# Patient Record
Sex: FEMALE | Race: WHITE | NOT HISPANIC OR LATINO | Employment: PART TIME | URBAN - METROPOLITAN AREA
[De-identification: names, ages, dates, MRNs, and addresses within clinical notes are randomized per-mention and may not be internally consistent; named-entity substitution may affect disease eponyms.]

---

## 2017-01-17 DIAGNOSIS — M54.41 ACUTE RIGHT-SIDED LOW BACK PAIN WITH RIGHT-SIDED SCIATICA: Primary | ICD-10-CM

## 2017-01-17 RX ORDER — HYDROCODONE BITARTRATE AND ACETAMINOPHEN 5; 325 MG/1; MG/1
1 TABLET ORAL EVERY 6 HOURS PRN
Qty: 45 TABLET | Refills: 0 | Status: SHIPPED | OUTPATIENT
Start: 2017-01-17 | End: 2017-02-01

## 2017-02-01 ENCOUNTER — OFFICE VISIT (OUTPATIENT)
Dept: FAMILY MEDICINE | Facility: CLINIC | Age: 47
End: 2017-02-01
Payer: COMMERCIAL

## 2017-02-01 VITALS
DIASTOLIC BLOOD PRESSURE: 80 MMHG | HEIGHT: 66 IN | SYSTOLIC BLOOD PRESSURE: 130 MMHG | WEIGHT: 142 LBS | BODY MASS INDEX: 22.82 KG/M2 | TEMPERATURE: 98.2 F | HEART RATE: 80 BPM

## 2017-02-01 DIAGNOSIS — M25.511 ACUTE PAIN OF RIGHT SHOULDER: Primary | ICD-10-CM

## 2017-02-01 DIAGNOSIS — F43.23 ADJUSTMENT DISORDER WITH MIXED ANXIETY AND DEPRESSED MOOD: ICD-10-CM

## 2017-02-01 PROCEDURE — 99214 OFFICE O/P EST MOD 30 MIN: CPT | Performed by: NURSE PRACTITIONER

## 2017-02-01 RX ORDER — TRAMADOL HYDROCHLORIDE 50 MG/1
50-100 TABLET ORAL EVERY 8 HOURS PRN
Qty: 60 TABLET | Refills: 0 | Status: SHIPPED | OUTPATIENT
Start: 2017-02-01 | End: 2017-04-12

## 2017-02-01 RX ORDER — NAPROXEN 500 MG/1
500 TABLET ORAL 2 TIMES DAILY PRN
Qty: 60 TABLET | Refills: 1 | Status: SHIPPED | OUTPATIENT
Start: 2017-02-01 | End: 2017-04-12

## 2017-02-01 RX ORDER — ALPRAZOLAM 0.5 MG
0.5 TABLET ORAL 3 TIMES DAILY PRN
Qty: 90 TABLET | Refills: 0 | Status: SHIPPED | OUTPATIENT
Start: 2017-02-01 | End: 2017-03-28

## 2017-02-01 RX ORDER — FLUOXETINE 40 MG/1
40 CAPSULE ORAL DAILY
Qty: 30 CAPSULE | Refills: 1 | Status: SHIPPED | OUTPATIENT
Start: 2017-02-01 | End: 2018-06-27

## 2017-02-01 NOTE — MR AVS SNAPSHOT
After Visit Summary   2/1/2017    Liliana Kat    MRN: [id number]           Patient Information     Date Of Birth          1970        Visit Information        Provider Department      2/1/2017 8:30 AM Paula Villa APRN [facility]        Today's Diagnoses     Adjustment disorder with mixed anxiety and depressed mood    -  1     Acute pain of right shoulder           Care Instructions      Preventive Health Recommendations  Female Ages 40 to 49    Yearly exam:     See your health care provider every year in order to  1. Review health changes.   2. Discuss preventive care.    3. Review your medicines if your doctor prescribed any.      Get a Pap test every three years (unless you have an abnormal result and your provider advises testing more often).      If you get Pap tests with HPV test, you only need to test every 5 years, unless you have an abnormal result. You do not need a Pap test if your uterus was removed (hysterectomy) and you have not had cancer.      You should be tested each year for STDs (sexually transmitted diseases), if you're at risk.       Ask your doctor if you should have a mammogram.      Have a colonoscopy (test for colon cancer) if someone in your family has had colon cancer or polyps before age 50.       Have a cholesterol test every 5 years.       Have a diabetes test (fasting glucose) after age 45. If you are at risk for diabetes, you should have this test every 3 years.    Shots: Get a flu shot each year. Get a tetanus shot every 10 years.     Nutrition:     Eat at least 5 servings of fruits and vegetables each day.    Eat whole-grain bread, whole-wheat pasta and brown rice instead of white grains and rice.    Talk to your provider about Calcium and Vitamin D.     Lifestyle    Exercise at least 150 minutes a week (an average of 30 minutes a day, 5 days a week). This will help you control your weight and prevent disease.    Limit alcohol  to one drink per day.    No smoking.     Wear sunscreen to prevent skin cancer.    See your dentist every six months for an exam and cleaning.        Follow-ups after your visit        Additional Services     MENTAL HEALTH REFERRAL       Your provider has referred you to: [place]: [facility] - Counseling (Individual/Couples/Family) - [facility] [phone]   [email or web address]   *Please call to schedule an appointment.    All scheduling is subject to the client's specific insurance plan & benefits, provider/location availability, and provider clinical specialities.  Please arrive 15 minutes early for your first appointment and bring your completed paperwork.    Please be aware that coverage of these services is subject to the terms and limitations of your health insurance plan.  Call member services at your health plan with any benefit or coverage questions.                  Who to contact     If you have questions or need follow up information about today's clinic visit or your schedule please contact [facility] directly at [phone].  Normal or non-critical lab and imaging results will be communicated to you by MyChart, letter or phone within 4 business days after the clinic has received the results. If you do not hear from us within 7 days, please contact the clinic through [employer] or phone. If you have a critical or abnormal lab result, we will notify you by phone as soon as possible.  Submit refill requests through [employer] or call your pharmacy and they will forward the refill request to us. Please allow 3 business days for your refill to be completed.          Additional Information About Your Visit        [employer] Information     [employer] lets you send messages to your doctor, view your test results, renew your prescriptions, schedule appointments and more. To sign up, go to [email or web address] . Click  "on \"Log in\" on the left side of the screen, which will take you to the Welcome page. Then click on \"Sign up Now\" on the right side of the page.     You will be asked to enter the access code listed below, as well as some personal information. Please follow the directions to create your username and password.     Your access code is: [email or web address]  Expires: 2017 10:36 AM     Your access code will  in 90 days. If you need help or a new code, please call your [facility] clinic or [phone].        Care EveryWhere ID     This is your Care EveryWhere ID. This could be used by other organizations to access your [facility] medical records  [id number]        Your Vitals Were     Pulse Temperature Height BMI (Body Mass Index) Last Period       80 98.2  F (36.8  C) (Tympanic) 5' 5.8\" (1.671 m) 23.07 kg/m2 2006        Blood Pressure from Last 3 Encounters:   17 130/80   16 102/60   16 116/62    Weight from Last 3 Encounters:   17 142 lb (64.411 kg)   16 143 lb (64.864 kg)   16 143 lb (64.864 kg)              We Performed the Following     MENTAL HEALTH REFERRAL          Today's Medication Changes          These changes are accurate as of: 17 10:36 AM.  If you have any questions, ask your nurse or doctor.               Start taking these medicines.        Dose/Directions    ALPRAZolam 0.5 MG tablet   Commonly known as:  XANAX   Used for:  Adjustment disorder with mixed anxiety and depressed mood   Started by:  Paula Villa APRN CNP        Dose:  0.5 mg   Take 1 tablet (0.5 mg) by mouth 3 times daily as needed for anxiety   Quantity:  90 tablet   Refills:  0       FLUoxetine 40 MG capsule   Commonly known as:  PROzac   Used for:  Adjustment disorder with mixed anxiety and depressed mood   Started by:  Paula Villa APRN CNP        Dose:  40 mg   Take 1 capsule (40 mg) by mouth daily   Quantity:  30 capsule   Refills:  1       naproxen 500 MG tablet   Commonly " known as:  NAPROSYN   Used for:  Acute pain of right shoulder   Started by:  Paula Villa APRN CNP        Dose:  500 mg   Take 1 tablet (500 mg) by mouth 2 times daily as needed for moderate pain   Quantity:  60 tablet   Refills:  1       traMADol 50 MG tablet   Commonly known as:  ULTRAM   Used for:  Acute pain of right shoulder   Started by:  Paula Villa APRN CNP        Dose:   mg   Take 1-2 tablets ( mg) by mouth every 8 hours as needed for pain Maximum 3 tablet(s) per day   Quantity:  60 tablet   Refills:  0         Stop taking these medicines if you haven't already. Please contact your care team if you have questions.     citalopram 20 MG tablet   Commonly known as:  celeXA   Stopped by:  Paula Villa APRN CNP           hydrOXYzine 50 MG capsule   Commonly known as:  VISTARIL   Stopped by:  Paula Villa APRN CNP                Where to get your medicines      These medications were sent to [facility]  [address] [facility] [address] [facility] [address]     Phone:  [phone]    - FLUoxetine 40 MG capsule  - naproxen 500 MG tablet      Some of these will need a paper prescription and others can be bought over the counter.  Ask your nurse if you have questions.     Bring a paper prescription for each of these medications    - ALPRAZolam 0.5 MG tablet  - traMADol 50 MG tablet             Primary Care Provider Office Phone # Fax #    LOLIS Osorio [phone]        [address] [address]   [facility] [address]        Thank you!     Thank you for choosing [facility]  for your care. Our goal is always to provide you with excellent care. Hearing back from our patients is one way we can continue to improve our services. Please take a few minutes to complete the written survey that you may receive in the mail after your visit with us. Thank you!             Your Updated Medication  List - Protect others around you: Learn how to safely use, store and throw away your medicines at www.disposemymeds.org.          This list is accurate as of: 2/1/17 10:36 AM.  Always use your most recent med list.                   Brand Name Dispense Instructions for use    * albuterol (2.5 MG/3ML) 0.083% neb solution     1 BOX    ONE NEBULIZATION 4 TIMES DAILY AS NEEDED       * albuterol 108 (90 BASE) MCG/ACT Inhaler    PROAIR HFA/PROVENTIL HFA/VENTOLIN HFA    1 Inhaler    Inhale 2 puffs into the lungs every 6 hours as needed for shortness of breath / dyspnea or wheezing       ALPRAZolam 0.5 MG tablet    XANAX    90 tablet    Take 1 tablet (0.5 mg) by mouth 3 times daily as needed for anxiety       aspirin 81 MG tablet      Take 1 tablet by mouth daily.       cyclobenzaprine 10 MG tablet    FLEXERIL    30 tablet    Take 1 tablet (10 mg) by mouth 3 times daily as needed for muscle spasms       estradiol 1 MG tablet    ESTRACE    30 tablet    TAKE ONE TABLET BY MOUTH ONCE DAILY       FLUoxetine 40 MG capsule    PROzac    30 capsule    Take 1 capsule (40 mg) by mouth daily       fluticasone 50 MCG/ACT spray    FLONASE    1 Package    Spray 2 sprays into both nostrils daily       medroxyPROGESTERone 5 MG tablet    PROVERA    90 tablet    TAKE ONE TABLET BY MOUTH ONCE DAILY       MELATONIN          naproxen 500 MG tablet    NAPROSYN    60 tablet    Take 1 tablet (500 mg) by mouth 2 times daily as needed for moderate pain       omeprazole 40 MG capsule    priLOSEC    90 capsule    Take 1 capsule (40 mg) by mouth daily as needed Take 30-60 minutes before a meal.       traMADol 50 MG tablet    ULTRAM    60 tablet    Take 1-2 tablets ( mg) by mouth every 8 hours as needed for pain Maximum 3 tablet(s) per day       * Notice:  This list has 2 medication(s) that are the same as other medications prescribed for you. Read the directions carefully, and ask your doctor or other care provider to review them with you.
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
## 2017-02-01 NOTE — NURSING NOTE
"Chief Complaint   Patient presents with     Shoulder Pain     right shoulder pain       Initial /80 mmHg  Pulse 80  Temp(Src) 98.2  F (36.8  C) (Tympanic)  Ht 5' 5.8\" (1.671 m)  Wt 142 lb (64.411 kg)  BMI 23.07 kg/m2  LMP 03/06/2006 Estimated body mass index is 23.07 kg/(m^2) as calculated from the following:    Height as of this encounter: 5' 5.8\" (1.671 m).    Weight as of this encounter: 142 lb (64.411 kg).  BP completed using cuff size: large  "

## 2017-02-02 ENCOUNTER — HOSPITAL ENCOUNTER (OUTPATIENT)
Dept: MRI IMAGING | Facility: CLINIC | Age: 47
Discharge: HOME | End: 2017-02-02
Attending: NURSE PRACTITIONER | Admitting: NURSE PRACTITIONER
Payer: COMMERCIAL

## 2017-02-02 DIAGNOSIS — M25.511 ACUTE PAIN OF RIGHT SHOULDER: ICD-10-CM

## 2017-02-02 PROCEDURE — 73221 MRI JOINT UPR EXTREM W/O DYE: CPT | Mod: RT

## 2017-02-02 ASSESSMENT — ANXIETY QUESTIONNAIRES
1. FEELING NERVOUS, ANXIOUS, OR ON EDGE: SEVERAL DAYS
7. FEELING AFRAID AS IF SOMETHING AWFUL MIGHT HAPPEN: SEVERAL DAYS
GAD7 TOTAL SCORE: 8
IF YOU CHECKED OFF ANY PROBLEMS ON THIS QUESTIONNAIRE, HOW DIFFICULT HAVE THESE PROBLEMS MADE IT FOR YOU TO DO YOUR WORK, TAKE CARE OF THINGS AT HOME, OR GET ALONG WITH OTHER PEOPLE: VERY DIFFICULT
6. BECOMING EASILY ANNOYED OR IRRITABLE: MORE THAN HALF THE DAYS
5. BEING SO RESTLESS THAT IT IS HARD TO SIT STILL: SEVERAL DAYS
2. NOT BEING ABLE TO STOP OR CONTROL WORRYING: SEVERAL DAYS
3. WORRYING TOO MUCH ABOUT DIFFERENT THINGS: SEVERAL DAYS

## 2017-02-02 ASSESSMENT — PATIENT HEALTH QUESTIONNAIRE - PHQ9
5. POOR APPETITE OR OVEREATING: SEVERAL DAYS
SUM OF ALL RESPONSES TO PHQ QUESTIONS 1-9: 20

## 2017-02-03 ASSESSMENT — ANXIETY QUESTIONNAIRES: GAD7 TOTAL SCORE: 8

## 2017-02-06 ENCOUNTER — OFFICE VISIT (OUTPATIENT)
Dept: ORTHOPEDICS | Facility: CLINIC | Age: 47
End: 2017-02-06
Payer: COMMERCIAL

## 2017-02-06 ENCOUNTER — RADIANT APPOINTMENT (OUTPATIENT)
Dept: GENERAL RADIOLOGY | Facility: CLINIC | Age: 47
End: 2017-02-06
Attending: ORTHOPAEDIC SURGERY
Payer: COMMERCIAL

## 2017-02-06 VITALS — BODY MASS INDEX: 22.82 KG/M2 | HEIGHT: 66 IN | TEMPERATURE: 97.4 F | WEIGHT: 142 LBS

## 2017-02-06 DIAGNOSIS — M25.511 ACUTE PAIN OF RIGHT SHOULDER: ICD-10-CM

## 2017-02-06 DIAGNOSIS — S43.431A SUPERIOR GLENOID LABRUM LESION OF RIGHT SHOULDER, INITIAL ENCOUNTER: Primary | ICD-10-CM

## 2017-02-06 PROCEDURE — 20610 DRAIN/INJ JOINT/BURSA W/O US: CPT | Mod: RT | Performed by: PHYSICIAN ASSISTANT

## 2017-02-06 PROCEDURE — 73030 X-RAY EXAM OF SHOULDER: CPT | Mod: TC

## 2017-02-06 PROCEDURE — 99244 OFF/OP CNSLTJ NEW/EST MOD 40: CPT | Mod: 25 | Performed by: PHYSICIAN ASSISTANT

## 2017-02-06 RX ORDER — MELOXICAM 15 MG/1
15 TABLET ORAL DAILY
Qty: 90 TABLET | Refills: 1 | Status: SHIPPED | OUTPATIENT
Start: 2017-02-06 | End: 2017-04-12

## 2017-02-06 ASSESSMENT — PAIN SCALES - GENERAL: PAINLEVEL: SEVERE PAIN (6)

## 2017-02-06 NOTE — Clinical Note
[address] [address]  Phone: [phone]  Fax: [phone]    February 6, 2017        Liliana Kat  [address] [place] [id number]          To whom it may concern:    RE: Liliana Kat    Patient was seen and treated today at our clinic.  Patient may return to work with the following: No lifting greater than 5 pounds with right arm for the next 6 weeks.  She will be reassessed at that time.   Please contact me for questions or concerns.      Sincerely,        Bruce Valentine PA-C

## 2017-02-06 NOTE — MR AVS SNAPSHOT
After Visit Summary   2/6/2017    Liliana Kat    MRN: [id number]           Patient Information     Date Of Birth          1970        Visit Information        Provider Department      2/6/2017 8:00 AM Letitia Stratton MD [facility]        Today's Diagnoses     Superior glenoid labrum lesion of right shoulder, initial encounter    -  1     Acute pain of right shoulder           Care Instructions    Encounter Diagnoses   Name Primary?     Acute pain of right shoulder      Superior glenoid labrum lesion of right shoulder, initial encounter Yes     Rest, ice and elevate above heart level as needed for pain control  1. Today we discussed your MRI findings which show a superior anterior to posterior labral tear.  2. This is the socket around your shoulder joint.  3. On exam I do believe you could have some rotator cuff irritation also as well as this.  4. We can treat this conservatively without surgery to begin with.  5. We decided to do a cortisone injection in your right shoulder to help cool down the shoulder and bring down inflammation  6. We also do a prescription of Mobic. Mobic 15mg once a day times 2 weeks, then take as needed   7. We also did a work note that says no lifting greater than 5 pounds for the next 6 weeks. We will see you back in 6 weeks.    1. You received an injection of cortisone into your R shoulder today.  2. The joint may be more painful for the first 1-2 days.  3. We ask you to continue to rest the joint for a few more days before resuming regular activities.  4. Pain Medications you can take:  Tylenol  Take 1000 mg by mouth every 6 hours as needed; maximum dose 4000 mg a day  Ibuprofen  600 mg every 6 hours as needed; maximum 2400 mg a day  (OK to take tylenol and ibuprofen at the same time)  5. Rest, ice and elevate as needed for pain control  6. Watch for these signs of infection: redness, swelling, drainage, warmth to touch, increased pain,  or fever. Call the clinic or make an appointment to be seen if you think you have an infection.  7. If you are diabetic, make sure you keep a close eye on your blood sugars, they can get elevated with cortisone injections.   8. Sometimes it can take 1-2 weeks for it to reach its full effect.  9. You can followup with Letitia Stratton MD in 6 weeks.    HubHub and [email or web address] may offer reliable information regarding your diagnosis and treatment plan.    THANK YOU for coming in today. If you receive a survey via impok or mail please let us know if there was anything you especially appreciated today or if there is any way we can improve our clinic. We appreciate your input.    GENERAL INFORMATION:  Our hours are:  Monday :     Clinic 7:30 AM-430 PM ([facility])  Tuesday:      Operating Room All Day ([facility])  Wednesday: Clinic 7:30 AM - 11:15 AM ([facility])             Clinic 1:00 PM - 4:00PM ([facility])  Thursday:     Administrative Day  Friday:          Clinic 7:30 AM - 11:15 AM ([facility])            Clinic 1:00 PM - 4:00 PM ([facility])    We are not in the office Thursdays. Therefore non- urgent calls and medical messages received on Thursday will be addressed when we are back in the office on Wednesday. Urgent matters will be reviewed and addressed by one of our partners in the office as needed.    If lab work was done today as part of your evaluation you will generally be contacted via impok, mail, or phone with the results within 1-5 days. If there is an alarming result we will contact you by phone. Lab results come back at varying times, I generally wait until all labs are resulted before making comments on results. Please note labs are automatically released to impok (if you have signed up for it) once available-at times you may see these prior to my having a chance  to review them as well.    If you need refills please contact your pharmacist. They will send a refill request to me to review. Please allow 3 business days for us to process all refill requests. All narcotic refills should be handled in the clinic at the time of your visit.    Cortisone Injections  Cortisone is a type of steroid. It can greatly reduce swelling, redness, and irritation (inflammation) and pain. Being injected with cortisone is simple and doesn t take long. Your doctor may ask you questions about your health. Certain health conditions, such as diabetes, can be affected by cortisone.     Your pain may be relieved by a cortisone injection.   Why have a cortisone injection?  Injecting cortisone can relieve pain for anything from a sports injury to arthritis. Your doctor may suggest an injection if rest, splints, or oral medicine doesn t relieve your pain. Injecting cortisone is simpler than having surgery. And cortisone may provide the lasting pain relief that can help you get out and enjoy life again.  Getting the injection  Your doctor will start by cleaning and occasionally numbing your skin at the injection site. Next you ll be injected with local anesthetics (for short-term pain relief) and cortisone. The injection may last a few moments. A small bandage will be put over the injection site. You ll then be ready to go home.  After your injection  After being injected, make sure you don t injure the treated region. But stay active. Enjoy a walk or some other mild activity. Just be careful not to strain the region that gave you trouble.  The next day  Some people feel more pain after being injected. This is normal, and it will go away soon. Applying ice for 20 minutes at a time to your injury may reduce the increased pain. Rest for the first day or two. You don t need to stay in bed. But avoid tasks that may strain the injured region.  If you have diabetes  Cortisone injections can cause blood sugar to  be increased for several days after the injection. If you have diabetes, you should follow your blood  sugar closely during this time. Follow your regular plan for what to do when your blood sugar is elevated.     5029-7601 [employer]. [address] [place] [address], [place] [address]. All rights reserved. This information is not intended as a substitute for professional medical care. Always follow your healthcare professional's instructions.                    Follow-ups after your visit        Additional Services     PHYSICAL THERAPY REFERRAL       *This therapy referral will be filtered to a centralized scheduling office at [facility] and the patient will receive a call to schedule an appointment at a [facility] location most convenient for them. *     [facility] provides Physical Therapy evaluation and treatment and many specialty services across the [facility] system.  If requesting a specialty program, please choose from the list below.    If you have not heard from the scheduling office within 2 business days, please call [phone] for all locations, with the exception of [place], please call [phone].  Treatment: Evaluation & Treatment  Special Instructions/Modalities: slap tear left shoulder  Special Programs:     Please be aware that coverage of these services is subject to the terms and limitations of your health insurance plan.  Call member services at your health plan with any benefit or coverage questions.      **Note to Provider:  If you are referring outside of [facility] for the therapy appointment, please list the name of the location in the  special instructions  above, print the referral and give to the patient to schedule the appointment.                  Your next 10 appointments already scheduled     Feb 07, 2017  9:00 AM   New Visit with ELVA Doe   [facility] ([facility])    [address] [facility] [id number] [phone]            2017 10:30 AM   Return Visit with ELVA Doe   [facility] ([facility])    [address] [id number] [phone]              Who to contact     If you have questions or need follow up information about today's clinic visit or your schedule please contact [facility] directly at [phone].  Normal or non-critical lab and imaging results will be communicated to you by Mediumhart, letter or phone within 4 business days after the clinic has received the results. If you do not hear from us within 7 days, please contact the clinic through Mediumhart or phone. If you have a critical or abnormal lab result, we will notify you by phone as soon as possible.  Submit refill requests through Flexiroam or call your pharmacy and they will forward the refill request to us. Please allow 3 business days for your refill to be completed.          Additional Information About Your Visit        [employer] Information     Flexiroam lets you send messages to your doctor, view your test results, renew your prescriptions, schedule appointments and more. To sign up, go to [email or web address] . Click on \"Log in\" on the left side of the screen, which will take you to the Welcome page. Then click on \"Sign up Now\" on the right side of the page.     You will be asked to enter the access code listed below, as well as some personal information. Please follow the directions to create your username and password.     Your access code is: [email or web address]  Expires: 2017 10:36 AM     Your access code will  in 90 days. If you need help or a new code, please call your [place] clinic or [phone].        Care EveryWhere ID     This is your Care EveryWhere ID. This could be used by other organizations to access your [place] medical records  [id number]        Your Vitals Were     " "Temperature Height BMI (Body Mass Index) Last Period          97.4  F (36.3  C) 1.671 m (5' 5.8\") 23.07 kg/m2 03/06/2006         Blood Pressure from Last 3 Encounters:   02/01/17 130/80   11/17/16 102/60   09/09/16 116/62    Weight from Last 3 Encounters:   02/06/17 64.411 kg (142 lb)   02/01/17 64.411 kg (142 lb)   11/17/16 64.864 kg (143 lb)              We Performed the Following     PHYSICAL THERAPY REFERRAL          Today's Medication Changes          These changes are accurate as of: 2/6/17  8:45 AM.  If you have any questions, ask your nurse or doctor.               Start taking these medicines.        Dose/Directions    meloxicam 15 MG tablet   Commonly known as:  MOBIC   Used for:  Acute pain of right shoulder, Superior glenoid labrum lesion of right shoulder, initial encounter   Started by:  Letitia Stratton MD        Dose:  15 mg   Take 1 tablet (15 mg) by mouth daily   Quantity:  90 tablet   Refills:  1            Where to get your medicines      These medications were sent to [address]  [address], [facility] [address]     Phone:  [phone]    - meloxicam 15 MG tablet             Primary Care Provider Office Phone # Fax #    Paula LOLIS Renee [facility] [phone]       [facility] [address]   [facility] [address]        Thank you!     Thank you for choosing [facility]  for your care. Our goal is always to provide you with excellent care. Hearing back from our patients is one way we can continue to improve our services. Please take a few minutes to complete the written survey that you may receive in the mail after your visit with us. Thank you!             Your Updated Medication List - Protect others around you: Learn how to safely use, store and throw away your medicines at www.disposemymeds.org.          This list is accurate as of: 2/6/17  8:45 AM.  Always use your most recent med list.                   " Brand Name Dispense Instructions for use    * albuterol (2.5 MG/3ML) 0.083% neb solution     1 BOX    ONE NEBULIZATION 4 TIMES DAILY AS NEEDED       * albuterol 108 (90 BASE) MCG/ACT Inhaler    PROAIR HFA/PROVENTIL HFA/VENTOLIN HFA    1 Inhaler    Inhale 2 puffs into the lungs every 6 hours as needed for shortness of breath / dyspnea or wheezing       ALPRAZolam 0.5 MG tablet    XANAX    90 tablet    Take 1 tablet (0.5 mg) by mouth 3 times daily as needed for anxiety       aspirin 81 MG tablet      Take 1 tablet by mouth daily.       cyclobenzaprine 10 MG tablet    FLEXERIL    30 tablet    Take 1 tablet (10 mg) by mouth 3 times daily as needed for muscle spasms       estradiol 1 MG tablet    ESTRACE    30 tablet    TAKE ONE TABLET BY MOUTH ONCE DAILY       FLUoxetine 40 MG capsule    PROzac    30 capsule    Take 1 capsule (40 mg) by mouth daily       fluticasone 50 MCG/ACT spray    FLONASE    1 Package    Spray 2 sprays into both nostrils daily       medroxyPROGESTERone 5 MG tablet    PROVERA    90 tablet    TAKE ONE TABLET BY MOUTH ONCE DAILY       MELATONIN          meloxicam 15 MG tablet    MOBIC    90 tablet    Take 1 tablet (15 mg) by mouth daily       naproxen 500 MG tablet    NAPROSYN    60 tablet    Take 1 tablet (500 mg) by mouth 2 times daily as needed for moderate pain       omeprazole 40 MG capsule    priLOSEC    90 capsule    Take 1 capsule (40 mg) by mouth daily as needed Take 30-60 minutes before a meal.       traMADol 50 MG tablet    ULTRAM    60 tablet    Take 1-2 tablets ( mg) by mouth every 8 hours as needed for pain Maximum 3 tablet(s) per day       * Notice:  This list has 2 medication(s) that are the same as other medications prescribed for you. Read the directions carefully, and ask your doctor or other care provider to review them with you.
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
## 2017-02-06 NOTE — PROGRESS NOTES
Liliana Kat is a 46 year old female who is seen in consultation at the request of Paula Villa.    History of Present illness:    Liliana presents for evaluation of:    1.) rt shoulder  2.)     Onset:  early and January      Symptoms brought on by stretching, lifting.     Location:  shoulder and arm, rt.      Character:  sharp.      Progression of symptoms:  worse.      Previous similar pain: YES.  10 yrs ago    Pain Level:  6/10.     Previous treatments:  immobilization.    Currently on Blood thinners? no    Diagnosis of Diabetes? no

## 2017-02-06 NOTE — PATIENT INSTRUCTIONS
Encounter Diagnoses   Name Primary?     Acute pain of right shoulder      Superior glenoid labrum lesion of right shoulder, initial encounter Yes     Rest, ice and elevate above heart level as needed for pain control  1. Today we discussed your MRI findings which show a superior anterior to posterior labral tear.  2. This is the socket around your shoulder joint.  3. On exam I do believe you could have some rotator cuff irritation also as well as this.  4. We can treat this conservatively without surgery to begin with.  5. We decided to do a cortisone injection in your right shoulder to help cool down the shoulder and bring down inflammation  6. We also do a prescription of Mobic. Mobic 15mg once a day times 2 weeks, then take as needed   7. We also did a work note that says no lifting greater than 5 pounds for the next 6 weeks. We will see you back in 6 weeks.    1. You received an injection of cortisone into your R shoulder today.  2. The joint may be more painful for the first 1-2 days.  3. We ask you to continue to rest the joint for a few more days before resuming regular activities.  4. Pain Medications you can take:  Tylenol  Take 1000 mg by mouth every 6 hours as needed; maximum dose 4000 mg a day  Ibuprofen  600 mg every 6 hours as needed; maximum 2400 mg a day  (OK to take tylenol and ibuprofen at the same time)  5. Rest, ice and elevate as needed for pain control  6. Watch for these signs of infection: redness, swelling, drainage, warmth to touch, increased pain, or fever. Call the clinic or make an appointment to be seen if you think you have an infection.  7. If you are diabetic, make sure you keep a close eye on your blood sugars, they can get elevated with cortisone injections.   8. Sometimes it can take 1-2 weeks for it to reach its full effect.  9. You can followup with Letitia Stratton MD in 6 weeks.    [employer] and [employer] may offer reliable information regarding your diagnosis and  treatment plan.    THANK YOU for coming in today. If you receive a survey via [employer] [place] or mail please let us know if there was anything you especially appreciated today or if there is any way we can improve our clinic. We appreciate your input.    GENERAL INFORMATION:  Our hours are:  Monday :     Clinic 7:30 AM-430 PM ([facility])  Tuesday:      Operating Room All Day ([facility])  Wednesday: Clinic 7:30 AM - 11:15 AM ([facility])             Clinic 1:00 PM - 4:00PM ([facility])  Thursday:     Administrative Day  Friday:          Clinic 7:30 AM - 11:15 AM ([facility])            Clinic 1:00 PM - 4:00 PM ([facility])    We are not in the office Thursdays. Therefore non- urgent calls and medical messages received on Thursday will be addressed when we are back in the office on Wednesday. Urgent matters will be reviewed and addressed by one of our partners in the office as needed.    If lab work was done today as part of your evaluation you will generally be contacted via [employer] [place], mail, or phone with the results within 1-5 days. If there is an alarming result we will contact you by phone. Lab results come back at varying times, I generally wait until all labs are resulted before making comments on results. Please note labs are automatically released to [employer] [place] (if you have signed up for it) once available-at times you may see these prior to my having a chance to review them as well.    If you need refills please contact your pharmacist. They will send a refill request to me to review. Please allow 3 business days for us to process all refill requests. All narcotic refills should be handled in the clinic at the time of your visit.    Cortisone Injections  Cortisone is a type of steroid. It can greatly reduce swelling, redness, and irritation (inflammation) and pain. Being injected with  cortisone is simple and doesn t take long. Your doctor may ask you questions about your health. Certain health conditions, such as diabetes, can be affected by cortisone.     Your pain may be relieved by a cortisone injection.   Why have a cortisone injection?  Injecting cortisone can relieve pain for anything from a sports injury to arthritis. Your doctor may suggest an injection if rest, splints, or oral medicine doesn t relieve your pain. Injecting cortisone is simpler than having surgery. And cortisone may provide the lasting pain relief that can help you get out and enjoy life again.  Getting the injection  Your doctor will start by cleaning and occasionally numbing your skin at the injection site. Next you ll be injected with local anesthetics (for short-term pain relief) and cortisone. The injection may last a few moments. A small bandage will be put over the injection site. You ll then be ready to go home.  After your injection  After being injected, make sure you don t injure the treated region. But stay active. Enjoy a walk or some other mild activity. Just be careful not to strain the region that gave you trouble.  The next day  Some people feel more pain after being injected. This is normal, and it will go away soon. Applying ice for 20 minutes at a time to your injury may reduce the increased pain. Rest for the first day or two. You don t need to stay in bed. But avoid tasks that may strain the injured region.  If you have diabetes  Cortisone injections can cause blood sugar to be increased for several days after the injection. If you have diabetes, you should follow your blood  sugar closely during this time. Follow your regular plan for what to do when your blood sugar is elevated.     5215-1124 [employer]. [address] [place] [address]. All rights reserved. This information is not intended as a substitute for professional medical care. Always follow your healthcare  professional's instructions.

## 2017-02-06 NOTE — NURSING NOTE
"Chief Complaint   Patient presents with     Consult     rt shoulder pain per Paula Villa       Initial Temp(Src) 97.4  F (36.3  C)  Ht 1.671 m (5' 5.8\")  Wt 64.411 kg (142 lb)  BMI 23.07 kg/m2  LMP 03/06/2006 Estimated body mass index is 23.07 kg/(m^2) as calculated from the following:    Height as of this encounter: 1.671 m (5' 5.8\").    Weight as of this encounter: 64.411 kg (142 lb).  BP completed using cuff size: NA (Not Taken)    Erma Beebe MA      "

## 2017-02-17 DIAGNOSIS — M54.41 ACUTE RIGHT-SIDED LOW BACK PAIN WITH RIGHT-SIDED SCIATICA: ICD-10-CM

## 2017-02-17 RX ORDER — HYDROCODONE BITARTRATE AND ACETAMINOPHEN 5; 325 MG/1; MG/1
1 TABLET ORAL EVERY 6 HOURS PRN
Qty: 45 TABLET | Refills: 0 | Status: SHIPPED | OUTPATIENT
Start: 2017-02-17 | End: 2017-03-23

## 2017-02-17 NOTE — TELEPHONE ENCOUNTER
Hydrocodone-acetaminophen 5-325      Last Written Prescription Date: 09/09/16  Last Fill Quantity: 30,  # refills: 0   Last Office Visit with [facility], [facility] or [facility] prescribing provider: 02/01/17                                         Next 5 appointments (look out 90 days)     Mar 20, 2017  9:00 AM CDT   [facility] Visit with Letitia Stratton MD   [facility]    [address] [place] [id number] [phone]

## 2017-03-02 RX ORDER — CITALOPRAM HYDROBROMIDE 40 MG/1
TABLET ORAL
Qty: 30 TABLET | Refills: 4 | OUTPATIENT
Start: 2017-03-02

## 2017-03-22 ENCOUNTER — OFFICE VISIT (OUTPATIENT)
Dept: ORTHOPEDICS | Facility: CLINIC | Age: 47
End: 2017-03-22
Payer: COMMERCIAL

## 2017-03-22 VITALS — HEIGHT: 65 IN | WEIGHT: 143 LBS | TEMPERATURE: 97 F | BODY MASS INDEX: 23.82 KG/M2

## 2017-03-22 DIAGNOSIS — M75.41 IMPINGEMENT SYNDROME OF RIGHT SHOULDER: Primary | ICD-10-CM

## 2017-03-22 PROCEDURE — 99213 OFFICE O/P EST LOW 20 MIN: CPT | Mod: 25 | Performed by: ORTHOPAEDIC SURGERY

## 2017-03-22 PROCEDURE — 20610 DRAIN/INJ JOINT/BURSA W/O US: CPT | Mod: RT | Performed by: ORTHOPAEDIC SURGERY

## 2017-03-22 NOTE — MR AVS SNAPSHOT
"              After Visit Summary   3/22/2017    Liliana Kat    MRN: [id number]           Patient Information     Date Of Birth          1970        Visit Information        Provider Department      3/22/2017 1:30 PM Letitia Stratton MD [facility]        Today's Diagnoses     Impingement syndrome of right shoulder    -  1       Follow-ups after your visit        Your next 10 appointments already scheduled     May 03, 2017  1:00 PM CDT   Return Visit with Letitia Stratton MD   [facility]    [address] [place] [id number] [phone]              Who to contact     If you have questions or need follow up information about today's clinic visit or your schedule please contact [facility] directly at [phone].  Normal or non-critical lab and imaging results will be communicated to you by [employer], letter or phone within 4 business days after the clinic has received the results. If you do not hear from us within 7 days, please contact the clinic through MyChart or phone. If you have a critical or abnormal lab result, we will notify you by phone as soon as possible.  Submit refill requests through [employer] or call your pharmacy and they will forward the refill request to us. Please allow 3 business days for your refill to be completed.          Additional Information About Your Visit        MyChart Information     [employer] lets you send messages to your doctor, view your test results, renew your prescriptions, schedule appointments and more. To sign up, go to [email or web address] . Click on \"Log in\" on the left side of the screen, which will take you to the Welcome page. Then click on \"Sign up Now\" on the right side of the page.     You will be asked to enter the access code listed below, as well as some personal information. Please follow the directions to create your username and password.     Your " "access code is: [email or web address]  Expires: 2017 11:36 AM     Your access code will  in 90 days. If you need help or a new code, please call your [facility] clinic or [phone].        Care EveryWhere ID     This is your Care EveryWhere ID. This could be used by other organizations to access your [facility] medical records  [id number]        Your Vitals Were     Temperature Height Last Period BMI (Body Mass Index)          97  F (36.1  C) 1.651 m (5' 5\") 2006 23.8 kg/m2         Blood Pressure from Last 3 Encounters:   17 130/80   16 102/60   16 116/62    Weight from Last 3 Encounters:   17 64.9 kg (143 lb)   17 64.4 kg (142 lb)   17 64.4 kg (142 lb)              Today, you had the following     No orders found for display       Primary Care Provider Office Phone # Fax #    LOLIS Osorio [facility] [phone]       [facility]  [address] [place] [address]        Thank you!     Thank you for choosing [facility]  for your care. Our goal is always to provide you with excellent care. Hearing back from our patients is one way we can continue to improve our services. Please take a few minutes to complete the written survey that you may receive in the mail after your visit with us. Thank you!             Your Updated Medication List - Protect others around you: Learn how to safely use, store and throw away your medicines at www.disposemymeds.org.          This list is accurate as of: 3/22/17  4:41 PM.  Always use your most recent med list.                   Brand Name Dispense Instructions for use    * albuterol (2.5 MG/3ML) 0.083% neb solution     1 BOX    ONE NEBULIZATION 4 TIMES DAILY AS NEEDED       * albuterol 108 (90 BASE) MCG/ACT Inhaler    PROAIR HFA/PROVENTIL HFA/VENTOLIN HFA    1 Inhaler    Inhale 2 puffs into the lungs every 6 hours as needed for shortness of breath / dyspnea or wheezing       ALPRAZolam 0.5 MG " tablet    XANAX    90 tablet    Take 1 tablet (0.5 mg) by mouth 3 times daily as needed for anxiety       aspirin 81 MG tablet      Take 1 tablet by mouth daily.       cyclobenzaprine 10 MG tablet    FLEXERIL    30 tablet    Take 1 tablet (10 mg) by mouth 3 times daily as needed for muscle spasms       estradiol 1 MG tablet    ESTRACE    30 tablet    TAKE ONE TABLET BY MOUTH ONCE DAILY       FLUoxetine 40 MG capsule    PROzac    30 capsule    Take 1 capsule (40 mg) by mouth daily       fluticasone 50 MCG/ACT spray    FLONASE    1 Package    Spray 2 sprays into both nostrils daily       HYDROcodone-acetaminophen 5-325 MG per tablet    NORCO    45 tablet    Take 1 tablet by mouth every 6 hours as needed for moderate to severe pain       medroxyPROGESTERone 5 MG tablet    PROVERA    90 tablet    TAKE ONE TABLET BY MOUTH ONCE DAILY       MELATONIN          meloxicam 15 MG tablet    MOBIC    90 tablet    Take 1 tablet (15 mg) by mouth daily       naproxen 500 MG tablet    NAPROSYN    60 tablet    Take 1 tablet (500 mg) by mouth 2 times daily as needed for moderate pain       omeprazole 40 MG capsule    priLOSEC    90 capsule    Take 1 capsule (40 mg) by mouth daily as needed Take 30-60 minutes before a meal.       traMADol 50 MG tablet    ULTRAM    60 tablet    Take 1-2 tablets ( mg) by mouth every 8 hours as needed for pain Maximum 3 tablet(s) per day       * Notice:  This list has 2 medication(s) that are the same as other medications prescribed for you. Read the directions carefully, and ask your doctor or other care provider to review them with you.

## 2017-03-22 NOTE — LETTER
"  3/22/2017       RE: Liliana Kat  [address] [address]           Dear Colleague,    Thank you for referring your patient, Liliana Kat, to the [facility]. Please see a copy of my visit note below.    Office Visit-Follow up    Chief Complaint: Liliana Kat is a 46 year old female who is being seen for   Chief Complaint   Patient presents with     RECHECK     6 wk rt shoulder f/u       History of Present Illness:   The injection given last visit worked for 2 days, mobic didn't help at all.  Still working full duty doing heavy lifting.  Severe pain at night.      REVIEW OF SYSTEMS  General: negative for, night sweats, dizziness, fatigue  Resp: No shortness of breath and no cough  CV: negative for chest pain, syncope or near-syncope  GI: negative for nausea, vomiting and diarrhea  : negative for dysuria and hematuria  Musculoskeletal: as above  Neurologic: negative for syncope   Hematologic: negative for bleeding disorder    Physical Exam:  Vitals: Temp 97  F (36.1  C)  Ht 1.651 m (5' 5\")  Wt 64.9 kg (143 lb)  LMP 03/06/2006  BMI 23.8 kg/m2  BMI= Body mass index is 23.8 kg/(m^2).  Constitutional: healthy, alert and no acute distress   Psychiatric: mentation appears normal and affect normal/bright  NEURO: no focal deficits  RESP: Normal with easy respirations and no use of accessory muscles to breathe, no audible wheezing or retractions  CV: No peripheral edema  SKIN: No erythema, rashes, excoriation, or breakdown. No evidence of infection.   JOINT/EXTREMITIES:right shoulder - global tenderness, full passive ROM with coaxing, positive impingement, ok strength in rotator cuff, ?labral signs  GAIT: non-antalgic      Diagnostic Modalities:  None today.      Impression: right rotator cuff tendonitis and SLAP tear    Plan:  All of the above pertinent physical exam and imaging modalities findings was reviewed with Liliana.                                         "  CONSERVATIVE CARE:  I recommend conservative care for the patient to include Tramadol, focused self directed physical therapy, steroid injections, activity modifications. Today I provided or dispensed Tramadol prescription.                                        INJECTION PROCEDURE:  The patient was counseled about an  injection, including discussion of risks (including infection), contents of the injection, rationale for performing the injection, and expected benefits of the injection. The skin was prepped with alcohol and betadine and then utilizing sterile technique an injection of the right shoulder subacromial space from the anterolateral approach  was performed. The injection consisted 1ml of Kenalog (40mg per 1ml) with 8ml 1% lidocaine plain. The patient tolerated the injection well, and there were no complications. The injection site was covered with a Band-Aid. The injection was performed by Letitia Stratton M.D.                                                FUTURE PLAN:  On their return if they still have symptoms we will consider physical therapy, NSAID's, injection of steroids, OOW.      Return to clinic 6, week(s), or sooner as needed for changes.  Re-x-ray on return: No    Letitia Stratton M.D.          Again, thank you for allowing me to participate in the care of your patient.        Sincerely,    Letitia Stratton MD

## 2017-03-22 NOTE — PROGRESS NOTES
"Office Visit-Follow up    Chief Complaint: Liliana Kat is a 46 year old female who is being seen for   Chief Complaint   Patient presents with     RECHECK     6 wk rt shoulder f/u       History of Present Illness:   The injection given last visit worked for 2 days, mobic didn't help at all.  Still working full duty doing heavy lifting.  Severe pain at night.      REVIEW OF SYSTEMS  General: negative for, night sweats, dizziness, fatigue  Resp: No shortness of breath and no cough  CV: negative for chest pain, syncope or near-syncope  GI: negative for nausea, vomiting and diarrhea  : negative for dysuria and hematuria  Musculoskeletal: as above  Neurologic: negative for syncope   Hematologic: negative for bleeding disorder    Physical Exam:  Vitals: Temp 97  F (36.1  C)  Ht 1.651 m (5' 5\")  Wt 64.9 kg (143 lb)  LMP 03/06/2006  BMI 23.8 kg/m2  BMI= Body mass index is 23.8 kg/(m^2).  Constitutional: healthy, alert and no acute distress   Psychiatric: mentation appears normal and affect normal/bright  NEURO: no focal deficits  RESP: Normal with easy respirations and no use of accessory muscles to breathe, no audible wheezing or retractions  CV: No peripheral edema  SKIN: No erythema, rashes, excoriation, or breakdown. No evidence of infection.   JOINT/EXTREMITIES:right shoulder - global tenderness, full passive ROM with coaxing, positive impingement, ok strength in rotator cuff, ?labral signs  GAIT: non-antalgic      Diagnostic Modalities:  None today.      Impression: right rotator cuff tendonitis and SLAP tear    Plan:  All of the above pertinent physical exam and imaging modalities findings was reviewed with Liliana.                                          CONSERVATIVE CARE:  I recommend conservative care for the patient to include Tramadol, focused self directed physical therapy, steroid injections, activity modifications. Today I provided or dispensed Tramadol prescription.                           "              INJECTION PROCEDURE:  The patient was counseled about an  injection, including discussion of risks (including infection), contents of the injection, rationale for performing the injection, and expected benefits of the injection. The skin was prepped with alcohol and betadine and then utilizing sterile technique an injection of the right shoulder subacromial space from the anterolateral approach  was performed. The injection consisted 1ml of Kenalog (40mg per 1ml) with 8ml 1% lidocaine plain. The patient tolerated the injection well, and there were no complications. The injection site was covered with a Band-Aid. The injection was performed by Letitia Stratton M.D.                                                FUTURE PLAN:  On their return if they still have symptoms we will consider physical therapy, NSAID's, injection of steroids, OOW.      Return to clinic 6, week(s), or sooner as needed for changes.  Re-x-ray on return: No    Letitia Stratton M.D.

## 2017-03-22 NOTE — NURSING NOTE
"Chief Complaint   Patient presents with     RECHECK     6 wk rt shoulder f/u       Initial Temp 97  F (36.1  C)  Ht 1.651 m (5' 5\")  Wt 64.9 kg (143 lb)  LMP 03/06/2006  BMI 23.8 kg/m2 Estimated body mass index is 23.8 kg/(m^2) as calculated from the following:    Height as of this encounter: 1.651 m (5' 5\").    Weight as of this encounter: 64.9 kg (143 lb).  Medication Reconciliation: complete    BP completed using cuff size: NA (Not Taken)    Erma Beebe MA      "

## 2017-03-23 DIAGNOSIS — M54.41 ACUTE RIGHT-SIDED LOW BACK PAIN WITH RIGHT-SIDED SCIATICA: ICD-10-CM

## 2017-03-23 RX ORDER — HYDROCODONE BITARTRATE AND ACETAMINOPHEN 5; 325 MG/1; MG/1
1 TABLET ORAL EVERY 6 HOURS PRN
Qty: 45 TABLET | Refills: 0 | Status: SHIPPED | OUTPATIENT
Start: 2017-03-23 | End: 2017-04-04

## 2017-03-23 NOTE — TELEPHONE ENCOUNTER
Renitaco       Last Written Prescription Date: 2/17/2017  Last Fill Quantity: 45,  # refills: 0   Last Office Visit with [facility], [facility] or [facility] prescribing provider: 2/1/2017                                         Next 5 appointments (look out 90 days)     May 03, 2017  1:00 PM CDT   [facility] with [facility]   [facility]    [address] [place] [id number] [phone]

## 2017-03-28 DIAGNOSIS — F43.23 ADJUSTMENT DISORDER WITH MIXED ANXIETY AND DEPRESSED MOOD: ICD-10-CM

## 2017-03-28 RX ORDER — ALPRAZOLAM 0.5 MG
0.5 TABLET ORAL 3 TIMES DAILY PRN
Qty: 90 TABLET | Refills: 0 | Status: SHIPPED | OUTPATIENT
Start: 2017-03-28 | End: 2017-05-13

## 2017-03-28 NOTE — TELEPHONE ENCOUNTER
Alprazolam      Last Written Prescription Date: 2/1/17  Last Fill Quantity: 90,  # refills: 0   Last Office Visit with [facility], [facility] or [facility] prescribing provider: 2/1/17                                         Next 5 appointments (look out 90 days)     May 03, 2017  1:00 PM CDT   Return Visit with Letitia Stratton MD   [facility]    [address] [place] [id number] [phone]              [facility]

## 2017-03-31 ENCOUNTER — APPOINTMENT (OUTPATIENT)
Dept: ULTRASOUND IMAGING | Facility: CLINIC | Age: 47
End: 2017-03-31
Attending: FAMILY MEDICINE
Payer: COMMERCIAL

## 2017-03-31 ENCOUNTER — HOSPITAL ENCOUNTER (EMERGENCY)
Facility: CLINIC | Age: 47
Discharge: HOME | End: 2017-03-31
Attending: FAMILY MEDICINE | Admitting: FAMILY MEDICINE
Payer: COMMERCIAL

## 2017-03-31 VITALS
SYSTOLIC BLOOD PRESSURE: 102 MMHG | BODY MASS INDEX: 23.66 KG/M2 | TEMPERATURE: 99.2 F | OXYGEN SATURATION: 100 % | RESPIRATION RATE: 20 BRPM | HEIGHT: 65 IN | DIASTOLIC BLOOD PRESSURE: 78 MMHG | HEART RATE: 68 BPM | WEIGHT: 142 LBS

## 2017-03-31 DIAGNOSIS — R74.8 ELEVATED LIPASE: ICD-10-CM

## 2017-03-31 DIAGNOSIS — R11.2 NAUSEA AND VOMITING, INTRACTABILITY OF VOMITING NOT SPECIFIED, UNSPECIFIED VOMITING TYPE: ICD-10-CM

## 2017-03-31 DIAGNOSIS — R10.11 ABDOMINAL PAIN, RIGHT UPPER QUADRANT: ICD-10-CM

## 2017-03-31 DIAGNOSIS — N75.1 ABSCESS OF BARTHOLIN'S GLAND: ICD-10-CM

## 2017-03-31 DIAGNOSIS — K85.80 OTHER ACUTE PANCREATITIS, UNSPECIFIED COMPLICATION STATUS: ICD-10-CM

## 2017-03-31 DIAGNOSIS — D72.823 LEUKEMOID REACTION: ICD-10-CM

## 2017-03-31 LAB
ALBUMIN SERPL-MCNC: 4 G/DL (ref 3.4–5)
ALBUMIN UR-MCNC: NEGATIVE MG/DL
ALP SERPL-CCNC: 90 U/L (ref 40–150)
ALT SERPL W P-5'-P-CCNC: 21 U/L (ref 0–50)
ANION GAP SERPL CALCULATED.3IONS-SCNC: 7 MMOL/L (ref 3–14)
APPEARANCE UR: CLEAR
AST SERPL W P-5'-P-CCNC: 10 U/L (ref 0–45)
BASOPHILS # BLD AUTO: 0 10E9/L (ref 0–0.2)
BASOPHILS NFR BLD AUTO: 0.3 %
BILIRUB SERPL-MCNC: 0.4 MG/DL (ref 0.2–1.3)
BILIRUB UR QL STRIP: NEGATIVE
BUN SERPL-MCNC: 10 MG/DL (ref 7–30)
CALCIUM SERPL-MCNC: 8.9 MG/DL (ref 8.5–10.1)
CHLORIDE SERPL-SCNC: 108 MMOL/L (ref 94–109)
CO2 SERPL-SCNC: 28 MMOL/L (ref 20–32)
COLOR UR AUTO: YELLOW
CREAT SERPL-MCNC: 0.84 MG/DL (ref 0.52–1.04)
CRP SERPL-MCNC: <2.9 MG/L (ref 0–8)
DIFFERENTIAL METHOD BLD: ABNORMAL
EOSINOPHIL # BLD AUTO: 0.1 10E9/L (ref 0–0.7)
EOSINOPHIL NFR BLD AUTO: 0.5 %
ERYTHROCYTE [DISTWIDTH] IN BLOOD BY AUTOMATED COUNT: 12.8 % (ref 10–15)
GFR SERPL CREATININE-BSD FRML MDRD: 74 ML/MIN/1.7M2
GLUCOSE SERPL-MCNC: 80 MG/DL (ref 70–99)
GLUCOSE UR STRIP-MCNC: NEGATIVE MG/DL
HCG UR QL: NEGATIVE
HCT VFR BLD AUTO: 43.1 % (ref 35–47)
HGB BLD-MCNC: 14.5 G/DL (ref 11.7–15.7)
HGB UR QL STRIP: ABNORMAL
IMM GRANULOCYTES # BLD: 0 10E9/L (ref 0–0.4)
IMM GRANULOCYTES NFR BLD: 0.2 %
KETONES UR STRIP-MCNC: NEGATIVE MG/DL
LEUKOCYTE ESTERASE UR QL STRIP: NEGATIVE
LIPASE SERPL-CCNC: 518 U/L (ref 73–393)
LYMPHOCYTES # BLD AUTO: 2.5 10E9/L (ref 0.8–5.3)
LYMPHOCYTES NFR BLD AUTO: 18.5 %
MCH RBC QN AUTO: 32.2 PG (ref 26.5–33)
MCHC RBC AUTO-ENTMCNC: 33.6 G/DL (ref 31.5–36.5)
MCV RBC AUTO: 96 FL (ref 78–100)
MONOCYTES # BLD AUTO: 0.7 10E9/L (ref 0–1.3)
MONOCYTES NFR BLD AUTO: 5.3 %
MUCOUS THREADS #/AREA URNS LPF: PRESENT /LPF
NEUTROPHILS # BLD AUTO: 10 10E9/L (ref 1.6–8.3)
NEUTROPHILS NFR BLD AUTO: 75.2 %
NITRATE UR QL: NEGATIVE
PH UR STRIP: 5 PH (ref 5–7)
PLATELET # BLD AUTO: 318 10E9/L (ref 150–450)
POTASSIUM SERPL-SCNC: 3.5 MMOL/L (ref 3.4–5.3)
PROT SERPL-MCNC: 7.3 G/DL (ref 6.8–8.8)
RBC # BLD AUTO: 4.5 10E12/L (ref 3.8–5.2)
RBC #/AREA URNS AUTO: 5 /HPF (ref 0–2)
SODIUM SERPL-SCNC: 143 MMOL/L (ref 133–144)
SP GR UR STRIP: 1.02 (ref 1–1.03)
SQUAMOUS #/AREA URNS AUTO: 3 /HPF (ref 0–1)
URN SPEC COLLECT METH UR: ABNORMAL
UROBILINOGEN UR STRIP-MCNC: 0 MG/DL (ref 0–2)
WBC # BLD AUTO: 13.3 10E9/L (ref 4–11)
WBC #/AREA URNS AUTO: 1 /HPF (ref 0–2)

## 2017-03-31 PROCEDURE — 80053 COMPREHEN METABOLIC PANEL: CPT | Performed by: FAMILY MEDICINE

## 2017-03-31 PROCEDURE — 81001 URINALYSIS AUTO W/SCOPE: CPT | Performed by: FAMILY MEDICINE

## 2017-03-31 PROCEDURE — 25000132 ZZH RX MED GY IP 250 OP 250 PS 637: Performed by: FAMILY MEDICINE

## 2017-03-31 PROCEDURE — 99284 EMERGENCY DEPT VISIT MOD MDM: CPT | Mod: 25 | Performed by: FAMILY MEDICINE

## 2017-03-31 PROCEDURE — 81025 URINE PREGNANCY TEST: CPT | Performed by: FAMILY MEDICINE

## 2017-03-31 PROCEDURE — 86140 C-REACTIVE PROTEIN: CPT | Performed by: FAMILY MEDICINE

## 2017-03-31 PROCEDURE — 85025 COMPLETE CBC W/AUTO DIFF WBC: CPT | Performed by: FAMILY MEDICINE

## 2017-03-31 PROCEDURE — 99284 EMERGENCY DEPT VISIT MOD MDM: CPT | Mod: 25

## 2017-03-31 PROCEDURE — 96360 HYDRATION IV INFUSION INIT: CPT

## 2017-03-31 PROCEDURE — 96361 HYDRATE IV INFUSION ADD-ON: CPT

## 2017-03-31 PROCEDURE — 25000128 H RX IP 250 OP 636: Performed by: FAMILY MEDICINE

## 2017-03-31 PROCEDURE — 56420 I&D BARTHOLINS GLAND ABSCESS: CPT

## 2017-03-31 PROCEDURE — 76705 ECHO EXAM OF ABDOMEN: CPT

## 2017-03-31 PROCEDURE — 25000125 ZZHC RX 250: Performed by: FAMILY MEDICINE

## 2017-03-31 PROCEDURE — 56420 I&D BARTHOLINS GLAND ABSCESS: CPT | Performed by: FAMILY MEDICINE

## 2017-03-31 PROCEDURE — 83690 ASSAY OF LIPASE: CPT | Performed by: FAMILY MEDICINE

## 2017-03-31 RX ORDER — ACETAMINOPHEN 325 MG/1
975 TABLET ORAL ONCE
Status: COMPLETED | OUTPATIENT
Start: 2017-03-31 | End: 2017-03-31

## 2017-03-31 RX ORDER — ONDANSETRON 4 MG/1
4 TABLET, ORALLY DISINTEGRATING ORAL ONCE
Status: COMPLETED | OUTPATIENT
Start: 2017-03-31 | End: 2017-03-31

## 2017-03-31 RX ORDER — LIDOCAINE 40 MG/G
CREAM TOPICAL
Status: DISCONTINUED | OUTPATIENT
Start: 2017-03-31 | End: 2017-04-01 | Stop reason: HOSPADM

## 2017-03-31 RX ORDER — BUPIVACAINE HYDROCHLORIDE AND EPINEPHRINE 5; 5 MG/ML; UG/ML
3 INJECTION, SOLUTION EPIDURAL; INTRACAUDAL; PERINEURAL ONCE
Status: COMPLETED | OUTPATIENT
Start: 2017-03-31 | End: 2017-03-31

## 2017-03-31 RX ORDER — CEPHALEXIN 500 MG/1
500 CAPSULE ORAL 4 TIMES DAILY
Qty: 28 CAPSULE | Refills: 0 | Status: SHIPPED | OUTPATIENT
Start: 2017-03-31 | End: 2017-04-07

## 2017-03-31 RX ORDER — SODIUM CHLORIDE 9 MG/ML
1000 INJECTION, SOLUTION INTRAVENOUS CONTINUOUS
Status: DISCONTINUED | OUTPATIENT
Start: 2017-03-31 | End: 2017-04-01 | Stop reason: HOSPADM

## 2017-03-31 RX ORDER — ONDANSETRON 4 MG/1
2-4 TABLET, ORALLY DISINTEGRATING ORAL EVERY 8 HOURS PRN
Qty: 10 TABLET | Refills: 0 | Status: SHIPPED | OUTPATIENT
Start: 2017-03-31 | End: 2017-04-12 | Stop reason: ALTCHOICE

## 2017-03-31 RX ORDER — IBUPROFEN 600 MG/1
600 TABLET, FILM COATED ORAL ONCE
Status: COMPLETED | OUTPATIENT
Start: 2017-03-31 | End: 2017-03-31

## 2017-03-31 RX ADMIN — IBUPROFEN 600 MG: 600 TABLET ORAL at 19:43

## 2017-03-31 RX ADMIN — BUPIVACAINE HYDROCHLORIDE AND EPINEPHRINE BITARTRATE 3 ML: 5; .005 INJECTION, SOLUTION EPIDURAL; INTRACAUDAL; PERINEURAL at 22:09

## 2017-03-31 RX ADMIN — SODIUM CHLORIDE 1000 ML: 9 INJECTION, SOLUTION INTRAVENOUS at 19:43

## 2017-03-31 RX ADMIN — SODIUM CHLORIDE 1000 ML: 9 INJECTION, SOLUTION INTRAVENOUS at 20:48

## 2017-03-31 RX ADMIN — ONDANSETRON 4 MG: 4 TABLET, ORALLY DISINTEGRATING ORAL at 19:30

## 2017-03-31 RX ADMIN — ACETAMINOPHEN 975 MG: 325 TABLET ORAL at 19:43

## 2017-03-31 ASSESSMENT — ENCOUNTER SYMPTOMS
EYES NEGATIVE: 1
NERVOUS/ANXIOUS: 1
CONSTIPATION: 0
BLOOD IN STOOL: 0
RESPIRATORY NEGATIVE: 1
CHILLS: 1
ACTIVITY CHANGE: 1
CARDIOVASCULAR NEGATIVE: 1
DYSURIA: 1
FLANK PAIN: 1
FATIGUE: 1
ABDOMINAL PAIN: 1
NAUSEA: 1
POLYPHAGIA: 0
DIARRHEA: 0
HEMATOLOGIC/LYMPHATIC NEGATIVE: 1
ANAL BLEEDING: 0
ABDOMINAL DISTENTION: 1
VOMITING: 1
NEUROLOGICAL NEGATIVE: 1
APPETITE CHANGE: 1
RECTAL PAIN: 0

## 2017-03-31 NOTE — LETTER
[facility]  Emergency Room  [address] [place]   [address]  Tel: [phone]   Fax: [phone]  2017    Liliana Kat  [address] [place] [id number] [phone] (home)     : 1970          To Whom it May Concern:    Liliana Kat was seen in our ER today 2017. I expect her condition to improve over the next 2-3 days.  She may return to work/school, without restriction, when improved. If not improved during the above expected time period,  then I have encouraged her to be rechecked in her clinic for further evaluation.      Please contact me for questions or concerns.    Sincerely,      Win Montemayor  Physician  [facility] Emergency Room

## 2017-03-31 NOTE — ED NOTES
"Abd pain started two days ago. Nausea and dry heaving today. Also noted a lump on labia today. States \" I could hardly wipe it hurt so bad\"  "

## 2017-03-31 NOTE — ED AVS SNAPSHOT
[facility]    [address] [place] [address]    Phone:  [phone]    Fax:  [phone]                                       Liliana Kat   MRN: [id number]    Department:  [facility]   Date of Visit:  3/31/2017           After Visit Summary Signature Page     I have received my discharge instructions, and my questions have been answered. I have discussed any challenges I see with this plan with the nurse or doctor.    ..........................................................................................................................................  Patient/Patient Representative Signature      ..........................................................................................................................................  Patient Representative Print Name and Relationship to Patient    ..................................................               ................................................  Date                                            Time    ..........................................................................................................................................  Reviewed by Signature/Title    ...................................................              ..............................................  Date                                                            Time

## 2017-03-31 NOTE — ED AVS SNAPSHOT
[facility] Emergency Department    [address] [place] [address]    Phone:  [phone]    Fax:  [phone]                                       Liliana Kat   MRN: [id number]    Department:  [facility] Emergency Department   Date of Visit:  3/31/2017           Patient Information     Date Of Birth          1970        Your diagnoses for this visit were:     Abdominal pain, right upper quadrant     Other acute pancreatitis, unspecified complication status     Nausea and vomiting, intractability of vomiting not specified, unspecified vomiting type     Leukemoid reaction     Elevated lipase     Abscess of Bartholin's gland left       You were seen by Win Montemayor DO.      Follow-up Information     Follow up with [facility] Emergency Department.    Specialty:  EMERGENCY MEDICINE    Why:  If symptoms worsen    Contact information:    [address] [place] [address] [phone]    Additional information:    From [address]: Exit at [employer] [employer] on south side of [place]. Turn right on [address]. Turn left at stoplight on [address]. [facility] will be in view two blocks ahead        Follow up with Paula Villa, LOLIS CNP.    Specialty:  NURSE PRACTITIONER - OBSTETRICS & GYNECOLOGY    Why:  if not improved in 3-4days    Contact information:    [facility]  [address] [place] [address] [phone]          Discharge Instructions       Please read and follow the handout(s) instructions. Return, if needed, for increased or worsening symptoms and as directed by the handout(s).    See the note for work.    If not improved in the next 2-3 days, then please return or get rechecked in your clinic.    I hope you feel better soon!    Electronically signed, Win Montemayor DO      Discharge References/Attachments     BARTHOLIN'S CYST: COMMON TREATMENTS (ENGLISH)    PANCREATITIS, ACUTE, DISCHARGE  INSTRUCTIONS FOR (ENGLISH)    DIET, CLEAR LIQUID (ENGLISH)      Future Appointments        Provider Department Dept Phone Center    5/3/2017 1:00 PM Letitia Stratton MD [facility] [phone] [facility]      24 Hour Appointment Hotline       To make an appointment at any [facility], call [phone] ([phone]). If you don't have a family doctor or clinic, we will help you find one. [facility] are conveniently located to serve the needs of you and your family.             Review of your medicines      START taking        Dose / Directions Last dose taken    cephALEXin 500 MG capsule   Commonly known as:  KEFLEX   Dose:  500 mg   Quantity:  28 capsule        Take 1 capsule (500 mg) by mouth 4 times daily for 7 days   Refills:  0        ondansetron 4 MG ODT tab   Commonly known as:  ZOFRAN-ODT   Dose:  2-4 mg   Quantity:  10 tablet        Take 0.5-1 tablets (2-4 mg) by mouth every 8 hours as needed for nausea or vomiting   Refills:  0          Our records show that you are taking the medicines listed below. If these are incorrect, please call your family doctor or clinic.        Dose / Directions Last dose taken    * albuterol (2.5 MG/3ML) 0.083% neb solution   Quantity:  1 BOX        ONE NEBULIZATION 4 TIMES DAILY AS NEEDED   Refills:  0        * albuterol 108 (90 BASE) MCG/ACT Inhaler   Commonly known as:  PROAIR HFA/PROVENTIL HFA/VENTOLIN HFA   Dose:  2 puff   Quantity:  1 Inhaler        Inhale 2 puffs into the lungs every 6 hours as needed for shortness of breath / dyspnea or wheezing   Refills:  0        ALPRAZolam 0.5 MG tablet   Commonly known as:  XANAX   Dose:  0.5 mg   Quantity:  90 tablet        Take 1 tablet (0.5 mg) by mouth 3 times daily as needed for anxiety   Refills:  0        aspirin 81 MG tablet   Dose:  81 mg        Take 1 tablet by mouth daily.   Refills:  OTC        cyclobenzaprine 10 MG tablet   Commonly known as:  FLEXERIL   Dose:  10 mg   Quantity:  30  tablet        Take 1 tablet (10 mg) by mouth 3 times daily as needed for muscle spasms   Refills:  1        estradiol 1 MG tablet   Commonly known as:  ESTRACE   Quantity:  30 tablet        TAKE ONE TABLET BY MOUTH ONCE DAILY   Refills:  1        FLUoxetine 40 MG capsule   Commonly known as:  PROzac   Dose:  40 mg   Quantity:  30 capsule        Take 1 capsule (40 mg) by mouth daily   Refills:  1        fluticasone 50 MCG/ACT spray   Commonly known as:  FLONASE   Dose:  2 spray   Quantity:  1 Package        Spray 2 sprays into both nostrils daily   Refills:  12        HYDROcodone-acetaminophen 5-325 MG per tablet   Commonly known as:  NORCO   Dose:  1 tablet   Quantity:  45 tablet        Take 1 tablet by mouth every 6 hours as needed for moderate to severe pain   Refills:  0        medroxyPROGESTERone 5 MG tablet   Commonly known as:  PROVERA   Quantity:  90 tablet        TAKE ONE TABLET BY MOUTH ONCE DAILY   Refills:  0        MELATONIN   Dose:  10 mg        10 mg daily   Refills:  0        meloxicam 15 MG tablet   Commonly known as:  MOBIC   Dose:  15 mg   Quantity:  90 tablet        Take 1 tablet (15 mg) by mouth daily   Refills:  1        naproxen 500 MG tablet   Commonly known as:  NAPROSYN   Dose:  500 mg   Quantity:  60 tablet        Take 1 tablet (500 mg) by mouth 2 times daily as needed for moderate pain   Refills:  1        omeprazole 40 MG capsule   Commonly known as:  priLOSEC   Dose:  40 mg   Quantity:  90 capsule        Take 1 capsule (40 mg) by mouth daily as needed Take 30-60 minutes before a meal.   Refills:  1        traMADol 50 MG tablet   Commonly known as:  ULTRAM   Dose:   mg   Quantity:  60 tablet        Take 1-2 tablets ( mg) by mouth every 8 hours as needed for pain Maximum 3 tablet(s) per day   Refills:  0        * Notice:  This list has 2 medication(s) that are the same as other medications prescribed for you. Read the directions carefully, and ask your doctor or other care  "provider to review them with you.            Prescriptions were sent or printed at these locations (2 Prescriptions)                   [facility]   [address] [address]    Telephone:  [phone]   Fax:  [phone]   Hours:                  These medications are not ready yet because we are checking if your insurance will help you pay for them. Call your pharmacy to confirm that your medication is ready for pickup. It may take up to 24 hours for them to receive the prescription. If the prescription is not ready within 3 business days, please contact your clinic or your provider (2 of 2)         ondansetron (ZOFRAN-ODT) 4 MG ODT tab               cephALEXin (KEFLEX) 500 MG capsule                Procedures and tests performed during your visit     CBC with platelets differential    CRP inflammation    Comprehensive metabolic panel    HCG qualitative urine    Lipase    Peripheral IV catheter    Prep for procedure - Laceration Kit    UA with Microscopic    US Abdomen Limited      Orders Needing Specimen Collection     None      Pending Results     No orders found from 3/29/2017 to 4/1/2017.            Pending Culture Results     No orders found from 3/29/2017 to 4/1/2017.            Thank you for choosing [facility]       Thank you for choosing [facility] for your care. Our goal is always to provide you with excellent care. Hearing back from our patients is one way we can continue to improve our services. Please take a few minutes to complete the written survey that you may receive in the mail after you visit with us. Thank you!        [facility] Information     [employer] lets you send messages to your doctor, view your test results, renew your prescriptions, schedule appointments and more. To sign up, go to [email or web address] . Click on \"Log in\" on the left side of the screen, which will take you to the Welcome page. Then click on \"Sign up Now\" on the right side of the " page.     You will be asked to enter the access code listed below, as well as some personal information. Please follow the directions to create your username and password.     Your access code is: [email or web address]  Expires: 2017 11:36 AM     Your access code will  in 90 days. If you need help or a new code, please call your [place] clinic or [phone].        Care EveryWhere ID     This is your Care EveryWhere ID. This could be used by other organizations to access your [place] medical records  [id number]        After Visit Summary       This is your record. Keep this with you and show to your community pharmacist(s) and doctor(s) at your next visit.

## 2017-04-01 NOTE — ED PROVIDER NOTES
History     Chief Complaint   Patient presents with     Abdominal Pain     HPI  Liliana Kat is a 46 year old female who presents to the ER with concerns about abdominal pain that started 2 days ago. She states the pain started on the right abdominal area and was sharp. She states the pain is now in her flank areas bilaterally. She has noted a change in the odor of the urine and states she noted pain and swelling to the left lip of the vaginal june today. She denies changes in the stool. Denies blood in the stool. States had one episode of vomiting yesterday but only dry heaves and nausea today. She derek to chills but is not sure about a fever. She denies fall or injury. She states she does have polyps in her urethra and has her next urology appointment on Monday in [place], [place].  The patient denies any alcohol use for many years. She does admit that she smokes daily. She states that all members of her family have been sick with a stomach flu with nausea vomiting and diarrhea over the last week.    I have reviewed the Medications, Allergies, Past Medical and Surgical History, and Social History in the Epic system.  Patient Active Problem List   Diagnosis     Need for postmenopausal hormone replacement     Decreased libido     CARDIOVASCULAR SCREENING; LDL GOAL LESS THAN 160     Bladder polyps     Superficial varicosities     Sacroiliitis (H)     Adjustment disorder with mixed anxiety and depressed mood     Acute pain of right shoulder       Past Medical History:   Diagnosis Date     Allergic rhinitis, cause unspecified     Allergic rhinitis - weeds and pollan     Anxiety      Bladder polyps      Depressive disorder      Obstructive chronic bronchitis with exacerbation (H)      Other and unspecified ovarian cyst     Ovarian cyst - rupured cyst + laser x3     Other and unspecified ovarian cyst 9/14/2005    Left hemorrhagic ovarian cyst.     Tobacco abuse      Urinary tract infection, site not specified          Past Surgical History:   Procedure Laterality Date     C LIGATE FALLOPIAN TUBE,POSTPARTUM      Tubal Ligation     ESOPHAGOSCOPY, GASTROSCOPY, DUODENOSCOPY (EGD), COMBINED  2012    Procedure: COMBINED ESOPHAGOSCOPY, GASTROSCOPY, DUODENOSCOPY (EGD), BIOPSY SINGLE OR MULTIPLE;  ESOPHAGOSCOPY, GASTROSCOPY, DUODENOSCOPY (EGD) with biopsy;  Surgeon: Herson Cabrera MD;  Location: [facility]     EXCIS VAGINAL CYST/TUMOR       HC REMOVAL OF OVARY/TUBE(S)  3/6/2006    Laparoscopic bilateral salpingo-oophorectomy.     HC TYMPANOPLASTY W/O MASTOID, INIT/REV W/O OSS CHAIN RECONST         Family History   Problem Relation Age of Onset     Alcohol/Drug Father       at age 53 of alcohol     Alcohol/Drug Paternal Grandfather      Alcohol/Drug Paternal Grandmother      Alcohol/Drug Paternal Aunt      Allergies Daughter      animals and grass     Allergies Son      dust mite     Arthritis Mother      Arthritis Father      CANCER Paternal Grandmother      lung     CANCER Paternal Grandfather      lung     CANCER Father      lung     Depression Brother      Hypertension Mother      Lipids Mother      Depression Mother      HEART DISEASE Mother      Blood Disease Mother      DVTs       Social History     Social History     Marital status:      Spouse name: N/A     Number of children: N/A     Years of education: N/A     Occupational History      [employer]     [employer]     Social History Main Topics     Smoking status: Current Some Day Smoker     Packs/day: 0.25     Years: 10.00     Smokeless tobacco: Never Used      Comment: 2 cigs a week     Alcohol use No      Comment: recovering  - 2005     Drug use: No     Sexual activity: Not Currently     Partners: Male     Birth control/ protection: Surgical      Comment: tubal 1997     Other Topics Concern     Not on file     Social History Narrative    Lives with spouse and children. No domestic violence issues.       Current Outpatient Rx   Medication Sig Dispense  Refill     ALPRAZolam (XANAX) 0.5 MG tablet Take 1 tablet (0.5 mg) by mouth 3 times daily as needed for anxiety 90 tablet 0     medroxyPROGESTERone (PROVERA) 5 MG tablet TAKE ONE TABLET BY MOUTH ONCE DAILY 90 tablet 0     meloxicam (MOBIC) 15 MG tablet Take 1 tablet (15 mg) by mouth daily 90 tablet 1     FLUoxetine (PROZAC) 40 MG capsule Take 1 capsule (40 mg) by mouth daily 30 capsule 1     naproxen (NAPROSYN) 500 MG tablet Take 1 tablet (500 mg) by mouth 2 times daily as needed for moderate pain 60 tablet 1     estradiol (ESTRACE) 1 MG tablet TAKE ONE TABLET BY MOUTH ONCE DAILY 30 tablet 1     cyclobenzaprine (FLEXERIL) 10 MG tablet Take 1 tablet (10 mg) by mouth 3 times daily as needed for muscle spasms 30 tablet 1     aspirin 81 MG tablet Take 1 tablet by mouth daily.  OTC     MELATONIN 10 mg daily        HYDROcodone-acetaminophen (NORCO) 5-325 MG per tablet Take 1 tablet by mouth every 6 hours as needed for moderate to severe pain 45 tablet 0     traMADol (ULTRAM) 50 MG tablet Take 1-2 tablets ( mg) by mouth every 8 hours as needed for pain Maximum 3 tablet(s) per day 60 tablet 0     omeprazole (PRILOSEC) 40 MG capsule Take 1 capsule (40 mg) by mouth daily as needed Take 30-60 minutes before a meal. 90 capsule 1     albuterol (PROAIR HFA, PROVENTIL HFA, VENTOLIN HFA) 108 (90 BASE) MCG/ACT inhaler Inhale 2 puffs into the lungs every 6 hours as needed for shortness of breath / dyspnea or wheezing 1 Inhaler 0     fluticasone (FLONASE) 50 MCG/ACT nasal spray Spray 2 sprays into both nostrils daily 1 Package 12     ALBUTEROL SULFATE 0.083 % IN NEBU ONE NEBULIZATION 4 TIMES DAILY AS NEEDED 1 BOX 0       Allergies   Allergen Reactions     Augmentin Swelling and Difficulty breathing     Avelox Nausea and Vomiting     Effexor Xr [Venlafaxine Hydrochloride]      Shaky and heart racing     Sulfa Drugs        Immunization History   Administered Date(s) Administered     Influenza (IIV3) 11/11/2008, 11/20/2012     TD  "(ADULT, 7+) 04/15/2003, 03/26/2004     TDAP Vaccine (Adacel) 03/20/2012       Review of Systems   Constitutional: Positive for activity change, appetite change, chills and fatigue.   HENT: Negative.    Eyes: Negative.    Respiratory: Negative.    Cardiovascular: Negative.    Gastrointestinal: Positive for abdominal distention, abdominal pain, nausea and vomiting. Negative for anal bleeding, blood in stool, constipation, diarrhea and rectal pain.   Endocrine: Negative for polyphagia and polyuria.   Genitourinary: Positive for dysuria, flank pain (Bilateral) and vaginal pain (left labial pain/swelling x 1 day.).   Neurological: Negative.    Hematological: Negative.    Psychiatric/Behavioral: The patient is nervous/anxious.    All other systems reviewed and are negative.      Physical Exam   BP: (!) 142/101  Pulse: 97  Temp: 99.2  F (37.3  C)  Resp: 20  Height: 165.1 cm (5' 5\")  Weight: 64.4 kg (142 lb)  SpO2: 100 %  Physical Exam   Constitutional: She is oriented to person, place, and time. She appears well-developed and well-nourished. She appears distressed.   HENT:   Head: Normocephalic and atraumatic.   Eyes: Conjunctivae and EOM are normal. Pupils are equal, round, and reactive to light.   Neck: Normal range of motion. Neck supple.   Cardiovascular: Normal rate, regular rhythm and normal pulses.    Pulmonary/Chest: Effort normal and breath sounds normal. No respiratory distress.   Abdominal: Soft. Normal appearance. She exhibits distension ( mild distention noted.). She exhibits no mass. There is tenderness in the right upper quadrant and epigastric area. There is guarding. There is no rigidity and no rebound. No hernia.   Genitourinary: Vagina normal.       There is tenderness and lesion on the left labia. There is no injury on the left labia.   Musculoskeletal: Normal range of motion.   Neurological: She is alert and oriented to person, place, and time.   Skin: Skin is warm. She is not diaphoretic. " "  Psychiatric: She has a normal mood and affect. Her behavior is normal. Judgment and thought content normal.   Nursing note and vitals reviewed.      ED Course     ED Course     Incision + drainage  Date/Time: 4/1/2017 3:22 PM  Performed by: ARMOND MART  Authorized by: ARMOND MART   Consent: Verbal consent obtained.  Risks and benefits: risks, benefits and alternatives were discussed  Consent given by: patient  Patient understanding: patient states understanding of the procedure being performed  Site marked: the operative site was marked  Required items: required blood products, implants, devices, and special equipment available  Patient identity confirmed: verbally with patient  Time out: Immediately prior to procedure a \"time out\" was called to verify the correct patient, procedure, equipment, support staff and site/side marked as required.  Type: abscess  Body area: anogenital  Location details: Bartholin's gland  Anesthesia: local infiltration    Anesthesia:  Anesthesia: local infiltration  Local Anesthetic: bupivacaine 0.5% with epinephrine   Scalpel size: 11  Incision type: single straight  Complexity: simple  Drainage amount: scant  Wound treatment: wound left open  Packing material: Vaseline gauze  Patient tolerance: Patient tolerated the procedure well with no immediate complications                   Critical Care time:  none               Results for orders placed or performed during the hospital encounter of 03/31/17 (from the past 24 hour(s))   UA with Microscopic   Result Value Ref Range    Color Urine Yellow     Appearance Urine Clear     Glucose Urine Negative NEG mg/dL    Bilirubin Urine Negative NEG    Ketones Urine Negative NEG mg/dL    Specific Gravity Urine 1.018 1.003 - 1.035    Blood Urine Small (A) NEG    pH Urine 5.0 5.0 - 7.0 pH    Protein Albumin Urine Negative NEG mg/dL    Urobilinogen mg/dL 0.0 0.0 - 2.0 mg/dL    Nitrite Urine Negative NEG    Leukocyte Esterase " Urine Negative NEG    Source Midstream Urine     WBC Urine 1 0 - 2 /HPF    RBC Urine 5 (H) 0 - 2 /HPF    Squamous Epithelial /HPF Urine 3 (H) 0 - 1 /HPF    Mucous Urine Present (A) NEG /LPF   HCG qualitative urine   Result Value Ref Range    HCG Qual Urine Negative NEG   CBC with platelets differential   Result Value Ref Range    WBC 13.3 (H) 4.0 - 11.0 10e9/L    RBC Count 4.50 3.8 - 5.2 10e12/L    Hemoglobin 14.5 11.7 - 15.7 g/dL    Hematocrit 43.1 35.0 - 47.0 %    MCV 96 78 - 100 fl    MCH 32.2 26.5 - 33.0 pg    MCHC 33.6 31.5 - 36.5 g/dL    RDW 12.8 10.0 - 15.0 %    Platelet Count 318 150 - 450 10e9/L    Diff Method Automated Method     % Neutrophils 75.2 %    % Lymphocytes 18.5 %    % Monocytes 5.3 %    % Eosinophils 0.5 %    % Basophils 0.3 %    % Immature Granulocytes 0.2 %    Absolute Neutrophil 10.0 (H) 1.6 - 8.3 10e9/L    Absolute Lymphocytes 2.5 0.8 - 5.3 10e9/L    Absolute Monocytes 0.7 0.0 - 1.3 10e9/L    Absolute Eosinophils 0.1 0.0 - 0.7 10e9/L    Absolute Basophils 0.0 0.0 - 0.2 10e9/L    Abs Immature Granulocytes 0.0 0 - 0.4 10e9/L   Comprehensive metabolic panel   Result Value Ref Range    Sodium 143 133 - 144 mmol/L    Potassium 3.5 3.4 - 5.3 mmol/L    Chloride 108 94 - 109 mmol/L    Carbon Dioxide 28 20 - 32 mmol/L    Anion Gap 7 3 - 14 mmol/L    Glucose 80 70 - 99 mg/dL    Urea Nitrogen 10 7 - 30 mg/dL    Creatinine 0.84 0.52 - 1.04 mg/dL    GFR Estimate 74 >60 mL/min/1.7m2    GFR Estimate If Black 89 >60 mL/min/1.7m2    Calcium 8.9 8.5 - 10.1 mg/dL    Bilirubin Total 0.4 0.2 - 1.3 mg/dL    Albumin 4.0 3.4 - 5.0 g/dL    Protein Total 7.3 6.8 - 8.8 g/dL    Alkaline Phosphatase 90 40 - 150 U/L    ALT 21 0 - 50 U/L    AST 10 0 - 45 U/L   Lipase   Result Value Ref Range    Lipase 518 (H) 73 - 393 U/L   CRP inflammation   Result Value Ref Range    CRP Inflammation <2.9 0.0 - 8.0 mg/L   US Abdomen Limited    Narrative    US ABDOMEN LIMITED 3/31/2017 9:05 PM    HISTORY: Right upper quadrant  pain.    TECHNIQUE: Limited abdominal ultrasound to the right upper quadrant is  performed.    COMPARISON: None.    FINDINGS: The liver is normal. Visualized portions of the pancreas are  unremarkable.    There is echogenic sludge within the gallbladder. Normal gallbladder  wall thickness. No sonographic Gabriel sign.    Normal right kidney.        Impression    IMPRESSION:  Gallbladder sludge. No sonographic evidence of  cholecystitis.      MELISSA CHASE MD         Assessments & Plan (with Medical Decision Making)     46-year-old female with concerns about right upper and epigastric abdominal pain. She also had concerns about tenderness and swelling to the left labial area vaginally. Patient with exam findings consistent with an acute pancreatitis with nausea and vomiting and mild dehydration. Also noted to have evidence for a Bartholin cyst/abscess. I suspect the pancreatitis may be viral in etiology as her family all head nausea and vomiting and diarrhea symptoms this last week. She denies any alcohol use. Her ultrasound was reassuring. She felt markedly improved after the IV fluids and anti-emetics and desire to return home. We discussed the potential risk of pancreatitis up to including even the potential risk of death. She is in agreement that she will return if she is having increasing symptoms despite the plan of care. The Bartholin's cyst was incised but very little drainage was noted from the site. I did not have a Word catheter available in the hospital and the patient was made aware of this In the potential risk of not using a Word catheter in the I&D of a Bartholin's cyst/abscess. We discussed the pros and cons of the I&D procedure. Procedure as noted above. She was initiated on a course of antibiotic as listed below. She was given instructions on pancreatitis and on the care of the Bartholin's cyst. A clear liquid diet once also prescribed and she was given instructions.         I have reviewed the  nursing notes.    I have reviewed the findings, diagnosis, plan and need for follow up with the patient.    Discharge Medication List as of 3/31/2017 10:59 PM      START taking these medications    Details   ondansetron (ZOFRAN-ODT) 4 MG ODT tab Take 0.5-1 tablets (2-4 mg) by mouth every 8 hours as needed for nausea or vomiting, Disp-10 tablet, R-0, E-Prescribe      cephALEXin (KEFLEX) 500 MG capsule Take 1 capsule (500 mg) by mouth 4 times daily for 7 days, Disp-28 capsule, R-0, E-Prescribe                I verbally discussed the findings of the evaluation today in the ER. I have verbally discussed with Liliana the suggested treatment(s) as described in the discharge instructions and handouts. I have prescribed the above listed medications and instructed her on appropriate use of these medications.      I have verbally suggested she follow-up in her clinic or return to the ER for increased symptoms. See the follow-up recommendations documented  in the after visit summary in this visit's EPIC chart.      Final diagnoses:   Abdominal pain, right upper quadrant   Other acute pancreatitis, unspecified complication status   Nausea and vomiting, intractability of vomiting not specified, unspecified vomiting type   Leukemoid reaction   Elevated lipase   Abscess of Bartholin's gland - left       3/31/2017   [facility] EMERGENCY DEPARTMENT     Win Montemayor,   04/01/17 1532

## 2017-04-01 NOTE — DISCHARGE INSTRUCTIONS
Please read and follow the handout(s) instructions. Return, if needed, for increased or worsening symptoms and as directed by the handout(s).    See the note for work.    If not improved in the next 2-3 days, then please return or get rechecked in your clinic.    I hope you feel better soon!    Electronically signed, Win Montemayor DO

## 2017-04-03 ENCOUNTER — TELEPHONE (OUTPATIENT)
Dept: FAMILY MEDICINE | Facility: CLINIC | Age: 47
End: 2017-04-03

## 2017-04-03 ENCOUNTER — OFFICE VISIT (OUTPATIENT)
Dept: FAMILY MEDICINE | Facility: CLINIC | Age: 47
End: 2017-04-03
Payer: COMMERCIAL

## 2017-04-03 VITALS
OXYGEN SATURATION: 99 % | BODY MASS INDEX: 24.16 KG/M2 | WEIGHT: 145 LBS | DIASTOLIC BLOOD PRESSURE: 70 MMHG | HEIGHT: 65 IN | SYSTOLIC BLOOD PRESSURE: 112 MMHG | TEMPERATURE: 98.5 F | RESPIRATION RATE: 24 BRPM | HEART RATE: 78 BPM

## 2017-04-03 DIAGNOSIS — L02.92 BOIL: Primary | ICD-10-CM

## 2017-04-03 DIAGNOSIS — K85.90 ACUTE PANCREATITIS, UNSPECIFIED COMPLICATION STATUS, UNSPECIFIED PANCREATITIS TYPE: ICD-10-CM

## 2017-04-03 DIAGNOSIS — R10.11 ABDOMINAL PAIN, RIGHT UPPER QUADRANT: ICD-10-CM

## 2017-04-03 DIAGNOSIS — K81.9 CHOLECYSTITIS: ICD-10-CM

## 2017-04-03 LAB
ALBUMIN SERPL-MCNC: 3.9 G/DL (ref 3.4–5)
ALP SERPL-CCNC: 82 U/L (ref 40–150)
ALT SERPL W P-5'-P-CCNC: 20 U/L (ref 0–50)
AMYLASE SERPL-CCNC: 39 U/L (ref 30–110)
ANION GAP SERPL CALCULATED.3IONS-SCNC: 6 MMOL/L (ref 3–14)
AST SERPL W P-5'-P-CCNC: 12 U/L (ref 0–45)
BASOPHILS # BLD AUTO: 0 10E9/L (ref 0–0.2)
BASOPHILS NFR BLD AUTO: 0.2 %
BILIRUB SERPL-MCNC: 0.5 MG/DL (ref 0.2–1.3)
BUN SERPL-MCNC: 14 MG/DL (ref 7–30)
CALCIUM SERPL-MCNC: 9.3 MG/DL (ref 8.5–10.1)
CHLORIDE SERPL-SCNC: 107 MMOL/L (ref 94–109)
CO2 SERPL-SCNC: 31 MMOL/L (ref 20–32)
CREAT SERPL-MCNC: 0.85 MG/DL (ref 0.52–1.04)
CRP SERPL-MCNC: <2.9 MG/L (ref 0–8)
DIFFERENTIAL METHOD BLD: ABNORMAL
EOSINOPHIL # BLD AUTO: 0.1 10E9/L (ref 0–0.7)
EOSINOPHIL NFR BLD AUTO: 0.4 %
ERYTHROCYTE [DISTWIDTH] IN BLOOD BY AUTOMATED COUNT: 12.9 % (ref 10–15)
GFR SERPL CREATININE-BSD FRML MDRD: 73 ML/MIN/1.7M2
GLUCOSE SERPL-MCNC: 96 MG/DL (ref 70–99)
HCT VFR BLD AUTO: 45.4 % (ref 35–47)
HGB BLD-MCNC: 14.8 G/DL (ref 11.7–15.7)
IMM GRANULOCYTES # BLD: 0 10E9/L (ref 0–0.4)
IMM GRANULOCYTES NFR BLD: 0.2 %
LIPASE SERPL-CCNC: 128 U/L (ref 73–393)
LYMPHOCYTES # BLD AUTO: 1.7 10E9/L (ref 0.8–5.3)
LYMPHOCYTES NFR BLD AUTO: 13.5 %
MCH RBC QN AUTO: 31.6 PG (ref 26.5–33)
MCHC RBC AUTO-ENTMCNC: 32.6 G/DL (ref 31.5–36.5)
MCV RBC AUTO: 97 FL (ref 78–100)
MONOCYTES # BLD AUTO: 0.7 10E9/L (ref 0–1.3)
MONOCYTES NFR BLD AUTO: 5.8 %
NEUTROPHILS # BLD AUTO: 9.8 10E9/L (ref 1.6–8.3)
NEUTROPHILS NFR BLD AUTO: 79.9 %
PLATELET # BLD AUTO: 323 10E9/L (ref 150–450)
POTASSIUM SERPL-SCNC: 4.2 MMOL/L (ref 3.4–5.3)
PROT SERPL-MCNC: 7.2 G/DL (ref 6.8–8.8)
RBC # BLD AUTO: 4.68 10E12/L (ref 3.8–5.2)
SODIUM SERPL-SCNC: 144 MMOL/L (ref 133–144)
WBC # BLD AUTO: 12.3 10E9/L (ref 4–11)

## 2017-04-03 PROCEDURE — 82150 ASSAY OF AMYLASE: CPT | Performed by: OBSTETRICS & GYNECOLOGY

## 2017-04-03 PROCEDURE — 99214 OFFICE O/P EST MOD 30 MIN: CPT | Performed by: OBSTETRICS & GYNECOLOGY

## 2017-04-03 PROCEDURE — 86140 C-REACTIVE PROTEIN: CPT | Performed by: OBSTETRICS & GYNECOLOGY

## 2017-04-03 PROCEDURE — 85025 COMPLETE CBC W/AUTO DIFF WBC: CPT | Performed by: OBSTETRICS & GYNECOLOGY

## 2017-04-03 PROCEDURE — 80053 COMPREHEN METABOLIC PANEL: CPT | Performed by: OBSTETRICS & GYNECOLOGY

## 2017-04-03 PROCEDURE — 83690 ASSAY OF LIPASE: CPT | Performed by: OBSTETRICS & GYNECOLOGY

## 2017-04-03 PROCEDURE — 36415 COLL VENOUS BLD VENIPUNCTURE: CPT | Performed by: OBSTETRICS & GYNECOLOGY

## 2017-04-03 ASSESSMENT — PAIN SCALES - GENERAL: PAINLEVEL: EXTREME PAIN (8)

## 2017-04-03 NOTE — TELEPHONE ENCOUNTER
Kassandra is calling back, relayed messages below, pt is confused. Couldn't reach RN. Please call back and clarify what is going on with patient today. She stated she was trying to get in to [place] today, not ER. She stated it's so painful, she can't drive to ER.

## 2017-04-03 NOTE — PROGRESS NOTES
Subjective:  She was seen in ER a few days ago with a left labial abscess- they tried to drain it but after incising it, very little stuff came out. She was started on cephalexin. She has multiple antibiotic allergies. She doesn't think it is improving. She had fevers a few days ago. Also was dx with pancreatitis. probably viral.      Also she continues to have RUQ pain as she did when she went to ER. The US then showed GB sludge. The pancreas looked normal. The GB wall thickness was normal then.    Also continues to feel weak and has epigastric pain as she did when she was in ER. consistent with pancreatitis. Has eaten very little. Taking fluids well at this point.    No vomiting.  Tolerating the cephalexin but she states that the boil still hurts quite a bit.      The past medical history, social history, past surgical history and family history as shown below have been reviewed by me today.  Past Medical History:   Diagnosis Date     Allergic rhinitis, cause unspecified     Allergic rhinitis - weeds and pollan     Anxiety      Bladder polyps      Depressive disorder      Obstructive chronic bronchitis with exacerbation (H)      Other and unspecified ovarian cyst     Ovarian cyst - rupured cyst + laser x3     Other and unspecified ovarian cyst 9/14/2005    Left hemorrhagic ovarian cyst.     Tobacco abuse      Urinary tract infection, site not specified         Allergies   Allergen Reactions     Augmentin Swelling and Difficulty breathing     Avelox Nausea and Vomiting     Effexor Xr [Venlafaxine Hydrochloride]      Shaky and heart racing     Sulfa Drugs      Current Outpatient Prescriptions   Medication Sig Dispense Refill     ondansetron (ZOFRAN-ODT) 4 MG ODT tab Take 0.5-1 tablets (2-4 mg) by mouth every 8 hours as needed for nausea or vomiting 10 tablet 0     cephALEXin (KEFLEX) 500 MG capsule Take 1 capsule (500 mg) by mouth 4 times daily for 7 days 28 capsule 0     ALPRAZolam (XANAX) 0.5 MG tablet Take 1  tablet (0.5 mg) by mouth 3 times daily as needed for anxiety 90 tablet 0     HYDROcodone-acetaminophen (NORCO) 5-325 MG per tablet Take 1 tablet by mouth every 6 hours as needed for moderate to severe pain 45 tablet 0     medroxyPROGESTERone (PROVERA) 5 MG tablet TAKE ONE TABLET BY MOUTH ONCE DAILY 90 tablet 0     meloxicam (MOBIC) 15 MG tablet Take 1 tablet (15 mg) by mouth daily 90 tablet 1     FLUoxetine (PROZAC) 40 MG capsule Take 1 capsule (40 mg) by mouth daily 30 capsule 1     traMADol (ULTRAM) 50 MG tablet Take 1-2 tablets ( mg) by mouth every 8 hours as needed for pain Maximum 3 tablet(s) per day 60 tablet 0     naproxen (NAPROSYN) 500 MG tablet Take 1 tablet (500 mg) by mouth 2 times daily as needed for moderate pain 60 tablet 1     estradiol (ESTRACE) 1 MG tablet TAKE ONE TABLET BY MOUTH ONCE DAILY 30 tablet 1     cyclobenzaprine (FLEXERIL) 10 MG tablet Take 1 tablet (10 mg) by mouth 3 times daily as needed for muscle spasms 30 tablet 1     omeprazole (PRILOSEC) 40 MG capsule Take 1 capsule (40 mg) by mouth daily as needed Take 30-60 minutes before a meal. 90 capsule 1     albuterol (PROAIR HFA, PROVENTIL HFA, VENTOLIN HFA) 108 (90 BASE) MCG/ACT inhaler Inhale 2 puffs into the lungs every 6 hours as needed for shortness of breath / dyspnea or wheezing 1 Inhaler 0     fluticasone (FLONASE) 50 MCG/ACT nasal spray Spray 2 sprays into both nostrils daily 1 Package 12     aspirin 81 MG tablet Take 1 tablet by mouth daily.  OTC     MELATONIN 10 mg daily        ALBUTEROL SULFATE 0.083 % IN NEBU ONE NEBULIZATION 4 TIMES DAILY AS NEEDED 1 BOX 0     Past Surgical History:   Procedure Laterality Date     C LIGATE FALLOPIAN TUBE,POSTPARTUM      Tubal Ligation     ESOPHAGOSCOPY, GASTROSCOPY, DUODENOSCOPY (EGD), COMBINED  11/28/2012    Procedure: COMBINED ESOPHAGOSCOPY, GASTROSCOPY, DUODENOSCOPY (EGD), BIOPSY SINGLE OR MULTIPLE;  ESOPHAGOSCOPY, GASTROSCOPY, DUODENOSCOPY (EGD) with biopsy;  Surgeon: Herson Cabrera  "MD Nima;  Location: [facility]     EXCIS VAGINAL CYST/TUMOR       HC REMOVAL OF OVARY/TUBE(S)  3/6/2006    Laparoscopic bilateral salpingo-oophorectomy.     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 No domestic violence issues.     Family History   Problem Relation Age of Onset     Alcohol/Drug Father       at age 53 of alcohol     Alcohol/Drug Paternal Grandfather      Alcohol/Drug Paternal Grandmother      Alcohol/Drug Paternal Aunt      Allergies Daughter      animals and grass     Allergies Son      dust mite     Arthritis Mother      Arthritis Father      CANCER Paternal Grandmother      lung     CANCER Paternal Grandfather      lung     CANCER Father      lung     Depression Brother      Hypertension Mother      Lipids Mother      Depression Mother      HEART DISEASE Mother      Blood Disease Mother      DVTs       ROS: A 12 point review of systems was done. Except for what is listed above in the HPI, the systems review is negative .      Objective: Vital signs: Blood pressure 112/70, pulse 78, temperature 98.5  F (36.9  C), temperature source Tympanic, resp. rate 24, height 5' 5\" (1.651 m), weight 145 lb (65.8 kg), last menstrual period 2006, SpO2 99 %, not currently breastfeeding.    HEENT normal- mucous membranes " moist.    Neck is supple, mobile, no adenoapthy or masses palpable. Normal range of motion noted.    Chest is clear to auscultation. No wheezes, rales or rhonchi heard.  cardiac exam is normal with s1, s2, no murmurs or adventitious sounds.Normal rate and rhythm is heard.    Abdomen is soft,  nondistended, No masses felt.No HSM. No guarding or rigidity or rebound noted. Palpation reveals   tenderness  Across the upper quadrants including the epigastrium and more RUQ than left side although tender diffusely. consistent with pancreatitis. Not distended.  Normal bowel sounds heard.     .Pelvic exam:My nurse Tarah   was present to chaperone the exam.    The external genitalia appeared normal except for a boil on the left labium majus which is 2 cm in diameter and red and swollen but doesn't appear to be coming to a head- nothing obvious that could be incised and drained.    The vaginal vault was without bleeding  or   discharge or odor. There is no obvious bartholin's cyst- the boil is on the external labium and well away from the introitus.     The cervix was smooth and palpable- no pap or speculum exam done today because she is so tender to movement of the labia.    No vaginal support defects were noted,      Bimanual exam revealed a  6 week  sized uterus. It does not   descend   well in the vaginal vault.      No adnexal masses were felt.      There was no  cervical motion tenderness.      Exam was  limited by the patient's labial tenderness.      Cbc  Shows lower wbc-now down to 12.3   Still high ANC.    amylase  39  Lipase 128- back to normal-    Comprehensive  panel normal-      Assessment/Plan:    1. Boil over the left labium majus- now on day #3 of cephalexin- due to multiple antibiotic allergies I advised her to stay on the same med. Also to soak in warm water and apply moist heat several times a dya. rechekc in 1 week, sooner if this gets bigger.    2. Vague diffuse upper abdominal pain- recent dx with  pancreatitis- viral-  At this point she doesn't have an acute abdomen and hger wbc is lower and I thinbk she is getting over the pancreatitis- the enzymes back to normal-    3. GB sludge on US- and she has RUQ pain- this is difficult to sort out because of her pancreatitis- but I am keeping this open as a possible explanation for her pain- cholecystitis is possible- I will arrange for her to see general surgery within the next day-    4, note written excusing her from work until next week.    DEVEN Washington MD

## 2017-04-03 NOTE — TELEPHONE ENCOUNTER
"I spoke with patient.   She would like to see Melissa today for her Pancreatitis follow up.   In regards to the cyst, they were unable to drain it at the ED with a \"catheter\" as they wanted.   She noticed today that it is larger and more painful than what it was on Friday.   Recommend to be seen today by OB/GYN.   Will see if Dr. Mancini can work patient into schedule - appointment with Melissa Chew is at 4 pm in [place].     Melissa Merritt, RN, BSN    "

## 2017-04-03 NOTE — TELEPHONE ENCOUNTER
"Patient is on  schedule this afternoon. Patient wanting to be seen by any provider as cyst is \"worse\".  does not treat Bartholin cysts.  will see for f/u pancreatitis, but need to see OB/GYN for cyst f/u. Need to notify patient of this; need more info regarding cyst-was it packed, is it draining etc. To determine when/where should be seen.     Christina Stringer RN, BSN    "

## 2017-04-03 NOTE — TELEPHONE ENCOUNTER
LM that she can be worked into our schedule today at 2:30,  I have put her in this spot.    Please also inform her that she is scheduled with GÉNESIS Villa on Thurs. 4/6 at 7:30 a.m. For her pancreatitis

## 2017-04-03 NOTE — TELEPHONE ENCOUNTER
"RN has attempted to contact this patient by phone to return their call, but there is no response.  Left message to \"call clinic at earliest convenience\".  Will try again later.     Leilani Mcconnell RN      Patient is scheduled on Dr. Washington's schedule at 1:00 today, 4/3/17, for cyst only.  She is seeing PCP on Thursday at 7:30 am for pancreatitis.  "

## 2017-04-03 NOTE — TELEPHONE ENCOUNTER
Patient called to schedule appointment for a hospital follow-up    Patient was admitted to : [facility]  Discharged date: 3/31/17  Reason for hospital admission:  Pancreatitis and large groin cyst   Does patient have future appointment scheduled with provider? Yes - Melissa Chew in [place]  Date of future appointment:  Today 4/3/17      Groin cyst has gotten much worse. It is now closed and gotten much bigger. She needed to be seen today.     This information will be used to help the care team plan for the patients upcoming visit.  The triage RN may determine that a follow up call is necessary and reach out to the patient via the phone number listed in the chart.     Please route this message high priority to the Triage RN pool.   Thank you,  Princess Glass   for [facility]

## 2017-04-03 NOTE — TELEPHONE ENCOUNTER
ED / Discharge Outreach Protocol    Patient Contact    Attempt # 1    Was call answered?  No.  Left message on voicemail with information to call me back.  Leilani Mcconnell RN

## 2017-04-03 NOTE — MR AVS SNAPSHOT
After Visit Summary   4/3/2017    Liliana Kat    MRN: [id number]           Patient Information     Date Of Birth          1970        Visit Information        Provider Department      4/3/2017 1:00 PM Edis Washington MD [facility]        Today's Diagnoses     Boil    -  1    Acute pancreatitis, unspecified complication status, unspecified pancreatitis type        Abdominal pain, right upper quadrant        Cholecystitis           Follow-ups after your visit        Additional Services     GENERAL SURG ADULT REFERRAL       Your provider has referred you to: G: [facility] [phone]   [email or web address]    Please be aware that coverage of these services is subject to the terms and limitations of your health insurance plan.  Call member services at your health plan with any benefit or coverage questions.      Please bring the following with you to your appointment:    (1) Any X-Rays, CTs or MRIs which have been performed.  Contact the facility where they were done to arrange for  prior to your scheduled appointment.   (2) List of current medications   (3) This referral request   (4) Any documents/labs given to you for this referral                  Your next 10 appointments already scheduled     Apr 04, 2017  2:00 PM CDT   New Visit with Shivam Luevano MD   [facility] ([address] [id number] [phone]            Apr 06, 2017  7:30 AM CDT   (Arrive by 7:00 AM)   Office Visit with LOLIS Osorio CNP   [address] [address]   [phone]           Bring a current list of meds and any records pertaining to this visit.  For Physicals, please bring immunization records and any forms needing to be filled out.  Please arrive 10 minutes early to complete  "paperwork.            May 03, 2017  1:00 PM CDT   Return Visit with Letitia Stratton MD   [facility] ([facility]    [address] [place] [id number] [phone]              Who to contact     If you have questions or need follow up information about today's clinic visit or your schedule please contact [facility] directly at [phone].  Normal or non-critical lab and imaging results will be communicated to you by iMusicTweethart, letter or phone within 4 business days after the clinic has received the results. If you do not hear from us within 7 days, please contact the clinic through D4Pt or phone. If you have a critical or abnormal lab result, we will notify you by phone as soon as possible.  Submit refill requests through [employer] or call your pharmacy and they will forward the refill request to us. Please allow 3 business days for your refill to be completed.          Additional Information About Your Visit        [employer] Information     [employer] lets you send messages to your doctor, view your test results, renew your prescriptions, schedule appointments and more. To sign up, go to [email or web address] . Click on \"Log in\" on the left side of the screen, which will take you to the Welcome page. Then click on \"Sign up Now\" on the right side of the page.     You will be asked to enter the access code listed below, as well as some personal information. Please follow the directions to create your username and password.     Your access code is: [email or web address]  Expires: 2017 11:36 AM     Your access code will  in 90 days. If you need help or a new code, please call your [facility] or [phone].        Care EveryWhere ID     This is your Care EveryWhere ID. This could be used by other organizations to access your [place] medical records  [id number]        Your Vitals Were     Pulse Temperature Respirations Height Last Period Pulse " "Oximetry    78 98.5  F (36.9  C) (Tympanic) 24 5' 5\" (1.651 m) 03/06/2006 99%    Breastfeeding? BMI (Body Mass Index)                No 24.13 kg/m2           Blood Pressure from Last 3 Encounters:   04/03/17 112/70   03/31/17 102/78   02/01/17 130/80    Weight from Last 3 Encounters:   04/03/17 145 lb (65.8 kg)   03/31/17 142 lb (64.4 kg)   03/22/17 143 lb (64.9 kg)              We Performed the Following     Amylase     CBC with platelets and differential     Comprehensive metabolic panel     CRP, inflammation     GENERAL SURG ADULT REFERRAL     Lipase        Primary Care Provider Office Phone # Fax #    LOLIS Osorio [phone]       [facility] [address] [place] [address]        Thank you!     Thank you for choosing [facility]  for your care. Our goal is always to provide you with excellent care. Hearing back from our patients is one way we can continue to improve our services. Please take a few minutes to complete the written survey that you may receive in the mail after your visit with us. Thank you!             Your Updated Medication List - Protect others around you: Learn how to safely use, store and throw away your medicines at www.disposemymeds.org.          This list is accurate as of: 4/3/17  2:10 PM.  Always use your most recent med list.                   Brand Name Dispense Instructions for use    * albuterol (2.5 MG/3ML) 0.083% neb solution     1 BOX    ONE NEBULIZATION 4 TIMES DAILY AS NEEDED       * albuterol 108 (90 BASE) MCG/ACT Inhaler    PROAIR HFA/PROVENTIL HFA/VENTOLIN HFA    1 Inhaler    Inhale 2 puffs into the lungs every 6 hours as needed for shortness of breath / dyspnea or wheezing       ALPRAZolam 0.5 MG tablet    XANAX    90 tablet    Take 1 tablet (0.5 mg) by mouth 3 times daily as needed for anxiety       aspirin 81 MG tablet      Take 1 tablet by mouth daily.       cephALEXin 500 MG capsule    KEFLEX    28 capsule "    Take 1 capsule (500 mg) by mouth 4 times daily for 7 days       cyclobenzaprine 10 MG tablet    FLEXERIL    30 tablet    Take 1 tablet (10 mg) by mouth 3 times daily as needed for muscle spasms       estradiol 1 MG tablet    ESTRACE    30 tablet    TAKE ONE TABLET BY MOUTH ONCE DAILY       FLUoxetine 40 MG capsule    PROzac    30 capsule    Take 1 capsule (40 mg) by mouth daily       fluticasone 50 MCG/ACT spray    FLONASE    1 Package    Spray 2 sprays into both nostrils daily       HYDROcodone-acetaminophen 5-325 MG per tablet    NORCO    45 tablet    Take 1 tablet by mouth every 6 hours as needed for moderate to severe pain       medroxyPROGESTERone 5 MG tablet    PROVERA    90 tablet    TAKE ONE TABLET BY MOUTH ONCE DAILY       MELATONIN      10 mg daily       meloxicam 15 MG tablet    MOBIC    90 tablet    Take 1 tablet (15 mg) by mouth daily       naproxen 500 MG tablet    NAPROSYN    60 tablet    Take 1 tablet (500 mg) by mouth 2 times daily as needed for moderate pain       omeprazole 40 MG capsule    priLOSEC    90 capsule    Take 1 capsule (40 mg) by mouth daily as needed Take 30-60 minutes before a meal.       ondansetron 4 MG ODT tab    ZOFRAN-ODT    10 tablet    Take 0.5-1 tablets (2-4 mg) by mouth every 8 hours as needed for nausea or vomiting       traMADol 50 MG tablet    ULTRAM    60 tablet    Take 1-2 tablets ( mg) by mouth every 8 hours as needed for pain Maximum 3 tablet(s) per day       * Notice:  This list has 2 medication(s) that are the same as other medications prescribed for you. Read the directions carefully, and ask your doctor or other care provider to review them with you.

## 2017-04-03 NOTE — TELEPHONE ENCOUNTER
RN can see Addis will see her at 2:30.  Please inform her that the [place] RN will not be calling her back about the cyst as this is taken care of.  Patient would like to PCP for her pancreatitis on a different day, so will call to schedule her with GÉNESIS Villa later in the week for pancreatitis.  She will be taken off the schedule in [facility] at 4:00.  Leilani Mcconnell RN    Please inform her she is scheduled with GÉNESIS Villa (PCP) on Thursday, 4/6/17 at 7:30 am for her pancreatitis.

## 2017-04-03 NOTE — TELEPHONE ENCOUNTER
Patient is called and informed of this message.  Patient understands and agrees to this plan.  Closing this encounter.    Leilani Mcconnell RN

## 2017-04-03 NOTE — TELEPHONE ENCOUNTER
"Hospital/TCU/ED for chronic condition Discharge Protocol    \"Hi, my name is Leilani Mcconnell, a registered nurse, and I am calling from [facility].  I am calling to follow up and see how things are going for you after your recent emergency visit/hospital/TCU stay.\"    Tell me how you are doing now that you are home?\" I am not doing well, the ED was supposed to go in and drain this cyst, but were unable to get it drained.  It then closed up when I went home and now it is very large. The provider she is scheduled with in [place] today at 4:00 does not see patients for this cyst.  She will be scheduled with Addis at 2:30 today.  She would like to see PCP a different day for pancreatitis follow up.  This is changed to Thursday 4/6/17      Discharge Instructions    \"Let's review your discharge instructions.  What is/are the follow-up recommendations?  Pt. Response: see above    \"Has an appointment with your primary care provider been scheduled?\"   Yes. (confirm)    \"When you see the provider, I would recommend that you bring your medications with you.\"    Medications    \"Tell me what changed about your medicines when you discharged?\"    Changes to chronic meds?    0-1    \"What questions do you have about your medications?\"    None     New diagnoses of heart failure, COPD, diabetes, or MI?    No              Medication reconciliation completed? Yes  Was MTM referral placed (*Make sure to put transitions as reason for referral)?   No    Call Summary    \"What questions or concerns do you have about your recent visit and your follow-up care?\"     none    \"If you have questions or things don't continue to improve, we encourage you contact us through the main clinic number (give number).  Even if the clinic is not open, triage nurses are available 24/7 to help you.     We would like you to know that our clinic has extended hours (provide information).  We also have urgent care (provide details on closest location and " "hours/contact info)\"      \"Thank you for your time and take care!\"   Leilani Mcconnell RN             "

## 2017-04-04 ENCOUNTER — TELEPHONE (OUTPATIENT)
Dept: SURGERY | Facility: CLINIC | Age: 47
End: 2017-04-04

## 2017-04-04 ENCOUNTER — OFFICE VISIT (OUTPATIENT)
Dept: SURGERY | Facility: CLINIC | Age: 47
End: 2017-04-04
Payer: COMMERCIAL

## 2017-04-04 VITALS — DIASTOLIC BLOOD PRESSURE: 62 MMHG | HEART RATE: 84 BPM | SYSTOLIC BLOOD PRESSURE: 114 MMHG | TEMPERATURE: 98.1 F

## 2017-04-04 DIAGNOSIS — K85.10 ACUTE BILIARY PANCREATITIS, UNSPECIFIED COMPLICATION STATUS: Primary | ICD-10-CM

## 2017-04-04 PROCEDURE — 99203 OFFICE O/P NEW LOW 30 MIN: CPT | Performed by: SURGERY

## 2017-04-04 RX ORDER — ACETAMINOPHEN 325 MG/1
975 TABLET ORAL ONCE
Status: CANCELLED | OUTPATIENT
Start: 2017-04-04 | End: 2017-04-04

## 2017-04-04 RX ORDER — OFLOXACIN 3 MG/ML
1 SOLUTION/ DROPS OPHTHALMIC
COMMUNITY
Start: 2015-12-05 | End: 2017-04-12

## 2017-04-04 RX ORDER — CEFAZOLIN SODIUM 2 G/100ML
2 INJECTION, SOLUTION INTRAVENOUS
Status: CANCELLED | OUTPATIENT
Start: 2017-04-04

## 2017-04-04 RX ORDER — CEFAZOLIN SODIUM 1 G/3ML
1 INJECTION, POWDER, FOR SOLUTION INTRAMUSCULAR; INTRAVENOUS SEE ADMIN INSTRUCTIONS
Status: CANCELLED | OUTPATIENT
Start: 2017-04-04

## 2017-04-04 NOTE — Clinical Note
Request for Elective Surgery to be Scheduled Dr. Martha Luevano Please schedule for surgery, pre-op H&P, and post-op follow up. Patient Name:   Liliana Kat ([id number]). Diagnosis:  Acute biliary pancreatitis, unspecified complication status [K85.10]. Procedures:   Laparoscopic cholecystectomy .  Patient type:  Outpatient. Time estimate:  60 min. Assistant:   Surgical Tech Anesthesia:  general anesthesia.     Patient position:  supine.                     Pathology Service Needed Routine. Block type: None. Given by: NA. Location where block to be administered:  NA. C-arm:  No. Special Equipment:  None Vendor to be called by O.R.:   No. Patient on anti-coagulation?:  Yes.             Blood or Blood Products available   No.                  Post op appointment:  3-4 weeks. Expected work restrictions:  10-20 lbs for 2-3 weeks.  PLEASE SCHEDULE THIS WEEK

## 2017-04-04 NOTE — PROGRESS NOTES
Chief complaint:  Abdominal pain, right upper quadrant    HPI:  This patient is a 46 year old  female who presents with right upper quadrant abdominal pain for the past 2 years.  The pain is constant.  It occurs with meals and especially after spicy foods. She denies radiation to the back. She has a history of pancreatitis times 2 one this past month and the other nine years ago. She denies excessive alcohol intake and smokes 2 cigs a day. She is trying to quit. She was diagnosed in the ED with viral pancreatitis as several family members were ill. She has continued to have this pain which is now leading to anorexia, nausea and vomiting.     Past Medical History:   has a past medical history of Allergic rhinitis, cause unspecified; Anxiety; Bladder polyps; Depressive disorder; Obstructive chronic bronchitis with exacerbation (H); Other and unspecified ovarian cyst; Other and unspecified ovarian cyst (9/14/2005); Tobacco abuse; and Urinary tract infection, site not specified.    Past Surgical History:  Past Surgical History:   Procedure Laterality Date     C LIGATE FALLOPIAN TUBE,POSTPARTUM      Tubal Ligation     ESOPHAGOSCOPY, GASTROSCOPY, DUODENOSCOPY (EGD), COMBINED  11/28/2012    Procedure: COMBINED ESOPHAGOSCOPY, GASTROSCOPY, DUODENOSCOPY (EGD), BIOPSY SINGLE OR MULTIPLE;  ESOPHAGOSCOPY, GASTROSCOPY, DUODENOSCOPY (EGD) with biopsy;  Surgeon: Herson Cabrera MD;  Location: [facility]     EXCIS VAGINAL CYST/TUMOR       HC REMOVAL OF OVARY/TUBE(S)  3/6/2006    Laparoscopic bilateral salpingo-oophorectomy.     HC TYMPANOPLASTY W/O MASTOID, INIT/REV W/O OSS CHAIN RECONST  04/02        Social History:  Social History     Social History     Marital status:      Spouse name: N/A     Number of children: N/A     Years of education: N/A     Occupational History      [employer]     assembly     Social History Main Topics     Smoking status: Current Some Day Smoker     Packs/day: 0.25     Years: 10.00      Smokeless tobacco: Never Used      Comment: 2 cigs a week     Alcohol use No      Comment: recovering  -      Drug use: No     Sexual activity: Not Currently     Partners: Male     Birth control/ protection: Surgical      Comment: tubal 1997     Other Topics Concern     Not on file     Social History Narrative    Lives with spouse and children. No domestic violence issues.        Family History:  Family History   Problem Relation Age of Onset     Alcohol/Drug Father       at age 53 of alcohol     Arthritis Father      CANCER Father      lung     Alcohol/Drug Paternal Grandfather      CANCER Paternal Grandfather      lung     Alcohol/Drug Paternal Grandmother      CANCER Paternal Grandmother      lung     Alcohol/Drug Paternal Aunt      Allergies Daughter      animals and grass     Allergies Son      dust mite     Arthritis Mother      Hypertension Mother      Lipids Mother      Depression Mother      HEART DISEASE Mother      Blood Disease Mother      DVTs     Depression Brother        Review of Systems:  The 10 point Review of Systems is negative other than noted in the HPI and above.    Physical Exam:  General - This is a well developed, well nourished female in no apparent distress.  HEENT - Normocephalic, atraumatic.  No scleral icterus.  Neck - supple without masses  Lungs - respirations regular and non-labored.    Abdomen:   soft, non-distended with tenderness noted in the right upper quadrant . no masses palpated. normal bowel sounds.  Extremities - warm without edema  Neurologic - nonfocal    Relevant labs:  Liver function panel- normal  WBC- normal  Lipase- has normalized was 578 in hospital     Imaging:  Ultrasound RUQ: positive cholelithiasis, negative gallbladder wall thickening, negative ductal dilatation, negative pericholecystic fluid, negative sonographic Gabriel's sign.    Assessment and Plan:  It is my impression that Ms Kat has biliary pancreatitis. I have also advised a low fat diet  and to avoid spicy foods and to take omeprazole daily. There is a small chance that this pain is not related to gallbladder. I will like her to complete her antibiotic course before surgery.    I have offered her a Laparoscopic cholecystectomy.  We have discussed the indication, risks and expected recovery.  Risks discussed included duct/ organ injury, conversion to open surgery with increased recovery, post cholecystectomy syndromes and their treatment.   We also discussed alternatives including dissolution, lithotripsy and low fat diet. Literature was given to review.  We will work on scheduling surgery this week.    Martha Luevano MD

## 2017-04-04 NOTE — MR AVS SNAPSHOT
After Visit Summary   4/4/2017    Liliana Kat    MRN: [id number]           Patient Information     Date Of Birth          1970        Visit Information        Provider Department      4/4/2017 2:00 PM Shivam Luevano MD [facility]        Today's Diagnoses     Acute biliary pancreatitis, unspecified complication status    -  1       Follow-ups after your visit        Your next 10 appointments already scheduled     Apr 06, 2017  7:30 AM CDT   (Arrive by 7:00 AM)   Office Visit with LOLIS Osorio CNP   [facility]    [address] [place] [id number] [phone]           Bring a current list of meds and any records pertaining to this visit.  For Physicals, please bring immunization records and any forms needing to be filled out.  Please arrive 10 minutes early to complete paperwork.            May 03, 2017  1:00 PM CDT   Return Visit with Letitia Stratton MD   [facility] ([address] [address] [phone]              Who to contact     If you have questions or need follow up information about today's clinic visit or your schedule please contact [facility] directly at [phone].  Normal or non-critical lab and imaging results will be communicated to you by [employer], letter or phone within 4 business days after the clinic has received the results. If you do not hear from us within 7 days, please contact the clinic through [employer] or phone. If you have a critical or abnormal lab result, we will notify you by phone as soon as possible.  Submit refill requests through Bundle or call your pharmacy and they will forward the refill request to us. Please allow 3 business days for your refill to be completed.          Additional Information About Your Visit        Bundle Information     Bundle lets you send messages to your  "doctor, view your test results, renew your prescriptions, schedule appointments and more. To sign up, go to [email or web address] . Click on \"Log in\" on the left side of the screen, which will take you to the Welcome page. Then click on \"Sign up Now\" on the right side of the page.     You will be asked to enter the access code listed below, as well as some personal information. Please follow the directions to create your username and password.     Your access code is: [email or web address]  Expires: 2017 11:36 AM     Your access code will  in 90 days. If you need help or a new code, please call your [place] clinic or [phone].        Care EveryWhere ID     This is your Care EveryWhere ID. This could be used by other organizations to access your [place] medical records  [id number]        Your Vitals Were     Pulse Temperature Last Period             84 98.1  F (36.7  C) (Temporal) 2006          Blood Pressure from Last 3 Encounters:   17 114/62   17 112/70   17 102/78    Weight from Last 3 Encounters:   17 145 lb (65.8 kg)   17 142 lb (64.4 kg)   17 143 lb (64.9 kg)              Today, you had the following     No orders found for display       Primary Care Provider Office Phone # Fax #    Paula LOLIS Renee [facility] [phone]       [facility]  [address] [place] [address]        Thank you!     Thank you for choosing [facility]  for your care. Our goal is always to provide you with excellent care. Hearing back from our patients is one way we can continue to improve our services. Please take a few minutes to complete the written survey that you may receive in the mail after your visit with us. Thank you!             Your Updated Medication List - Protect others around you: Learn how to safely use, store and throw away your medicines at www.disposemymeds.org.          This list is accurate as of: 17  3:25 PM.  " Always use your most recent med list.                   Brand Name Dispense Instructions for use    * albuterol (2.5 MG/3ML) 0.083% neb solution     1 BOX    ONE NEBULIZATION 4 TIMES DAILY AS NEEDED       * albuterol 108 (90 BASE) MCG/ACT Inhaler    PROAIR HFA/PROVENTIL HFA/VENTOLIN HFA    1 Inhaler    Inhale 2 puffs into the lungs every 6 hours as needed for shortness of breath / dyspnea or wheezing       ALPRAZolam 0.5 MG tablet    XANAX    90 tablet    Take 1 tablet (0.5 mg) by mouth 3 times daily as needed for anxiety       aspirin 81 MG tablet      Take 1 tablet by mouth daily.       cephALEXin 500 MG capsule    KEFLEX    28 capsule    Take 1 capsule (500 mg) by mouth 4 times daily for 7 days       cyclobenzaprine 10 MG tablet    FLEXERIL    30 tablet    Take 1 tablet (10 mg) by mouth 3 times daily as needed for muscle spasms       estradiol 1 MG tablet    ESTRACE    30 tablet    TAKE ONE TABLET BY MOUTH ONCE DAILY       FLUoxetine 40 MG capsule    PROzac    30 capsule    Take 1 capsule (40 mg) by mouth daily       fluticasone 50 MCG/ACT spray    FLONASE    1 Package    Spray 2 sprays into both nostrils daily       medroxyPROGESTERone 5 MG tablet    PROVERA    90 tablet    TAKE ONE TABLET BY MOUTH ONCE DAILY       MELATONIN      10 mg daily       meloxicam 15 MG tablet    MOBIC    90 tablet    Take 1 tablet (15 mg) by mouth daily       naproxen 500 MG tablet    NAPROSYN    60 tablet    Take 1 tablet (500 mg) by mouth 2 times daily as needed for moderate pain       ofloxacin 0.3 % ophthalmic solution    OCUFLOX     1 drop       omeprazole 40 MG capsule    priLOSEC    90 capsule    Take 1 capsule (40 mg) by mouth daily as needed Take 30-60 minutes before a meal.       ondansetron 4 MG ODT tab    ZOFRAN-ODT    10 tablet    Take 0.5-1 tablets (2-4 mg) by mouth every 8 hours as needed for nausea or vomiting       traMADol 50 MG tablet    ULTRAM    60 tablet    Take 1-2 tablets ( mg) by mouth every 8 hours as  needed for pain Maximum 3 tablet(s) per day       * Notice:  This list has 2 medication(s) that are the same as other medications prescribed for you. Read the directions carefully, and ask your doctor or other care provider to review them with you.

## 2017-04-04 NOTE — NURSING NOTE
"Chief Complaint   Patient presents with     Consult     gallbladder       Initial /62 (BP Location: Right arm, Patient Position: Chair, Cuff Size: Adult Regular)  Pulse 84  Temp 98.1  F (36.7  C) (Temporal)  LMP 03/06/2006 Estimated body mass index is 24.13 kg/(m^2) as calculated from the following:    Height as of 4/3/17: 5' 5\" (1.651 m).    Weight as of 4/3/17: 145 lb (65.8 kg).  Medication Reconciliation: complete   Judah REYNOSO CMA      "

## 2017-04-04 NOTE — TELEPHONE ENCOUNTER
Surgery Scheduled    Date of Surgery 4/6/17 Time of Surgery   Procedure: Laparoscopic cholecystectomy  Hospital/Surgical Facility: [facility]  Surgeon: Dr. Luevano  Type of Anesthesia : Genera;  Pre-op 4/5/17 with Paula Villa  Post op:4/21/17 with Dr. Luevano        Surgery packet mailed to patient's home address. Patients instructed to arrive 1 hours prior to surgery. Patient understood and agrees to plan.    Arin Gomez  Surgery Scheduler

## 2017-04-05 ENCOUNTER — OFFICE VISIT (OUTPATIENT)
Dept: FAMILY MEDICINE | Facility: CLINIC | Age: 47
End: 2017-04-05
Payer: COMMERCIAL

## 2017-04-05 VITALS
DIASTOLIC BLOOD PRESSURE: 68 MMHG | HEART RATE: 60 BPM | WEIGHT: 145 LBS | SYSTOLIC BLOOD PRESSURE: 120 MMHG | TEMPERATURE: 98.6 F | BODY MASS INDEX: 24.13 KG/M2

## 2017-04-05 DIAGNOSIS — Z01.818 PREOP GENERAL PHYSICAL EXAM: Primary | ICD-10-CM

## 2017-04-05 DIAGNOSIS — K85.10 ACUTE BILIARY PANCREATITIS, UNSPECIFIED COMPLICATION STATUS: ICD-10-CM

## 2017-04-05 PROCEDURE — 99214 OFFICE O/P EST MOD 30 MIN: CPT | Performed by: NURSE PRACTITIONER

## 2017-04-05 NOTE — MR AVS SNAPSHOT
After Visit Summary   4/5/2017    Liliana Kat    MRN: [id number]           Patient Information     Date Of Birth          1970        Visit Information        Provider Department      4/5/2017 11:30 AM Paula Villa APRN CNP [facility]        Today's Diagnoses     Preop general physical exam    -  1    Acute biliary pancreatitis, unspecified complication status          Care Instructions      Before Your Surgery      Call your surgeon if there is any change in your health. This includes signs of a cold or flu (such as a sore throat, runny nose, cough, rash or fever).    Do not smoke, drink alcohol or take over the counter medicine (unless your surgeon or primary care doctor tells you to) for the 24 hours before and after surgery.    If you take prescribed drugs: Follow your doctor s orders about which medicines to take and which to stop until after surgery.    Eating and drinking prior to surgery: follow the instructions from your surgeon    Take a shower or bath the night before surgery. Use the soap your surgeon gave you to gently clean your skin. If you do not have soap from your surgeon, use your regular soap. Do not shave or scrub the surgery site.  Wear clean pajamas and have clean sheets on your bed.         Follow-ups after your visit        Your next 10 appointments already scheduled     Apr 06, 2017  7:30 AM CDT   (Arrive by 7:00 AM)   Office Visit with LOLIS Osorio CNP   [facility] ([facility])    [address] [place] [id number] [phone]           Bring a current list of meds and any records pertaining to this visit.  For Physicals, please bring immunization records and any forms needing to be filled out.  Please arrive 10 minutes early to complete paperwork.            Apr 06, 2017   Procedure with Shivam Luevano MD   [facility] ([facility])    [facility]  "[address]  [place] [address]   [phone]           From [address]: Exit at [address] on south side of [place]. Turn right on [address]. Turn left at stoplight on [address]. [facility] will be in view two blocks ahead            Apr 21, 2017  1:30 PM CDT   Return Visit with Shivam Luevano MD   [facility] ([facility])    [address] [id number] [phone]            May 03, 2017  1:00 PM CDT   Return Visit with Letitia Stratton MD   [facility] ([facility])    [address] [id number] [phone]              Who to contact     If you have questions or need follow up information about today's clinic visit or your schedule please contact [facility] directly at [phone].  Normal or non-critical lab and imaging results will be communicated to you by [employer], letter or phone within 4 business days after the clinic has received the results. If you do not hear from us within 7 days, please contact the clinic through [employer] or phone. If you have a critical or abnormal lab result, we will notify you by phone as soon as possible.  Submit refill requests through [employer] or call your pharmacy and they will forward the refill request to us. Please allow 3 business days for your refill to be completed.          Additional Information About Your Visit        [employer] Information     [employer] lets you send messages to your doctor, view your test results, renew your prescriptions, schedule appointments and more. To sign up, go to [email or web address] . Click on \"Log in\" on the left side of the screen, which will take you to the Welcome page. Then click on \"Sign up Now\" on the right side of the page.     You will be asked to enter the access code listed below, as well as some personal information. Please follow the directions to create your username and password.   "   Your access code is: [email or web address]  Expires: 2017 11:36 AM     Your access code will  in 90 days. If you need help or a new code, please call your [place] clinic or [phone].        Care EveryWhere ID     This is your Care EveryWhere ID. This could be used by other organizations to access your [place] medical records  [id number]        Your Vitals Were     Pulse Temperature Last Period BMI (Body Mass Index)          60 98.6  F (37  C) (Tympanic) 2006 24.13 kg/m2         Blood Pressure from Last 3 Encounters:   17 120/68   17 114/62   17 112/70    Weight from Last 3 Encounters:   17 145 lb (65.8 kg)   17 145 lb (65.8 kg)   17 142 lb (64.4 kg)              Today, you had the following     No orders found for display       Primary Care Provider Office Phone # Fax #    LOLIS Osorio [facility] [phone]       [facility]  [address] [place] [address]        Thank you!     Thank you for choosing [facility]  for your care. Our goal is always to provide you with excellent care. Hearing back from our patients is one way we can continue to improve our services. Please take a few minutes to complete the written survey that you may receive in the mail after your visit with us. Thank you!             Your Updated Medication List - Protect others around you: Learn how to safely use, store and throw away your medicines at www.disposemymeds.org.          This list is accurate as of: 17  1:41 PM.  Always use your most recent med list.                   Brand Name Dispense Instructions for use    * albuterol (2.5 MG/3ML) 0.083% neb solution     1 BOX    ONE NEBULIZATION 4 TIMES DAILY AS NEEDED       * albuterol 108 (90 BASE) MCG/ACT Inhaler    PROAIR HFA/PROVENTIL HFA/VENTOLIN HFA    1 Inhaler    Inhale 2 puffs into the lungs every 6 hours as needed for shortness of breath / dyspnea or wheezing       ALPRAZolam 0.5  MG tablet    XANAX    90 tablet    Take 1 tablet (0.5 mg) by mouth 3 times daily as needed for anxiety       aspirin 81 MG tablet      Take 1 tablet by mouth daily.       cephALEXin 500 MG capsule    KEFLEX    28 capsule    Take 1 capsule (500 mg) by mouth 4 times daily for 7 days       cyclobenzaprine 10 MG tablet    FLEXERIL    30 tablet    Take 1 tablet (10 mg) by mouth 3 times daily as needed for muscle spasms       estradiol 1 MG tablet    ESTRACE    30 tablet    TAKE ONE TABLET BY MOUTH ONCE DAILY       FLUoxetine 40 MG capsule    PROzac    30 capsule    Take 1 capsule (40 mg) by mouth daily       fluticasone 50 MCG/ACT spray    FLONASE    1 Package    Spray 2 sprays into both nostrils daily       medroxyPROGESTERone 5 MG tablet    PROVERA    90 tablet    TAKE ONE TABLET BY MOUTH ONCE DAILY       MELATONIN      10 mg daily       meloxicam 15 MG tablet    MOBIC    90 tablet    Take 1 tablet (15 mg) by mouth daily       naproxen 500 MG tablet    NAPROSYN    60 tablet    Take 1 tablet (500 mg) by mouth 2 times daily as needed for moderate pain       ofloxacin 0.3 % ophthalmic solution    OCUFLOX     1 drop       omeprazole 40 MG capsule    priLOSEC    90 capsule    Take 1 capsule (40 mg) by mouth daily as needed Take 30-60 minutes before a meal.       ondansetron 4 MG ODT tab    ZOFRAN-ODT    10 tablet    Take 0.5-1 tablets (2-4 mg) by mouth every 8 hours as needed for nausea or vomiting       traMADol 50 MG tablet    ULTRAM    60 tablet    Take 1-2 tablets ( mg) by mouth every 8 hours as needed for pain Maximum 3 tablet(s) per day       * Notice:  This list has 2 medication(s) that are the same as other medications prescribed for you. Read the directions carefully, and ask your doctor or other care provider to review them with you.

## 2017-04-05 NOTE — PROGRESS NOTES
[address] [address]  [phone]  Dept: [phone]    PRE-OP EVALUATION:  Today's date: 2017    Liliana Kat (: 1970) presents for pre-operative evaluation assessment as requested by Dr. Shivam Luevano.  She requires evaluation and anesthesia risk assessment prior to undergoing surgery/procedure for treatment of RUQ pain .  Proposed procedure: LAPAROSCOPIC CHOLECYSTECTOMY    Date of Surgery/ Procedure: 17  Time of Surgery/ Procedure: 930  Hospital/Surgical Facility: [facility]  Fax number for surgical facility:   Primary Physician: Paula Villa  Type of Anesthesia Anticipated: General    Patient has a Health Care Directive or Living Will:  NO    1. NO - Do you have a history of heart attack, stroke, stent, bypass or surgery on an artery in the head, neck, heart or legs?  2. NO - Do you ever have any pain or discomfort in your chest?  3. NO - Do you have a history of  Heart Failure?  4. NO - Are you troubled by shortness of breath when: walking on the level, up a slight hill or at night?  5. NO - Do you currently have a cold, bronchitis or other respiratory infection?  6. NO - Do you have a cough, shortness of breath or wheezing?  7. NO - Do you sometimes get pains in the calves of your legs when you walk?  8. NO - Do you or anyone in your family have previous history of blood clots?  9. NO - Do you or does anyone in your family have a serious bleeding problem such as prolonged bleeding following surgeries or cuts?  10. NO - Have you ever had problems with anemia or been told to take iron pills?  11. NO - Have you had any abnormal blood loss such as black, tarry or bloody stools, or abnormal vaginal bleeding?  12. NO - Have you ever had a blood transfusion?  13. yes - Have you or any of your relatives ever had problems with anesthesia? Patient states she gets sick  14. NO - Do you have sleep apnea, excessive snoring or daytime  drowsiness?  15. NO - Do you have any prosthetic heart valves?  16. NO - Do you have prosthetic joints?  17. NO - Is there any chance that you may be pregnant?      HPI:                                                      Brief HPI related to upcoming procedure: The patient has been experiencing right upper quadrant abdominal pain and nausea. When it finally became more severe, she presented to the ED, where she was noted to have pancreatitis. She is a nondrinker. Upon further investigation, ultrasound was shown to show a lot of sludge in the gallbladder.      See problem list for active medical problems.  Problems all longstanding and stable, except as noted/documented.  See ROS for pertinent symptoms related to these conditions.                                                                                                  .    MEDICAL HISTORY:                                                      Patient Active Problem List    Diagnosis Date Noted     Acute biliary pancreatitis, unspecified complication status 04/05/2017     Priority: Medium     Adjustment disorder with mixed anxiety and depressed mood 02/01/2017     Priority: Medium     Acute pain of right shoulder 02/01/2017     Priority: Medium     Superficial varicosities 05/16/2013     Priority: Medium     Sacroiliitis (H) 05/16/2013     Priority: Medium     Bladder polyps      Priority: Medium     CARDIOVASCULAR SCREENING; LDL GOAL LESS THAN 160 10/31/2010     Priority: Medium     Need for postmenopausal hormone replacement 11/11/2008     Priority: Medium     Decreased libido 11/11/2008     Priority: Medium      Past Medical History:   Diagnosis Date     Allergic rhinitis, cause unspecified     Allergic rhinitis - weeds and pollan     Anxiety      Bladder polyps      Depressive disorder      Obstructive chronic bronchitis with exacerbation (H)      Other and unspecified ovarian cyst     Ovarian cyst - rupured cyst + laser x3     Other and unspecified  ovarian cyst 9/14/2005    Left hemorrhagic ovarian cyst.     Tobacco abuse      Urinary tract infection, site not specified      Past Surgical History:   Procedure Laterality Date     C LIGATE FALLOPIAN TUBE,POSTPARTUM      Tubal Ligation     ESOPHAGOSCOPY, GASTROSCOPY, DUODENOSCOPY (EGD), COMBINED  11/28/2012    Procedure: COMBINED ESOPHAGOSCOPY, GASTROSCOPY, DUODENOSCOPY (EGD), BIOPSY SINGLE OR MULTIPLE;  ESOPHAGOSCOPY, GASTROSCOPY, DUODENOSCOPY (EGD) with biopsy;  Surgeon: Herson Cabrera MD;  Location: [facility]     EXCIS VAGINAL CYST/TUMOR       HC REMOVAL OF OVARY/TUBE(S)  3/6/2006    Laparoscopic bilateral salpingo-oophorectomy.     HC TYMPANOPLASTY W/O MASTOID, INIT/REV W/O OSS CHAIN RECONST  04/02     Current Outpatient Prescriptions   Medication Sig Dispense Refill     ofloxacin (OCUFLOX) 0.3 % ophthalmic solution 1 drop       ondansetron (ZOFRAN-ODT) 4 MG ODT tab Take 0.5-1 tablets (2-4 mg) by mouth every 8 hours as needed for nausea or vomiting 10 tablet 0     cephALEXin (KEFLEX) 500 MG capsule Take 1 capsule (500 mg) by mouth 4 times daily for 7 days 28 capsule 0     ALPRAZolam (XANAX) 0.5 MG tablet Take 1 tablet (0.5 mg) by mouth 3 times daily as needed for anxiety 90 tablet 0     medroxyPROGESTERone (PROVERA) 5 MG tablet TAKE ONE TABLET BY MOUTH ONCE DAILY 90 tablet 0     meloxicam (MOBIC) 15 MG tablet Take 1 tablet (15 mg) by mouth daily 90 tablet 1     FLUoxetine (PROZAC) 40 MG capsule Take 1 capsule (40 mg) by mouth daily 30 capsule 1     traMADol (ULTRAM) 50 MG tablet Take 1-2 tablets ( mg) by mouth every 8 hours as needed for pain Maximum 3 tablet(s) per day 60 tablet 0     naproxen (NAPROSYN) 500 MG tablet Take 1 tablet (500 mg) by mouth 2 times daily as needed for moderate pain 60 tablet 1     estradiol (ESTRACE) 1 MG tablet TAKE ONE TABLET BY MOUTH ONCE DAILY 30 tablet 1     cyclobenzaprine (FLEXERIL) 10 MG tablet Take 1 tablet (10 mg) by mouth 3 times daily as needed for muscle spasms  30 tablet 1     omeprazole (PRILOSEC) 40 MG capsule Take 1 capsule (40 mg) by mouth daily as needed Take 30-60 minutes before a meal. 90 capsule 1     albuterol (PROAIR HFA, PROVENTIL HFA, VENTOLIN HFA) 108 (90 BASE) MCG/ACT inhaler Inhale 2 puffs into the lungs every 6 hours as needed for shortness of breath / dyspnea or wheezing 1 Inhaler 0     fluticasone (FLONASE) 50 MCG/ACT nasal spray Spray 2 sprays into both nostrils daily 1 Package 12     aspirin 81 MG tablet Take 1 tablet by mouth daily.  OTC     MELATONIN 10 mg daily        ALBUTEROL SULFATE 0.083 % IN NEBU ONE NEBULIZATION 4 TIMES DAILY AS NEEDED 1 BOX 0     OTC products: None, except as noted above    Allergies   Allergen Reactions     Augmentin Swelling and Difficulty breathing     Avelox Nausea and Vomiting     Effexor Xr [Venlafaxine Hydrochloride]      Shaky and heart racing     Sulfa Drugs       Latex Allergy: NO    Social History   Substance Use Topics     Smoking status: Current Some Day Smoker     Packs/day: 0.25     Years: 10.00     Smokeless tobacco: Never Used      Comment: 2 cigs a week     Alcohol use No      Comment: recovering  - 2005     History   Drug Use No       REVIEW OF SYSTEMS:                                                    C: NEGATIVE for fever, chills, change in weight  I: NEGATIVE for worrisome rashes, moles or lesions  E: NEGATIVE for vision changes or irritation  E/M: NEGATIVE for ear, mouth and throat problems  R: NEGATIVE for significant cough or SOB  CV: NEGATIVE for chest pain, palpitations or peripheral edema  GI: POSITIVE for acute biliary pancreatitis with nausea and abdominal pain  : NEGATIVE for frequency, dysuria, or hematuria  M: NEGATIVE for significant arthralgias or myalgia  N: NEGATIVE for weakness, dizziness or paresthesias  E: NEGATIVE for temperature intolerance, skin/hair changes  H: NEGATIVE for bleeding problems  PSYCHIATRIC: POSITIVE for depression and anxiety, primarily related to family  issues    EXAM:                                                    /68  Pulse 60  Temp 98.6  F (37  C) (Tympanic)  Wt 145 lb (65.8 kg)  LMP 03/06/2006  BMI 24.13 kg/m2    GENERAL APPEARANCE: healthy, alert and no distress     EYES: EOMI, PERRL     HENT: ear canals and TM's normal and nose and mouth without ulcers or lesions     NECK: no adenopathy, no asymmetry, masses, or scars and thyroid normal to palpation     RESP: lungs clear to auscultation - no rales, rhonchi or wheezes     CV: regular rates and rhythm, normal S1 S2, no S3 or S4 and no murmur, click or rub     ABDOMEN: Soft, nondistended. Bowel sounds present throughout. Tenderness in the right upper quadrant     MS: extremities normal- no gross deformities noted, no evidence of inflammation in joints, FROM in all extremities.     SKIN: no suspicious lesions or rashes     NEURO: Normal strength and tone, sensory exam grossly normal, mentation intact and speech normal     PSYCH: mentation appears normal. and affect normal/bright     LYMPHATICS: No axillary, cervical, or supraclavicular nodes    DIAGNOSTICS:                                                    EKG: Not indicated due to non-vascular surgery and low risk of event (age <65 and without cardiac risk factors)    Results for orders placed or performed in visit on 04/03/17 (from the past 48 hour(s))   Comprehensive metabolic panel   Result Value Ref Range    Sodium 144 133 - 144 mmol/L    Potassium 4.2 3.4 - 5.3 mmol/L    Chloride 107 94 - 109 mmol/L    Carbon Dioxide 31 20 - 32 mmol/L    Anion Gap 6 3 - 14 mmol/L    Glucose 96 70 - 99 mg/dL    Urea Nitrogen 14 7 - 30 mg/dL    Creatinine 0.85 0.52 - 1.04 mg/dL    GFR Estimate 73 >60 mL/min/1.7m2    GFR Estimate If Black 88 >60 mL/min/1.7m2    Calcium 9.3 8.5 - 10.1 mg/dL    Bilirubin Total 0.5 0.2 - 1.3 mg/dL    Albumin 3.9 3.4 - 5.0 g/dL    Protein Total 7.2 6.8 - 8.8 g/dL    Alkaline Phosphatase 82 40 - 150 U/L    ALT 20 0 - 50 U/L    AST  12 0 - 45 U/L   CBC with platelets and differential   Result Value Ref Range    WBC 12.3 (H) 4.0 - 11.0 10e9/L    RBC Count 4.68 3.8 - 5.2 10e12/L    Hemoglobin 14.8 11.7 - 15.7 g/dL    Hematocrit 45.4 35.0 - 47.0 %    MCV 97 78 - 100 fl    MCH 31.6 26.5 - 33.0 pg    MCHC 32.6 31.5 - 36.5 g/dL    RDW 12.9 10.0 - 15.0 %    Platelet Count 323 150 - 450 10e9/L    Diff Method Automated Method     % Neutrophils 79.9 %    % Lymphocytes 13.5 %    % Monocytes 5.8 %    % Eosinophils 0.4 %    % Basophils 0.2 %    % Immature Granulocytes 0.2 %    Absolute Neutrophil 9.8 (H) 1.6 - 8.3 10e9/L    Absolute Lymphocytes 1.7 0.8 - 5.3 10e9/L    Absolute Monocytes 0.7 0.0 - 1.3 10e9/L    Absolute Eosinophils 0.1 0.0 - 0.7 10e9/L    Absolute Basophils 0.0 0.0 - 0.2 10e9/L    Abs Immature Granulocytes 0.0 0 - 0.4 10e9/L   CRP, inflammation   Result Value Ref Range    CRP Inflammation <2.9 0.0 - 8.0 mg/L   Amylase   Result Value Ref Range    Amylase 39 30 - 110 U/L   Lipase   Result Value Ref Range    Lipase 128 73 - 393 U/L       IMPRESSION:                                                    Reason for surgery/procedure: Laparoscopic cholecystectomy  Diagnosis/reason for consult: No medical contraindication noted to proceeding as planned     The proposed surgical procedure is considered INTERMEDIATE risk.    REVISED CARDIAC RISK INDEX  The patient has the following serious cardiovascular risks for perioperative complications such as (MI, PE, VFib and 3  AV Block):  No serious cardiac risks  INTERPRETATION: 0 risks: Class I (very low risk - 0.4% complication rate)    The patient has the following additional risks for perioperative complications:  No identified additional risks      ICD-10-CM    1. Preop general physical exam Z01.818    2. Acute biliary pancreatitis, unspecified complication status K85.10        RECOMMENDATIONS:                                                       Advised to take no further meloxicam or naproxen prior to  surgery.May take usual evening medications, n.p.o. in the morning    APPROVAL GIVEN to proceed with proposed procedure, without further diagnostic evaluation       Signed Electronically by: LOLIS Osorio CNP    Copy of this evaluation report is provided to requesting physician.    [place] Preop Guidelines

## 2017-04-06 ENCOUNTER — HOSPITAL ENCOUNTER (OUTPATIENT)
Facility: CLINIC | Age: 47
Discharge: HOME | End: 2017-04-06
Attending: SURGERY | Admitting: SURGERY
Payer: COMMERCIAL

## 2017-04-06 ENCOUNTER — ANESTHESIA EVENT (OUTPATIENT)
Dept: SURGERY | Facility: CLINIC | Age: 47
End: 2017-04-06
Payer: COMMERCIAL

## 2017-04-06 ENCOUNTER — ANESTHESIA (OUTPATIENT)
Dept: SURGERY | Facility: CLINIC | Age: 47
End: 2017-04-06
Payer: COMMERCIAL

## 2017-04-06 VITALS
DIASTOLIC BLOOD PRESSURE: 81 MMHG | TEMPERATURE: 97.6 F | RESPIRATION RATE: 16 BRPM | SYSTOLIC BLOOD PRESSURE: 121 MMHG | HEART RATE: 76 BPM | OXYGEN SATURATION: 95 %

## 2017-04-06 DIAGNOSIS — Z90.49 S/P LAPAROSCOPIC CHOLECYSTECTOMY: Primary | ICD-10-CM

## 2017-04-06 PROCEDURE — C9399 UNCLASSIFIED DRUGS OR BIOLOG: HCPCS | Performed by: NURSE ANESTHETIST, CERTIFIED REGISTERED

## 2017-04-06 PROCEDURE — 40000306 ZZH STATISTIC PRE PROC ASSESS II: Performed by: SURGERY

## 2017-04-06 PROCEDURE — 25000125 ZZHC RX 250: Performed by: SURGERY

## 2017-04-06 PROCEDURE — 47562 LAPAROSCOPIC CHOLECYSTECTOMY: CPT | Performed by: SURGERY

## 2017-04-06 PROCEDURE — 27110028 ZZH OR GENERAL SUPPLY NON-STERILE: Performed by: SURGERY

## 2017-04-06 PROCEDURE — 25800025 ZZH RX 258: Performed by: NURSE ANESTHETIST, CERTIFIED REGISTERED

## 2017-04-06 PROCEDURE — 37000009 ZZH ANESTHESIA TECHNICAL FEE, EACH ADDTL 15 MIN: Performed by: SURGERY

## 2017-04-06 PROCEDURE — 25000128 H RX IP 250 OP 636: Performed by: NURSE ANESTHETIST, CERTIFIED REGISTERED

## 2017-04-06 PROCEDURE — 27210794 ZZH OR GENERAL SUPPLY STERILE: Performed by: SURGERY

## 2017-04-06 PROCEDURE — 37000008 ZZH ANESTHESIA TECHNICAL FEE, 1ST 30 MIN: Performed by: SURGERY

## 2017-04-06 PROCEDURE — 25000132 ZZH RX MED GY IP 250 OP 250 PS 637: Performed by: SURGERY

## 2017-04-06 PROCEDURE — 88304 TISSUE EXAM BY PATHOLOGIST: CPT | Performed by: SURGERY

## 2017-04-06 PROCEDURE — 25000125 ZZHC RX 250: Performed by: NURSE ANESTHETIST, CERTIFIED REGISTERED

## 2017-04-06 PROCEDURE — 36000056 ZZH SURGERY LEVEL 3 1ST 30 MIN: Performed by: SURGERY

## 2017-04-06 PROCEDURE — 25000564 ZZH DESFLURANE, EA 15 MIN: Performed by: SURGERY

## 2017-04-06 PROCEDURE — 88304 TISSUE EXAM BY PATHOLOGIST: CPT | Mod: 26 | Performed by: SURGERY

## 2017-04-06 PROCEDURE — 71000014 ZZH RECOVERY PHASE 1 LEVEL 2 FIRST HR: Performed by: SURGERY

## 2017-04-06 PROCEDURE — 25000128 H RX IP 250 OP 636: Performed by: SURGERY

## 2017-04-06 PROCEDURE — 25000132 ZZH RX MED GY IP 250 OP 250 PS 637: Performed by: NURSE ANESTHETIST, CERTIFIED REGISTERED

## 2017-04-06 PROCEDURE — 71000027 ZZH RECOVERY PHASE 2 EACH 15 MINS: Performed by: SURGERY

## 2017-04-06 PROCEDURE — 36000058 ZZH SURGERY LEVEL 3 EA 15 ADDTL MIN: Performed by: SURGERY

## 2017-04-06 RX ORDER — MEPERIDINE HYDROCHLORIDE 25 MG/ML
12.5 INJECTION INTRAMUSCULAR; INTRAVENOUS; SUBCUTANEOUS
Status: COMPLETED | OUTPATIENT
Start: 2017-04-06 | End: 2017-04-06

## 2017-04-06 RX ORDER — SODIUM CHLORIDE, SODIUM LACTATE, POTASSIUM CHLORIDE, CALCIUM CHLORIDE 600; 310; 30; 20 MG/100ML; MG/100ML; MG/100ML; MG/100ML
INJECTION, SOLUTION INTRAVENOUS CONTINUOUS
Status: DISCONTINUED | OUTPATIENT
Start: 2017-04-06 | End: 2017-04-06 | Stop reason: HOSPADM

## 2017-04-06 RX ORDER — ACETAMINOPHEN 325 MG/1
975 TABLET ORAL ONCE
Status: DISCONTINUED | OUTPATIENT
Start: 2017-04-06 | End: 2017-04-06 | Stop reason: HOSPADM

## 2017-04-06 RX ORDER — OXYCODONE HYDROCHLORIDE 5 MG/1
5-10 TABLET ORAL
Qty: 15 TABLET | Refills: 0 | Status: SHIPPED | OUTPATIENT
Start: 2017-04-06 | End: 2017-04-12 | Stop reason: SINTOL

## 2017-04-06 RX ORDER — LIDOCAINE 40 MG/G
CREAM TOPICAL
Status: DISCONTINUED | OUTPATIENT
Start: 2017-04-06 | End: 2017-04-06 | Stop reason: HOSPADM

## 2017-04-06 RX ORDER — ONDANSETRON 4 MG/1
4 TABLET, ORALLY DISINTEGRATING ORAL EVERY 30 MIN PRN
Status: DISCONTINUED | OUTPATIENT
Start: 2017-04-06 | End: 2017-04-06 | Stop reason: HOSPADM

## 2017-04-06 RX ORDER — DIMENHYDRINATE 50 MG/ML
25 INJECTION, SOLUTION INTRAMUSCULAR; INTRAVENOUS
Status: DISCONTINUED | OUTPATIENT
Start: 2017-04-06 | End: 2017-04-06 | Stop reason: HOSPADM

## 2017-04-06 RX ORDER — CEFAZOLIN SODIUM 2 G/100ML
2 INJECTION, SOLUTION INTRAVENOUS
Status: COMPLETED | OUTPATIENT
Start: 2017-04-06 | End: 2017-04-06

## 2017-04-06 RX ORDER — ONDANSETRON 2 MG/ML
4 INJECTION INTRAMUSCULAR; INTRAVENOUS EVERY 30 MIN PRN
Status: DISCONTINUED | OUTPATIENT
Start: 2017-04-06 | End: 2017-04-06 | Stop reason: HOSPADM

## 2017-04-06 RX ORDER — ALBUTEROL SULFATE 0.83 MG/ML
2.5 SOLUTION RESPIRATORY (INHALATION) EVERY 4 HOURS PRN
Status: DISCONTINUED | OUTPATIENT
Start: 2017-04-06 | End: 2017-04-06 | Stop reason: HOSPADM

## 2017-04-06 RX ORDER — FENTANYL CITRATE 50 UG/ML
INJECTION, SOLUTION INTRAMUSCULAR; INTRAVENOUS PRN
Status: DISCONTINUED | OUTPATIENT
Start: 2017-04-06 | End: 2017-04-06

## 2017-04-06 RX ORDER — FENTANYL CITRATE 50 UG/ML
25-50 INJECTION, SOLUTION INTRAMUSCULAR; INTRAVENOUS
Status: DISCONTINUED | OUTPATIENT
Start: 2017-04-06 | End: 2017-04-06 | Stop reason: HOSPADM

## 2017-04-06 RX ORDER — DEXAMETHASONE SODIUM PHOSPHATE 10 MG/ML
INJECTION, SOLUTION INTRAMUSCULAR; INTRAVENOUS PRN
Status: DISCONTINUED | OUTPATIENT
Start: 2017-04-06 | End: 2017-04-06

## 2017-04-06 RX ORDER — BUPIVACAINE HYDROCHLORIDE AND EPINEPHRINE 2.5; 5 MG/ML; UG/ML
INJECTION, SOLUTION INFILTRATION; PERINEURAL PRN
Status: DISCONTINUED | OUTPATIENT
Start: 2017-04-06 | End: 2017-04-06 | Stop reason: HOSPADM

## 2017-04-06 RX ORDER — HYDRALAZINE HYDROCHLORIDE 20 MG/ML
2.5-5 INJECTION INTRAMUSCULAR; INTRAVENOUS EVERY 10 MIN PRN
Status: DISCONTINUED | OUTPATIENT
Start: 2017-04-06 | End: 2017-04-06 | Stop reason: HOSPADM

## 2017-04-06 RX ORDER — HYDROXYZINE HYDROCHLORIDE 25 MG/1
25 TABLET, FILM COATED ORAL 3 TIMES DAILY PRN
Status: DISCONTINUED | OUTPATIENT
Start: 2017-04-06 | End: 2017-04-06 | Stop reason: HOSPADM

## 2017-04-06 RX ORDER — HYDROMORPHONE HYDROCHLORIDE 1 MG/ML
.3-.5 INJECTION, SOLUTION INTRAMUSCULAR; INTRAVENOUS; SUBCUTANEOUS EVERY 10 MIN PRN
Status: DISCONTINUED | OUTPATIENT
Start: 2017-04-06 | End: 2017-04-06 | Stop reason: HOSPADM

## 2017-04-06 RX ORDER — LIDOCAINE HYDROCHLORIDE 20 MG/ML
INJECTION, SOLUTION INFILTRATION; PERINEURAL PRN
Status: DISCONTINUED | OUTPATIENT
Start: 2017-04-06 | End: 2017-04-06

## 2017-04-06 RX ORDER — HYDROXYZINE HYDROCHLORIDE 25 MG/1
50 TABLET, FILM COATED ORAL EVERY 6 HOURS PRN
Status: DISCONTINUED | OUTPATIENT
Start: 2017-04-06 | End: 2017-04-06 | Stop reason: HOSPADM

## 2017-04-06 RX ORDER — NALOXONE HYDROCHLORIDE 0.4 MG/ML
.1-.4 INJECTION, SOLUTION INTRAMUSCULAR; INTRAVENOUS; SUBCUTANEOUS
Status: DISCONTINUED | OUTPATIENT
Start: 2017-04-06 | End: 2017-04-06 | Stop reason: HOSPADM

## 2017-04-06 RX ORDER — KETOROLAC TROMETHAMINE 30 MG/ML
INJECTION, SOLUTION INTRAMUSCULAR; INTRAVENOUS PRN
Status: DISCONTINUED | OUTPATIENT
Start: 2017-04-06 | End: 2017-04-06

## 2017-04-06 RX ORDER — ONDANSETRON 2 MG/ML
INJECTION INTRAMUSCULAR; INTRAVENOUS PRN
Status: DISCONTINUED | OUTPATIENT
Start: 2017-04-06 | End: 2017-04-06

## 2017-04-06 RX ORDER — PROPOFOL 10 MG/ML
INJECTION, EMULSION INTRAVENOUS CONTINUOUS PRN
Status: DISCONTINUED | OUTPATIENT
Start: 2017-04-06 | End: 2017-04-06

## 2017-04-06 RX ORDER — OXYCODONE AND ACETAMINOPHEN 5; 325 MG/1; MG/1
1-2 TABLET ORAL
Status: COMPLETED | OUTPATIENT
Start: 2017-04-06 | End: 2017-04-06

## 2017-04-06 RX ORDER — HYDROXYZINE HYDROCHLORIDE 25 MG/1
25 TABLET, FILM COATED ORAL EVERY 6 HOURS PRN
Status: DISCONTINUED | OUTPATIENT
Start: 2017-04-06 | End: 2017-04-06 | Stop reason: HOSPADM

## 2017-04-06 RX ORDER — PROPOFOL 10 MG/ML
INJECTION, EMULSION INTRAVENOUS PRN
Status: DISCONTINUED | OUTPATIENT
Start: 2017-04-06 | End: 2017-04-06

## 2017-04-06 RX ORDER — CEFAZOLIN SODIUM 1 G/3ML
1 INJECTION, POWDER, FOR SOLUTION INTRAMUSCULAR; INTRAVENOUS SEE ADMIN INSTRUCTIONS
Status: DISCONTINUED | OUTPATIENT
Start: 2017-04-06 | End: 2017-04-06 | Stop reason: HOSPADM

## 2017-04-06 RX ORDER — GLYCOPYRROLATE 0.2 MG/ML
INJECTION, SOLUTION INTRAMUSCULAR; INTRAVENOUS PRN
Status: DISCONTINUED | OUTPATIENT
Start: 2017-04-06 | End: 2017-04-06

## 2017-04-06 RX ADMIN — FENTANYL CITRATE 50 MCG: 50 INJECTION, SOLUTION INTRAMUSCULAR; INTRAVENOUS at 10:33

## 2017-04-06 RX ADMIN — SODIUM CITRATE AND CITRIC ACID MONOHYDRATE 30 ML: 500; 334 SOLUTION ORAL at 12:12

## 2017-04-06 RX ADMIN — MEPERIDINE HYDROCHLORIDE 12.5 MG: 25 INJECTION, SOLUTION INTRAMUSCULAR; INTRAVENOUS; SUBCUTANEOUS at 11:53

## 2017-04-06 RX ADMIN — DEXAMETHASONE SODIUM PHOSPHATE 10 MG: 10 INJECTION, SOLUTION INTRAMUSCULAR; INTRAVENOUS at 10:30

## 2017-04-06 RX ADMIN — PROPOFOL 100 MCG/KG/MIN: 10 INJECTION, EMULSION INTRAVENOUS at 10:20

## 2017-04-06 RX ADMIN — GLYCOPYRROLATE 0.1 MG: 0.2 INJECTION, SOLUTION INTRAMUSCULAR; INTRAVENOUS at 10:23

## 2017-04-06 RX ADMIN — MIDAZOLAM HYDROCHLORIDE 2 MG: 1 INJECTION, SOLUTION INTRAMUSCULAR; INTRAVENOUS at 10:15

## 2017-04-06 RX ADMIN — ONDANSETRON 4 MG: 2 INJECTION INTRAMUSCULAR; INTRAVENOUS at 10:31

## 2017-04-06 RX ADMIN — LIDOCAINE HYDROCHLORIDE 60 MG: 20 INJECTION, SOLUTION INFILTRATION; PERINEURAL at 10:23

## 2017-04-06 RX ADMIN — HYDROMORPHONE HYDROCHLORIDE 0.5 MG: 10 INJECTION, SOLUTION INTRAMUSCULAR; INTRAVENOUS; SUBCUTANEOUS at 12:02

## 2017-04-06 RX ADMIN — SUGAMMADEX 130 MG: 100 INJECTION, SOLUTION INTRAVENOUS at 11:18

## 2017-04-06 RX ADMIN — FENTANYL CITRATE 50 MCG: 50 INJECTION, SOLUTION INTRAMUSCULAR; INTRAVENOUS at 12:25

## 2017-04-06 RX ADMIN — LIDOCAINE HYDROCHLORIDE 1 ML: 10 INJECTION, SOLUTION EPIDURAL; INFILTRATION; INTRACAUDAL; PERINEURAL at 09:29

## 2017-04-06 RX ADMIN — FENTANYL CITRATE 50 MCG: 50 INJECTION, SOLUTION INTRAMUSCULAR; INTRAVENOUS at 12:32

## 2017-04-06 RX ADMIN — PROPOFOL 140 MG: 10 INJECTION, EMULSION INTRAVENOUS at 10:23

## 2017-04-06 RX ADMIN — SODIUM CHLORIDE, POTASSIUM CHLORIDE, SODIUM LACTATE AND CALCIUM CHLORIDE: 600; 310; 30; 20 INJECTION, SOLUTION INTRAVENOUS at 10:45

## 2017-04-06 RX ADMIN — HYDROMORPHONE HYDROCHLORIDE 0.5 MG: 1 INJECTION, SOLUTION INTRAMUSCULAR; INTRAVENOUS; SUBCUTANEOUS at 10:52

## 2017-04-06 RX ADMIN — MEPERIDINE HYDROCHLORIDE 12.5 MG: 25 INJECTION, SOLUTION INTRAMUSCULAR; INTRAVENOUS; SUBCUTANEOUS at 12:11

## 2017-04-06 RX ADMIN — ROCURONIUM BROMIDE 40 MG: 10 INJECTION INTRAVENOUS at 10:23

## 2017-04-06 RX ADMIN — KETOROLAC TROMETHAMINE 30 MG: 30 INJECTION, SOLUTION INTRAMUSCULAR at 11:12

## 2017-04-06 RX ADMIN — HYDROMORPHONE HYDROCHLORIDE 0.5 MG: 1 INJECTION, SOLUTION INTRAMUSCULAR; INTRAVENOUS; SUBCUTANEOUS at 10:49

## 2017-04-06 RX ADMIN — FENTANYL CITRATE 50 MCG: 50 INJECTION, SOLUTION INTRAMUSCULAR; INTRAVENOUS at 10:20

## 2017-04-06 RX ADMIN — CEFAZOLIN SODIUM 2 G: 2 INJECTION, SOLUTION INTRAVENOUS at 10:27

## 2017-04-06 RX ADMIN — SODIUM CHLORIDE, POTASSIUM CHLORIDE, SODIUM LACTATE AND CALCIUM CHLORIDE: 600; 310; 30; 20 INJECTION, SOLUTION INTRAVENOUS at 09:29

## 2017-04-06 RX ADMIN — OXYCODONE HYDROCHLORIDE AND ACETAMINOPHEN 2 TABLET: 5; 325 TABLET ORAL at 13:47

## 2017-04-06 ASSESSMENT — LIFESTYLE VARIABLES: TOBACCO_USE: 1

## 2017-04-06 NOTE — ANESTHESIA POSTPROCEDURE EVALUATION
Patient: Liliana Cisnerosaert    Procedure(s):  LAPAROSCOPIC CHOLECYSTECTOMY - Wound Class: II-Clean Contaminated    Diagnosis:Acute biliary pancreatitis, unspecified complication status  Diagnosis Additional Information: No value filed.    Anesthesia Type:  General, ETT    Note:  Anesthesia Post Evaluation    Patient location during evaluation: PACU  Patient participation: Able to fully participate in evaluation  Level of consciousness: awake and alert  Pain management: adequate (stiffness in legs, restless)  Airway patency: patent  Cardiovascular status: acceptable  Respiratory status: acceptable, spontaneous ventilation and nasal cannula  Hydration status: acceptable  PONV: none             Last vitals:  Vitals:    04/06/17 1205 04/06/17 1210 04/06/17 1215   BP: 146/85 137/87    Pulse:      Resp: 16 29 14   Temp:      SpO2: 100% 100% 100%         Electronically Signed By: LOLIS Rose CRNA  April 6, 2017  12:25 PM

## 2017-04-06 NOTE — IP AVS SNAPSHOT
[facility] Phase II    [address]     [place] [address]    Phone:  [phone]                                       After Visit Summary   4/6/2017    Liliana Kat    MRN: [id number]           After Visit Summary Signature Page     I have received my discharge instructions, and my questions have been answered. I have discussed any challenges I see with this plan with the nurse or doctor.    ..........................................................................................................................................  Patient/Patient Representative Signature      ..........................................................................................................................................  Patient Representative Print Name and Relationship to Patient    ..................................................               ................................................  Date                                            Time    ..........................................................................................................................................  Reviewed by Signature/Title    ...................................................              ..............................................  Date                                                            Time

## 2017-04-06 NOTE — OR NURSING
Pt. Has had some right shoulder strap pain & feels some fullness in her upper esophagus. Feels like she has to belch,  went & got her some Mountain Dew & feels much better after belching quite a bit.  Received oral pain meds at 1347 & at 1415 assisted up to chair & perked up & decided she was ready to go home.  Instructions reviewed with pt. &  & pt. Stated readiness for discharge.  Wang BARILLAS CAPA

## 2017-04-06 NOTE — ANESTHESIA CARE TRANSFER NOTE
Patient: Liliana VELASCO Standaert    Procedure(s):  LAPAROSCOPIC CHOLECYSTECTOMY - Wound Class: II-Clean Contaminated    Diagnosis: Acute biliary pancreatitis, unspecified complication status  Diagnosis Additional Information: No value filed.    Anesthesia Type:   General, ETT     Note:  Airway :Nasal Cannula  Patient transferred to:PACU  Comments: Pt awake, vital signs stable.      Vitals: (Last set prior to Anesthesia Care Transfer)    CRNA VITALS  4/6/2017 1106 - 4/6/2017 1141      4/6/2017             SpO2: 99 %                Electronically Signed By: LOLIS Rose CRNA  April 6, 2017  11:41 AM

## 2017-04-06 NOTE — ANESTHESIA PREPROCEDURE EVALUATION
Anesthesia Evaluation     . Pt has had prior anesthetic. Type: General           ROS/MED HX    ENT/Pulmonary:     (+)tobacco use, Current use asthma Treatment: Inhaler prn,  , . .    Neurologic:  - neg neurologic ROS     Cardiovascular:  - neg cardiovascular ROS   (+) ----. Taking blood thinners : Instructions Given to patient: been taking ASA, discussed with surgeon.  . . . :. . Previous cardiac testing date:results:date: results:ECG reviewed date:6/22/11 results:NSR date: results:          METS/Exercise Tolerance:     Hematologic:  - neg hematologic  ROS       Musculoskeletal:  - neg musculoskeletal ROS       GI/Hepatic:  - neg GI/hepatic ROS       Renal/Genitourinary:  - ROS Renal section negative       Endo:  - neg endo ROS       Psychiatric:     (+) psychiatric history anxiety and depression      Infectious Disease:  - neg infectious disease ROS       Malignancy:      - no malignancy   Other:    (+) No chance of pregnancy C-spine cleared: N/A,   - neg other ROS                 Physical Exam  Normal systems: cardiovascular, pulmonary and dental    Airway   Mallampati: II  TM distance: >3 FB  Neck ROM: full    Dental     Cardiovascular   Rhythm and rate: regular and normal      Pulmonary    breath sounds clear to auscultation                    Anesthesia Plan      History & Physical Review  History and physical reviewed and following examination; no interval change.H + P from 4/5/17 reviewed; cleared for procedure.      ASA Status:  2 .    NPO Status:  > 8 hours    Plan for General and ETT with Intravenous and Propofol induction. Maintenance will be Balanced.    PONV prophylaxis:  Ondansetron (or other 5HT-3) and Dexamethasone or Solumedrol  Pt verbalizes understanding of GETA anesthesia.  Denies further questions.        Postoperative Care  Postoperative pain management:  IV analgesics and Oral pain medications.      Consents  Anesthetic plan, risks, benefits and alternatives discussed with:  Patient.  Use of  blood products discussed: No .   .                          .

## 2017-04-06 NOTE — OP NOTE
DATE OF PROCEDURE:4/6/2017      PREOPERATIVE DIAGNOSIS: Biliary colic, cholelithiasis.       POSTOPERATIVE DIAGNOSIS: Biliary colic, cholelithiasis.       OPERATIVE PROCEDURES: Laparoscopic cholecystectomy.     ANESTHESIA: General      1ST ASSISTANT:  ST Natali      OPERATIVE FINDINGS: Omental attachments to intrahepatic gallbladder. No other abnormalities noted intra-abdominally.       DESCRIPTION OF PROCEDURE: Liliana Kat was brought back to the OR where she was laid supine. She was intubated successfully.  After a surgical pause identifying the patient and procedure, the Veress needle was placed infraumbilically via a vertical incision. After successful pneumoperitoneum, we inserted a 12 mm port and a camera and explored the abdomen where we noted extensive omental adhesions to the gallbladder. We placed under direct visualization, an epigastric port 12 mm and two 5 mm right upper quadrant ports. We began by taking down with cautery, the omental attachments to the gallbladder and once this was complete, mobilized the gallbladder anterior and posterior by scoring the peritoneum. The gallbladder was inflamed. With retraction then was fracturing to a crevice in the liver near the falciform so instead we did a top down resection. Removing the gallbladder from the gallbladder fossa. We were then able to then score the triangle of Calot isolated the cystic duct and  and cystic artery. The artery and  cystic duct was divided between 2 clips proximally, 1 distally. Once we completed this, we were able to remove the gallbladder from the gallbladder fossa. Hemostasis was achieved. We then irrigated and aspirated dry abdomen and under direct visualization, removed all ports. The periumbilical port was closed with an 0 PDS and skin with 4-0 Monocryl. Total of 30 mL of lidocaine was used for local analgesia and the patient was extubated and sent to PACU in stable condition.     ESTIMATED BLOOD LOSS: 5 mL.            TED HINES MD

## 2017-04-06 NOTE — DISCHARGE INSTRUCTIONS
[facility]   Adult Discharge Orders & Instructions     For 24 hours after surgery    1. Get plenty of rest.  A responsible adult must stay with you for at least 24 hours after you leave the hospital.   2. Do not drive or use heavy equipment.  If you have weakness or tingling, don't drive or use heavy equipment until this feeling goes away.  3. Do not drink alcohol.  4. Avoid strenuous or risky activities.  Ask for help when climbing stairs.   5. You may feel lightheaded.  IF so, sit for a few minutes before standing.  Have someone help you get up.   6. If you have nausea (feel sick to your stomach): Drink only clear liquids such as apple juice, ginger ale, broth or 7-Up.  Rest may also help.  Be sure to drink enough fluids.  Move to a regular diet as you feel able.  7. You may have a slight fever. Call the doctor if your fever is over 100 F (37.7 C) (taken under the tongue) or lasts longer than 24 hours.  8. You may have a dry mouth, a sore throat, muscle aches or trouble sleeping.  These should go away after 24 hours.  9. Do not make important or legal decisions.   Call your doctor for any of the followin.  Signs of infection (fever, growing tenderness at the surgery site, a large amount of drainage or bleeding, severe pain, foul-smelling drainage, redness, swelling).    2. It has been over 8 to 10 hours since surgery and you are still not able to urinate (pass water).    3.  Headache for over 24 hours.    To contact a doctor, call __[phone]______________________________________    No Ibuprofen until after 5:15 this evening.

## 2017-04-06 NOTE — IP AVS SNAPSHOT
MRN:[id number]                      After Visit Summary   4/6/2017    Liliana Kat    MRN: [id number]           Thank you!     Thank you for choosing [facility] for your care. Our goal is always to provide you with excellent care. Hearing back from our patients is one way we can continue to improve our services. Please take a few minutes to complete the written survey that you may receive in the mail after you visit with us. Thank you!        Patient Information     Date Of Birth          1970        About your hospital stay     You were admitted on:  April 6, 2017 You last received care in the:  [facility]    You were discharged on:  April 6, 2017       Who to Call     For medical emergencies, please call 911.  For non-urgent questions about your medical care, please call your primary care provider or clinic, [phone]  For questions related to your surgery, please call your surgery clinic        Attending Provider     Provider Martha Hassan MD Surgery       Primary Care Provider Office Phone # Fax #    Paula LOLIS Renee [facility] [phone]       [facility] [address]   [facility] [address]        After Care Instructions     Diet Instructions       Resume pre-procedure diet            Discharge Instructions       Follow-up Care:    [facility]    Home Care Following Gallbladder Surgery    Martha Luevano MD        Care of the Incision:  Remove gauze dressing (if present) after 48 hours.  Leave small strips in place (if present).  They will gradually come off.  If surgical glue was used on your incision, keep it dry for 24 hours.  Then you may shower but don t submerge under water for at least 2 weeks.  Gently pat your incision dry with a freshly laundered towel.  Do not touch your incision with bare hands or pick at scabs.  Leave your incision open to air.  Cover it only if draining, clothing rubs or irritates  it.    Activity:  Gradually increase your activity.  Walk short distances several times each day and increase the distance as your strength allows.  To promote circulation, do not cross your legs while sitting.  No strenuous lifting or straining for 2-3 weeks.  Do not lift anything over 10-20 pounds until your doctor approves an increase.  Return to work will be determined by the type of work you do and should be discussed with your physician.  Do not drive or operate equipment while taking prescription pain medicines.  You may drive 1 week after surgery if you have stopped taking prescription pain medicines and can react quickly enough to make an emergency stop if necessary.    Diet:  Continue a low fat diet for 1 week then resume usual diet as tolerated.  Drink plenty of water.  Avoid foods that cause constipation.    REMEMBER most prescription pain pills cause constipation.  Walking, extra fluids, and increased fiber (fresh fruits and vegetables, etc.) are natural remedies for constipation.  You can also take mineral oil, 1-2 Tablespoons per day.  If still constipated you may try a stool softener such as Colace or Miralax.    Call Your Physician if You Have:  Redness, increased swelling or cloudy drainage from your incision.  A temperature of more than 101 degrees F.  Worsening pain in your incision not relieved by your prescription pain pills and/or a short rest.  Jaundice (yellowing of skin or eyes)  Abdominal distention (stomach getting very large)  Swelling in your legs  Productive cough  Burning with urination  Any questions or concerns about your recovery, please call     Business hours [phone]    After hours [phone] Nurse Advice Line (24 hours a day)    Follow-up Care:  Make an appointment 3-4 weeks after your surgery.  Call [phone].            Dressing       Keep dressing clean and dry.  Dressing / incisional care as instructed by RN and or Surgeon            No driving or operating  machinery        until the day after procedure            No lifting        No lifting over 10 lbs and no strenuous physical activity for 2 weeks            Shower       No shower for 24 hours post procedure. May shower Postoperative Day (POD)  2                  Your next 10 appointments already scheduled     2017  1:30 PM CDT   [facility]am [facility]   [facility]    [address] [place] [id number]   [phone]            May 03, 2017  1:00 PM CDT   Return Visit with Letitia Stratton MD   [facility]    [address] [place] [id number]   [phone]              Further instructions from your care team       [facility]   Adult Discharge Orders & Instructions     For 24 hours after surgery    1. Get plenty of rest.  A responsible adult must stay with you for at least 24 hours after you leave the hospital.   2. Do not drive or use heavy equipment.  If you have weakness or tingling, don't drive or use heavy equipment until this feeling goes away.  3. Do not drink alcohol.  4. Avoid strenuous or risky activities.  Ask for help when climbing stairs.   5. You may feel lightheaded.  IF so, sit for a few minutes before standing.  Have someone help you get up.   6. If you have nausea (feel sick to your stomach): Drink only clear liquids such as apple juice, ginger ale, broth or 7-Up.  Rest may also help.  Be sure to drink enough fluids.  Move to a regular diet as you feel able.  7. You may have a slight fever. Call the doctor if your fever is over 100 F (37.7 C) (taken under the tongue) or lasts longer than 24 hours.  8. You may have a dry mouth, a sore throat, muscle aches or trouble sleeping.  These should go away after 24 hours.  9. Do not make important or legal decisions.   Call your doctor for any of the followin.  Signs of infection (fever, growing tenderness  "at the surgery site, a large amount of drainage or bleeding, severe pain, foul-smelling drainage, redness, swelling).    2. It has been over 8 to 10 hours since surgery and you are still not able to urinate (pass water).    3.  Headache for over 24 hours.    To contact a doctor, call __[phone]______________________________________    No Ibuprofen until after 5:15 this evening.      Additional Information     If you use hormonal birth control (such as the pill, patch, ring or implants): You'll need a second form of birth control for 7 days (condoms, a diaphragm or contraceptive foam). While in the hospital, you received a medicine called Bridion. Your normal birth control will not work as well for a week after taking this medicine.          Pending Results     No orders found from 2017 to 2017.            Admission Information     Date & Time Provider Department Dept. Phone    2017 Shivam Luevano MD [facility] Phase [phone]      Your Vitals Were     Blood Pressure Pulse Temperature Respirations Last Period Pulse Oximetry    115/86 76 97.6  F (36.4  C) (Axillary) 14 2006 95%      MyChart Information     [employer] lets you send messages to your doctor, view your test results, renew your prescriptions, schedule appointments and more. To sign up, go to [email or web address] . Click on \"Log in\" on the left side of the screen, which will take you to the Welcome page. Then click on \"Sign up Now\" on the right side of the page.     You will be asked to enter the access code listed below, as well as some personal information. Please follow the directions to create your username and password.     Your access code is: [email or web address]  Expires: 2017 11:36 AM     Your access code will  in 90 days. If you need help or a new code, please call your [facility] or [phone].        Care EveryWhere ID     This is your Care EveryWhere ID. This could be used by other organizations to " access your [facility] medical records  [id number]           Review of your medicines      UNREVIEWED medicines. Ask your doctor about these medicines        Dose / Directions    * albuterol (2.5 MG/3ML) 0.083% neb solution   Used for:  Pneumonia, organism unspecified        ONE NEBULIZATION 4 TIMES DAILY AS NEEDED   Quantity:  1 BOX   Refills:  0       * albuterol 108 (90 BASE) MCG/ACT Inhaler   Commonly known as:  PROAIR HFA/PROVENTIL HFA/VENTOLIN HFA   Used for:  Tobacco abuse        Dose:  2 puff   Inhale 2 puffs into the lungs every 6 hours as needed for shortness of breath / dyspnea or wheezing   Quantity:  1 Inhaler   Refills:  0       ALPRAZolam 0.5 MG tablet   Commonly known as:  XANAX   Used for:  Adjustment disorder with mixed anxiety and depressed mood        Dose:  0.5 mg   Take 1 tablet (0.5 mg) by mouth 3 times daily as needed for anxiety   Quantity:  90 tablet   Refills:  0       aspirin 81 MG tablet        Dose:  81 mg   Take 1 tablet by mouth daily.   Refills:  OTC       cephALEXin 500 MG capsule   Commonly known as:  KEFLEX   Used for:  Abscess of Bartholin's gland        Dose:  500 mg   Take 1 capsule (500 mg) by mouth 4 times daily for 7 days   Quantity:  28 capsule   Refills:  0       cyclobenzaprine 10 MG tablet   Commonly known as:  FLEXERIL   Used for:  Acute right-sided low back pain with right-sided sciatica        Dose:  10 mg   Take 1 tablet (10 mg) by mouth 3 times daily as needed for muscle spasms   Quantity:  30 tablet   Refills:  1       estradiol 1 MG tablet   Commonly known as:  ESTRACE   Used for:  Need for postmenopausal hormone replacement        TAKE ONE TABLET BY MOUTH ONCE DAILY   Quantity:  30 tablet   Refills:  1       FLUoxetine 40 MG capsule   Commonly known as:  PROzac   Used for:  Adjustment disorder with mixed anxiety and depressed mood        Dose:  40 mg   Take 1 capsule (40 mg) by mouth daily   Quantity:  30 capsule   Refills:  1       fluticasone 50 MCG/ACT spray    Commonly known as:  FLONASE   Used for:  Headache(784.0), Acute sinusitis, unspecified        Dose:  2 spray   Spray 2 sprays into both nostrils daily   Quantity:  1 Package   Refills:  12       medroxyPROGESTERone 5 MG tablet   Commonly known as:  PROVERA   Used for:  Need for postmenopausal hormone replacement        TAKE ONE TABLET BY MOUTH ONCE DAILY   Quantity:  90 tablet   Refills:  0       MELATONIN        Dose:  10 mg   10 mg daily   Refills:  0       meloxicam 15 MG tablet   Commonly known as:  MOBIC   Used for:  Acute pain of right shoulder, Superior glenoid labrum lesion of right shoulder, initial encounter        Dose:  15 mg   Take 1 tablet (15 mg) by mouth daily   Quantity:  90 tablet   Refills:  1       naproxen 500 MG tablet   Commonly known as:  NAPROSYN   Used for:  Acute pain of right shoulder        Dose:  500 mg   Take 1 tablet (500 mg) by mouth 2 times daily as needed for moderate pain   Quantity:  60 tablet   Refills:  1       ofloxacin 0.3 % ophthalmic solution   Commonly known as:  OCUFLOX        Dose:  1 drop   1 drop   Refills:  0       omeprazole 40 MG capsule   Commonly known as:  priLOSEC   Used for:  Gastritis        Dose:  40 mg   Take 1 capsule (40 mg) by mouth daily as needed Take 30-60 minutes before a meal.   Quantity:  90 capsule   Refills:  1       ondansetron 4 MG ODT tab   Commonly known as:  ZOFRAN-ODT   Used for:  Nausea and vomiting, intractability of vomiting not specified, unspecified vomiting type        Dose:  2-4 mg   Take 0.5-1 tablets (2-4 mg) by mouth every 8 hours as needed for nausea or vomiting   Quantity:  10 tablet   Refills:  0       traMADol 50 MG tablet   Commonly known as:  ULTRAM   Used for:  Acute pain of right shoulder        Dose:   mg   Take 1-2 tablets ( mg) by mouth every 8 hours as needed for pain Maximum 3 tablet(s) per day   Quantity:  60 tablet   Refills:  0       * Notice:  This list has 2 medication(s) that are the same as other  medications prescribed for you. Read the directions carefully, and ask your doctor or other care provider to review them with you.      START taking        Dose / Directions    oxyCODONE 5 MG IR tablet   Commonly known as:  ROXICODONE   Used for:  S/P laparoscopic cholecystectomy        Dose:  5-10 mg   Take 1-2 tablets (5-10 mg) by mouth every 3 hours as needed for pain or other (Moderate to Severe)   Quantity:  15 tablet   Refills:  0            Where to get your medicines      Some of these will need a paper prescription and others can be bought over the counter. Ask your nurse if you have questions.     Bring a paper prescription for each of these medications     oxyCODONE 5 MG IR tablet                Protect others around you: Learn how to safely use, store and throw away your medicines at www.[employer].org.             Medication List: This is a list of all your medications and when to take them. Check marks below indicate your daily home schedule. Keep this list as a reference.      Medications           Morning Afternoon Evening Bedtime As Needed    * albuterol (2.5 MG/3ML) 0.083% neb solution   ONE NEBULIZATION 4 TIMES DAILY AS NEEDED                                * albuterol 108 (90 BASE) MCG/ACT Inhaler   Commonly known as:  PROAIR HFA/PROVENTIL HFA/VENTOLIN HFA   Inhale 2 puffs into the lungs every 6 hours as needed for shortness of breath / dyspnea or wheezing                                ALPRAZolam 0.5 MG tablet   Commonly known as:  XANAX   Take 1 tablet (0.5 mg) by mouth 3 times daily as needed for anxiety                                aspirin 81 MG tablet   Take 1 tablet by mouth daily.                                cephALEXin 500 MG capsule   Commonly known as:  KEFLEX   Take 1 capsule (500 mg) by mouth 4 times daily for 7 days                                cyclobenzaprine 10 MG tablet   Commonly known as:  FLEXERIL   Take 1 tablet (10 mg) by mouth 3 times daily as needed for muscle  spasms                                estradiol 1 MG tablet   Commonly known as:  ESTRACE   TAKE ONE TABLET BY MOUTH ONCE DAILY                                FLUoxetine 40 MG capsule   Commonly known as:  PROzac   Take 1 capsule (40 mg) by mouth daily                                fluticasone 50 MCG/ACT spray   Commonly known as:  FLONASE   Spray 2 sprays into both nostrils daily                                medroxyPROGESTERone 5 MG tablet   Commonly known as:  PROVERA   TAKE ONE TABLET BY MOUTH ONCE DAILY                                MELATONIN   10 mg daily                                meloxicam 15 MG tablet   Commonly known as:  MOBIC   Take 1 tablet (15 mg) by mouth daily                                naproxen 500 MG tablet   Commonly known as:  NAPROSYN   Take 1 tablet (500 mg) by mouth 2 times daily as needed for moderate pain                                ofloxacin 0.3 % ophthalmic solution   Commonly known as:  OCUFLOX   1 drop                                omeprazole 40 MG capsule   Commonly known as:  priLOSEC   Take 1 capsule (40 mg) by mouth daily as needed Take 30-60 minutes before a meal.                                ondansetron 4 MG ODT tab   Commonly known as:  ZOFRAN-ODT   Take 0.5-1 tablets (2-4 mg) by mouth every 8 hours as needed for nausea or vomiting                                oxyCODONE 5 MG IR tablet   Commonly known as:  ROXICODONE   Take 1-2 tablets (5-10 mg) by mouth every 3 hours as needed for pain or other (Moderate to Severe)                                traMADol 50 MG tablet   Commonly known as:  ULTRAM   Take 1-2 tablets ( mg) by mouth every 8 hours as needed for pain Maximum 3 tablet(s) per day                                * Notice:  This list has 2 medication(s) that are the same as other medications prescribed for you. Read the directions carefully, and ask your doctor or other care provider to review them with you.

## 2017-04-07 ENCOUNTER — TELEPHONE (OUTPATIENT)
Dept: SURGERY | Facility: CLINIC | Age: 47
End: 2017-04-07

## 2017-04-07 NOTE — TELEPHONE ENCOUNTER
D:  raissa called reporting his wife in 10/10 pain. Has taken 3 tabs TOTAL of oxycodone since surgery yesterday and has been up all night. Reports the medication made her jittery. Has taken tylenol #3 and Vicodin in the past. Took oxycodone with food. Incisions covered, only dressing with drainage is the umbilical -instructed patient to place a gauze over that dressing to reinforce.  A: instructed raissa to give the patient 2 ES tylenol and increase to 2 oxycodone (with food). Continue ice to incisions 15 min on every hour.  R: raissa to call writer back in 1.5 hours to provide update.    Leilani Forman RN  [facility]  [phone]  4/7/2017 8:17 AM

## 2017-04-07 NOTE — TELEPHONE ENCOUNTER
FABIANA for Follow up phone call. Writer didn't receive return call today. Instructed patient to call back if she has questions or concerns.    Leilani Forman RN  [facility]  [phone]  4/7/2017 3:26 PM

## 2017-04-08 ENCOUNTER — TELEPHONE (OUTPATIENT)
Dept: NURSING | Facility: CLINIC | Age: 47
End: 2017-04-08

## 2017-04-08 NOTE — TELEPHONE ENCOUNTER
"Call Type: Triage Call    Presenting Problem: \"I had my gallbladder removed on Thursday, tried  calling the clinic yesterday they never returned my call. They gave  my Oxycodone for pain, I can't take it. It makes me feel funny and I  am in pain, I need something.\" Denies triage, advised ER. Patient  says she is not going to ER will wait until Monday when the clinic  is open. Advised to call back if needed.  Triage Note:  Guideline Title: Medication Questions - Adult  Recommended Disposition: Provide Health Information  Original Inclination: Would have called clinic  Override Disposition:  Intended Action: Follow advice given  Physician Contacted: No  Caller has medication question(s) that was answered with available resources ?  YES  Pregnant and has medication questions regarding prescribed and/or nonprescribed  medication(s) not covered by available resources ? NO  Breastfeeding and has medication questions regarding prescribed and/or  nonprescribed medication(s) not covered by available resources ? NO  Requested information on how to safely dispose of  or unused medications ?  NO  Sign(s) or symptom(s) associated with a diagnosed condition or with a new illness  ? NO  Prescription ordered today and not available at pharmacy putting patient at  clinical risk ? NO  Recurrence of a symptom(s) or illness post prescribed medication treatment AND  provider instructed patient to call if symptom(s) returned. ? NO  Unable to obtain prescribed medication related to available resources AND  situation poses immediate clinical risk ? NO  Pharmacy calling to clarify prescription order. ? NO  Requests refill of prescribed medication that does NOT have a valid refill; lack  of medication may cause clinical risk to patient if not available. ? NO  Has questions about prescribed and/or nonprescribed medications not covered by  available resources ? NO  Pharmacy calling with prescription question; answered per department " policy. ? NO  Requests refill of prescribed medication without valid refills OR requests refill  of prescribed medication with valid refills but does not have prescription number  (no RX container); lack of medication does not put patient at clinical risk ? NO  Physician Instructions:  Care Advice:

## 2017-04-10 LAB — COPATH REPORT: NORMAL

## 2017-04-12 ENCOUNTER — TELEPHONE (OUTPATIENT)
Dept: NURSING | Facility: CLINIC | Age: 47
End: 2017-04-12

## 2017-04-12 ENCOUNTER — APPOINTMENT (OUTPATIENT)
Dept: CT IMAGING | Facility: CLINIC | Age: 47
End: 2017-04-12
Attending: PHYSICIAN ASSISTANT
Payer: COMMERCIAL

## 2017-04-12 ENCOUNTER — HOSPITAL ENCOUNTER (EMERGENCY)
Facility: CLINIC | Age: 47
Discharge: HOME | End: 2017-04-12
Attending: PHYSICIAN ASSISTANT | Admitting: PHYSICIAN ASSISTANT
Payer: COMMERCIAL

## 2017-04-12 VITALS
BODY MASS INDEX: 24.16 KG/M2 | SYSTOLIC BLOOD PRESSURE: 114 MMHG | OXYGEN SATURATION: 97 % | RESPIRATION RATE: 20 BRPM | HEIGHT: 65 IN | WEIGHT: 145 LBS | DIASTOLIC BLOOD PRESSURE: 76 MMHG | TEMPERATURE: 98.6 F

## 2017-04-12 DIAGNOSIS — G89.18 POSTOPERATIVE PAIN: ICD-10-CM

## 2017-04-12 DIAGNOSIS — Z90.49 S/P LAPAROSCOPIC CHOLECYSTECTOMY: ICD-10-CM

## 2017-04-12 DIAGNOSIS — R11.2 NAUSEA AND VOMITING, INTRACTABILITY OF VOMITING NOT SPECIFIED, UNSPECIFIED VOMITING TYPE: ICD-10-CM

## 2017-04-12 DIAGNOSIS — R10.84 ABDOMINAL PAIN, GENERALIZED: ICD-10-CM

## 2017-04-12 LAB
ALBUMIN SERPL-MCNC: 3.6 G/DL (ref 3.4–5)
ALBUMIN UR-MCNC: NEGATIVE MG/DL
ALP SERPL-CCNC: 88 U/L (ref 40–150)
ALT SERPL W P-5'-P-CCNC: 34 U/L (ref 0–50)
ANION GAP SERPL CALCULATED.3IONS-SCNC: 6 MMOL/L (ref 3–14)
APPEARANCE UR: CLEAR
AST SERPL W P-5'-P-CCNC: 11 U/L (ref 0–45)
BASOPHILS # BLD AUTO: 0 10E9/L (ref 0–0.2)
BASOPHILS NFR BLD AUTO: 0.4 %
BILIRUB SERPL-MCNC: 0.3 MG/DL (ref 0.2–1.3)
BILIRUB UR QL STRIP: NEGATIVE
BUN SERPL-MCNC: 11 MG/DL (ref 7–30)
CALCIUM SERPL-MCNC: 9 MG/DL (ref 8.5–10.1)
CHLORIDE SERPL-SCNC: 107 MMOL/L (ref 94–109)
CO2 SERPL-SCNC: 29 MMOL/L (ref 20–32)
COLOR UR AUTO: ABNORMAL
CREAT SERPL-MCNC: 0.73 MG/DL (ref 0.52–1.04)
CRP SERPL-MCNC: 15.3 MG/L (ref 0–8)
DIFFERENTIAL METHOD BLD: ABNORMAL
EOSINOPHIL # BLD AUTO: 0.3 10E9/L (ref 0–0.7)
EOSINOPHIL NFR BLD AUTO: 2.7 %
ERYTHROCYTE [DISTWIDTH] IN BLOOD BY AUTOMATED COUNT: 12.4 % (ref 10–15)
GFR SERPL CREATININE-BSD FRML MDRD: 86 ML/MIN/1.7M2
GLUCOSE SERPL-MCNC: 94 MG/DL (ref 70–99)
GLUCOSE UR STRIP-MCNC: NEGATIVE MG/DL
HCG UR QL: NEGATIVE
HCT VFR BLD AUTO: 42.4 % (ref 35–47)
HGB BLD-MCNC: 14.1 G/DL (ref 11.7–15.7)
HGB UR QL STRIP: ABNORMAL
IMM GRANULOCYTES # BLD: 0 10E9/L (ref 0–0.4)
IMM GRANULOCYTES NFR BLD: 0.2 %
KETONES UR STRIP-MCNC: NEGATIVE MG/DL
LEUKOCYTE ESTERASE UR QL STRIP: NEGATIVE
LIPASE SERPL-CCNC: 143 U/L (ref 73–393)
LYMPHOCYTES # BLD AUTO: 2.4 10E9/L (ref 0.8–5.3)
LYMPHOCYTES NFR BLD AUTO: 21.5 %
MCH RBC QN AUTO: 31.6 PG (ref 26.5–33)
MCHC RBC AUTO-ENTMCNC: 33.3 G/DL (ref 31.5–36.5)
MCV RBC AUTO: 95 FL (ref 78–100)
MONOCYTES # BLD AUTO: 0.9 10E9/L (ref 0–1.3)
MONOCYTES NFR BLD AUTO: 8.1 %
NEUTROPHILS # BLD AUTO: 7.6 10E9/L (ref 1.6–8.3)
NEUTROPHILS NFR BLD AUTO: 67.1 %
NITRATE UR QL: NEGATIVE
PH UR STRIP: 7 PH (ref 5–7)
PLATELET # BLD AUTO: 349 10E9/L (ref 150–450)
POTASSIUM SERPL-SCNC: 3.8 MMOL/L (ref 3.4–5.3)
PROT SERPL-MCNC: 7.6 G/DL (ref 6.8–8.8)
RBC # BLD AUTO: 4.46 10E12/L (ref 3.8–5.2)
RBC #/AREA URNS AUTO: <1 /HPF (ref 0–2)
SODIUM SERPL-SCNC: 142 MMOL/L (ref 133–144)
SP GR UR STRIP: 1 (ref 1–1.03)
SQUAMOUS #/AREA URNS AUTO: <1 /HPF (ref 0–1)
URN SPEC COLLECT METH UR: ABNORMAL
UROBILINOGEN UR STRIP-MCNC: 0 MG/DL (ref 0–2)
WBC # BLD AUTO: 11.4 10E9/L (ref 4–11)
WBC #/AREA URNS AUTO: 1 /HPF (ref 0–2)

## 2017-04-12 PROCEDURE — 83690 ASSAY OF LIPASE: CPT | Performed by: PHYSICIAN ASSISTANT

## 2017-04-12 PROCEDURE — 85025 COMPLETE CBC W/AUTO DIFF WBC: CPT | Performed by: PHYSICIAN ASSISTANT

## 2017-04-12 PROCEDURE — 80053 COMPREHEN METABOLIC PANEL: CPT | Performed by: PHYSICIAN ASSISTANT

## 2017-04-12 PROCEDURE — 96376 TX/PRO/DX INJ SAME DRUG ADON: CPT | Performed by: PHYSICIAN ASSISTANT

## 2017-04-12 PROCEDURE — 81025 URINE PREGNANCY TEST: CPT | Performed by: PHYSICIAN ASSISTANT

## 2017-04-12 PROCEDURE — 96375 TX/PRO/DX INJ NEW DRUG ADDON: CPT | Performed by: PHYSICIAN ASSISTANT

## 2017-04-12 PROCEDURE — 86140 C-REACTIVE PROTEIN: CPT | Performed by: PHYSICIAN ASSISTANT

## 2017-04-12 PROCEDURE — 25000128 H RX IP 250 OP 636: Performed by: PHYSICIAN ASSISTANT

## 2017-04-12 PROCEDURE — 25500064 ZZH RX 255 OP 636: Performed by: PHYSICIAN ASSISTANT

## 2017-04-12 PROCEDURE — 99285 EMERGENCY DEPT VISIT HI MDM: CPT | Mod: 25 | Performed by: PHYSICIAN ASSISTANT

## 2017-04-12 PROCEDURE — 81001 URINALYSIS AUTO W/SCOPE: CPT | Performed by: PHYSICIAN ASSISTANT

## 2017-04-12 PROCEDURE — 74177 CT ABD & PELVIS W/CONTRAST: CPT

## 2017-04-12 PROCEDURE — 25000125 ZZHC RX 250: Performed by: PHYSICIAN ASSISTANT

## 2017-04-12 PROCEDURE — 96361 HYDRATE IV INFUSION ADD-ON: CPT | Performed by: PHYSICIAN ASSISTANT

## 2017-04-12 PROCEDURE — 96374 THER/PROPH/DIAG INJ IV PUSH: CPT | Performed by: PHYSICIAN ASSISTANT

## 2017-04-12 PROCEDURE — 99284 EMERGENCY DEPT VISIT MOD MDM: CPT | Mod: Z6 | Performed by: PHYSICIAN ASSISTANT

## 2017-04-12 RX ORDER — HYDROMORPHONE HYDROCHLORIDE 1 MG/ML
0.5 INJECTION, SOLUTION INTRAMUSCULAR; INTRAVENOUS; SUBCUTANEOUS
Status: DISCONTINUED | OUTPATIENT
Start: 2017-04-12 | End: 2017-04-13 | Stop reason: HOSPADM

## 2017-04-12 RX ORDER — ONDANSETRON 2 MG/ML
4 INJECTION INTRAMUSCULAR; INTRAVENOUS EVERY 30 MIN PRN
Status: DISCONTINUED | OUTPATIENT
Start: 2017-04-12 | End: 2017-04-13 | Stop reason: HOSPADM

## 2017-04-12 RX ORDER — SODIUM CHLORIDE 9 MG/ML
1000 INJECTION, SOLUTION INTRAVENOUS CONTINUOUS
Status: DISCONTINUED | OUTPATIENT
Start: 2017-04-12 | End: 2017-04-13 | Stop reason: HOSPADM

## 2017-04-12 RX ORDER — IOPAMIDOL 755 MG/ML
100 INJECTION, SOLUTION INTRAVASCULAR ONCE
Status: COMPLETED | OUTPATIENT
Start: 2017-04-12 | End: 2017-04-12

## 2017-04-12 RX ORDER — HYDROCODONE BITARTRATE AND ACETAMINOPHEN 5; 325 MG/1; MG/1
1-2 TABLET ORAL EVERY 4 HOURS PRN
Qty: 20 TABLET | Refills: 0 | Status: SHIPPED | OUTPATIENT
Start: 2017-04-12 | End: 2017-05-03

## 2017-04-12 RX ORDER — ONDANSETRON 4 MG/1
4 TABLET, ORALLY DISINTEGRATING ORAL EVERY 8 HOURS PRN
Qty: 12 TABLET | Refills: 0 | Status: SHIPPED | OUTPATIENT
Start: 2017-04-12 | End: 2018-06-27

## 2017-04-12 RX ORDER — LIDOCAINE 40 MG/G
CREAM TOPICAL
Status: DISCONTINUED | OUTPATIENT
Start: 2017-04-12 | End: 2017-04-13 | Stop reason: HOSPADM

## 2017-04-12 RX ADMIN — SODIUM CHLORIDE 1000 ML: 9 INJECTION, SOLUTION INTRAVENOUS at 19:55

## 2017-04-12 RX ADMIN — HYDROMORPHONE HYDROCHLORIDE 0.5 MG: 1 INJECTION, SOLUTION INTRAMUSCULAR; INTRAVENOUS; SUBCUTANEOUS at 19:55

## 2017-04-12 RX ADMIN — ONDANSETRON 4 MG: 2 INJECTION INTRAMUSCULAR; INTRAVENOUS at 19:55

## 2017-04-12 RX ADMIN — IOPAMIDOL 70 ML: 755 INJECTION, SOLUTION INTRAVENOUS at 20:23

## 2017-04-12 RX ADMIN — HYDROMORPHONE HYDROCHLORIDE 0.5 MG: 1 INJECTION, SOLUTION INTRAMUSCULAR; INTRAVENOUS; SUBCUTANEOUS at 21:08

## 2017-04-12 RX ADMIN — SODIUM CHLORIDE 60 ML: 9 INJECTION, SOLUTION INTRAVENOUS at 20:23

## 2017-04-12 ASSESSMENT — ENCOUNTER SYMPTOMS
FREQUENCY: 1
ABDOMINAL PAIN: 1
DIZZINESS: 1
VOMITING: 1
FLANK PAIN: 0
FEVER: 1
RESPIRATORY NEGATIVE: 1
DIAPHORESIS: 1
DYSURIA: 0
RHINORRHEA: 0

## 2017-04-12 NOTE — TELEPHONE ENCOUNTER
"Pt called at 6:27 P.M. She has not heard from the on call MD and just wants to go to ER.  She reports \"something is just not right\"Nellie Leon MA, RN, [facility]  "

## 2017-04-12 NOTE — TELEPHONE ENCOUNTER
"Call Type: Triage Call    Presenting Problem: Pt called at 6:27 P.M. She has not heard from the  on call MD and just wants to go to ER.  She reports \"something is  just not right\"Nellie Leon MA, RN, [facility]  Pt  just triaged one hr ago did not want to be triaged again.  Triage Note:  Guideline Title: No Guideline - Advice Per Reference (Adult)  Recommended Disposition: See ED Immediately  Original Inclination: Would have called ED  Override Disposition:  Intended Action: Go to Hospital / ED  Physician Contacted: No  SEE ED IMMEDIATELY ?  YES  ACTIVATE  ? NO  Physician Instructions:  Care Advice:  "

## 2017-04-12 NOTE — ED AVS SNAPSHOT
[facility] Emergency Department    [address] [place] [address]    Phone:  [phone]    Fax:  [phone]                                       Liliana Kat   MRN: [id number]    Department:  [facility] Emergency Department   Date of Visit:  4/12/2017           Patient Information     Date Of Birth          1970        Your diagnoses for this visit were:     Abdominal pain, generalized     S/P laparoscopic cholecystectomy     Nausea and vomiting, intractability of vomiting not specified, unspecified vomiting type        You were seen by Rhett Hernandez PA-C.      Follow-up Information     Follow up with Paula Villa, LOLIS CNP. Schedule an appointment as soon as possible for a visit in 2 days.    Specialty:  NURSE PRACTITIONER - OBSTETRICS & GYNECOLOGY    Why:  For abdominal pain recheck    Contact information:    [facility]  [address] [place] [address] [phone]          Discharge Instructions       It was a pleasure meeting you and working with you today!  I hope your condition improves rapidly!    1.  Please take the Zofran every 8 hours for nausea.  2.  It is okay to take the Hydrocodone as needed for pain.  3.  Please see your primary care provider for a recheck visit on Friday to ensure that you are doing well going into the weekend.  4.  Do not lift anything until new week when you are feeling better.        Future Appointments        Provider Department Dept Phone Center    4/21/2017 1:30 PM Martha Luevano MD [facility] [phone] [facility]    5/3/2017 1:00 PM Letitia Stratton MD [facility] [phone] [facility]      24 Hour Appointment Hotline       To make an appointment at any [facility], call [phone] ([phone]). If you don't have a family doctor or clinic, we will help you find one. [facility] are conveniently located to serve the needs of you and your  family.             Review of your medicines      START taking        Dose / Directions Last dose taken    HYDROcodone-acetaminophen 5-325 MG per tablet   Commonly known as:  NORCO   Dose:  1-2 tablet   Quantity:  20 tablet        Take 1-2 tablets by mouth every 4 hours as needed for moderate to severe pain   Refills:  0          CONTINUE these medicines which may have CHANGED, or have new prescriptions. If we are uncertain of the size of tablets/capsules you have at home, strength may be listed as something that might have changed.        Dose / Directions Last dose taken    ondansetron 4 MG ODT tab   Commonly known as:  ZOFRAN ODT   Dose:  4 mg   What changed:    - how much to take  - reasons to take this   Quantity:  12 tablet        Take 1 tablet (4 mg) by mouth every 8 hours as needed for nausea   Refills:  0          Our records show that you are taking the medicines listed below. If these are incorrect, please call your family doctor or clinic.        Dose / Directions Last dose taken    * albuterol (2.5 MG/3ML) 0.083% neb solution   Quantity:  1 BOX        ONE NEBULIZATION 4 TIMES DAILY AS NEEDED   Refills:  0        * albuterol 108 (90 BASE) MCG/ACT Inhaler   Commonly known as:  PROAIR HFA/PROVENTIL HFA/VENTOLIN HFA   Dose:  2 puff   Quantity:  1 Inhaler        Inhale 2 puffs into the lungs every 6 hours as needed for shortness of breath / dyspnea or wheezing   Refills:  0        ALPRAZolam 0.5 MG tablet   Commonly known as:  XANAX   Dose:  0.5 mg   Quantity:  90 tablet        Take 1 tablet (0.5 mg) by mouth 3 times daily as needed for anxiety   Refills:  0        aspirin 81 MG tablet   Dose:  81 mg        Take 1 tablet by mouth daily.   Refills:  OTC        cyclobenzaprine 10 MG tablet   Commonly known as:  FLEXERIL   Dose:  10 mg   Quantity:  30 tablet        Take 1 tablet (10 mg) by mouth 3 times daily as needed for muscle spasms   Refills:  1        estradiol 1 MG tablet   Commonly known as:  ESTRACE    Quantity:  30 tablet        TAKE ONE TABLET BY MOUTH ONCE DAILY   Refills:  1        FLUoxetine 40 MG capsule   Commonly known as:  PROzac   Dose:  40 mg   Quantity:  30 capsule        Take 1 capsule (40 mg) by mouth daily   Refills:  1        fluticasone 50 MCG/ACT spray   Commonly known as:  FLONASE   Dose:  2 spray   Quantity:  1 Package        Spray 2 sprays into both nostrils daily   Refills:  12        medroxyPROGESTERone 5 MG tablet   Commonly known as:  PROVERA   Quantity:  90 tablet        TAKE ONE TABLET BY MOUTH ONCE DAILY   Refills:  0        MELATONIN   Dose:  10 mg        10 mg daily   Refills:  0        TYLENOL PO   Dose:  650 mg        Take 650 mg by mouth every 4 hours as needed for mild pain or fever   Refills:  0        * Notice:  This list has 2 medication(s) that are the same as other medications prescribed for you. Read the directions carefully, and ask your doctor or other care provider to review them with you.      STOP taking        Dose Reason for stopping Comments    oxyCODONE 5 MG IR tablet   Commonly known as:  ROXICODONE                      Prescriptions were sent or printed at these locations (2 Prescriptions)                   [facility]   [address] [address]    Telephone:  [phone]   Fax:  [phone]   Hours:                  Printed at Department/Unit printer (1 of 2)         HYDROcodone-acetaminophen (NORCO) 5-325 MG per tablet                 These medications are not ready yet because we are checking if your insurance will help you pay for them. Call your pharmacy to confirm that your medication is ready for pickup. It may take up to 24 hours for them to receive the prescription. If the prescription is not ready within 3 business days, please contact your clinic or your provider (1 of 2)         ondansetron (ZOFRAN ODT) 4 MG ODT tab                Procedures and tests performed during your visit     CBC with platelets  "differential    CRP inflammation    CT Abdomen Pelvis w Contrast    Comprehensive metabolic panel    HCG qualitative urine    Lipase    Peripheral IV catheter    UA reflex to Microscopic and Culture      Orders Needing Specimen Collection     None      Pending Results     No orders found from 4/10/2017 to 2017.            Pending Culture Results     No orders found from 4/10/2017 to 2017.            Thank you for choosing [facility]       Thank you for choosing [facility] for your care. Our goal is always to provide you with excellent care. Hearing back from our patients is one way we can continue to improve our services. Please take a few minutes to complete the written survey that you may receive in the mail after you visit with us. Thank you!        [employer] Information     [employer] lets you send messages to your doctor, view your test results, renew your prescriptions, schedule appointments and more. To sign up, go to [email or web address] . Click on \"Log in\" on the left side of the screen, which will take you to the Welcome page. Then click on \"Sign up Now\" on the right side of the page.     You will be asked to enter the access code listed below, as well as some personal information. Please follow the directions to create your username and password.     Your access code is: [email or web address]  Expires: 2017 11:36 AM     Your access code will  in 90 days. If you need help or a new code, please call your [facility] clinic or [phone].        Care EveryWhere ID     This is your Care EveryWhere ID. This could be used by other organizations to access your [facility] medical records  [id number]        After Visit Summary       This is your record. Keep this with you and show to your community pharmacist(s) and doctor(s) at your next visit.                  "

## 2017-04-12 NOTE — ED AVS SNAPSHOT
[facility] Emergency Department    [address] [place] [address]    Phone:  [phone]    Fax:  [phone]                                       Liliana Kat   MRN: [id number]    Department:  [facility] Department   Date of Visit:  4/12/2017           After Visit Summary Signature Page     I have received my discharge instructions, and my questions have been answered. I have discussed any challenges I see with this plan with the nurse or doctor.    ..........................................................................................................................................  Patient/Patient Representative Signature      ..........................................................................................................................................  Patient Representative Print Name and Relationship to Patient    ..................................................               ................................................  Date                                            Time    ..........................................................................................................................................  Reviewed by Signature/Title    ...................................................              ..............................................  Date                                                            Time

## 2017-04-12 NOTE — TELEPHONE ENCOUNTER
"Call Type: Triage Call    Presenting Problem: s/p lap olu 4/6  Dr. Luevano @ [facility]. Today pt reports upper incision (between breasts) is quite  red and there is a small bump underneath skin at incision. Also more  sore/tender. No drainage. T 100.0. \"waking up w/ cold sweats\".  Advised pt we will have surgeon on-call contact her. Paged on-call  Dr. Seaman via page oper @5:50pm to call pt at [phone].  Advised pt if no call in 20-30 min call us back at [facility].  Triage Note:  Guideline Title: Postoperative Wound Care ; Postoperative Wound Care  Recommended Disposition: See Provider within 4 hours  Original Inclination: Wanted to speak with a nurse  Override Disposition: Call Provider Immediately  Intended Action: Follow advice given  Physician Contacted: No  New or increased redness, swelling, pain or purulent drainage (may be foul  smelling) around surgical wound or drain site ?  YES  New onset OR worsening bleeding from incision requiring pressure to control ? NO  Wound separation AND internal organs protrude through wound or surgical incision  (evisceration) ? NO  New or worsening signs and symptoms that may indicate shock ? NO  Continuous or heavy bleeding from operative site and NOT controlled with 10  minutes of steady pressure ? NO  Separation of wound edges without protrusion of tissue (dehiscence) ? NO  Medical device (pump, infusion catheter) damaged or not functioning as expected  (leaking, not infusing, swelling at insertion site) ? NO  More bleeding from site of instrumentation or procedure OR bleeding lasting longer  than defined in provider specified discharge information ? NO  Physician Instructions:  Care Advice: Another adult should drive.  SYMPTOM / CONDITION MANAGEMENT  "

## 2017-04-13 NOTE — DISCHARGE INSTRUCTIONS
It was a pleasure meeting you and working with you today!  I hope your condition improves rapidly!    1.  Please take the Zofran every 8 hours for nausea.  2.  It is okay to take the Hydrocodone as needed for pain.  3.  Please see your primary care provider for a recheck visit on Friday to ensure that you are doing well going into the weekend.  4.  Do not lift anything until new week when you are feeling better.

## 2017-04-13 NOTE — ED PROVIDER NOTES
"  History     Chief Complaint   Patient presents with     Abdominal Pain     The history is provided by the patient.     Liliana Kat is a 46 year old female who presents to the ED with abdominal pain. Patient had a cholecystectomy on 4/6, 6 days ago performed by Dr. Luevano. Since surgery she has had increasing abdominal pain to incision's and lower abdomen. She states her incisions are red. The incision to upper mid abdomen she has felt a lump. Patient has not taken her pain medication as it made her \"not feel right.\" She took tylenol  today but unsure if that helped. Today she has had off and fevers. She has cold sweats. She has been dry-heaving. She will become dizzy when she stands up from a sitting position. She is drinking a lot of fluids. She has had frequency with urination but also mentions a history of bladder issues. She denies dysuria. Patient denies flank pain, cough, congestion or runny nose.     Patient Active Problem List   Diagnosis     Need for postmenopausal hormone replacement     Decreased libido     CARDIOVASCULAR SCREENING; LDL GOAL LESS THAN 160     Bladder polyps     Superficial varicosities     Sacroiliitis (H)     Adjustment disorder with mixed anxiety and depressed mood     Acute pain of right shoulder     Acute biliary pancreatitis, unspecified complication status     Past Medical History:   Diagnosis Date     Allergic rhinitis, cause unspecified     Allergic rhinitis - weeds and pollan     Anxiety      Bladder polyps      Depressive disorder      Obstructive chronic bronchitis with exacerbation (H)      Other and unspecified ovarian cyst     Ovarian cyst - rupured cyst + laser x3     Other and unspecified ovarian cyst 9/14/2005    Left hemorrhagic ovarian cyst.     Tobacco abuse      Urinary tract infection, site not specified        Past Surgical History:   Procedure Laterality Date     C LIGATE FALLOPIAN TUBE,POSTPARTUM      Tubal Ligation     ESOPHAGOSCOPY, GASTROSCOPY, " DUODENOSCOPY (EGD), COMBINED  2012    Procedure: COMBINED ESOPHAGOSCOPY, GASTROSCOPY, DUODENOSCOPY (EGD), BIOPSY SINGLE OR MULTIPLE;  ESOPHAGOSCOPY, GASTROSCOPY, DUODENOSCOPY (EGD) with biopsy;  Surgeon: Herson Cabrera MD;  Location: [facility]     EXCIS VAGINAL CYST/TUMOR       HC REMOVAL OF OVARY/TUBE(S)  3/6/2006    Laparoscopic bilateral salpingo-oophorectomy.     HC TYMPANOPLASTY W/O MASTOID, INIT/REV W/O OSS CHAIN RECONST       LAPAROSCOPIC CHOLECYSTECTOMY N/A 2017    Procedure: LAPAROSCOPIC CHOLECYSTECTOMY;  Surgeon: Shivam Luevano MD;  Location: [facility]       Family History   Problem Relation Age of Onset     Alcohol/Drug Father       at age 53 of alcohol     Arthritis Father      CANCER Father      lung     Alcohol/Drug Paternal Grandfather      CANCER Paternal Grandfather      lung     Alcohol/Drug Paternal Grandmother      CANCER Paternal Grandmother      lung     Alcohol/Drug Paternal Aunt      Allergies Daughter      animals and grass     Allergies Son      dust mite     Arthritis Mother      Hypertension Mother      Lipids Mother      Depression Mother      HEART DISEASE Mother      Blood Disease Mother      DVTs     Depression Brother        Social History   Substance Use Topics     Smoking status: Current Some Day Smoker     Packs/day: 0.25     Years: 10.00     Smokeless tobacco: Never Used      Comment: 2 cigs a week     Alcohol use No      Comment: recovering  -         Immunization History   Administered Date(s) Administered     Influenza (IIV3) 2008, 2012     TD (ADULT, 7+) 04/15/2003, 2004     TDAP Vaccine (Adacel) 2012          Allergies   Allergen Reactions     Augmentin Swelling and Difficulty breathing     Avelox Nausea and Vomiting     Effexor Xr [Venlafaxine Hydrochloride]      Shaky and heart racing     Sulfa Drugs        Current Outpatient Prescriptions   Medication Sig Dispense Refill     Acetaminophen (TYLENOL PO) Take 650 mg by mouth every  4 hours as needed for mild pain or fever       HYDROcodone-acetaminophen (NORCO) 5-325 MG per tablet Take 1-2 tablets by mouth every 4 hours as needed for moderate to severe pain 20 tablet 0     ondansetron (ZOFRAN ODT) 4 MG ODT tab Take 1 tablet (4 mg) by mouth every 8 hours as needed for nausea 12 tablet 0     ALPRAZolam (XANAX) 0.5 MG tablet Take 1 tablet (0.5 mg) by mouth 3 times daily as needed for anxiety 90 tablet 0     medroxyPROGESTERone (PROVERA) 5 MG tablet TAKE ONE TABLET BY MOUTH ONCE DAILY 90 tablet 0     FLUoxetine (PROZAC) 40 MG capsule Take 1 capsule (40 mg) by mouth daily 30 capsule 1     estradiol (ESTRACE) 1 MG tablet TAKE ONE TABLET BY MOUTH ONCE DAILY 30 tablet 1     cyclobenzaprine (FLEXERIL) 10 MG tablet Take 1 tablet (10 mg) by mouth 3 times daily as needed for muscle spasms 30 tablet 1     albuterol (PROAIR HFA, PROVENTIL HFA, VENTOLIN HFA) 108 (90 BASE) MCG/ACT inhaler Inhale 2 puffs into the lungs every 6 hours as needed for shortness of breath / dyspnea or wheezing 1 Inhaler 0     fluticasone (FLONASE) 50 MCG/ACT nasal spray Spray 2 sprays into both nostrils daily 1 Package 12     aspirin 81 MG tablet Take 1 tablet by mouth daily.  OTC     MELATONIN 10 mg daily        ALBUTEROL SULFATE 0.083 % IN NEBU ONE NEBULIZATION 4 TIMES DAILY AS NEEDED 1 BOX 0     I have reviewed the Medications, Allergies, Past Medical and Surgical History, and Social History in the Epic system.    Review of Systems   Constitutional: Positive for diaphoresis and fever.   HENT: Negative for congestion and rhinorrhea.    Respiratory: Negative.    Gastrointestinal: Positive for abdominal pain and vomiting (dry-heaving).   Genitourinary: Positive for frequency. Negative for dysuria and flank pain.   Skin:        Positive for redness to incisions. Positive for a lump to incision at upper mid abdominal.    Neurological: Positive for dizziness.   All other systems reviewed and are negative.      Physical Exam   BP:  "134/81  Heart Rate: 88  Temp: 98.6  F (37  C)  Resp: 20  Height: 165.1 cm (5' 5\")  Weight: 65.8 kg (145 lb)  SpO2: 98 %  Physical Exam  ill appearing   Appears to be in a lot of pain.  Skin:  No rashes or lesions are noted on inspection of the torso, face, and upper extremities.   For abdominal wounds appear normal given the time elapsed from her surgery. No induration, fluctuance, or significant erythema. She is tender to palpation around each of the incisions.  Head: Normocephalic, atraumatic, nontender to palpation  Eyes: PERRLA, conjunctiva and sclera clear  Ears: Bilateral TM's and canals are clear.  TM's translucent without erythema or effusion.  Nose: Nares normal and patent bilaterally.  Mucous membranes are non-erythematous and non-edematous.  No sinus tenderness.  Throat: Mucous membranes are clear.  No tonsilar hypertrophy, exudate, or erythema.  Neck: Supple.  FROM without pain.  No adenopathy.  No thyromegaly.   Heart:  RRR with normal S1 and S2.  No S3 or S4.  No murmur, rub, gallop, or click.  PMI is nondisplaced.   Lungs:  CTA bilaterally without wheezes, rales, or rhonchi.  Good breath sounds heard throughout all lung fields.  Tympanitic to percussion with no areas of dullness.   ABDOMEN: negative findings: umbilicus normal, symmetric, no masses palpable, no organomegaly, bowel sounds normal, no bruits heard, liver span normal to percussion, positive findings: tenderness marked, voluntary guarding and rebound present generalized       ED Course     ED Course       9:41 PM - I spoke with Dr. Seaman, general surgeon, regarding the patient. He agreed that there is nothing acutely concerning based on her workup at this time. He felt that her white blood cell count elevation of 11,400 is typical for her post cholecystectomy status. Especially since she does not have a fever on exam. He recommended antinausea medicine, clear fluid treatment, and pain management. Follow up for symptoms " worsening.      Procedures             Critical Care time:  none         Results for orders placed or performed during the hospital encounter of 04/12/17   CT Abdomen Pelvis w Contrast    Narrative    CT ABDOMEN PELVIS WITH CONTRAST   4/12/2017 8:42 PM     HISTORY: Generalized abdominal pain, fever, status post lap olu 4/6.    TECHNIQUE:   70 mL - Isovue 370. Radiation dose for this scan was  reduced using automated exposure control, adjustment of the mA and/or  kV according to patient size, or iterative reconstruction technique.    COMPARISON: 11/15/2012.    FINDINGS:  No evidence of diverticulitis or small bowel obstruction.  Unremarkable appendix. There are changes of recent cholecystectomy.  Within the gallbladder fossa, there is a small nonspecific fluid  collection measuring 2.5 x 1.1 x 2.3 cm. There is very mild additional  perihepatic fluid. There is a subcentimeter right liver lesion on  image 15, too small to characterize. Nothing else acute is seen in the  upper abdominal organs.       Impression    IMPRESSION:  2.5 cm fluid collection in the gallbladder fossa, as well  as very mild additional perihepatic fluid.      DONTE GUPTA MD   CBC with platelets differential   Result Value Ref Range    WBC 11.4 (H) 4.0 - 11.0 10e9/L    RBC Count 4.46 3.8 - 5.2 10e12/L    Hemoglobin 14.1 11.7 - 15.7 g/dL    Hematocrit 42.4 35.0 - 47.0 %    MCV 95 78 - 100 fl    MCH 31.6 26.5 - 33.0 pg    MCHC 33.3 31.5 - 36.5 g/dL    RDW 12.4 10.0 - 15.0 %    Platelet Count 349 150 - 450 10e9/L    Diff Method Automated Method     % Neutrophils 67.1 %    % Lymphocytes 21.5 %    % Monocytes 8.1 %    % Eosinophils 2.7 %    % Basophils 0.4 %    % Immature Granulocytes 0.2 %    Absolute Neutrophil 7.6 1.6 - 8.3 10e9/L    Absolute Lymphocytes 2.4 0.8 - 5.3 10e9/L    Absolute Monocytes 0.9 0.0 - 1.3 10e9/L    Absolute Eosinophils 0.3 0.0 - 0.7 10e9/L    Absolute Basophils 0.0 0.0 - 0.2 10e9/L    Abs Immature Granulocytes 0.0 0 - 0.4  10e9/L   Comprehensive metabolic panel   Result Value Ref Range    Sodium 142 133 - 144 mmol/L    Potassium 3.8 3.4 - 5.3 mmol/L    Chloride 107 94 - 109 mmol/L    Carbon Dioxide 29 20 - 32 mmol/L    Anion Gap 6 3 - 14 mmol/L    Glucose 94 70 - 99 mg/dL    Urea Nitrogen 11 7 - 30 mg/dL    Creatinine 0.73 0.52 - 1.04 mg/dL    GFR Estimate 86 >60 mL/min/1.7m2    GFR Estimate If Black >90   GFR Calc   >60 mL/min/1.7m2    Calcium 9.0 8.5 - 10.1 mg/dL    Bilirubin Total 0.3 0.2 - 1.3 mg/dL    Albumin 3.6 3.4 - 5.0 g/dL    Protein Total 7.6 6.8 - 8.8 g/dL    Alkaline Phosphatase 88 40 - 150 U/L    ALT 34 0 - 50 U/L    AST 11 0 - 45 U/L   Lipase   Result Value Ref Range    Lipase 143 73 - 393 U/L   CRP inflammation   Result Value Ref Range    CRP Inflammation 15.3 (H) 0.0 - 8.0 mg/L   UA reflex to Microscopic and Culture   Result Value Ref Range    Color Urine Straw     Appearance Urine Clear     Glucose Urine Negative NEG mg/dL    Bilirubin Urine Negative NEG    Ketones Urine Negative NEG mg/dL    Specific Gravity Urine 1.002 (L) 1.003 - 1.035    Blood Urine Small (A) NEG    pH Urine 7.0 5.0 - 7.0 pH    Protein Albumin Urine Negative NEG mg/dL    Urobilinogen mg/dL 0.0 0.0 - 2.0 mg/dL    Nitrite Urine Negative NEG    Leukocyte Esterase Urine Negative NEG    Source Midstream Urine     RBC Urine <1 0 - 2 /HPF    WBC Urine 1 0 - 2 /HPF    Squamous Epithelial /HPF Urine <1 0 - 1 /HPF   HCG qualitative urine   Result Value Ref Range    HCG Qual Urine Negative NEG          Results for orders placed or performed during the hospital encounter of 04/12/17 (from the past 24 hour(s))   UA reflex to Microscopic and Culture   Result Value Ref Range    Color Urine Straw     Appearance Urine Clear     Glucose Urine Negative NEG mg/dL    Bilirubin Urine Negative NEG    Ketones Urine Negative NEG mg/dL    Specific Gravity Urine 1.002 (L) 1.003 - 1.035    Blood Urine Small (A) NEG    pH Urine 7.0 5.0 - 7.0 pH    Protein  Albumin Urine Negative NEG mg/dL    Urobilinogen mg/dL 0.0 0.0 - 2.0 mg/dL    Nitrite Urine Negative NEG    Leukocyte Esterase Urine Negative NEG    Source Midstream Urine     RBC Urine <1 0 - 2 /HPF    WBC Urine 1 0 - 2 /HPF    Squamous Epithelial /HPF Urine <1 0 - 1 /HPF   HCG qualitative urine   Result Value Ref Range    HCG Qual Urine Negative NEG   CBC with platelets differential   Result Value Ref Range    WBC 11.4 (H) 4.0 - 11.0 10e9/L    RBC Count 4.46 3.8 - 5.2 10e12/L    Hemoglobin 14.1 11.7 - 15.7 g/dL    Hematocrit 42.4 35.0 - 47.0 %    MCV 95 78 - 100 fl    MCH 31.6 26.5 - 33.0 pg    MCHC 33.3 31.5 - 36.5 g/dL    RDW 12.4 10.0 - 15.0 %    Platelet Count 349 150 - 450 10e9/L    Diff Method Automated Method     % Neutrophils 67.1 %    % Lymphocytes 21.5 %    % Monocytes 8.1 %    % Eosinophils 2.7 %    % Basophils 0.4 %    % Immature Granulocytes 0.2 %    Absolute Neutrophil 7.6 1.6 - 8.3 10e9/L    Absolute Lymphocytes 2.4 0.8 - 5.3 10e9/L    Absolute Monocytes 0.9 0.0 - 1.3 10e9/L    Absolute Eosinophils 0.3 0.0 - 0.7 10e9/L    Absolute Basophils 0.0 0.0 - 0.2 10e9/L    Abs Immature Granulocytes 0.0 0 - 0.4 10e9/L   Comprehensive metabolic panel   Result Value Ref Range    Sodium 142 133 - 144 mmol/L    Potassium 3.8 3.4 - 5.3 mmol/L    Chloride 107 94 - 109 mmol/L    Carbon Dioxide 29 20 - 32 mmol/L    Anion Gap 6 3 - 14 mmol/L    Glucose 94 70 - 99 mg/dL    Urea Nitrogen 11 7 - 30 mg/dL    Creatinine 0.73 0.52 - 1.04 mg/dL    GFR Estimate 86 >60 mL/min/1.7m2    GFR Estimate If Black >90   GFR Calc   >60 mL/min/1.7m2    Calcium 9.0 8.5 - 10.1 mg/dL    Bilirubin Total 0.3 0.2 - 1.3 mg/dL    Albumin 3.6 3.4 - 5.0 g/dL    Protein Total 7.6 6.8 - 8.8 g/dL    Alkaline Phosphatase 88 40 - 150 U/L    ALT 34 0 - 50 U/L    AST 11 0 - 45 U/L   Lipase   Result Value Ref Range    Lipase 143 73 - 393 U/L   CRP inflammation   Result Value Ref Range    CRP Inflammation 15.3 (H) 0.0 - 8.0 mg/L   CT  Abdomen Pelvis w Contrast    Narrative    CT ABDOMEN PELVIS WITH CONTRAST   4/12/2017 8:42 PM     HISTORY: Generalized abdominal pain, fever, status post lap olu 4/6.    TECHNIQUE:   70 mL - Isovue 370. Radiation dose for this scan was  reduced using automated exposure control, adjustment of the mA and/or  kV according to patient size, or iterative reconstruction technique.    COMPARISON: 11/15/2012.    FINDINGS:  No evidence of diverticulitis or small bowel obstruction.  Unremarkable appendix. There are changes of recent cholecystectomy.  Within the gallbladder fossa, there is a small nonspecific fluid  collection measuring 2.5 x 1.1 x 2.3 cm. There is very mild additional  perihepatic fluid. There is a subcentimeter right liver lesion on  image 15, too small to characterize. Nothing else acute is seen in the  upper abdominal organs.       Impression    IMPRESSION:  2.5 cm fluid collection in the gallbladder fossa, as well  as very mild additional perihepatic fluid.      DONTE GUPTA MD     Medications   0.9% sodium chloride BOLUS (0 mLs Intravenous Stopped 4/12/17 2132)   sodium chloride (PF) 0.9% PF flush 3 mL (3 mLs Intracatheter Given 4/12/17 2023)   sodium chloride 0.9 % for CT scan flush dose 500 mL (60 mLs Intravenous Given 4/12/17 2023)   iopamidol (ISOVUE-370) solution 100 mL (70 mLs Intravenous Given 4/12/17 2023)         Assessments & Plan (with Medical Decision Making)     Abdominal pain, generalized  S/P laparoscopic cholecystectomy  Nausea and vomiting, intractability of vomiting not specified, unspecified vomiting type     Liliana ROD Kat is a 46 year old female presenting with increasing abdominal pain since cholecystectomy surgery 6 days ago.  She had been doing well, but developed nausea and dry heaves 2 days prior and therefore has been unable to tolerate oral food or fluids. Due to side effects, she did not tolerate her oral postoperative pain medication, and therefore has not been able to  take anything for pain either. She reports feeling warm at home, but has not documented a fever. She also notes chills and significant increase in her abdominal pain. She is still passing gas and has had bowel movements. Her daughter has similar GI symptoms over the past 2 days, and they do live together. Her vitals were stable upon arrival to the ED and she was afebrile. On exam she has evidence for dehydration with dry oral mucous membranes and significant generalized abdominal pain with rebound and guarding. This is concerning for an acute intra-abdominal process. Due to this, we performed laboratory tests and CT of the abdomen. On the CT she has a 2.5 cm fluid collection in the gallbladder fossa which appears to be a normal postoperative finding. There were no other acute abnormalities on the CT.  Labs with a mild elevation in her white blood cell count at 11,400 with a normal differential. BMP was normal. Lipase and urinalysis normal. Very mild elevation in her CRP. I contacted the general surgeon on-call, Dr. Seaman, and he agreed that her findings are most suggestive of normal postoperative findings. It appears that she has had an exacerbation of her pain given the nausea, vomiting, and dry heaving that she started 2 days prior. This likely could be a viral gastroenteritis. Her symptoms improved significantly with IV fluid rehydration, IV Zofran, and IV Dilaudid. She was very happy with the results of her treatment. We'll send her home with oral Zofran to use on a scheduled basis for 2 days and then as needed after that. Clear fluid diet initially discussed with her and advance as as tolerated tomorrow. She was sent home with hydrocodone as needed for pain. She has used this in the past without issues. Possible side effects of the medication discussed with her. Follow up with PCP in 2 days for recheck visit to ensure that her condition has stabilized. The patient and her  were in agreement with this  plan. Her  was present to drive her home. She was suitable for discharge.       I have reviewed the nursing notes.    I have reviewed the findings, diagnosis, plan and need for follow up with the patient.    Discharge Medication List as of 4/12/2017 10:38 PM      START taking these medications    Details   HYDROcodone-acetaminophen (NORCO) 5-325 MG per tablet Take 1-2 tablets by mouth every 4 hours as needed for moderate to severe pain, Disp-20 tablet, R-0, Local Print             Final diagnoses:   Abdominal pain, generalized   S/P laparoscopic cholecystectomy   Nausea and vomiting, intractability of vomiting not specified, unspecified vomiting type   This document serves as a record of services personally performed by Rhett Hernandez PA*. It was created on their behalf by Belkys Rutledge, a trained medical scribe. The creation of this record is based on the provider's personal observations and the statements of the patient. This document has been checked and approved by the attending provider.   Note: Chart documentation done in part with Dragon Voice Recognition software. Although reviewed after completion, some word and grammatical errors may remain.        4/12/2017   Rhett Hernandez PA-C   [facility] EMERGENCY DEPARTMENT     Rhett Hernandez PA-C  04/13/17 0145       Rhett Hernandez PA-C  04/13/17 0146

## 2017-04-21 ENCOUNTER — OFFICE VISIT (OUTPATIENT)
Dept: SURGERY | Facility: CLINIC | Age: 47
End: 2017-04-21
Payer: COMMERCIAL

## 2017-04-21 VITALS — WEIGHT: 139 LBS | HEIGHT: 65 IN | BODY MASS INDEX: 23.16 KG/M2 | TEMPERATURE: 99.9 F

## 2017-04-21 DIAGNOSIS — Z90.49 S/P LAPAROSCOPIC CHOLECYSTECTOMY: Primary | ICD-10-CM

## 2017-04-21 PROCEDURE — 99024 POSTOP FOLLOW-UP VISIT: CPT | Performed by: SURGERY

## 2017-04-21 NOTE — NURSING NOTE
"Chief Complaint   Patient presents with     Surgical Followup     lap olu done on 04/06/17       Initial Temp 99.9  F (37.7  C) (Temporal)  Ht 5' 5\" (1.651 m)  Wt 139 lb (63 kg)  LMP 03/06/2006  BMI 23.13 kg/m2 Estimated body mass index is 23.13 kg/(m^2) as calculated from the following:    Height as of this encounter: 5' 5\" (1.651 m).    Weight as of this encounter: 139 lb (63 kg).  Medication Reconciliation: unable or not appropriate to perform   Judah REYNOSO CMA      "

## 2017-04-21 NOTE — PROGRESS NOTES
"[facility] NOTE  GENERAL SURGERY    PCP: Paula Villa         Subjective:     Liliana Kat is a 47 year old female who presents with Surgical Followup (lap olu done on 04/06/17)  Pain she was having preoperatively is gone.  Pain has been well controlled. Currently is not using pain medications. Eating a regular diet and having regular bladder/bowel function. Overall doing well.    Patient went to ER narcotics had caused nausea vomiting and had her pain meds changed.         Objective:     Temp 99.9  F (37.7  C) (Temporal)  Ht 5' 5\" (1.651 m)  Wt 139 lb (63 kg)  LMP 03/06/2006  BMI 23.13 kg/m2   Constitutional: Awake, alert, in no acute distress.  Respiratory: Non-labored.   Cardiovascular: Regular rate and rhythm.   Abdomen: Incision is clean and dry without erythema. Healing very well  No abdominal pain at all on palpation         Assessment and Plan:       ICD-10-CM    1. S/P laparoscopic cholecystectomy Z90.49          Liliana Kat is a 47 year old female who presented post operatively and is doing very well.   Running fever but family has been ill with a rare flu. Push fluids and see PCP.    She may increase her activity.   Follow up with general surgery as needed. No surgical issues today.      Martha Luevano MD  [facility]            "

## 2017-04-21 NOTE — MR AVS SNAPSHOT
"              After Visit Summary   4/21/2017    Liliana Kat    MRN: [id number]           Patient Information     Date Of Birth          1970        Visit Information        Provider Department      4/21/2017 1:30 PM Shivam Luevano MD [facility]        Today's Diagnoses     S/P laparoscopic cholecystectomy    -  1       Follow-ups after your visit        Your next 10 appointments already scheduled     May 03, 2017  1:00 PM CDT   Return Visit with Letitia Stratton MD   [facility] ([facility])    [address] [place] [id number] [phone]              Who to contact     If you have questions or need follow up information about today's clinic visit or your schedule please contact [facility] directly at [phone].  Normal or non-critical lab and imaging results will be communicated to you by MyChart, letter or phone within 4 business days after the clinic has received the results. If you do not hear from us within 7 days, please contact the clinic through MyChart or phone. If you have a critical or abnormal lab result, we will notify you by phone as soon as possible.  Submit refill requests through [employer] or call your pharmacy and they will forward the refill request to us. Please allow 3 business days for your refill to be completed.          Additional Information About Your Visit        MyChart Information     MedNews lets you send messages to your doctor, view your test results, renew your prescriptions, schedule appointments and more. To sign up, go to [email or web address] . Click on \"Log in\" on the left side of the screen, which will take you to the Welcome page. Then click on \"Sign up Now\" on the right side of the page.     You will be asked to enter the access code listed below, as well as some personal information. Please follow the directions to create your username and password.     Your access code is: " "[email or web address]  Expires: 2017 11:36 AM     Your access code will  in 90 days. If you need help or a new code, please call your [facility] clinic or [phone].        Care EveryWhere ID     This is your Care EveryWhere ID. This could be used by other organizations to access your [facility] medical records  [id number]        Your Vitals Were     Temperature Height Last Period BMI (Body Mass Index)          99.9  F (37.7  C) (Temporal) 5' 5\" (1.651 m) 2006 23.13 kg/m2         Blood Pressure from Last 3 Encounters:   17 114/76   17 121/81   17 120/68    Weight from Last 3 Encounters:   17 139 lb (63 kg)   17 145 lb (65.8 kg)   17 145 lb (65.8 kg)              Today, you had the following     No orders found for display       Primary Care Provider Office Phone # Fax #    LOLIS Osorio [facility] [phone]       [facility]  [address] [place] [address]        Thank you!     Thank you for choosing [facility]  for your care. Our goal is always to provide you with excellent care. Hearing back from our patients is one way we can continue to improve our services. Please take a few minutes to complete the written survey that you may receive in the mail after your visit with us. Thank you!             Your Updated Medication List - Protect others around you: Learn how to safely use, store and throw away your medicines at www.disposemymeds.org.          This list is accurate as of: 17  3:01 PM.  Always use your most recent med list.                   Brand Name Dispense Instructions for use    * albuterol (2.5 MG/3ML) 0.083% neb solution     1 BOX    ONE NEBULIZATION 4 TIMES DAILY AS NEEDED       * albuterol 108 (90 BASE) MCG/ACT Inhaler    PROAIR HFA/PROVENTIL HFA/VENTOLIN HFA    1 Inhaler    Inhale 2 puffs into the lungs every 6 hours as needed for shortness of breath / dyspnea or wheezing       ALPRAZolam 0.5 MG " tablet    XANAX    90 tablet    Take 1 tablet (0.5 mg) by mouth 3 times daily as needed for anxiety       aspirin 81 MG tablet      Take 1 tablet by mouth daily.       cyclobenzaprine 10 MG tablet    FLEXERIL    30 tablet    Take 1 tablet (10 mg) by mouth 3 times daily as needed for muscle spasms       estradiol 1 MG tablet    ESTRACE    30 tablet    TAKE ONE TABLET BY MOUTH ONCE DAILY       FLUoxetine 40 MG capsule    PROzac    30 capsule    Take 1 capsule (40 mg) by mouth daily       fluticasone 50 MCG/ACT spray    FLONASE    1 Package    Spray 2 sprays into both nostrils daily       HYDROcodone-acetaminophen 5-325 MG per tablet    NORCO    20 tablet    Take 1-2 tablets by mouth every 4 hours as needed for moderate to severe pain       medroxyPROGESTERone 5 MG tablet    PROVERA    90 tablet    TAKE ONE TABLET BY MOUTH ONCE DAILY       MELATONIN      10 mg daily       ondansetron 4 MG ODT tab    ZOFRAN ODT    12 tablet    Take 1 tablet (4 mg) by mouth every 8 hours as needed for nausea       TYLENOL PO      Take 650 mg by mouth every 4 hours as needed for mild pain or fever       * Notice:  This list has 2 medication(s) that are the same as other medications prescribed for you. Read the directions carefully, and ask your doctor or other care provider to review them with you.

## 2017-05-03 DIAGNOSIS — S43.431D LABRAL TEAR OF SHOULDER, RIGHT, SUBSEQUENT ENCOUNTER: Primary | ICD-10-CM

## 2017-05-03 RX ORDER — HYDROCODONE BITARTRATE AND ACETAMINOPHEN 5; 325 MG/1; MG/1
1-2 TABLET ORAL EVERY 4 HOURS PRN
Qty: 20 TABLET | Refills: 0 | Status: SHIPPED | OUTPATIENT
Start: 2017-05-03 | End: 2017-06-22

## 2017-05-03 NOTE — TELEPHONE ENCOUNTER
HYDROcodone-acetaminophen (NORCO) 5-325 MG per tablet  Last Written Prescription Date:  04/12/2017  Last Fill Quantity: 20,   # refills: 0  Last Office Visit with [facility] prescribing provider: 04/05/2517  Future Office visit:    Next 5 appointments (look out 90 days)     May 03, 2017  1:00 PM CDT   [facility]   [facility]    [address] [place] [id number] [phone]                   Routing refill request to provider for review/approval because:  Drug not on the [facility] [employer] refill protocol or controlled substance

## 2017-05-13 DIAGNOSIS — F43.23 ADJUSTMENT DISORDER WITH MIXED ANXIETY AND DEPRESSED MOOD: ICD-10-CM

## 2017-05-13 NOTE — TELEPHONE ENCOUNTER
Xanax 0.5mg      Last Written Prescription Date: 03/28/2017  Last Fill Quantity: 90,  # refills: 0   Last Office Visit with [facility] or [facility] prescribing provider: 04/05/2017    Lenka Mejia CPhT  [facility] [place]  [phone]

## 2017-05-16 RX ORDER — ALPRAZOLAM 0.5 MG
0.5 TABLET ORAL 3 TIMES DAILY PRN
Qty: 90 TABLET | Refills: 0 | Status: SHIPPED | OUTPATIENT
Start: 2017-05-16 | End: 2017-07-23

## 2017-06-22 DIAGNOSIS — S43.431D LABRAL TEAR OF SHOULDER, RIGHT, SUBSEQUENT ENCOUNTER: ICD-10-CM

## 2017-06-22 RX ORDER — HYDROCODONE BITARTRATE AND ACETAMINOPHEN 5; 325 MG/1; MG/1
1-2 TABLET ORAL EVERY 4 HOURS PRN
Qty: 20 TABLET | Refills: 0 | Status: SHIPPED | OUTPATIENT
Start: 2017-06-22 | End: 2017-07-24

## 2017-06-22 NOTE — TELEPHONE ENCOUNTER
Rx put in the front for coborns to piclk up.     Sanam Lyles CMA ([facility])  6/22/2017

## 2017-07-23 DIAGNOSIS — F43.23 ADJUSTMENT DISORDER WITH MIXED ANXIETY AND DEPRESSED MOOD: ICD-10-CM

## 2017-07-23 NOTE — TELEPHONE ENCOUNTER
Xanax 0.5mg      Last Written Prescription Date: 05/16/2017  Last Fill Quantity: 90,  # refills: 0   Last Office Visit with [facility] or [facility] prescribing provider: 04/05/2017    Mine Hilton, Pharmacy Technician  [facility]  [phone]

## 2017-07-24 DIAGNOSIS — S43.431D LABRAL TEAR OF SHOULDER, RIGHT, SUBSEQUENT ENCOUNTER: ICD-10-CM

## 2017-07-24 NOTE — TELEPHONE ENCOUNTER
Hydrocodone-acetaminophen      Last Written Prescription Date:  6/22/17  Last Fill Quantity: 20,   # refills: 0  Last Office Visit with [facility] prescribing provider: 4/5/17  Future Office visit:       Routing refill request to provider for review/approval because:  Drug not on the [facility] refill protocol or controlled substance

## 2017-07-25 RX ORDER — ALPRAZOLAM 0.5 MG
0.5 TABLET ORAL 3 TIMES DAILY PRN
Qty: 90 TABLET | Refills: 0 | Status: SHIPPED | OUTPATIENT
Start: 2017-07-25 | End: 2017-09-28

## 2017-07-25 RX ORDER — HYDROCODONE BITARTRATE AND ACETAMINOPHEN 5; 325 MG/1; MG/1
1-2 TABLET ORAL EVERY 4 HOURS PRN
Qty: 15 TABLET | Refills: 0 | Status: SHIPPED | OUTPATIENT
Start: 2017-07-25 | End: 2017-09-08

## 2017-08-23 DIAGNOSIS — Z79.890 NEED FOR POSTMENOPAUSAL HORMONE REPLACEMENT: ICD-10-CM

## 2017-08-23 NOTE — TELEPHONE ENCOUNTER
medroxyprogesterone      Last Written Prescription Date: 3/02/2017  Last Fill Quantity: 90, # refills: 0  Last Office Visit with [facility], [facility] or [facility] prescribing provider: 4/05/2017       BP Readings from Last 3 Encounters:   04/12/17 114/76   04/06/17 121/81   04/05/17 120/68     Date of last Breast Exam: 1/06/2016

## 2017-08-28 NOTE — TELEPHONE ENCOUNTER
Patient had a mammogram 1/2016.  Routing refill request to provider for review/approval because:  A break in medication  Leilani Mcconnell RN

## 2017-08-29 NOTE — TELEPHONE ENCOUNTER
Please have patient make appointment for physical and to review medications. Does not appear she has been getting regular refills of the hormone replacement

## 2017-08-30 NOTE — TELEPHONE ENCOUNTER
Left message for patient to call back and speak with any .    Thank you,  Princess Glass   for [facility]

## 2017-09-05 RX ORDER — MEDROXYPROGESTERONE ACETATE 5 MG
TABLET ORAL
Qty: 90 TABLET | Refills: 0 | Status: SHIPPED | OUTPATIENT
Start: 2017-09-05 | End: 2017-09-28

## 2017-09-08 ENCOUNTER — HOSPITAL ENCOUNTER (EMERGENCY)
Facility: CLINIC | Age: 47
Discharge: HOME | End: 2017-09-08
Attending: NURSE PRACTITIONER | Admitting: NURSE PRACTITIONER
Payer: COMMERCIAL

## 2017-09-08 ENCOUNTER — TELEPHONE (OUTPATIENT)
Dept: FAMILY MEDICINE | Facility: CLINIC | Age: 47
End: 2017-09-08

## 2017-09-08 ENCOUNTER — APPOINTMENT (OUTPATIENT)
Dept: CT IMAGING | Facility: CLINIC | Age: 47
End: 2017-09-08
Attending: NURSE PRACTITIONER
Payer: COMMERCIAL

## 2017-09-08 VITALS
RESPIRATION RATE: 18 BRPM | WEIGHT: 143 LBS | BODY MASS INDEX: 23.8 KG/M2 | SYSTOLIC BLOOD PRESSURE: 112 MMHG | DIASTOLIC BLOOD PRESSURE: 79 MMHG | TEMPERATURE: 98.1 F | HEART RATE: 80 BPM | OXYGEN SATURATION: 100 %

## 2017-09-08 DIAGNOSIS — H81.12 BPPV (BENIGN PAROXYSMAL POSITIONAL VERTIGO), LEFT: ICD-10-CM

## 2017-09-08 LAB
ALBUMIN SERPL-MCNC: 4 G/DL (ref 3.4–5)
ALBUMIN UR-MCNC: NEGATIVE MG/DL
ALP SERPL-CCNC: 105 U/L (ref 40–150)
ALT SERPL W P-5'-P-CCNC: 27 U/L (ref 0–50)
ANION GAP SERPL CALCULATED.3IONS-SCNC: 3 MMOL/L (ref 3–14)
APPEARANCE UR: CLEAR
AST SERPL W P-5'-P-CCNC: 16 U/L (ref 0–45)
BASOPHILS # BLD AUTO: 0 10E9/L (ref 0–0.2)
BASOPHILS NFR BLD AUTO: 0.4 %
BILIRUB SERPL-MCNC: 0.4 MG/DL (ref 0.2–1.3)
BILIRUB UR QL STRIP: NEGATIVE
BUN SERPL-MCNC: 9 MG/DL (ref 7–30)
CALCIUM SERPL-MCNC: 9 MG/DL (ref 8.5–10.1)
CHLORIDE SERPL-SCNC: 108 MMOL/L (ref 94–109)
CO2 SERPL-SCNC: 29 MMOL/L (ref 20–32)
COLOR UR AUTO: ABNORMAL
CREAT SERPL-MCNC: 0.87 MG/DL (ref 0.52–1.04)
CRP SERPL-MCNC: <2.9 MG/L (ref 0–8)
DIFFERENTIAL METHOD BLD: NORMAL
EOSINOPHIL # BLD AUTO: 0.2 10E9/L (ref 0–0.7)
EOSINOPHIL NFR BLD AUTO: 2.1 %
ERYTHROCYTE [DISTWIDTH] IN BLOOD BY AUTOMATED COUNT: 12.2 % (ref 10–15)
ERYTHROCYTE [SEDIMENTATION RATE] IN BLOOD BY WESTERGREN METHOD: 10 MM/H (ref 0–20)
GFR SERPL CREATININE-BSD FRML MDRD: 71 ML/MIN/1.7M2
GLUCOSE SERPL-MCNC: 83 MG/DL (ref 70–99)
GLUCOSE UR STRIP-MCNC: NEGATIVE MG/DL
HCT VFR BLD AUTO: 43.5 % (ref 35–47)
HGB BLD-MCNC: 14.3 G/DL (ref 11.7–15.7)
HGB UR QL STRIP: ABNORMAL
IMM GRANULOCYTES # BLD: 0 10E9/L (ref 0–0.4)
IMM GRANULOCYTES NFR BLD: 0.2 %
KETONES UR STRIP-MCNC: NEGATIVE MG/DL
LEUKOCYTE ESTERASE UR QL STRIP: NEGATIVE
LYMPHOCYTES # BLD AUTO: 2.3 10E9/L (ref 0.8–5.3)
LYMPHOCYTES NFR BLD AUTO: 23 %
MCH RBC QN AUTO: 31.6 PG (ref 26.5–33)
MCHC RBC AUTO-ENTMCNC: 32.9 G/DL (ref 31.5–36.5)
MCV RBC AUTO: 96 FL (ref 78–100)
MONOCYTES # BLD AUTO: 0.7 10E9/L (ref 0–1.3)
MONOCYTES NFR BLD AUTO: 6.5 %
NEUTROPHILS # BLD AUTO: 6.8 10E9/L (ref 1.6–8.3)
NEUTROPHILS NFR BLD AUTO: 67.8 %
NITRATE UR QL: NEGATIVE
PH UR STRIP: 7 PH (ref 5–7)
PLATELET # BLD AUTO: 326 10E9/L (ref 150–450)
POTASSIUM SERPL-SCNC: 3.8 MMOL/L (ref 3.4–5.3)
PROT SERPL-MCNC: 7.6 G/DL (ref 6.8–8.8)
RBC # BLD AUTO: 4.52 10E12/L (ref 3.8–5.2)
RBC #/AREA URNS AUTO: <1 /HPF (ref 0–2)
SODIUM SERPL-SCNC: 140 MMOL/L (ref 133–144)
SOURCE: ABNORMAL
SP GR UR STRIP: 1 (ref 1–1.03)
SQUAMOUS #/AREA URNS AUTO: 1 /HPF (ref 0–1)
UROBILINOGEN UR STRIP-MCNC: 0 MG/DL (ref 0–2)
WBC # BLD AUTO: 10 10E9/L (ref 4–11)
WBC #/AREA URNS AUTO: 0 /HPF (ref 0–2)

## 2017-09-08 PROCEDURE — 85652 RBC SED RATE AUTOMATED: CPT | Performed by: NURSE PRACTITIONER

## 2017-09-08 PROCEDURE — 86140 C-REACTIVE PROTEIN: CPT | Performed by: NURSE PRACTITIONER

## 2017-09-08 PROCEDURE — 96375 TX/PRO/DX INJ NEW DRUG ADDON: CPT | Performed by: NURSE PRACTITIONER

## 2017-09-08 PROCEDURE — 85025 COMPLETE CBC W/AUTO DIFF WBC: CPT | Performed by: NURSE PRACTITIONER

## 2017-09-08 PROCEDURE — 99284 EMERGENCY DEPT VISIT MOD MDM: CPT | Mod: 25 | Performed by: NURSE PRACTITIONER

## 2017-09-08 PROCEDURE — 70450 CT HEAD/BRAIN W/O DYE: CPT

## 2017-09-08 PROCEDURE — 96361 HYDRATE IV INFUSION ADD-ON: CPT | Performed by: NURSE PRACTITIONER

## 2017-09-08 PROCEDURE — 99284 EMERGENCY DEPT VISIT MOD MDM: CPT | Mod: Z6 | Performed by: NURSE PRACTITIONER

## 2017-09-08 PROCEDURE — 81001 URINALYSIS AUTO W/SCOPE: CPT | Performed by: NURSE PRACTITIONER

## 2017-09-08 PROCEDURE — 96365 THER/PROPH/DIAG IV INF INIT: CPT | Performed by: NURSE PRACTITIONER

## 2017-09-08 PROCEDURE — 25000128 H RX IP 250 OP 636: Performed by: NURSE PRACTITIONER

## 2017-09-08 PROCEDURE — 80053 COMPREHEN METABOLIC PANEL: CPT | Performed by: NURSE PRACTITIONER

## 2017-09-08 RX ORDER — DEXAMETHASONE SODIUM PHOSPHATE 10 MG/ML
10 INJECTION, SOLUTION INTRAMUSCULAR; INTRAVENOUS ONCE
Status: COMPLETED | OUTPATIENT
Start: 2017-09-08 | End: 2017-09-08

## 2017-09-08 RX ORDER — KETOROLAC TROMETHAMINE 30 MG/ML
30 INJECTION, SOLUTION INTRAMUSCULAR; INTRAVENOUS ONCE
Status: COMPLETED | OUTPATIENT
Start: 2017-09-08 | End: 2017-09-08

## 2017-09-08 RX ORDER — DIPHENHYDRAMINE HYDROCHLORIDE 50 MG/ML
25 INJECTION INTRAMUSCULAR; INTRAVENOUS ONCE
Status: COMPLETED | OUTPATIENT
Start: 2017-09-08 | End: 2017-09-08

## 2017-09-08 RX ORDER — MECLIZINE HYDROCHLORIDE 25 MG/1
25 TABLET ORAL EVERY 6 HOURS PRN
Qty: 30 TABLET | Refills: 0 | Status: ON HOLD | OUTPATIENT
Start: 2017-09-08 | End: 2018-08-30

## 2017-09-08 RX ORDER — LORATADINE 10 MG/1
10 TABLET ORAL DAILY
Qty: 30 TABLET | Refills: 0 | Status: SHIPPED | OUTPATIENT
Start: 2017-09-08 | End: 2017-09-28

## 2017-09-08 RX ORDER — LIDOCAINE 40 MG/G
CREAM TOPICAL
Status: DISCONTINUED | OUTPATIENT
Start: 2017-09-08 | End: 2017-09-08 | Stop reason: HOSPADM

## 2017-09-08 RX ORDER — MAGNESIUM SULFATE 1 G/100ML
1 INJECTION INTRAVENOUS ONCE
Status: COMPLETED | OUTPATIENT
Start: 2017-09-08 | End: 2017-09-08

## 2017-09-08 RX ADMIN — KETOROLAC TROMETHAMINE 30 MG: 30 INJECTION, SOLUTION INTRAMUSCULAR at 12:34

## 2017-09-08 RX ADMIN — SODIUM CHLORIDE 1000 ML: 9 INJECTION, SOLUTION INTRAVENOUS at 12:33

## 2017-09-08 RX ADMIN — PROCHLORPERAZINE EDISYLATE 5 MG: 5 INJECTION INTRAMUSCULAR; INTRAVENOUS at 12:38

## 2017-09-08 RX ADMIN — DEXAMETHASONE SODIUM PHOSPHATE 10 MG: 10 INJECTION, SOLUTION INTRAMUSCULAR; INTRAVENOUS at 12:35

## 2017-09-08 RX ADMIN — MAGNESIUM SULFATE IN DEXTROSE 1 G: 10 INJECTION, SOLUTION INTRAVENOUS at 12:33

## 2017-09-08 RX ADMIN — DIPHENHYDRAMINE HYDROCHLORIDE 25 MG: 50 INJECTION, SOLUTION INTRAMUSCULAR; INTRAVENOUS at 12:33

## 2017-09-08 ASSESSMENT — ENCOUNTER SYMPTOMS
RESPIRATORY NEGATIVE: 1
NAUSEA: 1
ACTIVITY CHANGE: 1
FEVER: 1
DIZZINESS: 1
HEADACHES: 1
SINUS PRESSURE: 1

## 2017-09-08 NOTE — DISCHARGE INSTRUCTIONS
Benign Paroxysmal Positional Vertigo     Your health care provider may move your head in certain ways to treat your BPPV.     Benign paroxysmal positional vertigo (BPPV) is a problem with the inner ear. The inner ear contains the vestibular system. This system is what helps you keep your balance. BPPV causes a feeling of spinning. It is a common problem of the vestibular system.  Understanding the vestibular system  The vestibular system of the ear is made up of very tiny parts. They include the utricle, saccule, and semicircular canals. The utricle is a tiny organ that contains calcium crystals. In some people, the crystals can move into the semicircular canals. When this happens, the system no longer works as it should. This causes BPPV. Benign means it is not life-threatening. Paroxysmal means it happens suddenly. Positional means that it happens when you move your head. Vertigo is a feeling of spinning.  What causes BPPV?  Causes include injury to your head or neck. Other problems with the vestibular system may cause BPPV. In many people, the cause of BPPV is not known.  Symptoms of BPPV  You many have repeated feelings of spinning (vertigo). The vertigo usually lasts less than 1 minute. Some movements, suchas rolling over in bed, can bring on vertigo.  Diagnosing BPPV  Your primary health care provider may diagnose and treat your BPPV. Or you may see an ear, nose, and throat doctor (otolaryngologist). In some cases, you may see a nervous system doctor (neurologist).  The health care provider will ask about your symptoms and your medical history. He or she will examine you. You may have hearing and balance tests. As part of the exam, your health care provider may have you move your head and body in certain ways. If you have BPPV, the movements can bring on vertigo. Your provider will also look for abnormal movements of your eyes. You may have other tests to check your vestibular or nervous systems.  Treatment  for BPPV  Your health care provider may try to move the calcium crystals. This is done by having you move your head and neck in certain ways. This treatment is safe and often works well. You may also be told to do these movements at home. You may still have vertigo for a few weeks. Your health care provider will recheck your symptoms, usually in about a month. Special physical therapy may also be part of treatment. In rare cases surgery may be needed for BPPV that does not go away.     When to call the health care provider  Call your health care provider right away if you have any of these:    Symptoms that do not go away with treatment    Symptoms that get worse    New symptoms   Date Last Reviewed: 3/19/2015    [id number] [employer]. [address] [place] [address], [place] [address]. All rights reserved. This information is not intended as a substitute for professional medical care. Always follow your healthcare professional's instructions.      You can look up the Epley and half somersault maneuvers on-line for vertigo treatment, these can be done at home, specifically for the left side.  These sometimes are helpful for people.

## 2017-09-08 NOTE — ED NOTES
Pt c/o Duncan,  Sinus pain pressure, nausea, body aches for a few days.  Dizziness  Started today.

## 2017-09-08 NOTE — ED PROVIDER NOTES
History     Chief Complaint   Patient presents with     Headache     Dizziness     HPI  Liliana Kat is a 47 year old female who presents to the emergency department today with a several day history of a frontal headache with maxillary and frontal sinus pressure, nausea and body aches.  Patient denies any cough or postnasal drip.  Patient denies any rhinorrhea.  Patient reports she did have a fever yesterday.  Patient denies any vomiting or diarrhea.  Patient does report that she developed dizziness this morning, specifically a room spinning sensation that is worse when patient looks to the left.  She denies any known history of vertigo in the past.  Patient also reports mid pelvic pain which is ongoing for her, she reports that she has polyps along both sides of her urethra.  Patient denies any neck stiffness.    I have reviewed the Medications, Allergies, Past Medical and Surgical History, and Social History in the Epic system.    Allergies:   Allergies   Allergen Reactions     Augmentin Swelling and Difficulty breathing     Avelox Nausea and Vomiting     Effexor Xr [Venlafaxine Hydrochloride]      Shaky and heart racing     Sulfa Drugs          No current facility-administered medications on file prior to encounter.   Current Outpatient Prescriptions on File Prior to Encounter:  medroxyPROGESTERone (PROVERA) 5 MG tablet TAKE ONE TABLET BY MOUTH ONCE DAILY   ALPRAZolam (XANAX) 0.5 MG tablet Take 1 tablet (0.5 mg) by mouth 3 times daily as needed for anxiety   Acetaminophen (TYLENOL PO) Take 650 mg by mouth every 4 hours as needed for mild pain or fever   FLUoxetine (PROZAC) 40 MG capsule Take 1 capsule (40 mg) by mouth daily   estradiol (ESTRACE) 1 MG tablet TAKE ONE TABLET BY MOUTH ONCE DAILY   aspirin 81 MG tablet Take 1 tablet by mouth daily.   MELATONIN 10 mg daily    ondansetron (ZOFRAN ODT) 4 MG ODT tab Take 1 tablet (4 mg) by mouth every 8 hours as needed for nausea   albuterol (PROAIR HFA,  PROVENTIL HFA, VENTOLIN HFA) 108 (90 BASE) MCG/ACT inhaler Inhale 2 puffs into the lungs every 6 hours as needed for shortness of breath / dyspnea or wheezing   fluticasone (FLONASE) 50 MCG/ACT nasal spray Spray 2 sprays into both nostrils daily   ALBUTEROL SULFATE 0.083 % IN NEBU ONE NEBULIZATION 4 TIMES DAILY AS NEEDED       Patient Active Problem List   Diagnosis     Need for postmenopausal hormone replacement     Decreased libido     CARDIOVASCULAR SCREENING; LDL GOAL LESS THAN 160     Bladder polyps     Superficial varicosities     Sacroiliitis (H)     Adjustment disorder with mixed anxiety and depressed mood     Acute pain of right shoulder     Acute biliary pancreatitis, unspecified complication status     Labral tear of shoulder, right, subsequent encounter       Past Surgical History:   Procedure Laterality Date     C LIGATE FALLOPIAN TUBE,POSTPARTUM      Tubal Ligation     ESOPHAGOSCOPY, GASTROSCOPY, DUODENOSCOPY (EGD), COMBINED  11/28/2012    Procedure: COMBINED ESOPHAGOSCOPY, GASTROSCOPY, DUODENOSCOPY (EGD), BIOPSY SINGLE OR MULTIPLE;  ESOPHAGOSCOPY, GASTROSCOPY, DUODENOSCOPY (EGD) with biopsy;  Surgeon: Herson Cabrera MD;  Location: [facility]     EXCIS VAGINAL CYST/TUMOR       HC REMOVAL OF OVARY/TUBE(S)  3/6/2006    Laparoscopic bilateral salpingo-oophorectomy.     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    Weight as of this encounter: 64.9 kg (143 lb).      Review of Systems   Constitutional: Positive for activity change and fever.   HENT: Positive for congestion and sinus pressure.    Respiratory: Negative.    Gastrointestinal: Positive for nausea.   Genitourinary: Positive for pelvic pain.   Neurological: Positive for dizziness and headaches.   All other systems reviewed and are negative.      Physical Exam   BP: (!) 128/93  Pulse: 80  Temp: 98.1  F (36.7  C)  Resp: 18  Weight: 64.9 kg (143 lb)  SpO2: 100 %  Physical Exam   Constitutional: She is oriented to person, place, and time. She appears well-developed and well-nourished. She appears distressed (appears uncomfortable).   HENT:   Head: Normocephalic.   Right Ear: Tympanic membrane and external ear normal.   Left Ear: Tympanic membrane and external ear normal.   Nose: Right sinus exhibits maxillary sinus tenderness and frontal sinus tenderness. Left sinus exhibits maxillary sinus tenderness and frontal sinus tenderness.   Mouth/Throat: Oropharynx is clear and moist. No oropharyngeal exudate.   Eyes: Conjunctivae and EOM are normal. Pupils are equal, round, and reactive to light. Right eye exhibits no discharge. Left eye exhibits no discharge.   Neck: Normal range of motion. Neck supple. No spinous process tenderness present. No rigidity.   Cardiovascular: Normal rate, regular rhythm, normal heart sounds and intact distal pulses.    No murmur heard.  Pulmonary/Chest: Effort normal and breath sounds normal.   Abdominal: Soft. Bowel sounds are normal. There is tenderness (mid pelvic region).   Musculoskeletal: Normal range of motion.   Lymphadenopathy:     She has no cervical adenopathy.   Neurological: She is alert and oriented to person, place, and time.   Skin: Skin is warm and dry. She is not diaphoretic.       ED Course     ED Course     Procedures     Results for " orders placed or performed during the hospital encounter of 09/08/17   CT Head w/o Contrast    Narrative    CT HEAD WITHOUT CONTRAST  9/8/2017 1:10 PM     HISTORY: Headache, dizziness, sinus pressure. No injury or history of  cancer.    COMPARISON: CT head dated 2/2/2010.    TECHNIQUE: Axial images through the brain obtained without intravenous  contrast, reviewed in bone, brain, and subdural windows. Radiation  dose for this scan was reduced using automated exposure control,  adjustment of the mA and/or kV according to patient size, or iterative  reconstruction technique.    FINDINGS: Attenuation of the brain parenchyma is grossly within normal  limits without mass or acute hemorrhage. There is no mass-effect or  midline shift. Gray/white matter differentiation is well maintained.   No abnormal intra- or extra-axial fluid collections. The ventricles do  not appear enlarged out of proportion to the cerebral sulci.     The visualized portions of the paranasal sinuses, mastoid air cells,  calvarium, and orbits are unremarkable.       Impression    IMPRESSION: No acute intracranial pathology. No change since the prior  study.    KI TRAN MD   CBC with platelets differential   Result Value Ref Range    WBC 10.0 4.0 - 11.0 10e9/L    RBC Count 4.52 3.8 - 5.2 10e12/L    Hemoglobin 14.3 11.7 - 15.7 g/dL    Hematocrit 43.5 35.0 - 47.0 %    MCV 96 78 - 100 fl    MCH 31.6 26.5 - 33.0 pg    MCHC 32.9 31.5 - 36.5 g/dL    RDW 12.2 10.0 - 15.0 %    Platelet Count 326 150 - 450 10e9/L    Diff Method Automated Method     % Neutrophils 67.8 %    % Lymphocytes 23.0 %    % Monocytes 6.5 %    % Eosinophils 2.1 %    % Basophils 0.4 %    % Immature Granulocytes 0.2 %    Absolute Neutrophil 6.8 1.6 - 8.3 10e9/L    Absolute Lymphocytes 2.3 0.8 - 5.3 10e9/L    Absolute Monocytes 0.7 0.0 - 1.3 10e9/L    Absolute Eosinophils 0.2 0.0 - 0.7 10e9/L    Absolute Basophils 0.0 0.0 - 0.2 10e9/L    Abs Immature Granulocytes 0.0 0 - 0.4 10e9/L    Comprehensive metabolic panel   Result Value Ref Range    Sodium 140 133 - 144 mmol/L    Potassium 3.8 3.4 - 5.3 mmol/L    Chloride 108 94 - 109 mmol/L    Carbon Dioxide 29 20 - 32 mmol/L    Anion Gap 3 3 - 14 mmol/L    Glucose 83 70 - 99 mg/dL    Urea Nitrogen 9 7 - 30 mg/dL    Creatinine 0.87 0.52 - 1.04 mg/dL    GFR Estimate 71 >60 mL/min/1.7m2    GFR Estimate If Black 85 >60 mL/min/1.7m2    Calcium 9.0 8.5 - 10.1 mg/dL    Bilirubin Total 0.4 0.2 - 1.3 mg/dL    Albumin 4.0 3.4 - 5.0 g/dL    Protein Total 7.6 6.8 - 8.8 g/dL    Alkaline Phosphatase 105 40 - 150 U/L    ALT 27 0 - 50 U/L    AST 16 0 - 45 U/L   CRP inflammation   Result Value Ref Range    CRP Inflammation <2.9 0.0 - 8.0 mg/L   Erythrocyte sedimentation rate auto   Result Value Ref Range    Sed Rate 10 0 - 20 mm/h   UA with Microscopic   Result Value Ref Range    Color Urine Straw     Appearance Urine Clear     Glucose Urine Negative NEG^Negative mg/dL    Bilirubin Urine Negative NEG^Negative    Ketones Urine Negative NEG^Negative mg/dL    Specific Gravity Urine 1.003 1.003 - 1.035    Blood Urine Small (A) NEG^Negative    pH Urine 7.0 5.0 - 7.0 pH    Protein Albumin Urine Negative NEG^Negative mg/dL    Urobilinogen mg/dL 0.0 0.0 - 2.0 mg/dL    Nitrite Urine Negative NEG^Negative    Leukocyte Esterase Urine Negative NEG^Negative    Source Midstream Urine     WBC Urine 0 0 - 2 /HPF    RBC Urine <1 0 - 2 /HPF    Squamous Epithelial /HPF Urine 1 0 - 1 /HPF        Labs Ordered and Resulted from Time of ED Arrival Up to the Time of Departure from the ED - No data to display    Assessments & Plan (with Medical Decision Making)  Liliana is a 47-year-old female with a history of bladder polyps, sacroiliitis and sinus surgeries presents to the emergency department today with multiple complaints including room spinning sensation, congestion, sinus pressure/pain, nausea, headache, and pelvic pain.  Please refer to HPI and focused exam.    Patient on exam is  well-hydrated, she is nontoxic appearing, she appears relatively uncomfortable secondary to her dizziness, this is worse when patient looks to the left, likely consistent with an acute benign positional vertigo.    CT scan was obtained to rule out any acute intracranial findings as well as an acute sinusitis and is negative.  Patient was given a liter of IV fluids here along with medications for headache including IV Decadron, Toradol, Benadryl, Compazine with improvement in her symptoms as well as her nausea.  Urinalysis was obtained secondary to patient's lower pelvic pain, sutures at this moment of blood and is otherwise negative for infection.  CBC, CMP, CRP, ESR are all unremarkable.  I feel patient is stable to be discharged home, I discussed with her vertigo and will prescribe meclizine and Claritin for use at home.  I also recommended patient trying the Epley or half somersault maneuver at home.  I would like patient seen by her primary care provider in the next week to reevaluate her symptoms, reasons to return to the emergency department were discussed.    Patient is agreeable to plan of care, she was discharged home in stable condition.       I have reviewed the nursing notes.    I have reviewed the findings, diagnosis, plan and need for follow up with the patient.      Discharge Medication List as of 9/8/2017  1:45 PM      START taking these medications    Details   meclizine (ANTIVERT) 25 MG tablet Take 1 tablet (25 mg) by mouth every 6 hours as needed for dizziness, Disp-30 tablet, R-0, E-Prescribe      loratadine (CLARITIN) 10 MG tablet Take 1 tablet (10 mg) by mouth daily, Disp-30 tablet, R-0, E-Prescribe             Final diagnoses:   BPPV (benign paroxysmal positional vertigo), left       9/8/2017   [facility] EMERGENCY DEPARTMENT     Fanny Santo, LOLIS CNP  09/08/17 [id number]

## 2017-09-08 NOTE — ED AVS SNAPSHOT
[facility] Emergency Department    [address] [place] [address]    Phone:  [phone]    Fax:  [phone]                                       Liliana Kat   MRN: [id number]    Department:  [facility] Emergency Department   Date of Visit:  9/8/2017           After Visit Summary Signature Page     I have received my discharge instructions, and my questions have been answered. I have discussed any challenges I see with this plan with the nurse or doctor.    ..........................................................................................................................................  Patient/Patient Representative Signature      ..........................................................................................................................................  Patient Representative Print Name and Relationship to Patient    ..................................................               ................................................  Date                                            Time    ..........................................................................................................................................  Reviewed by Signature/Title    ...................................................              ..............................................  Date                                                            Time

## 2017-09-08 NOTE — TELEPHONE ENCOUNTER
"Liliana Kat is a 47 year old female-     NURSING ASSESSMENT:  Description:  Patient has been feeling \"off\" for the past few days. Reports terrible dizziness, changes in vision, sick to her stomach, nauseated, and headaches not relieved with aspirin. She felt like it was maybe sinus related as she has had two sinus surgeries in the past, but something doesn't feel right. Rates the pain a 6/10. She did have a fever yesterday. Unable to tell if she has one today. Denies difficulty breathing or chest pain. She feels very \"stuffed up\".   Onset/duration:  3 days   Precip. factors:  Unknown   Associated symptoms:  Unknown   Improves/worsens symptoms:  Worsening headaches, dizziness and visual changes   Pain scale (0-10)   6/10  Last exam/Treatment:  4/5/2017  Allergies:   Allergies   Allergen Reactions     Augmentin Swelling and Difficulty breathing     Avelox Nausea and Vomiting     Effexor Xr [Venlafaxine Hydrochloride]      Shaky and heart racing     Sulfa Drugs      NURSING PLAN: Nursing advice to patient seek emergency care     RECOMMENDED DISPOSITION:  To ED, another person to drive - patient is in agreement with this plan.   Will comply with recommendation: Yes  If further questions/concerns or if symptoms do not improve, worsen or new symptoms develop, call your PCP or [facility] as soon as possible.      Guideline used: Dizziness/Vision Changes  Telephone Triage Protocols for Nurses, Fifth Edition, Kelsey Vargas RN    "

## 2017-09-28 ENCOUNTER — OFFICE VISIT (OUTPATIENT)
Dept: FAMILY MEDICINE | Facility: CLINIC | Age: 47
End: 2017-09-28
Payer: COMMERCIAL

## 2017-09-28 VITALS
HEART RATE: 119 BPM | BODY MASS INDEX: 24.09 KG/M2 | WEIGHT: 144.6 LBS | DIASTOLIC BLOOD PRESSURE: 72 MMHG | OXYGEN SATURATION: 99 % | TEMPERATURE: 97.1 F | HEIGHT: 65 IN | SYSTOLIC BLOOD PRESSURE: 96 MMHG

## 2017-09-28 DIAGNOSIS — Z79.890 NEED FOR POSTMENOPAUSAL HORMONE REPLACEMENT: ICD-10-CM

## 2017-09-28 DIAGNOSIS — J01.90 ACUTE SINUSITIS, UNSPECIFIED: ICD-10-CM

## 2017-09-28 DIAGNOSIS — Z72.0 TOBACCO ABUSE: ICD-10-CM

## 2017-09-28 DIAGNOSIS — I83.90 SUPERFICIAL VARICOSITIES, UNSPECIFIED LATERALITY: ICD-10-CM

## 2017-09-28 DIAGNOSIS — R42 DIZZINESS: ICD-10-CM

## 2017-09-28 DIAGNOSIS — F43.23 ADJUSTMENT DISORDER WITH MIXED ANXIETY AND DEPRESSED MOOD: Primary | ICD-10-CM

## 2017-09-28 PROCEDURE — 99214 OFFICE O/P EST MOD 30 MIN: CPT | Performed by: FAMILY MEDICINE

## 2017-09-28 RX ORDER — MEDROXYPROGESTERONE ACETATE 5 MG
5 TABLET ORAL DAILY
Qty: 90 TABLET | Refills: 0 | Status: SHIPPED | OUTPATIENT
Start: 2017-09-28 | End: 2018-01-17

## 2017-09-28 RX ORDER — CITALOPRAM HYDROBROMIDE 20 MG/1
20 TABLET ORAL DAILY
Qty: 30 TABLET | Refills: 1 | Status: SHIPPED | OUTPATIENT
Start: 2017-09-28 | End: 2018-01-17

## 2017-09-28 RX ORDER — ALBUTEROL SULFATE 90 UG/1
2 AEROSOL, METERED RESPIRATORY (INHALATION) EVERY 6 HOURS PRN
Qty: 1 INHALER | Refills: 0 | Status: SHIPPED | OUTPATIENT
Start: 2017-09-28 | End: 2020-03-18

## 2017-09-28 RX ORDER — MECLIZINE HYDROCHLORIDE 25 MG/1
25 TABLET ORAL EVERY 6 HOURS PRN
Qty: 30 TABLET | Refills: 0 | Status: CANCELLED | OUTPATIENT
Start: 2017-09-28

## 2017-09-28 RX ORDER — ALBUTEROL SULFATE 0.83 MG/ML
SOLUTION RESPIRATORY (INHALATION)
Qty: 1 BOX | Refills: 0 | Status: SHIPPED | OUTPATIENT
Start: 2017-09-28 | End: 2023-03-13

## 2017-09-28 RX ORDER — FLUTICASONE PROPIONATE 50 MCG
2 SPRAY, SUSPENSION (ML) NASAL DAILY
Qty: 1 BOTTLE | Refills: 11 | Status: SHIPPED | OUTPATIENT
Start: 2017-09-28 | End: 2023-03-13

## 2017-09-28 RX ORDER — ESTRADIOL 1 MG/1
1 TABLET ORAL DAILY
Qty: 30 TABLET | Refills: 3 | Status: SHIPPED | OUTPATIENT
Start: 2017-09-28 | End: 2018-02-20

## 2017-09-28 RX ORDER — ALPRAZOLAM 0.5 MG
0.5 TABLET ORAL 3 TIMES DAILY PRN
Qty: 90 TABLET | Refills: 0 | Status: SHIPPED | OUTPATIENT
Start: 2017-09-28 | End: 2017-11-18

## 2017-09-28 RX ORDER — LORATADINE 10 MG/1
10 TABLET ORAL DAILY
Qty: 30 TABLET | Refills: 0 | Status: SHIPPED | OUTPATIENT
Start: 2017-09-28 | End: 2018-03-30

## 2017-09-28 RX ORDER — FLUOXETINE 40 MG/1
40 CAPSULE ORAL DAILY
Qty: 30 CAPSULE | Refills: 1 | Status: CANCELLED | OUTPATIENT
Start: 2017-09-28

## 2017-09-28 ASSESSMENT — PATIENT HEALTH QUESTIONNAIRE - PHQ9: SUM OF ALL RESPONSES TO PHQ QUESTIONS 1-9: 9

## 2017-09-28 NOTE — MR AVS SNAPSHOT
"              After Visit Summary   9/28/2017    Liliana Kat    MRN: [id number]           Patient Information     Date Of Birth          1970        Visit Information        Provider Department      9/28/2017 10:40 AM Brayan So MD [facility]        Today's Diagnoses     Adjustment disorder with mixed anxiety and depressed mood    -  1    Need for postmenopausal hormone replacement        Tobacco abuse        Acute sinusitis, unspecified        Superficial varicosities, unspecified laterality        Dizziness           Follow-ups after your visit        Who to contact     If you have questions or need follow up information about today's clinic visit or your schedule please contact [facility] directly at [phone].  Normal or non-critical lab and imaging results will be communicated to you by MyChart, letter or phone within 4 business days after the clinic has received the results. If you do not hear from us within 7 days, please contact the clinic through MyChart or phone. If you have a critical or abnormal lab result, we will notify you by phone as soon as possible.  Submit refill requests through [employer] game platform or call your pharmacy and they will forward the refill request to us. Please allow 3 business days for your refill to be completed.          Additional Information About Your Visit        MyChart Information     [employer] game platform lets you send messages to your doctor, view your test results, renew your prescriptions, schedule appointments and more. To sign up, go to [email or web address] . Click on \"Log in\" on the left side of the screen, which will take you to the Welcome page. Then click on \"Sign up Now\" on the right side of the page.     You will be asked to enter the access code listed below, as well as some personal information. Please follow the directions to create your username and password.     Your access code is: [email or web address]  Expires: 12/7/2017  1:45 " "PM     Your access code will  in 90 days. If you need help or a new code, please call your [place] clinic or [phone].        Care EveryWhere ID     This is your Care EveryWhere ID. This could be used by other organizations to access your [facility] medical records  [id number]        Your Vitals Were     Pulse Temperature Height Last Period Pulse Oximetry BMI (Body Mass Index)    119 97.1  F (36.2  C) (Tympanic) 5' 5\" (1.651 m) 2006 99% 24.06 kg/m2       Blood Pressure from Last 3 Encounters:   17 96/72   17 112/79   17 114/76    Weight from Last 3 Encounters:   17 144 lb 9.6 oz (65.6 kg)   17 143 lb (64.9 kg)   17 139 lb (63 kg)              Today, you had the following     No orders found for display         Today's Medication Changes          These changes are accurate as of: 17 12:21 PM.  If you have any questions, ask your nurse or doctor.               Start taking these medicines.        Dose/Directions    citalopram 20 MG tablet   Commonly known as:  celeXA   Used for:  Adjustment disorder with mixed anxiety and depressed mood   Started by:  Brayan So MD        Dose:  20 mg   Take 1 tablet (20 mg) by mouth daily   Quantity:  30 tablet   Refills:  1         These medicines have changed or have updated prescriptions.        Dose/Directions    * albuterol 108 (90 BASE) MCG/ACT Inhaler   Commonly known as:  PROAIR HFA/PROVENTIL HFA/VENTOLIN HFA   This may have changed:  Another medication with the same name was changed. Make sure you understand how and when to take each.   Used for:  Tobacco abuse   Changed by:  Brayan So MD        Dose:  2 puff   Inhale 2 puffs into the lungs every 6 hours as needed for shortness of breath / dyspnea or wheezing   Quantity:  1 Inhaler   Refills:  0       * albuterol (2.5 MG/3ML) 0.083% neb solution   This may have changed:  See the new instructions.   Used for:  Acute sinusitis, unspecified   Changed by: "  Brayan So MD        ONE NEBULIZATION 4 TIMES DAILY AS NEEDED   Quantity:  1 Box   Refills:  0       estradiol 1 MG tablet   Commonly known as:  ESTRACE   This may have changed:  See the new instructions.   Used for:  Need for postmenopausal hormone replacement   Changed by:  Brayan So MD        Dose:  1 mg   Take 1 tablet (1 mg) by mouth daily   Quantity:  30 tablet   Refills:  3       medroxyPROGESTERone 5 MG tablet   Commonly known as:  PROVERA   This may have changed:  See the new instructions.   Used for:  Need for postmenopausal hormone replacement   Changed by:  Brayan So MD        Dose:  5 mg   Take 1 tablet (5 mg) by mouth daily   Quantity:  90 tablet   Refills:  0       * Notice:  This list has 2 medication(s) that are the same as other medications prescribed for you. Read the directions carefully, and ask your doctor or other care provider to review them with you.         Where to get your medicines      These medications were sent to [facility] [address]   [address] [place] [address] [address] [facility] [address]     Phone:  [phone]     albuterol (2.5 MG/3ML) 0.083% neb solution    albuterol 108 (90 BASE) MCG/ACT Inhaler    citalopram 20 MG tablet    estradiol 1 MG tablet    fluticasone 50 MCG/ACT spray    loratadine 10 MG tablet    medroxyPROGESTERone 5 MG tablet         Some of these will need a paper prescription and others can be bought over the counter.  Ask your nurse if you have questions.     Bring a paper prescription for each of these medications     ALPRAZolam 0.5 MG tablet    aspirin 81 MG tablet                Primary Care Provider Office Phone # Fax #    LOLIS Osorio [facility] [phone]       [address]   [place] [address]        Equal Access to Services     [facility] KATIE AH: Paulie Johnson, kushal waldron, anirudh stanford. So [facility]  [phone].    ATENCIÓN: Si maday romero, tiene a garcia disposición servicios gratuitos de asistencia lingüística. Roxane guadarrama [phone].    We comply with applicable federal civil rights laws and [place] laws. We do not discriminate on the basis of race, color, national origin, age, disability sex, sexual orientation or gender identity.            Thank you!     Thank you for choosing [facility]  for your care. Our goal is always to provide you with excellent care. Hearing back from our patients is one way we can continue to improve our services. Please take a few minutes to complete the written survey that you may receive in the mail after your visit with us. Thank you!             Your Updated Medication List - Protect others around you: Learn how to safely use, store and throw away your medicines at www.disposemymeds.org.          This list is accurate as of: 9/28/17 12:21 PM.  Always use your most recent med list.                   Brand Name Dispense Instructions for use Diagnosis    * albuterol 108 (90 BASE) MCG/ACT Inhaler    PROAIR HFA/PROVENTIL HFA/VENTOLIN HFA    1 Inhaler    Inhale 2 puffs into the lungs every 6 hours as needed for shortness of breath / dyspnea or wheezing    Tobacco abuse       * albuterol (2.5 MG/3ML) 0.083% neb solution     1 Box    ONE NEBULIZATION 4 TIMES DAILY AS NEEDED    Acute sinusitis, unspecified       ALPRAZolam 0.5 MG tablet    XANAX    90 tablet    Take 1 tablet (0.5 mg) by mouth 3 times daily as needed for anxiety    Adjustment disorder with mixed anxiety and depressed mood       aspirin 81 MG tablet     30 tablet    Take 1 tablet (81 mg) by mouth daily    Superficial varicosities, unspecified laterality       citalopram 20 MG tablet    celeXA    30 tablet    Take 1 tablet (20 mg) by mouth daily    Adjustment disorder with mixed anxiety and depressed mood       estradiol 1 MG tablet    ESTRACE    30 tablet    Take 1 tablet (1 mg) by mouth daily    Need  for postmenopausal hormone replacement       FLUoxetine 40 MG capsule    PROzac    30 capsule    Take 1 capsule (40 mg) by mouth daily    Adjustment disorder with mixed anxiety and depressed mood       fluticasone 50 MCG/ACT spray    FLONASE    1 Bottle    Spray 2 sprays into both nostrils daily    Acute sinusitis, unspecified       loratadine 10 MG tablet    CLARITIN    30 tablet    Take 1 tablet (10 mg) by mouth daily    Acute sinusitis, unspecified       meclizine 25 MG tablet    ANTIVERT    30 tablet    Take 1 tablet (25 mg) by mouth every 6 hours as needed for dizziness        medroxyPROGESTERone 5 MG tablet    PROVERA    90 tablet    Take 1 tablet (5 mg) by mouth daily    Need for postmenopausal hormone replacement       MELATONIN      10 mg daily        ondansetron 4 MG ODT tab    ZOFRAN ODT    12 tablet    Take 1 tablet (4 mg) by mouth every 8 hours as needed for nausea        TYLENOL PO      Take 650 mg by mouth every 4 hours as needed for mild pain or fever        * Notice:  This list has 2 medication(s) that are the same as other medications prescribed for you. Read the directions carefully, and ask your doctor or other care provider to review them with you.

## 2017-09-28 NOTE — PROGRESS NOTES
"  SUBJECTIVE:   Liliana Kat is a 47 year old female who presents to clinic today for the following health issues:       Patient is in clinic today requesting that all of her medications be refilled. Patient would like to change her Prozac back to Celexa. Patient does not like the prozac because it makes her feel \"sick and fuzzy.\"  She feels more focused on Celexa.         Amount of exercise or physical activity: 2-3 days/week for an average of greater than 60 minutes    Problems taking medications regularly: No    Medication side effects: none    Diet: regular (no restrictions)            Problem list and histories reviewed & adjusted, as indicated.  Additional history: as documented        Reviewed and updated as needed this visit by clinical staff       Reviewed and updated as needed this visit by Provider        SUBJECTIVE:  Liliana  is a 47 year old female who presents for:  Refill of multiple medications. I am not familiar with her medical history. She sees one of my partners and was told she needed an appointment to get her multiple medications refilled and she was put on my schedule. Acutely what's going on and she is under extreme stress. Her son is in halfway and is a trial date coming up in October for third degree murder charges for supplying narcotics to a person that overdosed. Her  is 49 years old is oxygen dependent COPD and is doing poorly. Daughter has drug abuse problems. She is working 10 hours a day to try to keep finances together. Has turned to the [employer] for her sanity. She was switched to Prozac from Celexa but wants to go back to Celexa as she feels it was working better. Recently in the emergency room with vertigo.    Past Medical History:   Diagnosis Date     Allergic rhinitis, cause unspecified     Allergic rhinitis - weeds and pollan     Anxiety      Bladder polyps      Depressive disorder      Obstructive chronic bronchitis with exacerbation (H)      Other and unspecified " ovarian cyst     Ovarian cyst - rupured cyst + laser x3     Other and unspecified ovarian cyst 9/14/2005    Left hemorrhagic ovarian cyst.     Tobacco abuse      Urinary tract infection, site not specified      Past Surgical History:   Procedure Laterality Date     C LIGATE FALLOPIAN TUBE,POSTPARTUM      Tubal Ligation     ESOPHAGOSCOPY, GASTROSCOPY, DUODENOSCOPY (EGD), COMBINED  11/28/2012    Procedure: COMBINED ESOPHAGOSCOPY, GASTROSCOPY, DUODENOSCOPY (EGD), BIOPSY SINGLE OR MULTIPLE;  ESOPHAGOSCOPY, GASTROSCOPY, DUODENOSCOPY (EGD) with biopsy;  Surgeon: Herson Cabrera MD;  Location: [facility]     EXCIS VAGINAL CYST/TUMOR       HC REMOVAL OF OVARY/TUBE(S)  3/6/2006    Laparoscopic bilateral salpingo-oophorectomy.     HC TYMPANOPLASTY W/O MASTOID, INIT/REV W/O OSS CHAIN RECONST  04/02     LAPAROSCOPIC CHOLECYSTECTOMY N/A 4/6/2017    Procedure: LAPAROSCOPIC CHOLECYSTECTOMY;  Surgeon: Shivam Luevano MD;  Location: [facility] OR     Social History   Substance Use Topics     Smoking status: Current Some Day Smoker     Packs/day: 0.00     Years: 10.00     Smokeless tobacco: Never Used      Comment: 3 cigs a day      Alcohol use No      Comment: recovering  - 2005     Current Outpatient Prescriptions   Medication Sig Dispense Refill     estradiol (ESTRACE) 1 MG tablet Take 1 tablet (1 mg) by mouth daily 30 tablet 3     albuterol (PROAIR HFA/PROVENTIL HFA/VENTOLIN HFA) 108 (90 BASE) MCG/ACT Inhaler Inhale 2 puffs into the lungs every 6 hours as needed for shortness of breath / dyspnea or wheezing 1 Inhaler 0     fluticasone (FLONASE) 50 MCG/ACT spray Spray 2 sprays into both nostrils daily 1 Bottle 11     aspirin 81 MG tablet Take 1 tablet (81 mg) by mouth daily 30 tablet OTC     albuterol (2.5 MG/3ML) 0.083% neb solution ONE NEBULIZATION 4 TIMES DAILY AS NEEDED 1 Box 0     ALPRAZolam (XANAX) 0.5 MG tablet Take 1 tablet (0.5 mg) by mouth 3 times daily as needed for anxiety 90 tablet 0     medroxyPROGESTERone (PROVERA) 5 MG  "tablet Take 1 tablet (5 mg) by mouth daily 90 tablet 0     loratadine (CLARITIN) 10 MG tablet Take 1 tablet (10 mg) by mouth daily 30 tablet 0     citalopram (CELEXA) 20 MG tablet Take 1 tablet (20 mg) by mouth daily 30 tablet 1     meclizine (ANTIVERT) 25 MG tablet Take 1 tablet (25 mg) by mouth every 6 hours as needed for dizziness 30 tablet 0     MELATONIN 10 mg daily        Acetaminophen (TYLENOL PO) Take 650 mg by mouth every 4 hours as needed for mild pain or fever       ondansetron (ZOFRAN ODT) 4 MG ODT tab Take 1 tablet (4 mg) by mouth every 8 hours as needed for nausea 12 tablet 0     FLUoxetine (PROZAC) 40 MG capsule Take 1 capsule (40 mg) by mouth daily 30 capsule 1     [DISCONTINUED] estradiol (ESTRACE) 1 MG tablet TAKE ONE TABLET BY MOUTH ONCE DAILY 30 tablet 1     [DISCONTINUED] albuterol (PROAIR HFA, PROVENTIL HFA, VENTOLIN HFA) 108 (90 BASE) MCG/ACT inhaler Inhale 2 puffs into the lungs every 6 hours as needed for shortness of breath / dyspnea or wheezing 1 Inhaler 0     [DISCONTINUED] ALBUTEROL SULFATE 0.083 % IN NEBU ONE NEBULIZATION 4 TIMES DAILY AS NEEDED 1 BOX 0       REVIEW OF SYSTEMS:   5 point ROS negative except as noted above in HPI, including Gen., Resp, CV, GI &  system review.     OBJECTIVE:  Vitals: BP 96/72 (BP Location: Right arm, Patient Position: Chair, Cuff Size: Adult Regular)  Pulse 119  Temp 97.1  F (36.2  C) (Tympanic)  Ht 5' 5\" (1.651 m)  Wt 144 lb 9.6 oz (65.6 kg)  LMP 03/06/2006  SpO2 99%  BMI 24.06 kg/m2  BMI= Body mass index is 24.06 kg/(m^2).  She is alert. She is rather distraught. But pulls himself together and answers questions appropriately.    ASSESSMENT:  #1 adjustment disorder with anxiety and depression #2 hormone replacement #3 sinus problems #4 dizziness    PLAN:  Polypharmacy was renewed for now. Switched to Celexa. She'll follow-up with her primary in the near future to discuss more about hormone replacement therapy.        Brayan So, " MD  [facility]

## 2017-11-18 DIAGNOSIS — F43.23 ADJUSTMENT DISORDER WITH MIXED ANXIETY AND DEPRESSED MOOD: ICD-10-CM

## 2017-11-22 RX ORDER — ALPRAZOLAM 0.5 MG
0.5 TABLET ORAL 3 TIMES DAILY PRN
Qty: 90 TABLET | Refills: 0 | Status: SHIPPED | OUTPATIENT
Start: 2017-11-22 | End: 2018-02-01

## 2018-01-17 DIAGNOSIS — Z79.890 NEED FOR POSTMENOPAUSAL HORMONE REPLACEMENT: ICD-10-CM

## 2018-01-17 DIAGNOSIS — F43.23 ADJUSTMENT DISORDER WITH MIXED ANXIETY AND DEPRESSED MOOD: ICD-10-CM

## 2018-01-17 NOTE — TELEPHONE ENCOUNTER
"Requested Prescriptions   Pending Prescriptions Disp Refills     citalopram (CELEXA) 20 MG tablet [Pharmacy Med Name: CITALOPRAM 20MG TAB] 30 tablet 1     Sig: TAKE ONE TABLET BY MOUTH EVERY DAY    SSRIs Protocol Failed    1/17/2018  9:46 AM       Failed - PHQ-9 score less than 5 in past 6 months    The PHQ-9 criteria is meant to fail. It requires a PHQ-9 score review         Passed - Recent or future visit with authorizing provider    Patient had office visit in the last year or has a visit in the next 30 days with authorizing provider.  See \"Patient Info\" tab in inbasket, or \"Choose Columns\" in Meds & Orders section of the refill encounter.              Passed - Patient is age 18 or older       Passed - No active pregnancy on record       Passed - No positive pregnancy test in last 12 months       Passed - Recent (6 mo) or future visit with authorizing provider's specialty    Patient had office visit in the last 6 months or has a visit in the next 30 days with authorizing provider.  See \"Patient Info\" tab in inbasket, or \"Choose Columns\" in Meds & Orders section of the refill encounter.           medroxyPROGESTERone (PROVERA) 5 MG tablet [Pharmacy Med Name: MEDROXYPROGESTERONE 5MG TAB] 90 tablet 0     Sig: TAKE ONE TABLET BY MOUTH EVERY DAY    There is no refill protocol information for this order          Last Written Prescription Date:  9/28/17  Last Fill Quantity: 30,  # refills: 1   Last Office Visit with G, P or [facility] prescribing provider:  9/28/17   Future Office Visit:       "

## 2018-01-19 RX ORDER — CITALOPRAM HYDROBROMIDE 20 MG/1
TABLET ORAL
Qty: 30 TABLET | Refills: 1 | Status: SHIPPED | OUTPATIENT
Start: 2018-01-19 | End: 2018-04-23

## 2018-01-19 RX ORDER — MEDROXYPROGESTERONE ACETATE 5 MG
TABLET ORAL
Qty: 90 TABLET | Refills: 0 | Status: SHIPPED | OUTPATIENT
Start: 2018-01-19 | End: 2018-04-23

## 2018-02-01 DIAGNOSIS — F43.23 ADJUSTMENT DISORDER WITH MIXED ANXIETY AND DEPRESSED MOOD: ICD-10-CM

## 2018-02-01 RX ORDER — ALPRAZOLAM 0.5 MG
0.5 TABLET ORAL 3 TIMES DAILY PRN
Qty: 90 TABLET | Refills: 0 | Status: SHIPPED | OUTPATIENT
Start: 2018-02-01 | End: 2018-04-04

## 2018-02-01 NOTE — TELEPHONE ENCOUNTER
Alprazolam       Last Written Prescription Date:  11/22/17  Last Fill Quantity: 90,   # refills: 0  Last Office Visit: 9-28-17  Future Office visit:       Routing refill request to provider for review/approval because:  Drug not on the [facility], [facility] or [facility] refill protocol or controlled substance

## 2018-03-30 DIAGNOSIS — J01.90 ACUTE SINUSITIS: ICD-10-CM

## 2018-03-30 NOTE — TELEPHONE ENCOUNTER
"Requested Prescriptions   Pending Prescriptions Disp Refills     loratadine (CLARITIN) 10 MG tablet [Pharmacy Med Name: LORATADINE 10MG TABS] 30 tablet 0     Sig: TAKE ONE TABLET BY MOUTH EVERY DAY    Antihistamines Protocol Passed    3/30/2018  7:13 AM       Passed - Patient is 3-64 years of age    Apply weight-based dosing for peds patients age 3 - 12 years of age.    Forward request to provider for patients under the age of 3 or over the age of 64.         Passed - Recent (12 mo) or future (30 days) visit within the authorizing provider's specialty    Patient had office visit in the last 12 months or has a visit in the next 30 days with authorizing provider or within the authorizing provider's specialty.  See \"Patient Info\" tab in inbasket, or \"Choose Columns\" in Meds & Orders section of the refill encounter.              Last Written Prescription Date:  9-28-17  Last Fill Quantity: 30,  # refills: 0   Last Office Visit with [facility], [facility] or [facility] prescribing provider:  9-28-17   Future Office Visit:       "

## 2018-04-02 RX ORDER — LORATADINE 10 MG/1
TABLET ORAL
Qty: 30 TABLET | Refills: 5 | Status: SHIPPED | OUTPATIENT
Start: 2018-04-02

## 2018-04-02 NOTE — TELEPHONE ENCOUNTER
Prescription approved per [facility] Refill Protocol............NARGIS Wilks

## 2018-04-04 DIAGNOSIS — F43.23 ADJUSTMENT DISORDER WITH MIXED ANXIETY AND DEPRESSED MOOD: ICD-10-CM

## 2018-04-04 RX ORDER — ALPRAZOLAM 0.5 MG
0.5 TABLET ORAL 3 TIMES DAILY PRN
Qty: 90 TABLET | Refills: 0 | Status: SHIPPED | OUTPATIENT
Start: 2018-04-04 | End: 2018-06-11

## 2018-04-04 NOTE — TELEPHONE ENCOUNTER
alprazolam      Last Written Prescription Date:  2/1/18  Last Fill Quantity: 90,   # refills: 0  Last Office Visit: 9/28/17  Future Office visit:       Routing refill request to provider for review/approval because:  Drug not on the [facility], [facility] or [facility] refill protocol or controlled substance'

## 2018-04-18 DIAGNOSIS — J01.00 ACUTE MAXILLARY SINUSITIS, RECURRENCE NOT SPECIFIED: Primary | ICD-10-CM

## 2018-04-18 NOTE — TELEPHONE ENCOUNTER
This was prescribed for acute sinusitis.  Routing refill request to provider for review/approval because:  Drug not on the [facility] refill protocol for this diagnosis.  Routing to PCP for further advice.    Leilani Mcconnell RN

## 2018-04-18 NOTE — TELEPHONE ENCOUNTER
"Requested Prescriptions   Pending Prescriptions Disp Refills     albuterol (2.5 MG/3ML) 0.083% neb solution [Pharmacy Med Name: ALBUTEROL SULFATE 2.5 MG/3ML NEBU] 180 mL 0     Sig: NEBULIZE CONTENTS OF ONE VIAL FOUR TIMES A DAY AS NEEDED    Asthma Maintenance Inhalers - Anticholinergics Passed    4/18/2018  2:35 PM       Passed - Patient is age 12 years or older       Passed - Recent (12 mo) or future (30 days) visit within the authorizing provider's specialty    Patient had office visit in the last 12 months or has a visit in the next 30 days with authorizing provider or within the authorizing provider's specialty.  See \"Patient Info\" tab in inbasket, or \"Choose Columns\" in Meds & Orders section of the refill encounter.              Last Written Prescription Date:  9-28-17  Last Fill Quantity: 1 box ,  # refills: 0   Last Office Visit with [facility], [facility] or [facility] prescribing provider:  9-28-17   Future Office Visit:       "

## 2018-04-19 RX ORDER — ALBUTEROL SULFATE 0.83 MG/ML
SOLUTION RESPIRATORY (INHALATION)
Qty: 180 ML | Refills: 0 | OUTPATIENT
Start: 2018-04-19

## 2018-04-19 NOTE — TELEPHONE ENCOUNTER
I have been seeing Kassandra in over a year.  She does not have a diagnosis that requires a prescription for albuterol nebulizations.  Please schedule appointment

## 2018-04-23 DIAGNOSIS — Z79.890 NEED FOR POSTMENOPAUSAL HORMONE REPLACEMENT: ICD-10-CM

## 2018-04-23 DIAGNOSIS — F43.23 ADJUSTMENT DISORDER WITH MIXED ANXIETY AND DEPRESSED MOOD: ICD-10-CM

## 2018-04-23 NOTE — TELEPHONE ENCOUNTER
"Requested Prescriptions   Pending Prescriptions Disp Refills     medroxyPROGESTERone (PROVERA) 5 MG tablet [Pharmacy Med Name: MEDROXYPROGESTERONE 5MG TAB] 90 tablet 0     Sig: TAKE ONE TABLET BY MOUTH EVERY DAY    There is no refill protocol information for this order        citalopram (CELEXA) 20 MG tablet [Pharmacy Med Name: CITALOPRAM 20MG TAB] 30 tablet 1     Sig: TAKE ONE TABLET BY MOUTH EVERY DAY    SSRIs Protocol Passed    4/23/2018  8:54 AM       Passed - Recent (12 mo) or future (30 days) visit within the authorizing provider's specialty    Patient had office visit in the last 12 months or has a visit in the next 30 days with authorizing provider or within the authorizing provider's specialty.  See \"Patient Info\" tab in inbasket, or \"Choose Columns\" in Meds & Orders section of the refill encounter.           Passed - Patient is age 18 or older       Passed - No active pregnancy on record       Passed - No positive pregnancy test in last 12 months          Last Written Prescription Date:  1/19/18  Last Fill Quantity: 30,  # refills: 1   Last Office Visit with [facility], [facility] or [facility] prescribing provider:  9-28-17   Future Office Visit:     Medroxyprogesterone 5 MG       Last Written Prescription Date:  1/19/18  Last Fill Quantity: 90,   # refills: 0  Last Office Visit: 9/28/17  Future Office visit:       Routing refill request to provider for review/approval because:  Drug not on the [facility], [facility] or [facility] refill protocol or controlled substance    "

## 2018-04-24 RX ORDER — CITALOPRAM HYDROBROMIDE 20 MG/1
TABLET ORAL
Qty: 30 TABLET | Refills: 1 | Status: SHIPPED | OUTPATIENT
Start: 2018-04-24 | End: 2018-09-06 | Stop reason: DRUGHIGH

## 2018-04-24 RX ORDER — MEDROXYPROGESTERONE ACETATE 5 MG
TABLET ORAL
Qty: 90 TABLET | Refills: 0 | Status: SHIPPED | OUTPATIENT
Start: 2018-04-24 | End: 2018-06-27

## 2018-06-11 DIAGNOSIS — F43.23 ADJUSTMENT DISORDER WITH MIXED ANXIETY AND DEPRESSED MOOD: ICD-10-CM

## 2018-06-11 NOTE — TELEPHONE ENCOUNTER
Requested Prescriptions   Pending Prescriptions Disp Refills     ALPRAZolam (XANAX) 0.5 MG tablet 90 tablet 0     Sig: Take 1 tablet (0.5 mg) by mouth 3 times daily as needed for anxiety    There is no refill protocol information for this order        Last Written Prescription Date:  04/04/2018  Last Fill Quantity: 90,  # refills: 0   Last office visit: 9/28/2017 with prescribing provider:  09/28/2017   Future Office Visit:

## 2018-06-12 RX ORDER — ALPRAZOLAM 0.5 MG
0.5 TABLET ORAL 3 TIMES DAILY PRN
Qty: 90 TABLET | Refills: 0 | Status: SHIPPED | OUTPATIENT
Start: 2018-06-12 | End: 2018-08-13

## 2018-06-27 ENCOUNTER — OFFICE VISIT (OUTPATIENT)
Dept: FAMILY MEDICINE | Facility: CLINIC | Age: 48
End: 2018-06-27
Payer: COMMERCIAL

## 2018-06-27 ENCOUNTER — OFFICE VISIT (OUTPATIENT)
Dept: BEHAVIORAL HEALTH | Facility: CLINIC | Age: 48
End: 2018-06-27
Payer: COMMERCIAL

## 2018-06-27 VITALS
SYSTOLIC BLOOD PRESSURE: 118 MMHG | TEMPERATURE: 97.6 F | DIASTOLIC BLOOD PRESSURE: 64 MMHG | HEART RATE: 70 BPM | WEIGHT: 136 LBS | RESPIRATION RATE: 16 BRPM | HEIGHT: 65 IN | BODY MASS INDEX: 22.66 KG/M2 | OXYGEN SATURATION: 98 %

## 2018-06-27 DIAGNOSIS — Z00.01 ENCOUNTER FOR WELL ADULT EXAM WITH ABNORMAL FINDINGS: ICD-10-CM

## 2018-06-27 DIAGNOSIS — Z12.31 ENCOUNTER FOR SCREENING MAMMOGRAM FOR BREAST CANCER: Primary | ICD-10-CM

## 2018-06-27 DIAGNOSIS — F43.23 ADJUSTMENT DISORDER WITH MIXED ANXIETY AND DEPRESSED MOOD: Primary | ICD-10-CM

## 2018-06-27 DIAGNOSIS — Z12.4 SCREENING FOR MALIGNANT NEOPLASM OF CERVIX: ICD-10-CM

## 2018-06-27 DIAGNOSIS — R53.83 OTHER FATIGUE: ICD-10-CM

## 2018-06-27 DIAGNOSIS — R19.7 DIARRHEA, UNSPECIFIED TYPE: ICD-10-CM

## 2018-06-27 DIAGNOSIS — Z79.890 PERSONAL HISTORY OF LONG-TERM (CURRENT) USE OF POSTMENOPAUSAL HORMONE REPLACEMENT THERAPY: ICD-10-CM

## 2018-06-27 DIAGNOSIS — Z12.11 ENCOUNTER FOR SCREENING FOR MALIGNANT NEOPLASM OF COLON: ICD-10-CM

## 2018-06-27 DIAGNOSIS — R63.4 LOSS OF WEIGHT: ICD-10-CM

## 2018-06-27 LAB
AMYLASE SERPL-CCNC: 40 U/L (ref 30–110)
ANION GAP SERPL CALCULATED.3IONS-SCNC: 5 MMOL/L (ref 3–14)
BUN SERPL-MCNC: 9 MG/DL (ref 7–30)
CALCIUM SERPL-MCNC: 9.1 MG/DL (ref 8.5–10.1)
CHLORIDE SERPL-SCNC: 108 MMOL/L (ref 94–109)
CO2 SERPL-SCNC: 28 MMOL/L (ref 20–32)
CREAT SERPL-MCNC: 0.74 MG/DL (ref 0.52–1.04)
CRP SERPL-MCNC: <2.9 MG/L (ref 0–8)
ERYTHROCYTE [DISTWIDTH] IN BLOOD BY AUTOMATED COUNT: 12.3 % (ref 10–15)
GFR SERPL CREATININE-BSD FRML MDRD: 84 ML/MIN/1.7M2
GLUCOSE SERPL-MCNC: 92 MG/DL (ref 70–99)
HCT VFR BLD AUTO: 44.1 % (ref 35–47)
HGB BLD-MCNC: 14.5 G/DL (ref 11.7–15.7)
LIPASE SERPL-CCNC: 113 U/L (ref 73–393)
MCH RBC QN AUTO: 32.2 PG (ref 26.5–33)
MCHC RBC AUTO-ENTMCNC: 32.9 G/DL (ref 31.5–36.5)
MCV RBC AUTO: 98 FL (ref 78–100)
PLATELET # BLD AUTO: 320 10E9/L (ref 150–450)
POTASSIUM SERPL-SCNC: 3.9 MMOL/L (ref 3.4–5.3)
RBC # BLD AUTO: 4.51 10E12/L (ref 3.8–5.2)
SODIUM SERPL-SCNC: 141 MMOL/L (ref 133–144)
TSH SERPL DL<=0.005 MIU/L-ACNC: 1.37 MU/L (ref 0.4–4)
WBC # BLD AUTO: 9.5 10E9/L (ref 4–11)

## 2018-06-27 PROCEDURE — 58100 BIOPSY OF UTERUS LINING: CPT | Performed by: OBSTETRICS & GYNECOLOGY

## 2018-06-27 PROCEDURE — 99207 ZZC NO CHARGE BEHAVIORAL WARM HANDOFF: CPT | Performed by: MARRIAGE & FAMILY THERAPIST

## 2018-06-27 PROCEDURE — 99396 PREV VISIT EST AGE 40-64: CPT | Mod: 25 | Performed by: OBSTETRICS & GYNECOLOGY

## 2018-06-27 PROCEDURE — 88305 TISSUE EXAM BY PATHOLOGIST: CPT | Performed by: OBSTETRICS & GYNECOLOGY

## 2018-06-27 PROCEDURE — 87491 CHLMYD TRACH DNA AMP PROBE: CPT | Performed by: OBSTETRICS & GYNECOLOGY

## 2018-06-27 PROCEDURE — 88305 TISSUE EXAM BY PATHOLOGIST: CPT | Mod: 26 | Performed by: OBSTETRICS & GYNECOLOGY

## 2018-06-27 PROCEDURE — 86140 C-REACTIVE PROTEIN: CPT | Performed by: OBSTETRICS & GYNECOLOGY

## 2018-06-27 PROCEDURE — 99214 OFFICE O/P EST MOD 30 MIN: CPT | Mod: 25 | Performed by: OBSTETRICS & GYNECOLOGY

## 2018-06-27 PROCEDURE — 87624 HPV HI-RISK TYP POOLED RSLT: CPT | Performed by: OBSTETRICS & GYNECOLOGY

## 2018-06-27 PROCEDURE — 36415 COLL VENOUS BLD VENIPUNCTURE: CPT | Performed by: OBSTETRICS & GYNECOLOGY

## 2018-06-27 PROCEDURE — 82150 ASSAY OF AMYLASE: CPT | Performed by: OBSTETRICS & GYNECOLOGY

## 2018-06-27 PROCEDURE — G0476 HPV COMBO ASSAY CA SCREEN: HCPCS | Performed by: OBSTETRICS & GYNECOLOGY

## 2018-06-27 PROCEDURE — 87591 N.GONORRHOEAE DNA AMP PROB: CPT | Performed by: OBSTETRICS & GYNECOLOGY

## 2018-06-27 PROCEDURE — 84443 ASSAY THYROID STIM HORMONE: CPT | Performed by: OBSTETRICS & GYNECOLOGY

## 2018-06-27 PROCEDURE — G0145 SCR C/V CYTO,THINLAYER,RESCR: HCPCS | Performed by: OBSTETRICS & GYNECOLOGY

## 2018-06-27 PROCEDURE — 87506 IADNA-DNA/RNA PROBE TQ 6-11: CPT | Performed by: OBSTETRICS & GYNECOLOGY

## 2018-06-27 PROCEDURE — 85027 COMPLETE CBC AUTOMATED: CPT | Performed by: OBSTETRICS & GYNECOLOGY

## 2018-06-27 PROCEDURE — 69209 REMOVE IMPACTED EAR WAX UNI: CPT | Mod: RT | Performed by: OBSTETRICS & GYNECOLOGY

## 2018-06-27 PROCEDURE — 83690 ASSAY OF LIPASE: CPT | Performed by: OBSTETRICS & GYNECOLOGY

## 2018-06-27 PROCEDURE — 80048 BASIC METABOLIC PNL TOTAL CA: CPT | Performed by: OBSTETRICS & GYNECOLOGY

## 2018-06-27 ASSESSMENT — PAIN SCALES - GENERAL: PAINLEVEL: NO PAIN (0)

## 2018-06-27 NOTE — PROGRESS NOTES
"Warm-handoff    C met with patient, by PCP request. C informed and explained integrated health model, use of brief therapy interventions, as well as referrals and support services for ongoing long-term therapy.  Patient reports feeling increased anxiety and depression symptoms. She states that there is a lot of stress going on around her with her family members and she feels that there is pressure on her to \"fix\" things that are out of her control. Patient reports that she also has a history of trauma throughout her life. Patient denies any current suicidal or homicidal thoughts or behaviors. Patient has previously done counseling and is open to starting this again. Patient set up an initial visit with this writer for 7/9/18.    "

## 2018-06-27 NOTE — LETTER
July 6, 2018    Liliana Kat  [address] [place] [id number]    Dear Liliana,  We are happy to inform you that your PAP smear result from 6/27/18 is normal.  We are now able to do a follow up test on PAP smears. The DNA test is for HPV (Human Papilloma Virus). Cervical cancer is closely linked with certain types of HPV. Your results showed no evidence of high risk HPV.  Therefore we recommend you return in 3 years for your next pap smear.  You will still need to return to the clinic every year for an annual exam and other preventive tests.  Please contact the clinic at [phone] with any questions.  Sincerely,    Edis Washington MD/parviz

## 2018-06-27 NOTE — LETTER

## 2018-06-27 NOTE — PROGRESS NOTES
SUBJECTIVE:   CC: Liliana Kat is an 48 year old woman who presents for preventive health visit.     Physical   Annual:     Getting at least 3 servings of Calcium per day:  NO    Bi-annual eye exam:  Yes    Dental care twice a year:  Yes    Sleep apnea or symptoms of sleep apnea:  None    Diet:  Regular (no restrictions)    Taking medications regularly:  No    Barriers to taking medications:  Problems remembering to take them    Medication side effects:  None    Additional concerns today:  YES      Today's PHQ-2 Score:   PHQ-2 ( 1999 [employer]) 6/27/2018   Q1: Little interest or pleasure in doing things 1   Q2: Feeling down, depressed or hopeless 1   PHQ-2 Score 2   Q1: Little interest or pleasure in doing things Several days   Q2: Feeling down, depressed or hopeless Several days   PHQ-2 Score 2     Alcohol Use 6/27/2018   If you drink alcohol do you typically have greater than 3 drinks per day OR greater than 7 drinks per week? No     Review of Systems  Physical Exam  Subjective:Liilana is here for yearly physical- she sees Paula Villa for the depression and xanax use but wants to see me for a female physical.    . Current concerns are: She has unexplained diarrhea for the past few weeks.  Also she has lost at least 10 pounds during that time.  The stools are nonbloody.  No vomiting.  She is under a lot of stress because her son is on trial for murder due to drug-related charges.  Also she feels fatigued a lot.  She was worried about a lump behind her right ear.    No dysuria or hematuria.  No fevers.  No night sweats.  She is on hormone replacement because both ovaries have been removed.  She still has a uterus.  Has not had any menses.  She takes estrogen and progesterone.    She has not seen a counselor.  She is not suicidal.  She is taking Celexa for depression.    She is smoking but has cut down her cigarette use quite substantially.  She now takes only 2 cigarettes a day.        Past Medical  History:   Diagnosis Date     Allergic rhinitis, cause unspecified     Allergic rhinitis - weeds and pollan     Anxiety      Bladder polyps      Depressive disorder      Obstructive chronic bronchitis with exacerbation (H)      Other and unspecified ovarian cyst     Ovarian cyst - rupured cyst + laser x3     Other and unspecified ovarian cyst 9/14/2005    Left hemorrhagic ovarian cyst.     Tobacco abuse      Urinary tract infection, site not specified       Current Outpatient Prescriptions   Medication Sig Dispense Refill     ALPRAZolam (XANAX) 0.5 MG tablet Take 1 tablet (0.5 mg) by mouth 3 times daily as needed for anxiety 90 tablet 0     aspirin 81 MG tablet Take 1 tablet (81 mg) by mouth daily 30 tablet OTC     citalopram (CELEXA) 20 MG tablet TAKE ONE TABLET BY MOUTH EVERY DAY 30 tablet 1     estradiol (ESTRACE) 1 MG tablet TAKE ONE TABLET BY MOUTH EVERY DAY 30 tablet 6     fluticasone (FLONASE) 50 MCG/ACT spray Spray 2 sprays into both nostrils daily 1 Bottle 11     loratadine (CLARITIN) 10 MG tablet TAKE ONE TABLET BY MOUTH EVERY DAY 30 tablet 5     MELATONIN 10 mg daily        Acetaminophen (TYLENOL PO) Take 650 mg by mouth every 4 hours as needed for mild pain or fever       albuterol (2.5 MG/3ML) 0.083% neb solution ONE NEBULIZATION 4 TIMES DAILY AS NEEDED (Patient not taking: Reported on 6/27/2018) 1 Box 0     albuterol (PROAIR HFA/PROVENTIL HFA/VENTOLIN HFA) 108 (90 BASE) MCG/ACT Inhaler Inhale 2 puffs into the lungs every 6 hours as needed for shortness of breath / dyspnea or wheezing (Patient not taking: Reported on 6/27/2018) 1 Inhaler 0     meclizine (ANTIVERT) 25 MG tablet Take 1 tablet (25 mg) by mouth every 6 hours as needed for dizziness (Patient not taking: Reported on 6/27/2018) 30 tablet 0      Allergies   Allergen Reactions     Augmentin Swelling and Difficulty breathing     Avelox Nausea and Vomiting     Effexor Xr [Venlafaxine Hydrochloride]      Shaky and heart racing     Sulfa Drugs      "  History   Smoking Status     Current Some Day Smoker     Packs/day: 0.00     Years: 10.00   Smokeless Tobacco     Never Used     Comment: 3 cigs a day       Past Surgical History:   Procedure Laterality Date     C LIGATE FALLOPIAN TUBE,POSTPARTUM      Tubal Ligation     ESOPHAGOSCOPY, GASTROSCOPY, DUODENOSCOPY (EGD), COMBINED  2012    Procedure: COMBINED ESOPHAGOSCOPY, GASTROSCOPY, DUODENOSCOPY (EGD), BIOPSY SINGLE OR MULTIPLE;  ESOPHAGOSCOPY, GASTROSCOPY, DUODENOSCOPY (EGD) with biopsy;  Surgeon: Herson Cabrera MD;  Location:  GI     EXCIS VAGINAL CYST/TUMOR       HC REMOVAL OF OVARY/TUBE(S)  3/6/2006    Laparoscopic bilateral salpingo-oophorectomy.     HC TYMPANOPLASTY W/O MASTOID, INIT/REV W/O OSS CHAIN RECONST       LAPAROSCOPIC CHOLECYSTECTOMY N/A 2017    Procedure: LAPAROSCOPIC CHOLECYSTECTOMY;  Surgeon: Shivam Luevano MD;  Location:  OR      Social History   Substance Use Topics     Smoking status: Current Some Day Smoker     Packs/day: 0.00     Years: 10.00     Smokeless tobacco: Never Used      Comment: 3 cigs a day      Alcohol use No      Comment: recovering  -       Family History   Problem Relation Age of Onset     Alcohol/Drug Father       at age 53 of alcohol     Arthritis Father      Cancer Father      lung     Alcohol/Drug Paternal Grandfather      Cancer Paternal Grandfather      lung     Alcohol/Drug Paternal Grandmother      Cancer Paternal Grandmother      lung     Alcohol/Drug Paternal Aunt      Allergies Daughter      animals and grass     Allergies Son      dust mite     Arthritis Mother      Hypertension Mother      Lipids Mother      Depression Mother      HEART DISEASE Mother      Blood Disease Mother      DVTs     Depression Brother        ROS: A 12 point review of systems is negative except for the following: See above      Physical Exam: /64  Pulse 70  Temp 97.6  F (36.4  C) (Temporal)  Resp 16  Ht 5' 5\" (1.651 m)  Wt 136 lb (61.7 kg)  LMP " 03/06/2006  SpO2 98%  Breastfeeding? No  BMI 22.63 kg/m2  HEENT:    Sclerae and conjunctiva are normal.   Ear canals and TMs look normal.  Nasal mucosa is pink  - no polyps or masses seen.  sinuses are non tender to palpation.  Throat is unremarkable . Mucous membranes are moist.  I examined behind both ears and I do not feel any lumps or lymph nodes.  This was reassuring to her.  Also there is no tenderness to palpation behind the ears.  The right ear canal has some cerumen.  Neck is supple, mobile, no adenopathy or masses palpable. The thyroid feels normal.   Normal range of motion noted.  Chest is clear to auscultation.  No wheezes, rales or rhonchi heard.  Cardiac exam is normal with s1, s2, no murmurs or adventitious sounds.Normal rate and rhythm is heard.   Abdomen is soft,  nondistended, No masses felt.No HSM. No guarding or rigidity or rebound   noted. Palpation reveals  no    tenderness   Normal bowel sounds heard.   Pelvic exam:My nurse Tarah   was present to chaperone the exam.  The external genitalia appeared normal.    The vaginal vault was without bleeding  or   discharge   or odor.   The cervix was smooth   and shiny and normal in appearance.    A pap was obtained.   A gen probe   was obtained.   With my nurse present, and after receiving informed consent from the patient, I performed the endometrial biopsy in the usual fashion using a standard pipelle. The pipelle sounded to  6 cm.I passed the pipelle  1 time(s)  The tissue retrieved was  Scanty-this was consistent with her medroxyprogesterone use-I obtain the sample because she has unexplained diarrhea fatigue and weight loss and wanted to make sure that nothing to do with uterine pathology. I sent the biopsy for pathology. She tolerated the procedure well. She was instructed to call or present to clinic or ER if she has unusual amount of cramping, bleeding, fever or vaginal discharge.  No vaginal support defects were noted,    Bimanual exam  revealed a  6 week   sized uterus. It does  descend somewhat  well in the vaginal vault.    No adnexal masses were felt.    There was no  cervical motion tenderness.    Exam was NOT  limited by the patient's body habitus.      Rectal exam was deferred because she is going to have a colonoscopy.      The breast exam was declined.      We did discuss the option for an exam   and I suggested that she should be doing a self-breast exam   monthly and after the age of 40 a yearly mammogram   and she feels comfortable that this is sufficient.         Assessment/Plan:         The yearly exam is normal except for :    1. Diarrhea for a few weeks- will check labs and also stool cultures.  Also order colonoscopy    2. Fatigue- see lab orders    3. Weight loss- will check TSH-    4. She is under a lot of stress- son on trial for drug-related murder- she ius taking celexa. She is not suicidal./ counseling offered  - Shira Renae met with her today in my office-      5. Smoker- tobacco abuse-I congratulated her on cutting down substantially.  I encouraged her to quit.    6. Preop exam:  Based upon today's history and exam findings I believe that  she   is a good candidate for general anesthesia and is therefore CLEARED for general anesthesia if needed.  This is in case she needs general anesthesia for her colonoscopy.      7. Cerumen in right ear- it was irrigated clear- her ear feels normal now.        Additional Plan:Diet and rest and exercise discussed.      I have advised going for a 5 mile walk daily if possible       See labs and orders.    .Immunizations reviewed(TDAP, pneumovax, shingles vaccine,etc)and discussed-including advice for yearly flu shot/tetanus update.         Vaccines were discussed and  declined -Immunizations are up to date.        Calcium supplementation advised     Colonoscopy will be arranged today-    Mammogram  Is advised beginning at age 40-pt to be responsible for getting this done -she is due  later this fall        Labs done: see orders      I advised the following exams with specialists:    1. Dental evaluation yearly    2. Dermatology evaluation for mole exam yearly    3. Ophthalmology exam yearly to check for glaucoma, etc          Living will was discussed.         Followup in 1 week to discuss results- sooner if concerns arise.   DEVEN Washington MD(electronic signature)

## 2018-06-27 NOTE — MR AVS SNAPSHOT
"              After Visit Summary   6/27/2018    Liliana Kat    MRN: [id number]           Patient Information     Date Of Birth          1970        Visit Information        Provider Department      6/27/2018 2:46 PM Shira Renae LMFT [facility]        Today's Diagnoses     Adjustment disorder with mixed anxiety and depressed mood    -  1       Follow-ups after your visit        Your next 10 appointments already scheduled     Jul 06, 2018  9:45 AM CDT   MA SCREENING DIGITAL BILATERAL with PHMA1   [facility] ([facility])    [address] [place] [id number] [phone]           Do not use any powder, lotion or deodorant under your arms or on your breast. If you do, we will ask you to remove it before your exam.  Wear comfortable, two-piece clothing.  If you have any allergies, tell your care team.  Bring any previous mammograms from other facilities or have them mailed to the breast center. Three-dimensional (3D) mammograms are available at [facility] locations in [facility], [facility], [facility], and [place]. [facility] locations include [facility] in [place]. Benefits of 3D mammograms include: - Improved rate of cancer detection - Decreases your chance of having to go back for more tests, which means fewer: - \"False-positive\" results (This means that there is an abnormal area but it isn't cancer.) - Invasive testing procedures, such as a biopsy or surgery - Can provide clearer images of the breast if you have dense breast tissue. 3D mammography is an optional exam that anyone can have with a 2D mammogram. It doesn't replace or take the place of a 2D mammogram. 2D mammograms remain an effective screening test for all women.  Not all insurance companies cover the cost of a 3D mammogram. Check with your insurance.            Jul 06, 2018 10:20 AM CDT   Office Visit " "with Edis Washington MD   [facility]    [address] [facility] [address] [phone]           Bring a current list of meds and any records pertaining to this visit. For Physicals, please bring immunization records and any forms needing to be filled out. Please arrive 10 minutes early to complete paperwork.            Jul 09, 2018 10:30 AM CDT   New Visit with ELVA Felton   [facility])    [address] [place] [id number] [phone]              Future tests that were ordered for you today     Open Future Orders        Priority Expected Expires Ordered    *MA Screening Digital Bilateral Routine  6/27/2019 6/27/2018            Who to contact     If you have questions or need follow up information about today's clinic visit or your schedule please contact [facility] directly at [phone].  Normal or non-critical lab and imaging results will be communicated to you by [employer] Inchart, letter or phone within 4 business days after the clinic has received the results. If you do not hear from us within 7 days, please contact the clinic through [employer] or phone. If you have a critical or abnormal lab result, we will notify you by phone as soon as possible.  Submit refill requests through [employer] or call your pharmacy and they will forward the refill request to us. Please allow 3 business days for your refill to be completed.          Additional Information About Your Visit        Panl Information     [employer] lets you send messages to your doctor, view your test results, renew your prescriptions, schedule appointments and more. To sign up, go to [email or web address] . Click on \"Log in\" on the left side of the screen, which will take you to the Welcome page. Then click on \"Sign up Now\" on the right side of the page.     You will be asked to enter the access code listed " below, as well as some personal information. Please follow the directions to create your username and password.     Your access code is: [email or web address]  Expires: 2018 11:02 AM     Your access code will  in 90 days. If you need help or a new code, please call your [facility] clinic or [phone].        Care EveryWhere ID     This is your Care EveryWhere ID. This could be used by other organizations to access your [facility] medical records  [id number]        Your Vitals Were     Last Period                   2006            Blood Pressure from Last 3 Encounters:   18 118/64   17 96/72   17 112/79    Weight from Last 3 Encounters:   18 61.7 kg (136 lb)   17 65.6 kg (144 lb 9.6 oz)   17 64.9 kg (143 lb)              Today, you had the following     No orders found for display       Primary Care Provider Office Phone # Fax #    LOLIS Osorio [facility] [phone] [address] [place] [address]        Equal Access to Services     [facility]: Hadii aad ku hadasho Sodavidali, waaxda luqadaha, qaybta kaalmada adeegyada, anirudh cade . So [facility] [phone].    ATENCIÓN: Si habla español, tiene a garcia disposición servicios gratuitos de asistencia lingüística. Llame al [phone].    We comply with applicable federal civil rights laws and [place] laws. We do not discriminate on the basis of race, color, national origin, age, disability, sex, sexual orientation, or gender identity.            Thank you!     Thank you for choosing [facility]  for your care. Our goal is always to provide you with excellent care. Hearing back from our patients is one way we can continue to improve our services. Please take a few minutes to complete the written survey that you may receive in the mail after your visit with us. Thank you!             Your Updated Medication List - Protect others around you: Learn how to  safely use, store and throw away your medicines at www.disposemymeds.org.          This list is accurate as of 6/27/18  2:49 PM.  Always use your most recent med list.                   Brand Name Dispense Instructions for use Diagnosis    * albuterol 108 (90 Base) MCG/ACT Inhaler    PROAIR HFA/PROVENTIL HFA/VENTOLIN HFA    1 Inhaler    Inhale 2 puffs into the lungs every 6 hours as needed for shortness of breath / dyspnea or wheezing    Tobacco abuse       * albuterol (2.5 MG/3ML) 0.083% neb solution     1 Box    ONE NEBULIZATION 4 TIMES DAILY AS NEEDED    Acute sinusitis, unspecified       ALPRAZolam 0.5 MG tablet    XANAX    90 tablet    Take 1 tablet (0.5 mg) by mouth 3 times daily as needed for anxiety    Adjustment disorder with mixed anxiety and depressed mood       aspirin 81 MG tablet     30 tablet    Take 1 tablet (81 mg) by mouth daily    Superficial varicosities, unspecified laterality       citalopram 20 MG tablet    celeXA    30 tablet    TAKE ONE TABLET BY MOUTH EVERY DAY    Adjustment disorder with mixed anxiety and depressed mood       estradiol 1 MG tablet    ESTRACE    30 tablet    TAKE ONE TABLET BY MOUTH EVERY DAY    Need for postmenopausal hormone replacement       fluticasone 50 MCG/ACT spray    FLONASE    1 Bottle    Spray 2 sprays into both nostrils daily    Acute sinusitis, unspecified       loratadine 10 MG tablet    CLARITIN    30 tablet    TAKE ONE TABLET BY MOUTH EVERY DAY    Acute sinusitis       meclizine 25 MG tablet    ANTIVERT    30 tablet    Take 1 tablet (25 mg) by mouth every 6 hours as needed for dizziness        MELATONIN      10 mg daily        TYLENOL PO      Take 650 mg by mouth every 4 hours as needed for mild pain or fever        * Notice:  This list has 2 medication(s) that are the same as other medications prescribed for you. Read the directions carefully, and ask your doctor or other care provider to review them with you.

## 2018-06-28 LAB
C COLI+JEJUNI+LARI FUSA STL QL NAA+PROBE: NOT DETECTED
C TRACH DNA SPEC QL NAA+PROBE: NEGATIVE
EC STX1 GENE STL QL NAA+PROBE: NOT DETECTED
EC STX2 GENE STL QL NAA+PROBE: NOT DETECTED
ENTERIC PATHOGEN COMMENT: NORMAL
N GONORRHOEA DNA SPEC QL NAA+PROBE: NEGATIVE
NOROV GI+II ORF1-ORF2 JNC STL QL NAA+PR: NOT DETECTED
RVA NSP5 STL QL NAA+PROBE: NOT DETECTED
SALMONELLA SP RPOD STL QL NAA+PROBE: NOT DETECTED
SHIGELLA SP+EIEC IPAH STL QL NAA+PROBE: NOT DETECTED
SPECIMEN SOURCE: NORMAL
SPECIMEN SOURCE: NORMAL
V CHOL+PARA RFBL+TRKH+TNAA STL QL NAA+PR: NOT DETECTED
Y ENTERO RECN STL QL NAA+PROBE: NOT DETECTED

## 2018-06-28 NOTE — PROGRESS NOTES
Tarah Please inform Liliana/ or caretaker  that this result(s) is/are normal. The stool testing was all negative- she should make sure she gets a colonoscopy-Thanks. DEVEN Washington MD

## 2018-06-29 ENCOUNTER — TELEPHONE (OUTPATIENT)
Dept: FAMILY MEDICINE | Facility: CLINIC | Age: 48
End: 2018-06-29

## 2018-06-29 ENCOUNTER — HOSPITAL ENCOUNTER (OUTPATIENT)
Facility: CLINIC | Age: 48
End: 2018-06-29
Attending: SURGERY | Admitting: SURGERY
Payer: COMMERCIAL

## 2018-06-29 LAB
COPATH REPORT: NORMAL
PAP: NORMAL

## 2018-06-29 NOTE — TELEPHONE ENCOUNTER
Date of colonoscopy/EGD: 7/16/18  Surgeon: Dr. Barba  Prep:Miralax  Packet:Colonoscopy/EGD instructions mailed to patient's home address.   Date: 6/29/2018      Surgery Scheduler

## 2018-06-30 LAB — COPATH REPORT: NORMAL

## 2018-06-30 NOTE — PROGRESS NOTES
Tarah Please inform Liliana/ or caretaker  that this result(s) is/are normal. The gen probe is negative-David Washington MD

## 2018-07-02 LAB
FINAL DIAGNOSIS: NORMAL
HPV HR 12 DNA CVX QL NAA+PROBE: NEGATIVE
HPV16 DNA SPEC QL NAA+PROBE: NEGATIVE
HPV18 DNA SPEC QL NAA+PROBE: NEGATIVE
SPECIMEN DESCRIPTION: NORMAL
SPECIMEN SOURCE CVX/VAG CYTO: NORMAL

## 2018-07-06 ENCOUNTER — HOSPITAL ENCOUNTER (OUTPATIENT)
Dept: MAMMOGRAPHY | Facility: CLINIC | Age: 48
Discharge: HOME | End: 2018-07-06
Attending: OBSTETRICS & GYNECOLOGY | Admitting: OBSTETRICS & GYNECOLOGY
Payer: COMMERCIAL

## 2018-07-06 ENCOUNTER — OFFICE VISIT (OUTPATIENT)
Dept: FAMILY MEDICINE | Facility: CLINIC | Age: 48
End: 2018-07-06
Payer: COMMERCIAL

## 2018-07-06 VITALS
TEMPERATURE: 98 F | RESPIRATION RATE: 16 BRPM | OXYGEN SATURATION: 100 % | WEIGHT: 135 LBS | DIASTOLIC BLOOD PRESSURE: 78 MMHG | SYSTOLIC BLOOD PRESSURE: 122 MMHG | HEART RATE: 78 BPM | BODY MASS INDEX: 22.47 KG/M2

## 2018-07-06 DIAGNOSIS — F41.1 GENERALIZED ANXIETY DISORDER: ICD-10-CM

## 2018-07-06 DIAGNOSIS — K13.0 LIP LESION: Primary | ICD-10-CM

## 2018-07-06 DIAGNOSIS — Z12.31 VISIT FOR SCREENING MAMMOGRAM: ICD-10-CM

## 2018-07-06 PROCEDURE — 77067 SCR MAMMO BI INCL CAD: CPT

## 2018-07-06 PROCEDURE — 99213 OFFICE O/P EST LOW 20 MIN: CPT | Performed by: OBSTETRICS & GYNECOLOGY

## 2018-07-06 ASSESSMENT — PAIN SCALES - GENERAL: PAINLEVEL: NO PAIN (0)

## 2018-07-06 NOTE — MR AVS SNAPSHOT
After Visit Summary   7/6/2018    Liliana Kat    MRN: [id number]           Patient Information     Date Of Birth          1970        Visit Information        Provider Department      7/6/2018 10:20 AM Edis Washington MD [facility]        Today's Diagnoses     Lip lesion    -  1    Generalized anxiety disorder           Follow-ups after your visit        Additional Services     OTOLARYNGOLOGY REFERRAL       Your provider has referred you to: [facility]: [facility] [phone]   [email or web address]    Please be aware that coverage of these services is subject to the terms and limitations of your health insurance plan.  Call member services at your health plan with any benefit or coverage questions.      Please bring the following with you to your appointment:    (1) Any X-Rays, CTs or MRIs which have been performed.  Contact the facility where they were done to arrange for  prior to your scheduled appointment.   (2) List of current medications  (3) This referral request   (4) Any documents/labs given to you for this referral                  Your next 10 appointments already scheduled     Jul 09, 2018 10:30 AM CDT   New Visit with ELVA Felton   [facility] ([facility]    [address] [id number] [phone]            Jul 11, 2018  1:45 PM CDT   New Visit with Cordell Pacheco MD   [facility] ([facility])    [address]  [address]  [place] [id number] [phone]            Jul 16, 2018   Procedure with Juanpablo Barba DO   [facility] ([facility])    [address] [place] [id number] [phone]              Who to contact     If you have questions or need follow up information about today's clinic visit or your schedule please contact  "[facility] directly at [phone].  Normal or non-critical lab and imaging results will be communicated to you by MyChart, letter or phone within 4 business days after the clinic has received the results. If you do not hear from us within 7 days, please contact the clinic through [facility] or phone. If you have a critical or abnormal lab result, we will notify you by phone as soon as possible.  Submit refill requests through [employer] or call your pharmacy and they will forward the refill request to us. Please allow 3 business days for your refill to be completed.          Additional Information About Your Visit        [facility] Information     [employer] lets you send messages to your doctor, view your test results, renew your prescriptions, schedule appointments and more. To sign up, go to [email or web address] . Click on \"Log in\" on the left side of the screen, which will take you to the Welcome page. Then click on \"Sign up Now\" on the right side of the page.     You will be asked to enter the access code listed below, as well as some personal information. Please follow the directions to create your username and password.     Your access code is: [email or web address]  Expires: 2018 11:02 AM     Your access code will  in 90 days. If you need help or a new code, please call your [facility] clinic or [phone].        Care EveryWhere ID     This is your Care EveryWhere ID. This could be used by other organizations to access your [facility] medical records  [id number]        Your Vitals Were     Pulse Temperature Respirations Last Period Pulse Oximetry Breastfeeding?    78 98  F (36.7  C) (Temporal) 16 2006 100% No    BMI (Body Mass Index)                   22.47 kg/m2            Blood Pressure from Last 3 Encounters:   18 122/78   18 118/64   17 96/72    Weight from Last 3 Encounters:   18 135 lb (61.2 kg)   18 136 lb (61.7 kg)   17 144 lb 9.6 oz (65.6 kg)    "           We Performed the Following     OTOLARYNGOLOGY REFERRAL        Primary Care Provider Office Phone # Fax #    Paula Villa, APRN [facility] [phone] [address] [place] [address]        Equal Access to Services     NAREN WILSON : Hadii ermias ku hadwilmao Soomaali, waaxda luqadaha, qaybta kaalmada adeegyada, anirudh zelayan cristianjeniffer daniels rayo leslie. So [facility] [phone].    ATENCIÓN: Si habla español, tiene a garcia disposición servicios gratuitos de asistencia lingüística. Llame al [phone].    We comply with applicable federal civil rights laws and [place] laws. We do not discriminate on the basis of race, color, national origin, age, disability, sex, sexual orientation, or gender identity.            Thank you!     Thank you for choosing [facility]  for your care. Our goal is always to provide you with excellent care. Hearing back from our patients is one way we can continue to improve our services. Please take a few minutes to complete the written survey that you may receive in the mail after your visit with us. Thank you!             Your Updated Medication List - Protect others around you: Learn how to safely use, store and throw away your medicines at www.disposemymeds.org.          This list is accurate as of 7/6/18 11:30 AM.  Always use your most recent med list.                   Brand Name Dispense Instructions for use Diagnosis    * albuterol 108 (90 Base) MCG/ACT Inhaler    PROAIR HFA/PROVENTIL HFA/VENTOLIN HFA    1 Inhaler    Inhale 2 puffs into the lungs every 6 hours as needed for shortness of breath / dyspnea or wheezing    Tobacco abuse       * albuterol (2.5 MG/3ML) 0.083% neb solution     1 Box    ONE NEBULIZATION 4 TIMES DAILY AS NEEDED    Acute sinusitis, unspecified       ALPRAZolam 0.5 MG tablet    XANAX    90 tablet    Take 1 tablet (0.5 mg) by mouth 3 times daily as needed for anxiety    Adjustment disorder with mixed anxiety and depressed  mood       aspirin 81 MG tablet     30 tablet    Take 1 tablet (81 mg) by mouth daily    Superficial varicosities, unspecified laterality       citalopram 20 MG tablet    celeXA    30 tablet    TAKE ONE TABLET BY MOUTH EVERY DAY    Adjustment disorder with mixed anxiety and depressed mood       estradiol 1 MG tablet    ESTRACE    30 tablet    TAKE ONE TABLET BY MOUTH EVERY DAY    Need for postmenopausal hormone replacement       fluticasone 50 MCG/ACT spray    FLONASE    1 Bottle    Spray 2 sprays into both nostrils daily    Acute sinusitis, unspecified       loratadine 10 MG tablet    CLARITIN    30 tablet    TAKE ONE TABLET BY MOUTH EVERY DAY    Acute sinusitis       meclizine 25 MG tablet    ANTIVERT    30 tablet    Take 1 tablet (25 mg) by mouth every 6 hours as needed for dizziness        MELATONIN      10 mg daily        TYLENOL PO      Take 650 mg by mouth every 4 hours as needed for mild pain or fever        * Notice:  This list has 2 medication(s) that are the same as other medications prescribed for you. Read the directions carefully, and ask your doctor or other care provider to review them with you.

## 2018-07-06 NOTE — PROGRESS NOTES
Subjective: Here to discuss lab results.  I told her that the endometrium was normal and her Pap was normal.  Her blood tests were normal.  The stool tests for diarrhea were all negative.  TSH was normal.  CRP is less than 2.9 so that is encouraging as well.  She feels she is depressed and I offered her antidepressant medication but she declined.  She is not suicidal.  She is planning to see Shira next Monday for counseling.      The past medical history, social history, past surgical history and family history as shown below have been reviewed by me today.  Past Medical History:   Diagnosis Date     Allergic rhinitis, cause unspecified     Allergic rhinitis - weeds and pollan     Anxiety      Bladder polyps      Depressive disorder      Obstructive chronic bronchitis with exacerbation (H)      Other and unspecified ovarian cyst     Ovarian cyst - rupured cyst + laser x3     Other and unspecified ovarian cyst 9/14/2005    Left hemorrhagic ovarian cyst.     Tobacco abuse      Urinary tract infection, site not specified         Allergies   Allergen Reactions     Augmentin Swelling and Difficulty breathing     Avelox Nausea and Vomiting     Effexor Xr [Venlafaxine Hydrochloride]      Shaky and heart racing     Sulfa Drugs      Current Outpatient Prescriptions   Medication Sig Dispense Refill     Acetaminophen (TYLENOL PO) Take 650 mg by mouth every 4 hours as needed for mild pain or fever       ALPRAZolam (XANAX) 0.5 MG tablet Take 1 tablet (0.5 mg) by mouth 3 times daily as needed for anxiety 90 tablet 0     aspirin 81 MG tablet Take 1 tablet (81 mg) by mouth daily 30 tablet OTC     citalopram (CELEXA) 20 MG tablet TAKE ONE TABLET BY MOUTH EVERY DAY 30 tablet 1     estradiol (ESTRACE) 1 MG tablet TAKE ONE TABLET BY MOUTH EVERY DAY 30 tablet 6     fluticasone (FLONASE) 50 MCG/ACT spray Spray 2 sprays into both nostrils daily 1 Bottle 11     loratadine (CLARITIN) 10 MG tablet TAKE ONE TABLET BY MOUTH EVERY DAY 30  tablet 5     MELATONIN 10 mg daily        albuterol (2.5 MG/3ML) 0.083% neb solution ONE NEBULIZATION 4 TIMES DAILY AS NEEDED (Patient not taking: Reported on 6/27/2018) 1 Box 0     albuterol (PROAIR HFA/PROVENTIL HFA/VENTOLIN HFA) 108 (90 BASE) MCG/ACT Inhaler Inhale 2 puffs into the lungs every 6 hours as needed for shortness of breath / dyspnea or wheezing (Patient not taking: Reported on 6/27/2018) 1 Inhaler 0     meclizine (ANTIVERT) 25 MG tablet Take 1 tablet (25 mg) by mouth every 6 hours as needed for dizziness (Patient not taking: Reported on 6/27/2018) 30 tablet 0     Past Surgical History:   Procedure Laterality Date     C LIGATE FALLOPIAN TUBE,POSTPARTUM      Tubal Ligation     ESOPHAGOSCOPY, GASTROSCOPY, DUODENOSCOPY (EGD), COMBINED  11/28/2012    Procedure: COMBINED ESOPHAGOSCOPY, GASTROSCOPY, DUODENOSCOPY (EGD), BIOPSY SINGLE OR MULTIPLE;  ESOPHAGOSCOPY, GASTROSCOPY, DUODENOSCOPY (EGD) with biopsy;  Surgeon: Herson Cabrera MD;  Location: [facility]     EXCIS VAGINAL CYST/TUMOR       HC REMOVAL OF OVARY/TUBE(S)  3/6/2006    Laparoscopic bilateral salpingo-oophorectomy.     HC TYMPANOPLASTY W/O MASTOID, INIT/REV W/O OSS CHAIN RECONST  04/02     LAPAROSCOPIC CHOLECYSTECTOMY N/A 4/6/2017    Procedure: LAPAROSCOPIC CHOLECYSTECTOMY;  Surgeon: Shivam Luevano MD;  Location:  OR     Social History     Social History     Marital status:      Spouse name: N/A     Number of children: N/A     Years of education: N/A     Occupational History      [employer]     assembly     Social History Main Topics     Smoking status: Current Some Day Smoker     Packs/day: 0.00     Years: 10.00     Smokeless tobacco: Never Used      Comment: 3 cigs a day      Alcohol use No      Comment: recovering  - 2005     Drug use: No     Sexual activity: Not Currently     Partners: Male     Birth control/ protection: Surgical      Comment: tubal 1997     Other Topics Concern     None     Social History Narrative    Lives with spouse  and children. No domestic violence issues.     Family History   Problem Relation Age of Onset     Alcohol/Drug Father       at age 53 of alcohol     Arthritis Father      Cancer Father      lung     Alcohol/Drug Paternal Grandfather      Cancer Paternal Grandfather      lung     Alcohol/Drug Paternal Grandmother      Cancer Paternal Grandmother      lung     Alcohol/Drug Paternal Aunt      Allergies Daughter      animals and grass     Allergies Son      dust mite     Arthritis Mother      Hypertension Mother      Lipids Mother      Depression Mother      HEART DISEASE Mother      Blood Disease Mother      DVTs     Depression Brother        ROS: A 12 point review of systems was done. Except for what is listed above in the HPI, the systems review is negative .      Objective: Vital signs: Blood pressure 122/78, pulse 78, temperature 98  F (36.7  C), temperature source Temporal, resp. rate 16, weight 135 lb (61.2 kg), last menstrual period 2006, SpO2 100 %, not currently breastfeeding.    No other exam is repeated today except that I noticed a lip lesion and I suggested that she have this biopsied by ENT        Assessment/Plan:A total of 15 minutes were spent face-to-face with this patient during today's consultation, with more than 50% of that time devoted to conversation and counseling about the management decisions.    1 depression related to her son being incarcerated and sent to long term.  She is seeing Shira about this.  She declines medication    2.  She has a lip lesion-we have referred to ENT to have this biopsied      DEVEN Washington MD

## 2018-07-08 NOTE — PROGRESS NOTES
Tarah Please inform Liliana/ or caretaker  that this result(s) is/are incomplete -she needs additional studies-please help her schedule a diagnostic mammogram and ultrasound of the right breast- thanks. DEVEN Washington MD

## 2018-07-09 ENCOUNTER — TELEPHONE (OUTPATIENT)
Dept: FAMILY MEDICINE | Facility: CLINIC | Age: 48
End: 2018-07-09

## 2018-07-09 NOTE — TELEPHONE ENCOUNTER
Pt calling back. Radiology told her these orders need to be signed. Please kemi her back when done at [phone].  Thank you,  Letitia Deluca- Pt Rep.

## 2018-07-09 NOTE — TELEPHONE ENCOUNTER
RM has cosigned these orders. Patient informed and transferred to radiology for scheduling.  Tarah Ribeiro CMA

## 2018-07-09 NOTE — TELEPHONE ENCOUNTER
Left message for patient to return call to clinic.  Please inform of further imaging needed. Orders are placed and assist in scheduling with radiology  Tarah Ribeiro CMA

## 2018-07-09 NOTE — TELEPHONE ENCOUNTER
Patient returned call and was transferred to Radiology.  Thank you,  Sarah Liang  Patient Representative

## 2018-07-11 ENCOUNTER — HOSPITAL ENCOUNTER (OUTPATIENT)
Dept: ULTRASOUND IMAGING | Facility: CLINIC | Age: 48
End: 2018-07-11
Attending: OBSTETRICS & GYNECOLOGY
Payer: COMMERCIAL

## 2018-07-11 ENCOUNTER — HOSPITAL ENCOUNTER (OUTPATIENT)
Dept: MAMMOGRAPHY | Facility: CLINIC | Age: 48
Discharge: HOME | End: 2018-07-11
Attending: OBSTETRICS & GYNECOLOGY | Admitting: OBSTETRICS & GYNECOLOGY
Payer: COMMERCIAL

## 2018-07-11 DIAGNOSIS — R92.8 ABNORMAL MAMMOGRAM: ICD-10-CM

## 2018-07-11 PROCEDURE — 76642 ULTRASOUND BREAST LIMITED: CPT | Mod: RT

## 2018-07-11 PROCEDURE — 77065 DX MAMMO INCL CAD UNI: CPT

## 2018-07-12 ENCOUNTER — TELEPHONE (OUTPATIENT)
Dept: FAMILY MEDICINE | Facility: CLINIC | Age: 48
End: 2018-07-12

## 2018-07-12 DIAGNOSIS — N63.0 LUMP OR MASS IN BREAST: Primary | ICD-10-CM

## 2018-07-12 NOTE — TELEPHONE ENCOUNTER
----- Message from Edis Washington MD sent at 7/12/2018  2:59 PM CDT -----  Tarah Please inform Liliana/ or caretaker that I reviewed these results from the mammogram and I would like her to see Dr. Dumont to make sure she is comfortable with the plan of waiting 6 months for repeating the tests -please set up that appointment -thanks. DEVEN Washington MD

## 2018-07-12 NOTE — TELEPHONE ENCOUNTER
Left message for patient to return call to clinic. Referral placed for Dr. HEATON. Please assist in scheduling  Tarah Ribeiro CMA

## 2018-07-12 NOTE — PROGRESS NOTES
Tarah Please inform Liliana/ or caretaker that I reviewed these results from the mammogram and I would like her to see Dr. Dumont to make sure she is comfortable with the plan of waiting 6 months for repeating the tests -please set up that appointment -thanks. DEVEN Washington MD

## 2018-07-13 ENCOUNTER — OFFICE VISIT (OUTPATIENT)
Dept: SURGERY | Facility: CLINIC | Age: 48
End: 2018-07-13
Attending: OBSTETRICS & GYNECOLOGY
Payer: COMMERCIAL

## 2018-07-13 VITALS
HEIGHT: 65 IN | OXYGEN SATURATION: 99 % | BODY MASS INDEX: 22.39 KG/M2 | TEMPERATURE: 98.8 F | DIASTOLIC BLOOD PRESSURE: 82 MMHG | SYSTOLIC BLOOD PRESSURE: 124 MMHG | HEART RATE: 96 BPM | WEIGHT: 134.4 LBS

## 2018-07-13 DIAGNOSIS — Z80.3 FAMILY HISTORY OF BREAST CANCER IN FEMALE: ICD-10-CM

## 2018-07-13 DIAGNOSIS — N60.01 BREAST CYST, RIGHT: Primary | ICD-10-CM

## 2018-07-13 PROCEDURE — 99243 OFF/OP CNSLTJ NEW/EST LOW 30: CPT | Performed by: SURGERY

## 2018-07-13 ASSESSMENT — PAIN SCALES - GENERAL: PAINLEVEL: NO PAIN (0)

## 2018-07-13 NOTE — LETTER
7/13/2018         RE: Liliana Kat  [address] [place] [id number]        Dear Colleague,    Thank you for referring your patient, Liliana Kat, to the [facility]. Please see a copy of my visit note below.      General Surgery Consultation    Liliana Kat MRN# [id number]   Age: 48 year old YOB: 1970         Assessment and Plan:   I was asked to see Ms. Kat  By Dr. Zaldivar for evaluation of right breast cysts.  This is a 48 year old female who has the assessment below.      Assessment:    ICD-10-CM    1. Breast cyst, right N60.01 US Breast Bilateral Complete 4 Quadrants     MA Diagnostic Digital Bilateral   2. Family history of breast cancer in female Z80.3        Plan:  I reviewed the patient's imaging and went through her past imaging.  Recommend that we do follow-up ultrasound and a diagnostic mammogram in 6 months.  I reassured patient that these are likely simple cysts as shown on the ultrasound and mammogram rather than any malignant process.  I will call the patient in 6 months once I have the results of her imaging, she will can either follow-up with me in the office if there are any abnormalities or we can just resume her annual imaging if the cysts are unchanged.  Kassandra was satisfied with the visit.    Total time with patient visit: 40 minutes including discussions about the plan of care and care coordination with the patient.    I thank Dr. Zaldivar for the opportunity to participate in the patient's care.           Chief Complaint:   Right breast cysts     History is obtained from the patient         History of Present Illness:   This patient is a 48 year old  female without a significant past medical history who presents with noted right breast cysts.  Patient recently has her screening mammogram followed by recommended ultrasound which concluded to have a BI-RADS 3 due to the finding of 2 simple appearing cysts at the 10  o'clock position 2 cm from the nipple areole or complex.  Patient was sent here by Dr. Zaldivar for evaluation of these cysts.  Patient stated that she does have family history of breast cancer in her paternal side.  Does have history of nipple discharge back in 2004 on the left breast.  Patient was getting every 6 months ultrasounds, mammogram for several years.  She finally got a negative MRI in 2013.  Patient at that time no longer was having the nipple discharge.  Her screening mammogram and ultrasound has been normal until this month.  The result was as above.  Patient denies any symptoms from the cyst thus far.  However she noted tenderness in the area of the right breast along the lateral aspect especially since she underwent all the imaging above.  Patient denies any recent nipple discharge.  Patient denies any palpable mass in either breasts.  Patient stated that she is been on estrogen since she was 31 years old.  Patient stated that she had her ovaries and fallopian tubes removed when she was 31 years old.  Does not recall the reason why.          BREAST-SPECIFIC HISTORY:  Prior breast biopsies: no  Prior breast surgeries: no  Prior radiation history: no  Hormone replacement therapy: currently on estrogen       GYN HISTORY:  Breastfeeding history: yes  Menopausal? yes    Cancer history in self: None      FAMILY HISTORY:  Paternal grandmother diagnosed with breast cancer in her 40s  Internal cousin was diagnosed with breast cancer in her 50s  Her father passed away from lung cancer at age 50              Past Medical History:    has a past medical history of Allergic rhinitis, cause unspecified; Anxiety; Bladder polyps; Depressive disorder; Obstructive chronic bronchitis with exacerbation (H); Other and unspecified ovarian cyst; Other and unspecified ovarian cyst (9/14/2005); Tobacco abuse; and Urinary tract infection, site not specified.          Past Surgical History:     Past Surgical History:    Procedure Laterality Date     C LIGATE FALLOPIAN TUBE,POSTPARTUM      Tubal Ligation     ESOPHAGOSCOPY, GASTROSCOPY, DUODENOSCOPY (EGD), COMBINED  11/28/2012    Procedure: COMBINED ESOPHAGOSCOPY, GASTROSCOPY, DUODENOSCOPY (EGD), BIOPSY SINGLE OR MULTIPLE;  ESOPHAGOSCOPY, GASTROSCOPY, DUODENOSCOPY (EGD) with biopsy;  Surgeon: Herson Cabrera MD;  Location:  GI     EXCIS VAGINAL CYST/TUMOR       HC REMOVAL OF OVARY/TUBE(S)  3/6/2006    Laparoscopic bilateral salpingo-oophorectomy.     HC TYMPANOPLASTY W/O MASTOID, INIT/REV W/O OSS CHAIN RECONST  04/02     LAPAROSCOPIC CHOLECYSTECTOMY N/A 4/6/2017    Procedure: LAPAROSCOPIC CHOLECYSTECTOMY;  Surgeon: Shivam Luevano MD;  Location:  OR           Medications:     Current Outpatient Prescriptions on File Prior to Visit:  Acetaminophen (TYLENOL PO) Take 650 mg by mouth every 4 hours as needed for mild pain or fever   ALPRAZolam (XANAX) 0.5 MG tablet Take 1 tablet (0.5 mg) by mouth 3 times daily as needed for anxiety   aspirin 81 MG tablet Take 1 tablet (81 mg) by mouth daily   citalopram (CELEXA) 20 MG tablet TAKE ONE TABLET BY MOUTH EVERY DAY   estradiol (ESTRACE) 1 MG tablet TAKE ONE TABLET BY MOUTH EVERY DAY   fluticasone (FLONASE) 50 MCG/ACT spray Spray 2 sprays into both nostrils daily   loratadine (CLARITIN) 10 MG tablet TAKE ONE TABLET BY MOUTH EVERY DAY   meclizine (ANTIVERT) 25 MG tablet Take 1 tablet (25 mg) by mouth every 6 hours as needed for dizziness   MELATONIN 10 mg daily    albuterol (2.5 MG/3ML) 0.083% neb solution ONE NEBULIZATION 4 TIMES DAILY AS NEEDED (Patient not taking: Reported on 6/27/2018)   albuterol (PROAIR HFA/PROVENTIL HFA/VENTOLIN HFA) 108 (90 BASE) MCG/ACT Inhaler Inhale 2 puffs into the lungs every 6 hours as needed for shortness of breath / dyspnea or wheezing (Patient not taking: Reported on 6/27/2018)     No current facility-administered medications on file prior to visit.       Allergies:      Allergies   Allergen Reactions  "    Augmentin Swelling and Difficulty breathing     Avelox Nausea and Vomiting     Effexor Xr [Venlafaxine Hydrochloride]      Shaky and heart racing     Sulfa Drugs             Social History:   Liliana Kat  reports that she has been smoking.  She has been smoking about 0.00 packs per day for the past 10.00 years. She has never used smokeless tobacco. She reports that she does not drink alcohol or use illicit drugs.          Family History:   The patient has no family history of any bleeding, clotting or anesthesia problems.          Review of Systems:     Constitutional: Denies fever or chills   Eyes: Denies change in visual acuity   HENT: Denies nasal congestion or sore throat   Respiratory: Denies cough or shortness of breath   Cardiovascular: Denies chest pain or edema   GI: Denies abdominal pain, nausea, vomiting, bloody stools or diarrhea   : Denies dysuria   Musculoskeletal: Denies back pain or joint pain   Integument: Denies rash   Neurologic: Denies headache, focal weakness or sensory changes   Endocrine: Denies polyuria or polydipsia   Lymphatic: Denies swollen glands   Psychiatric: Denies depression or anxiety          Physical Exam:     VITAL SIGNS:  height is 1.651 m (5' 5\") and weight is 61 kg (134 lb 6.4 oz). Her temporal temperature is 98.8  F (37.1  C). Her blood pressure is 124/82 and her pulse is 96. Her oxygen saturation is 99%.   Constitutional: Well developed, Well nourished, No acute distress, Non-toxic appearance.   HENT: Normocephalic, Atraumatic, Bilateral external ears normal, Oropharynx moist, No oral exudates, Nose normal.   Eyes: Conjunctiva normal, No discharge.   Neck: Normal range of motion, No tenderness, Supple, No stridor.   Lymphatic: No lymphadenopathy noted.   Cardiovascular: Normal heart rate, Normal rhythm, No murmurs, No rubs, No gallops.   Breast Exam:     RIGHT: normal without suspicious masses, skin changes or axillary nodes, symmetric fibrous changes in both " upper outer quadrants, self exam is taught and encouraged.    LEFT:normal without suspicious masses, skin changes or axillary nodes, symmetric fibrous changes in both upper outer quadrants, self exam is taught and encouraged.  Thorax & Lungs: Normal breath sounds, No respiratory distress, No wheezing, No chest tenderness.   Abdomen: Bowel sounds normal, Soft, No masses, No pulsatile masses.   Skin: Warm, Dry, No erythema, No rash.   Back: No tenderness, No CVA tenderness.   Extremities: Intact distal pulses, No edema, No tenderness, No cyanosis, No clubbing.   Musculoskeletal: Good range of motion in all major joints. No tenderness to palpation or major deformities noted.   Neurologic: Alert & oriented x 3, Normal motor function, Normal sensory function, No focal deficits noted.   Psychiatric: Affect normal, Judgment normal, Mood normal.           Data:   WBC -   WBC   Date Value Ref Range Status   06/27/2018 9.5 4.0 - 11.0 10e9/L Final   ], HgB - Hemoglobin   Date Value Ref Range Status   06/27/2018 14.5 11.7 - 15.7 g/dL Final   ]   Liver Function Studies -   Recent Labs   Lab Test  09/08/17   1230   PROTTOTAL  7.6   ALBUMIN  4.0   BILITOTAL  0.4   ALKPHOS  105   AST  16   ALT  27       Imagings:   MAMMOGRAPHY DIAGNOSTIC RIGHT WITH TOMOSYNTHESIS   US BREAST RIGHT LIMITED 1-3 QUADRANTS, DIGITAL;    TARGETED ULTRASOUND, RIGHT BREAST - 7/11/2018 10:05 AM     HISTORY: Focal asymmetries in the upper outer and lower inner right  breast on screening mammogram.      COMPARISON: Mammograms dated 7/6/2018, 1/6/2016, 5/21/2013 and  6/15/2007.     BREAST DENSITY: Scattered fibroglandular densities.     FINDINGS: The focal asymmetry upper slightly outer aspect of the right  mid breast persists with tomographic imaging. Small focal asymmetry in  the lower probably inner aspect of the right breast also persists with  tomographic imaging.     Targeted ultrasound of the upper outer right breast demonstrates a  group of two  simple-appearing cysts at the 10 o'clock position 2 cm  from the nipple measuring a total of 0.5 x 0.3 x 0.5 cm. This is a  benign finding and corresponds with the mammographic finding in the  upper outer breast.     No sonographic abnormality is seen in the lower inner breast to  suggest etiology for the other asymmetry. This should be followed in  six months with mammogram to ensure stability. This is likely benign  and could represent a benign lymph node.         IMPRESSION: BI-RADS CATEGORY: 3 - Probably Benign Finding-Short  Interval Follow-Up Suggested. Simple cyst upper outer right breast  corresponding with mammographic finding in that region. No sonographic  correlate to the mammographic finding in the lower inner right breast.  Six-month follow-up mammogram is recommended.     RECOMMENDED FOLLOW-UP: Short Interval Follow-up six Months right  breast mammogram.     I discussed the findings and recommendations with the patient at the  time of the exam.     KI TRAN MD    Pathology:   n/a     Aurea Dumont DO 7/13/2018            Again, thank you for allowing me to participate in the care of your patient.        Sincerely,        Aurea Dumont MD

## 2018-07-13 NOTE — MR AVS SNAPSHOT
After Visit Summary   7/13/2018    Liliana Kat    MRN: [id number]           Patient Information     Date Of Birth          1970        Visit Information        Provider Department      7/13/2018 3:15 PM Aurea Dumont MD [facility]        Today's Diagnoses     Breast cyst, right    -  1    Family history of breast cancer in female           Follow-ups after your visit        Your next 10 appointments already scheduled     Jul 17, 2018 10:30 AM CDT   New Visit with ELVA Felton   [facility])    [address] [place] [id number]   [phone]            Jul 23, 2018  9:30 AM CDT   New Visit with Cordell Pacheco MD   [address] [id number] [phone]            Aug 07, 2018   Procedure with Juanpablo Barba DO   [facility] ([facility])    [address] [place] [id number] [phone]              Future tests that were ordered for you today     Open Future Orders        Priority Expected Expires Ordered    US Breast Bilateral Complete 4 Quadrants Routine 1/14/2019 7/13/2019 7/13/2018    MA Diagnostic Digital Bilateral Routine 1/14/2019 7/13/2019 7/13/2018            Who to contact     If you have questions or need follow up information about today's clinic visit or your schedule please contact [facility] directly at [phone].  Normal or non-critical lab and imaging results will be communicated to you by MyChart, letter or phone within 4 business days after the clinic has received the results. If you do not hear from us within 7 days, please contact the clinic through MyChart or phone. If you have a critical or abnormal lab result, we will notify you by phone as soon as possible.  Submit refill requests through [employer] or call your pharmacy and they will  "forward the refill request to us. Please allow 3 business days for your refill to be completed.          Additional Information About Your Visit        MyChart Information     [employer] lets you send messages to your doctor, view your test results, renew your prescriptions, schedule appointments and more. To sign up, go to [email or web address] . Click on \"Log in\" on the left side of the screen, which will take you to the Welcome page. Then click on \"Sign up Now\" on the right side of the page.     You will be asked to enter the access code listed below, as well as some personal information. Please follow the directions to create your username and password.     Your access code is: [email or web address]  Expires: 2018 11:02 AM     Your access code will  in 90 days. If you need help or a new code, please call your [facility] clinic or [phone].        Care EveryWhere ID     This is your Care EveryWhere ID. This could be used by other organizations to access your [facility] medical records  [id number]        Your Vitals Were     Pulse Temperature Height Last Period Pulse Oximetry BMI (Body Mass Index)    96 98.8  F (37.1  C) (Temporal) 1.651 m (5' 5\") 2006 99% 22.37 kg/m2       Blood Pressure from Last 3 Encounters:   18 124/82   18 122/78   18 118/64    Weight from Last 3 Encounters:   18 61 kg (134 lb 6.4 oz)   18 61.2 kg (135 lb)   18 61.7 kg (136 lb)               Primary Care Provider Office Phone # Fax #    LOLIS Osorio [facility] [phone] [address] [place] [address]        Equal Access to Services     [facility] KATIE : Paulie Johnson, wajanessa waldron, qaybta kaaldavonte bright, anirudh leslie. So [facility] [phone].    ATENCIÓN: Si habla español, tiene a garcia disposición servicios gratuitos de asistencia lingüística. Llame al [phone].    We comply with applicable federal civil rights laws " and [place] laws. We do not discriminate on the basis of race, color, national origin, age, disability, sex, sexual orientation, or gender identity.            Thank you!     Thank you for choosing [facility]  for your care. Our goal is always to provide you with excellent care. Hearing back from our patients is one way we can continue to improve our services. Please take a few minutes to complete the written survey that you may receive in the mail after your visit with us. Thank you!             Your Updated Medication List - Protect others around you: Learn how to safely use, store and throw away your medicines at www.disposemymeds.org.          This list is accurate as of 7/13/18  4:03 PM.  Always use your most recent med list.                   Brand Name Dispense Instructions for use Diagnosis    * albuterol 108 (90 Base) MCG/ACT Inhaler    PROAIR HFA/PROVENTIL HFA/VENTOLIN HFA    1 Inhaler    Inhale 2 puffs into the lungs every 6 hours as needed for shortness of breath / dyspnea or wheezing    Tobacco abuse       * albuterol (2.5 MG/3ML) 0.083% neb solution     1 Box    ONE NEBULIZATION 4 TIMES DAILY AS NEEDED    Acute sinusitis, unspecified       ALPRAZolam 0.5 MG tablet    XANAX    90 tablet    Take 1 tablet (0.5 mg) by mouth 3 times daily as needed for anxiety    Adjustment disorder with mixed anxiety and depressed mood       aspirin 81 MG tablet     30 tablet    Take 1 tablet (81 mg) by mouth daily    Superficial varicosities, unspecified laterality       citalopram 20 MG tablet    celeXA    30 tablet    TAKE ONE TABLET BY MOUTH EVERY DAY    Adjustment disorder with mixed anxiety and depressed mood       estradiol 1 MG tablet    ESTRACE    30 tablet    TAKE ONE TABLET BY MOUTH EVERY DAY    Need for postmenopausal hormone replacement       fluticasone 50 MCG/ACT spray    FLONASE    1 Bottle    Spray 2 sprays into both nostrils daily    Acute sinusitis, unspecified       loratadine 10 MG  tablet    CLARITIN    30 tablet    TAKE ONE TABLET BY MOUTH EVERY DAY    Acute sinusitis       meclizine 25 MG tablet    ANTIVERT    30 tablet    Take 1 tablet (25 mg) by mouth every 6 hours as needed for dizziness        MELATONIN      10 mg daily        TYLENOL PO      Take 650 mg by mouth every 4 hours as needed for mild pain or fever        * Notice:  This list has 2 medication(s) that are the same as other medications prescribed for you. Read the directions carefully, and ask your doctor or other care provider to review them with you.

## 2018-07-13 NOTE — TELEPHONE ENCOUNTER
patient informed and appt is scheduled for today with Dr. Julia Ribeiro, [facility]

## 2018-07-13 NOTE — PROGRESS NOTES
General Surgery Consultation    Liliana Kat MRN# [id number]   Age: 48 year old YOB: 1970         Assessment and Plan:   I was asked to see Ms. Kat  By Dr. Zaldivar for evaluation of right breast cysts.  This is a 48 year old female who has the assessment below.      Assessment:    ICD-10-CM    1. Breast cyst, right N60.01 US Breast Bilateral Complete 4 Quadrants     MA Diagnostic Digital Bilateral   2. Family history of breast cancer in female Z80.3        Plan:  I reviewed the patient's imaging and went through her past imaging.  Recommend that we do follow-up ultrasound and a diagnostic mammogram in 6 months.  I reassured patient that these are likely simple cysts as shown on the ultrasound and mammogram rather than any malignant process.  I will call the patient in 6 months once I have the results of her imaging, she will can either follow-up with me in the office if there are any abnormalities or we can just resume her annual imaging if the cysts are unchanged.  Kassandra was satisfied with the visit.    Total time with patient visit: 40 minutes including discussions about the plan of care and care coordination with the patient.    I thank Dr. Zaldivar for the opportunity to participate in the patient's care.           Chief Complaint:   Right breast cysts     History is obtained from the patient         History of Present Illness:   This patient is a 48 year old  female without a significant past medical history who presents with noted right breast cysts.  Patient recently has her screening mammogram followed by recommended ultrasound which concluded to have a BI-RADS 3 due to the finding of 2 simple appearing cysts at the 10 o'clock position 2 cm from the nipple areole or complex.  Patient was sent here by Dr. Zaldivar for evaluation of these cysts.  Patient stated that she does have family history of breast cancer in her paternal side.  Does have history of nipple  discharge back in 2004 on the left breast.  Patient was getting every 6 months ultrasounds, mammogram for several years.  She finally got a negative MRI in 2013.  Patient at that time no longer was having the nipple discharge.  Her screening mammogram and ultrasound has been normal until this month.  The result was as above.  Patient denies any symptoms from the cyst thus far.  However she noted tenderness in the area of the right breast along the lateral aspect especially since she underwent all the imaging above.  Patient denies any recent nipple discharge.  Patient denies any palpable mass in either breasts.  Patient stated that she is been on estrogen since she was 31 years old.  Patient stated that she had her ovaries and fallopian tubes removed when she was 31 years old.  Does not recall the reason why.          BREAST-SPECIFIC HISTORY:  Prior breast biopsies: no  Prior breast surgeries: no  Prior radiation history: no  Hormone replacement therapy: currently on estrogen       GYN HISTORY:  Breastfeeding history: yes  Menopausal? yes    Cancer history in self: None      FAMILY HISTORY:  Paternal grandmother diagnosed with breast cancer in her 40s  Internal cousin was diagnosed with breast cancer in her 50s  Her father passed away from lung cancer at age 50              Past Medical History:    has a past medical history of Allergic rhinitis, cause unspecified; Anxiety; Bladder polyps; Depressive disorder; Obstructive chronic bronchitis with exacerbation (H); Other and unspecified ovarian cyst; Other and unspecified ovarian cyst (9/14/2005); Tobacco abuse; and Urinary tract infection, site not specified.          Past Surgical History:     Past Surgical History:   Procedure Laterality Date     C LIGATE FALLOPIAN TUBE,POSTPARTUM      Tubal Ligation     ESOPHAGOSCOPY, GASTROSCOPY, DUODENOSCOPY (EGD), COMBINED  11/28/2012    Procedure: COMBINED ESOPHAGOSCOPY, GASTROSCOPY, DUODENOSCOPY (EGD), BIOPSY SINGLE OR  MULTIPLE;  ESOPHAGOSCOPY, GASTROSCOPY, DUODENOSCOPY (EGD) with biopsy;  Surgeon: Herson Cabrera MD;  Location: [facility]     EXCIS VAGINAL CYST/TUMOR       HC REMOVAL OF OVARY/TUBE(S)  3/6/2006    Laparoscopic bilateral salpingo-oophorectomy.     HC TYMPANOPLASTY W/O MASTOID, INIT/REV W/O OSS CHAIN RECONST  04/02     LAPAROSCOPIC CHOLECYSTECTOMY N/A 4/6/2017    Procedure: LAPAROSCOPIC CHOLECYSTECTOMY;  Surgeon: Shivam Luevano MD;  Location: [facility]           Medications:     Current Outpatient Prescriptions on File Prior to Visit:  Acetaminophen (TYLENOL PO) Take 650 mg by mouth every 4 hours as needed for mild pain or fever   ALPRAZolam (XANAX) 0.5 MG tablet Take 1 tablet (0.5 mg) by mouth 3 times daily as needed for anxiety   aspirin 81 MG tablet Take 1 tablet (81 mg) by mouth daily   citalopram (CELEXA) 20 MG tablet TAKE ONE TABLET BY MOUTH EVERY DAY   estradiol (ESTRACE) 1 MG tablet TAKE ONE TABLET BY MOUTH EVERY DAY   fluticasone (FLONASE) 50 MCG/ACT spray Spray 2 sprays into both nostrils daily   loratadine (CLARITIN) 10 MG tablet TAKE ONE TABLET BY MOUTH EVERY DAY   meclizine (ANTIVERT) 25 MG tablet Take 1 tablet (25 mg) by mouth every 6 hours as needed for dizziness   MELATONIN 10 mg daily    albuterol (2.5 MG/3ML) 0.083% neb solution ONE NEBULIZATION 4 TIMES DAILY AS NEEDED (Patient not taking: Reported on 6/27/2018)   albuterol (PROAIR HFA/PROVENTIL HFA/VENTOLIN HFA) 108 (90 BASE) MCG/ACT Inhaler Inhale 2 puffs into the lungs every 6 hours as needed for shortness of breath / dyspnea or wheezing (Patient not taking: Reported on 6/27/2018)     No current facility-administered medications on file prior to visit.       Allergies:      Allergies   Allergen Reactions     Augmentin Swelling and Difficulty breathing     Avelox Nausea and Vomiting     Effexor Xr [Venlafaxine Hydrochloride]      Shaky and heart racing     Sulfa Drugs             Social History:   Liliana Kat  reports that she has been  "smoking.  She has been smoking about 0.00 packs per day for the past 10.00 years. She has never used smokeless tobacco. She reports that she does not drink alcohol or use illicit drugs.          Family History:   The patient has no family history of any bleeding, clotting or anesthesia problems.          Review of Systems:     Constitutional: Denies fever or chills   Eyes: Denies change in visual acuity   HENT: Denies nasal congestion or sore throat   Respiratory: Denies cough or shortness of breath   Cardiovascular: Denies chest pain or edema   GI: Denies abdominal pain, nausea, vomiting, bloody stools or diarrhea   : Denies dysuria   Musculoskeletal: Denies back pain or joint pain   Integument: Denies rash   Neurologic: Denies headache, focal weakness or sensory changes   Endocrine: Denies polyuria or polydipsia   Lymphatic: Denies swollen glands   Psychiatric: Denies depression or anxiety          Physical Exam:     VITAL SIGNS:  height is 1.651 m (5' 5\") and weight is 61 kg (134 lb 6.4 oz). Her temporal temperature is 98.8  F (37.1  C). Her blood pressure is 124/82 and her pulse is 96. Her oxygen saturation is 99%.   Constitutional: Well developed, Well nourished, No acute distress, Non-toxic appearance.   HENT: Normocephalic, Atraumatic, Bilateral external ears normal, Oropharynx moist, No oral exudates, Nose normal.   Eyes: Conjunctiva normal, No discharge.   Neck: Normal range of motion, No tenderness, Supple, No stridor.   Lymphatic: No lymphadenopathy noted.   Cardiovascular: Normal heart rate, Normal rhythm, No murmurs, No rubs, No gallops.   Breast Exam:     RIGHT: normal without suspicious masses, skin changes or axillary nodes, symmetric fibrous changes in both upper outer quadrants, self exam is taught and encouraged.    LEFT:normal without suspicious masses, skin changes or axillary nodes, symmetric fibrous changes in both upper outer quadrants, self exam is taught and encouraged.  Thorax & Lungs: " Normal breath sounds, No respiratory distress, No wheezing, No chest tenderness.   Abdomen: Bowel sounds normal, Soft, No masses, No pulsatile masses.   Skin: Warm, Dry, No erythema, No rash.   Back: No tenderness, No CVA tenderness.   Extremities: Intact distal pulses, No edema, No tenderness, No cyanosis, No clubbing.   Musculoskeletal: Good range of motion in all major joints. No tenderness to palpation or major deformities noted.   Neurologic: Alert & oriented x 3, Normal motor function, Normal sensory function, No focal deficits noted.   Psychiatric: Affect normal, Judgment normal, Mood normal.           Data:   WBC -   WBC   Date Value Ref Range Status   06/27/2018 9.5 4.0 - 11.0 10e9/L Final   ], HgB - Hemoglobin   Date Value Ref Range Status   06/27/2018 14.5 11.7 - 15.7 g/dL Final   ]   Liver Function Studies -   Recent Labs   Lab Test  09/08/17   1230   PROTTOTAL  7.6   ALBUMIN  4.0   BILITOTAL  0.4   ALKPHOS  105   AST  16   ALT  27       Imagings:   MAMMOGRAPHY DIAGNOSTIC RIGHT WITH TOMOSYNTHESIS   US BREAST RIGHT LIMITED 1-3 QUADRANTS, DIGITAL;    TARGETED ULTRASOUND, RIGHT BREAST - 7/11/2018 10:05 AM     HISTORY: Focal asymmetries in the upper outer and lower inner right  breast on screening mammogram.      COMPARISON: Mammograms dated 7/6/2018, 1/6/2016, 5/21/2013 and  6/15/2007.     BREAST DENSITY: Scattered fibroglandular densities.     FINDINGS: The focal asymmetry upper slightly outer aspect of the right  mid breast persists with tomographic imaging. Small focal asymmetry in  the lower probably inner aspect of the right breast also persists with  tomographic imaging.     Targeted ultrasound of the upper outer right breast demonstrates a  group of two simple-appearing cysts at the 10 o'clock position 2 cm  from the nipple measuring a total of 0.5 x 0.3 x 0.5 cm. This is a  benign finding and corresponds with the mammographic finding in the  upper outer breast.     No sonographic abnormality is seen  in the lower inner breast to  suggest etiology for the other asymmetry. This should be followed in  six months with mammogram to ensure stability. This is likely benign  and could represent a benign lymph node.         IMPRESSION: BI-RADS CATEGORY: 3 - Probably Benign Finding-Short  Interval Follow-Up Suggested. Simple cyst upper outer right breast  corresponding with mammographic finding in that region. No sonographic  correlate to the mammographic finding in the lower inner right breast.  Six-month follow-up mammogram is recommended.     RECOMMENDED FOLLOW-UP: Short Interval Follow-up six Months right  breast mammogram.     I discussed the findings and recommendations with the patient at the  time of the exam.     KI TRAN MD    Pathology:   n/a     Juan-Bobbi Dumont, DO 7/13/2018

## 2018-07-20 NOTE — PROGRESS NOTES
ENT Consultation    Liliana Kat is a 48 year old female who is seen in consultation at the request of Dr. Rivera.      History of Present Illness - Liliana Kat is a 48 year old female here today for a growth on here lip she first noted about 2 years ago. The lesion is on the right side of the lower lip. Her PCP has tried to freeze it without success. The lesion has grown in size and there is now a burning sensation at times. Liliana is on the way to quitting, she is down to 2 cigarettes a day.      Body mass index is 22.8 kg/(m^2).    BP Readings from Last 1 Encounters:   07/13/18 124/82     BP noted to be well controlled today in office.     Liliana IS a smoker/uses chewing tobacco.  Liliana is ready to quit      Past Medical History -   Past Medical History:   Diagnosis Date     Allergic rhinitis, cause unspecified     Allergic rhinitis - weeds and pollan     Anxiety      Bladder polyps      Depressive disorder      Obstructive chronic bronchitis with exacerbation (H)      Other and unspecified ovarian cyst     Ovarian cyst - rupured cyst + laser x3     Other and unspecified ovarian cyst 9/14/2005    Left hemorrhagic ovarian cyst.     Tobacco abuse      Urinary tract infection, site not specified        Current Medications -   Current Outpatient Prescriptions:      Acetaminophen (TYLENOL PO), Take 650 mg by mouth every 4 hours as needed for mild pain or fever, Disp: , Rfl:      albuterol (2.5 MG/3ML) 0.083% neb solution, ONE NEBULIZATION 4 TIMES DAILY AS NEEDED (Patient not taking: Reported on 6/27/2018), Disp: 1 Box, Rfl: 0     albuterol (PROAIR HFA/PROVENTIL HFA/VENTOLIN HFA) 108 (90 BASE) MCG/ACT Inhaler, Inhale 2 puffs into the lungs every 6 hours as needed for shortness of breath / dyspnea or wheezing (Patient not taking: Reported on 6/27/2018), Disp: 1 Inhaler, Rfl: 0     ALPRAZolam (XANAX) 0.5 MG tablet, Take 1 tablet (0.5 mg) by mouth 3 times daily as needed for anxiety, Disp: 90  tablet, Rfl: 0     aspirin 81 MG tablet, Take 1 tablet (81 mg) by mouth daily, Disp: 30 tablet, Rfl: OTC     citalopram (CELEXA) 20 MG tablet, TAKE ONE TABLET BY MOUTH EVERY DAY, Disp: 30 tablet, Rfl: 1     estradiol (ESTRACE) 1 MG tablet, TAKE ONE TABLET BY MOUTH EVERY DAY, Disp: 30 tablet, Rfl: 6     fluticasone (FLONASE) 50 MCG/ACT spray, Spray 2 sprays into both nostrils daily, Disp: 1 Bottle, Rfl: 11     loratadine (CLARITIN) 10 MG tablet, TAKE ONE TABLET BY MOUTH EVERY DAY, Disp: 30 tablet, Rfl: 5     meclizine (ANTIVERT) 25 MG tablet, Take 1 tablet (25 mg) by mouth every 6 hours as needed for dizziness, Disp: 30 tablet, Rfl: 0     MELATONIN, 10 mg daily , Disp: , Rfl:     Allergies -   Allergies   Allergen Reactions     Augmentin Swelling and Difficulty breathing     Avelox Nausea and Vomiting     Effexor Xr [Venlafaxine Hydrochloride]      Shaky and heart racing     Sulfa Drugs        Social History -   Social History     Social History     Marital status:      Spouse name: N/A     Number of children: N/A     Years of education: N/A     Occupational History      [employer]     assembly     Social History Main Topics     Smoking status: Current Some Day Smoker     Packs/day: 0.00     Years: 10.00     Smokeless tobacco: Never Used      Comment: 3 cigs a day      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 No domestic violence issues.       Family History -   Family History   Problem Relation Age of Onset     Alcohol/Drug Father       at age 53 of alcohol     Arthritis Father      Cancer Father      lung     Alcohol/Drug Paternal Grandfather      Cancer Paternal Grandfather      lung     Alcohol/Drug Paternal Grandmother      Cancer Paternal Grandmother       lung     Alcohol/Drug Paternal Aunt      Allergies Daughter      animals and grass     Allergies Son      dust mite     Arthritis Mother      Hypertension Mother      Lipids Mother      Depression Mother      HEART DISEASE Mother      Blood Disease Mother      DVTs     Depression Brother        Review of Systems - As per HPI and PMHx, otherwise review of system review of the head and neck negative.    Physical Exam  LMP 03/06/2006  BMI: There is no height or weight on file to calculate BMI.    General - The patient is well nourished and well developed, and appears to have good nutritional status.  Alert and oriented to person and place, answers questions and cooperates with examination appropriately.    SKIN - No suspicious lesions or rashes.  Respiration - No respiratory distress.  Head and Face - Normocephalic and atraumatic, with no gross asymmetry noted of the contour of the facial features.  The facial nerve is intact, with strong symmetric movements.    Voice and Breathing - The patient was breathing comfortably without the use of accessory muscles. The patients voice was clear and strong, and had appropriate pitch and quality.    Ears - Bilateral pinna and EACs with normal appearing overlying skin. Tympanic membrane intact with good mobility on pneumatic otoscopy bilaterally. Bony landmarks of the ossicular chain are normal. The tympanic membranes are normal in appearance. No retraction, perforation, or masses.  No fluid or purulence was seen in the external canal or the middle ear.     Eyes - Extraocular movements intact.  Sclera were not icteric or injected, conjunctiva were pink and moist.    Mouth - Examination of the oral cavity showed pink, healthy oral mucosa. No lesions or ulcerations noted.  The tongue was mobile and midline, and the dentition were in good condition. There is what seems to be a hemangioma on the right lower lip near the [place] boarder. The lining of the lip is intact.     Throat  - The walls of the oropharynx were smooth, pink, moist, symmetric, and had no lesions or ulcerations.  The tonsillar pillars and soft palate were symmetric.  The uvula was midline on elevation.    Neck - Normal midline excursion of the laryngotracheal complex during swallowing.  Full range of motion on passive movement.  Palpation of the occipital, submental, submandibular, internal jugular chain, and supraclavicular nodes did not demonstrate any abnormal lymph nodes or masses.  The carotid pulse was palpable bilaterally.  Palpation of the thyroid was soft and smooth, with no nodules or goiter appreciated.  The trachea was mobile and midline.    Nose - External contour is symmetric, no gross deflection or scars.  Nasal mucosa is pink and moist with no abnormal mucus.  The septum was midline and non-obstructive, turbinates of normal size and position.  No polyps, masses, or purulence noted on examination.    Neuro - Nonfocal neuro exam is normal, CN 2 through 12 intact, normal gait and muscle tone.      Performed in clinic today:  No procedures preformed in clinic today      A/P - Liliana Cisnerosaert is a 48 year old female a hemangioma on the right side of the lower lip. I discussed with         This document serves as a record of the services and decisions personally performed and made by Dr. Cordell Pacheco MD. It was created on his behalf by Erika Sheridan, a trained medical scribe. The creation of this document is based the provider's statements to the medical scribe.  Erika Sheridan 7/23/2018    Provider:   The information in this document, created by the medical scribe for me, accurately reflects the services I personally performed and the decisions made by me. I have reviewed and approved this document for accuracy prior to leaving the patient care area.  Dr. Cordell Pacheco MD 7/23/2018    Cordell Pcaheco MD.

## 2018-07-23 ENCOUNTER — OFFICE VISIT (OUTPATIENT)
Dept: OTOLARYNGOLOGY | Facility: CLINIC | Age: 48
End: 2018-07-23
Payer: COMMERCIAL

## 2018-07-23 VITALS
WEIGHT: 137 LBS | OXYGEN SATURATION: 96 % | DIASTOLIC BLOOD PRESSURE: 60 MMHG | BODY MASS INDEX: 22.8 KG/M2 | HEART RATE: 80 BPM | SYSTOLIC BLOOD PRESSURE: 100 MMHG

## 2018-07-23 DIAGNOSIS — K13.0 LIP LESION: Primary | ICD-10-CM

## 2018-07-23 PROCEDURE — 99203 OFFICE O/P NEW LOW 30 MIN: CPT | Performed by: OTOLARYNGOLOGY

## 2018-07-23 NOTE — LETTER
7/23/2018         RE: Liliana Kat  [address] [facility] [address]        Dear Colleague,    Thank you for referring your patient, Liliana Kat, to the [facility]. Please see a copy of my visit note below.    ENT Consultation    Liliana Kat is a 48 year old female who is seen in consultation at the request of Dr. Rivera.      History of Present Illness - Liliana Kat is a 48 year old female here today for a growth on here lip she first noted about 2 years ago. The lesion is on the right side of the lower lip. Her PCP has tried to freeze it without success. The lesion has grown in size and there is now a burning sensation at times. Liliana is on the way to quitting, she is down to 2 cigarettes a day.      Body mass index is 22.8 kg/(m^2).    BP Readings from Last 1 Encounters:   07/13/18 124/82     BP noted to be well controlled today in office.     Liliana IS a smoker/uses chewing tobacco.  Liliana is ready to quit      Past Medical History -   Past Medical History:   Diagnosis Date     Allergic rhinitis, cause unspecified     Allergic rhinitis - weeds and pollan     Anxiety      Bladder polyps      Depressive disorder      Obstructive chronic bronchitis with exacerbation (H)      Other and unspecified ovarian cyst     Ovarian cyst - rupured cyst + laser x3     Other and unspecified ovarian cyst 9/14/2005    Left hemorrhagic ovarian cyst.     Tobacco abuse      Urinary tract infection, site not specified        Current Medications -   Current Outpatient Prescriptions:      Acetaminophen (TYLENOL PO), Take 650 mg by mouth every 4 hours as needed for mild pain or fever, Disp: , Rfl:      albuterol (2.5 MG/3ML) 0.083% neb solution, ONE NEBULIZATION 4 TIMES DAILY AS NEEDED (Patient not taking: Reported on 6/27/2018), Disp: 1 Box, Rfl: 0     albuterol (PROAIR HFA/PROVENTIL HFA/VENTOLIN HFA) 108 (90 BASE) MCG/ACT Inhaler, Inhale 2 puffs into the lungs  every 6 hours as needed for shortness of breath / dyspnea or wheezing (Patient not taking: Reported on 6/27/2018), Disp: 1 Inhaler, Rfl: 0     ALPRAZolam (XANAX) 0.5 MG tablet, Take 1 tablet (0.5 mg) by mouth 3 times daily as needed for anxiety, Disp: 90 tablet, Rfl: 0     aspirin 81 MG tablet, Take 1 tablet (81 mg) by mouth daily, Disp: 30 tablet, Rfl: OTC     citalopram (CELEXA) 20 MG tablet, TAKE ONE TABLET BY MOUTH EVERY DAY, Disp: 30 tablet, Rfl: 1     estradiol (ESTRACE) 1 MG tablet, TAKE ONE TABLET BY MOUTH EVERY DAY, Disp: 30 tablet, Rfl: 6     fluticasone (FLONASE) 50 MCG/ACT spray, Spray 2 sprays into both nostrils daily, Disp: 1 Bottle, Rfl: 11     loratadine (CLARITIN) 10 MG tablet, TAKE ONE TABLET BY MOUTH EVERY DAY, Disp: 30 tablet, Rfl: 5     meclizine (ANTIVERT) 25 MG tablet, Take 1 tablet (25 mg) by mouth every 6 hours as needed for dizziness, Disp: 30 tablet, Rfl: 0     MELATONIN, 10 mg daily , Disp: , Rfl:     Allergies -   Allergies   Allergen Reactions     Augmentin Swelling and Difficulty breathing     Avelox Nausea and Vomiting     Effexor Xr [Venlafaxine Hydrochloride]      Shaky and heart racing     Sulfa Drugs        Social History -   Social History     Social History     Marital status:      Spouse name: N/A     Number of children: N/A     Years of education: N/A     Occupational History      [employer]     assembly     Social History Main Topics     Smoking status: Current Some Day Smoker     Packs/day: 0.00     Years: 10.00     Smokeless tobacco: Never Used      Comment: 3 cigs a day      Alcohol use No      Comment: recovering  - 2005     Drug use: No     Sexual activity: Not Currently     Partners: Male     Birth control/ protection: Surgical      Comment: tubal 1997     Other Topics Concern     Not on file     Social History Narrative    Lives with spouse and children. No domestic violence issues.       Family History -   Family History   Problem Relation Age of Onset      Alcohol/Drug Father       at age 53 of alcohol     Arthritis Father      Cancer Father      lung     Alcohol/Drug Paternal Grandfather      Cancer Paternal Grandfather      lung     Alcohol/Drug Paternal Grandmother      Cancer Paternal Grandmother      lung     Alcohol/Drug Paternal Aunt      Allergies Daughter      animals and grass     Allergies Son      dust mite     Arthritis Mother      Hypertension Mother      Lipids Mother      Depression Mother      HEART DISEASE Mother      Blood Disease Mother      DVTs     Depression Brother        Review of Systems - As per HPI and PMHx, otherwise review of system review of the head and neck negative.    Physical Exam  LMP 2006  BMI: There is no height or weight on file to calculate BMI.    General - The patient is well nourished and well developed, and appears to have good nutritional status.  Alert and oriented to person and place, answers questions and cooperates with examination appropriately.    SKIN - No suspicious lesions or rashes.  Respiration - No respiratory distress.  Head and Face - Normocephalic and atraumatic, with no gross asymmetry noted of the contour of the facial features.  The facial nerve is intact, with strong symmetric movements.    Voice and Breathing - The patient was breathing comfortably without the use of accessory muscles. The patients voice was clear and strong, and had appropriate pitch and quality.    Ears - Bilateral pinna and EACs with normal appearing overlying skin. Tympanic membrane intact with good mobility on pneumatic otoscopy bilaterally. Bony landmarks of the ossicular chain are normal. The tympanic membranes are normal in appearance. No retraction, perforation, or masses.  No fluid or purulence was seen in the external canal or the middle ear.     Eyes - Extraocular movements intact.  Sclera were not icteric or injected, conjunctiva were pink and moist.    Mouth - Examination of the oral cavity showed pink, healthy  oral mucosa. No lesions or ulcerations noted.  The tongue was mobile and midline, and the dentition were in good condition. There is what seems to be a hemangioma on the right lower lip near the [place] boarder. The lining of the lip is intact.     Throat - The walls of the oropharynx were smooth, pink, moist, symmetric, and had no lesions or ulcerations.  The tonsillar pillars and soft palate were symmetric.  The uvula was midline on elevation.    Neck - Normal midline excursion of the laryngotracheal complex during swallowing.  Full range of motion on passive movement.  Palpation of the occipital, submental, submandibular, internal jugular chain, and supraclavicular nodes did not demonstrate any abnormal lymph nodes or masses.  The carotid pulse was palpable bilaterally.  Palpation of the thyroid was soft and smooth, with no nodules or goiter appreciated.  The trachea was mobile and midline.    Nose - External contour is symmetric, no gross deflection or scars.  Nasal mucosa is pink and moist with no abnormal mucus.  The septum was midline and non-obstructive, turbinates of normal size and position.  No polyps, masses, or purulence noted on examination.    Neuro - Nonfocal neuro exam is normal, CN 2 through 12 intact, normal gait and muscle tone.      Performed in clinic today:  No procedures preformed in clinic today      A/P - Liliana VELASCO Standaert is a 48 year old female a hemangioma on the right side of the lower lip. I discussed with         This document serves as a record of the services and decisions personally performed and made by Dr. Cordell Pacheco MD. It was created on his behalf by Erika Sheridan, a trained medical scribe. The creation of this document is based the provider's statements to the medical scribe.  Erika Sheridan 7/23/2018    Provider:   The information in this document, created by the medical scribe for me, accurately reflects the services I personally performed and the decisions made  by me. I have reviewed and approved this document for accuracy prior to leaving the patient care area.  Dr. Cordell Pacheco MD 7/23/2018    Cordell Pacheco MD.      Again, thank you for allowing me to participate in the care of your patient.        Sincerely,        Cordell Pacheco MD, MD

## 2018-07-23 NOTE — MR AVS SNAPSHOT
"              After Visit Summary   2018    Liliana Kat    MRN: [id number]           Patient Information     Date Of Birth          1970        Visit Information        Provider Department      2018 9:30 AM Cordell Pacheco MD [facility]         Follow-ups after your visit        Your next 10 appointments already scheduled     Aug 07, 2018   Procedure with Juanpablo Barba DO   [facility] ([facility])    [address] [place] [id number] [phone]              Who to contact     If you have questions or need follow up information about today's clinic visit or your schedule please contact [facility] directly at [phone].  Normal or non-critical lab and imaging results will be communicated to you by MyChart, letter or phone within 4 business days after the clinic has received the results. If you do not hear from us within 7 days, please contact the clinic through MyChart or phone. If you have a critical or abnormal lab result, we will notify you by phone as soon as possible.  Submit refill requests through L[employer] or call your pharmacy and they will forward the refill request to us. Please allow 3 business days for your refill to be completed.          Additional Information About Your Visit        [email or web address] Information     LISNR lets you send messages to your doctor, view your test results, renew your prescriptions, schedule appointments and more. To sign up, go to [email or web address] . Click on \"Log in\" on the left side of the screen, which will take you to the Welcome page. Then click on \"Sign up Now\" on the right side of the page.     You will be asked to enter the access code listed below, as well as some personal information. Please follow the directions to create your username and password.     Your access code is: [email or web address]  Expires: 2018 11:02 AM     Your access code will  " in 90 days. If you need help or a new code, please call your [facility] clinic or [phone].        Care EveryWhere ID     This is your Care EveryWhere ID. This could be used by other organizations to access your [facility] medical records  [id number]        Your Vitals Were     Pulse Last Period Pulse Oximetry BMI (Body Mass Index)          80 03/06/2006 96% 22.8 kg/m2         Blood Pressure from Last 3 Encounters:   07/23/18 100/60   07/13/18 124/82   07/06/18 122/78    Weight from Last 3 Encounters:   07/23/18 62.1 kg (137 lb)   07/13/18 61 kg (134 lb 6.4 oz)   07/06/18 61.2 kg (135 lb)              Today, you had the following     No orders found for display       Primary Care Provider Office Phone # Fax #    Paula LOLIS [facility] [phone] [address] [place] [address]        Equal Access to Services     [facility] : Hadii aad ku hadasho Soomaali, waaxda luqadaha, qaybta kaalmada adeegyada, waxay idiin haylitn harlan cade . So [facility] [phone].    ATENCIÓN: Si habla español, tiene a garcia disposición servicios gratuitos de asistencia lingüística. Llame al [phone].    We comply with applicable federal civil rights laws and [place] laws. We do not discriminate on the basis of race, color, national origin, age, disability, sex, sexual orientation, or gender identity.            Thank you!     Thank you for choosing [facility]  for your care. Our goal is always to provide you with excellent care. Hearing back from our patients is one way we can continue to improve our services. Please take a few minutes to complete the written survey that you may receive in the mail after your visit with us. Thank you!             Your Updated Medication List - Protect others around you: Learn how to safely use, store and throw away your medicines at www.disposemymeds.org.          This list is accurate as of 7/23/18 10:35 AM.  Always use your most recent med list.                    Brand Name Dispense Instructions for use Diagnosis    * albuterol 108 (90 Base) MCG/ACT Inhaler    PROAIR HFA/PROVENTIL HFA/VENTOLIN HFA    1 Inhaler    Inhale 2 puffs into the lungs every 6 hours as needed for shortness of breath / dyspnea or wheezing    Tobacco abuse       * albuterol (2.5 MG/3ML) 0.083% neb solution     1 Box    ONE NEBULIZATION 4 TIMES DAILY AS NEEDED    Acute sinusitis, unspecified       ALPRAZolam 0.5 MG tablet    XANAX    90 tablet    Take 1 tablet (0.5 mg) by mouth 3 times daily as needed for anxiety    Adjustment disorder with mixed anxiety and depressed mood       aspirin 81 MG tablet     30 tablet    Take 1 tablet (81 mg) by mouth daily    Superficial varicosities, unspecified laterality       citalopram 20 MG tablet    celeXA    30 tablet    TAKE ONE TABLET BY MOUTH EVERY DAY    Adjustment disorder with mixed anxiety and depressed mood       estradiol 1 MG tablet    ESTRACE    30 tablet    TAKE ONE TABLET BY MOUTH EVERY DAY    Need for postmenopausal hormone replacement       fluticasone 50 MCG/ACT spray    FLONASE    1 Bottle    Spray 2 sprays into both nostrils daily    Acute sinusitis, unspecified       loratadine 10 MG tablet    CLARITIN    30 tablet    TAKE ONE TABLET BY MOUTH EVERY DAY    Acute sinusitis       meclizine 25 MG tablet    ANTIVERT    30 tablet    Take 1 tablet (25 mg) by mouth every 6 hours as needed for dizziness        MELATONIN      10 mg daily        TYLENOL PO      Take 650 mg by mouth every 4 hours as needed for mild pain or fever        * Notice:  This list has 2 medication(s) that are the same as other medications prescribed for you. Read the directions carefully, and ask your doctor or other care provider to review them with you.

## 2018-08-06 ENCOUNTER — TELEPHONE (OUTPATIENT)
Dept: FAMILY MEDICINE | Facility: CLINIC | Age: 48
End: 2018-08-06

## 2018-08-06 DIAGNOSIS — Z79.890 NEED FOR POSTMENOPAUSAL HORMONE REPLACEMENT: ICD-10-CM

## 2018-08-06 RX ORDER — LIDOCAINE 40 MG/G
CREAM TOPICAL
Status: CANCELLED | OUTPATIENT
Start: 2018-08-06

## 2018-08-06 RX ORDER — ONDANSETRON 2 MG/ML
4 INJECTION INTRAMUSCULAR; INTRAVENOUS
Status: CANCELLED | OUTPATIENT
Start: 2018-08-06

## 2018-08-06 NOTE — TELEPHONE ENCOUNTER
Medroxyprogesterone 5 MG       Last Written Prescription Date:    Last Fill Quantity: ,   # refills:   Last Office Visit: 4/5/17  Future Office visit:    Next 5 appointments (look out 90 days)     Aug 13, 2018 10:45 AM CDT   Return Visit with Cordell Pacheco MD   [facility]    [address] [place] [id number]   [phone]                   Routing refill request to provider for review/approval because:  Drug not on the [facility], [facility] or  Health refill protocol or controlled substance

## 2018-08-06 NOTE — LETTER
[address] [id number] [phone]        August 9, 2018    Liliana Kat  [address] [place] [id number]          Dear Liliana,    We, at [facility] want to help you receive better care.  To improve the process of getting refills please following up with your provider and we ask that you schedule an appointment with your primary provider before your next refill. You can call the clinic at [phone] to schedule an appointment.          Sincerely,        Paula Villa NP

## 2018-08-07 ENCOUNTER — ANESTHESIA EVENT (OUTPATIENT)
Dept: GASTROENTEROLOGY | Facility: CLINIC | Age: 48
End: 2018-08-07

## 2018-08-07 ENCOUNTER — ANESTHESIA (OUTPATIENT)
Dept: GASTROENTEROLOGY | Facility: CLINIC | Age: 48
End: 2018-08-07

## 2018-08-07 RX ORDER — MEDROXYPROGESTERONE ACETATE 5 MG
TABLET ORAL
Qty: 90 TABLET | Refills: 0 | Status: SHIPPED | OUTPATIENT
Start: 2018-08-07 | End: 2019-01-11

## 2018-08-07 ASSESSMENT — COPD QUESTIONNAIRES: COPD: 1

## 2018-08-07 ASSESSMENT — LIFESTYLE VARIABLES: TOBACCO_USE: 1

## 2018-08-07 NOTE — ANESTHESIA PREPROCEDURE EVALUATION
Anesthesia Evaluation     . Pt has had prior anesthetic. Type: General           ROS/MED HX    ENT/Pulmonary:     (+)tobacco use, Current use asthma Treatment: Inhaler prn,  COPD, , . .    Neurologic:  - neg neurologic ROS     Cardiovascular:     (+) ----. Taking blood thinners : Instructions Given to patient: been taking ASA, discussed with surgeon.  . . . :. . Previous cardiac testing date:results:date: results:ECG reviewed date:6/22/11 results:NSR date: results:          METS/Exercise Tolerance:     Hematologic:  - neg hematologic  ROS       Musculoskeletal:  - neg musculoskeletal ROS       GI/Hepatic:  - neg GI/hepatic ROS       Renal/Genitourinary:  - ROS Renal section negative       Endo:  - neg endo ROS       Psychiatric:     (+) psychiatric history anxiety and depression      Infectious Disease:  - neg infectious disease ROS       Malignancy:      - no malignancy   Other:    (+) No chance of pregnancy C-spine cleared: N/A,   - neg other ROS                 Physical Exam  Normal systems: cardiovascular, pulmonary and dental    Airway   Mallampati: II  TM distance: >3 FB  Neck ROM: full    Dental     Cardiovascular   Rhythm and rate: normal      Pulmonary    breath sounds clear to auscultation                    Anesthesia Plan      History & Physical Review  History and physical reviewed and following examination; no interval change.    ASA Status:  2 .    NPO Status:  > 6 hours    Plan for MAC with Propofol induction. Maintenance will be TIVA.  Reason for MAC:  Deep or markedly invasive procedure (G8)         Postoperative Care      Consents  Anesthetic plan, risks, benefits and alternatives discussed with:  Patient.  Use of blood products discussed: No .   .                          .

## 2018-08-07 NOTE — TELEPHONE ENCOUNTER
Left message for patient to call back and speak with any .    Thank you,  Princess Glass   for [facility]

## 2018-08-08 NOTE — TELEPHONE ENCOUNTER
2nd attempt to reach pt- left another message. Routing back to team to send letter.   Thank you,  Letitia Deluca- Pt Rep.

## 2018-08-13 ENCOUNTER — OFFICE VISIT (OUTPATIENT)
Dept: OTOLARYNGOLOGY | Facility: CLINIC | Age: 48
End: 2018-08-13
Payer: COMMERCIAL

## 2018-08-13 VITALS
OXYGEN SATURATION: 99 % | SYSTOLIC BLOOD PRESSURE: 106 MMHG | TEMPERATURE: 98.5 F | DIASTOLIC BLOOD PRESSURE: 60 MMHG | HEART RATE: 96 BPM | BODY MASS INDEX: 22.8 KG/M2 | WEIGHT: 137 LBS

## 2018-08-13 DIAGNOSIS — F43.23 ADJUSTMENT DISORDER WITH MIXED ANXIETY AND DEPRESSED MOOD: ICD-10-CM

## 2018-08-13 DIAGNOSIS — D18.01 HEMANGIOMA OF LIP: Primary | ICD-10-CM

## 2018-08-13 PROCEDURE — 17999 UNLISTD PX SKN MUC MEMB SUBQ: CPT | Performed by: OTOLARYNGOLOGY

## 2018-08-13 PROCEDURE — 99207 ZZC DROP WITH A PROCEDURE: CPT | Mod: 25 | Performed by: OTOLARYNGOLOGY

## 2018-08-13 RX ORDER — ALPRAZOLAM 0.5 MG
0.5 TABLET ORAL 3 TIMES DAILY PRN
Qty: 90 TABLET | Refills: 0 | Status: SHIPPED | OUTPATIENT
Start: 2018-08-13 | End: 2018-10-24

## 2018-08-13 ASSESSMENT — PAIN SCALES - GENERAL: PAINLEVEL: NO PAIN (0)

## 2018-08-13 NOTE — NURSING NOTE
[facility]  Invasive Procedure Safety Checklist  August 13, 2018  Procedure:  Ablation hemangioma lower lip    Responsible person(s):  Complete sections as appropriate and electronically sign and date below.    Staff/Provider  Consent documentation on chart:  YES  H&P is not applicable (when straight local anesthesia is used).    Procedure Team  Completed by comparing informed consent documentation, information on the patient record and/or the marked surgical site, and discussion with the patient/guardian.     Verified:  (Select all that apply)  Patient identification (two indicators)  Procedure to be performed  Procedure site and /or laterality and/or level  Consent  Procedure site:  Identified by pt and MD  Provider Larson - Site/Laterality/Level:  No laterality  Staff/Provider:  No images    Procedure Team:  *Pause for the Cause* verbal and active participation of team members- verify:  Patient name:  YES  Procedure to be performed:  YES  Site, laterality and level, noting patient position:  YES    Above steps completed as applicable (Electronic Signature, Title, Date):    NARGIS Plummer 8/13/18      Note:  Any incidents of wrong patient, wrong procedure, or wrong site are reported using the Occurrence Process already in place.  The occurrence form is required to be completed immediately with this type of event.

## 2018-08-13 NOTE — TELEPHONE ENCOUNTER
Requested Prescriptions   Pending Prescriptions Disp Refills     ALPRAZolam (XANAX) 0.5 MG tablet 90 tablet 0     Sig: Take 1 tablet (0.5 mg) by mouth 3 times daily as needed for anxiety    There is no refill protocol information for this order        ALPRAZolam (XANAX) 0.5 MG tablet      Last Written Prescription Date:  06/12/2018  Last Fill Quantity: 90,   # refills: 0  Last Office Visit: 07/06/2018  Future Office visit:     Routing refill request to provider for review/approval because:  Drug not on the [facility], [facility] or [facility] refill protocol or controlled substance  Harriet Garber RN, BSN

## 2018-08-13 NOTE — TELEPHONE ENCOUNTER
I have not seen Kassandra for over one year.  I did fill the prescription today, but please schedule an appointment

## 2018-08-13 NOTE — PROGRESS NOTES
Dictating procedure report.  Preoperative diagnosis lower lip hemangioma.  Postoperative diagnosis same  Procedure: Ablation of lower lip hemangioma with bipolar cautery.  Surgeon: Cordell Pacheco  Assistants none  Patient with a history of lower lip hemangioma bites on it and bothered by it.  She understands risks and benefits of procedure were stable and agrees to it.    Patient is taken to procedure room appropriate position prepped and draped.  Topical Cetacaine was first used followed by local injection using 1% lidocaine 1-100,000 epinephrine.  Using fine tip bipolar cautery at the low setting of 10 hemangioma was ablated systematically in layers.  Patient tolerated procedure well.  Silvadene cream was applied.  Patient will use Silvadene for a few days followed by topical Vaseline.  She will follow-up with us in 2 weeks.    Cordell Pacheco MD

## 2018-08-13 NOTE — MR AVS SNAPSHOT
"              After Visit Summary   2018    Liliana Kat    MRN: [id number]           Patient Information     Date Of Birth          1970        Visit Information        Provider Department      2018 10:45 AM Cordell Pacheco MD [facility]        Today's Diagnoses     Hemangioma of lip    -  1       Follow-ups after your visit        Who to contact     If you have questions or need follow up information about today's clinic visit or your schedule please contact [facility] directly at [phone].  Normal or non-critical lab and imaging results will be communicated to you by [employer], letter or phone within 4 business days after the clinic has received the results. If you do not hear from us within 7 days, please contact the clinic through [employer] or phone. If you have a critical or abnormal lab result, we will notify you by phone as soon as possible.  Submit refill requests through [email or web address] or call your pharmacy and they will forward the refill request to us. Please allow 3 business days for your refill to be completed.          Additional Information About Your Visit        MyChart Information     [email or web address] lets you send messages to your doctor, view your test results, renew your prescriptions, schedule appointments and more. To sign up, go to [email or web address] . Click on \"Log in\" on the left side of the screen, which will take you to the Welcome page. Then click on \"Sign up Now\" on the right side of the page.     You will be asked to enter the access code listed below, as well as some personal information. Please follow the directions to create your username and password.     Your access code is: [email or web address]  Expires: 2018 11:02 AM     Your access code will  in 90 days. If you need help or a new code, please call your [facility] or [phone].        Care EveryWhere ID     This is your Care EveryWhere ID. This could be used by other " organizations to access your [facility] medical records  [id number]        Your Vitals Were     Pulse Temperature Last Period Pulse Oximetry BMI (Body Mass Index)       96 98.5  F (36.9  C) (Temporal) 03/06/2006 99% 22.8 kg/m2        Blood Pressure from Last 3 Encounters:   08/13/18 106/60   07/23/18 100/60   07/13/18 124/82    Weight from Last 3 Encounters:   08/13/18 62.1 kg (137 lb)   07/23/18 62.1 kg (137 lb)   07/13/18 61 kg (134 lb 6.4 oz)              We Performed the Following     C DESTRUCTION CUTANEOUS VASCULAR PROLIFERATE LESIONS        Primary Care Provider Office Phone # Fax #    LOLIS Osorio [facility] [phone] [address] [place] [address]        Equal Access to Services     NAREN WILSON : Paulie zeng Solizbeth, waaxda luqadaha, qaybta kaalmada deangelo, anirudh cade . So [facility] [phone].    ATENCIÓN: Si habla español, tiene a garcia disposición servicios gratuitos de asistencia lingüística. Roxane al [phone].    We comply with applicable federal civil rights laws and [place] laws. We do not discriminate on the basis of race, color, national origin, age, disability, sex, sexual orientation, or gender identity.            Thank you!     Thank you for choosing [facility]  for your care. Our goal is always to provide you with excellent care. Hearing back from our patients is one way we can continue to improve our services. Please take a few minutes to complete the written survey that you may receive in the mail after your visit with us. Thank you!             Your Updated Medication List - Protect others around you: Learn how to safely use, store and throw away your medicines at www.disposemymeds.org.          This list is accurate as of 8/13/18  1:26 PM.  Always use your most recent med list.                   Brand Name Dispense Instructions for use Diagnosis    * albuterol 108 (90 Base) MCG/ACT inhaler    PROAIR  HFA/PROVENTIL HFA/VENTOLIN HFA    1 Inhaler    Inhale 2 puffs into the lungs every 6 hours as needed for shortness of breath / dyspnea or wheezing    Tobacco abuse       * albuterol (2.5 MG/3ML) 0.083% neb solution     1 Box    ONE NEBULIZATION 4 TIMES DAILY AS NEEDED    Acute sinusitis, unspecified       ALPRAZolam 0.5 MG tablet    XANAX    90 tablet    Take 1 tablet (0.5 mg) by mouth 3 times daily as needed for anxiety    Adjustment disorder with mixed anxiety and depressed mood       aspirin 81 MG tablet     30 tablet    Take 1 tablet (81 mg) by mouth daily    Superficial varicosities, unspecified laterality       citalopram 20 MG tablet    celeXA    30 tablet    TAKE ONE TABLET BY MOUTH EVERY DAY    Adjustment disorder with mixed anxiety and depressed mood       estradiol 1 MG tablet    ESTRACE    30 tablet    TAKE ONE TABLET BY MOUTH EVERY DAY    Need for postmenopausal hormone replacement       fluticasone 50 MCG/ACT spray    FLONASE    1 Bottle    Spray 2 sprays into both nostrils daily    Acute sinusitis, unspecified       loratadine 10 MG tablet    CLARITIN    30 tablet    TAKE ONE TABLET BY MOUTH EVERY DAY    Acute sinusitis       meclizine 25 MG tablet    ANTIVERT    30 tablet    Take 1 tablet (25 mg) by mouth every 6 hours as needed for dizziness        medroxyPROGESTERone 5 MG tablet    PROVERA    90 tablet    TAKE ONE TABLET BY MOUTH EVERY DAY    Need for postmenopausal hormone replacement       MELATONIN      10 mg daily        TYLENOL PO      Take 650 mg by mouth every 4 hours as needed for mild pain or fever        * Notice:  This list has 2 medication(s) that are the same as other medications prescribed for you. Read the directions carefully, and ask your doctor or other care provider to review them with you.

## 2018-08-14 NOTE — TELEPHONE ENCOUNTER
Patient calling back, gave message below and scheduled patient for an appointment with her provider

## 2018-08-14 NOTE — TELEPHONE ENCOUNTER
Left message for patient to call back and speak with any .    Thank you,  Princess Glass   for [facility]

## 2018-08-27 ENCOUNTER — TELEPHONE (OUTPATIENT)
Dept: OTOLARYNGOLOGY | Facility: CLINIC | Age: 48
End: 2018-08-27

## 2018-08-27 NOTE — TELEPHONE ENCOUNTER
Spoke with Liliana and she doing very well following ablation of lesion on lower lip. She will call if she has any problems.      Zeny Farrell MA-ENT

## 2018-08-30 ENCOUNTER — APPOINTMENT (OUTPATIENT)
Dept: GENERAL RADIOLOGY | Facility: CLINIC | Age: 48
End: 2018-08-30
Attending: EMERGENCY MEDICINE
Payer: COMMERCIAL

## 2018-08-30 ENCOUNTER — APPOINTMENT (OUTPATIENT)
Dept: CT IMAGING | Facility: CLINIC | Age: 48
End: 2018-08-30
Attending: EMERGENCY MEDICINE
Payer: COMMERCIAL

## 2018-08-30 ENCOUNTER — HOSPITAL ENCOUNTER (INPATIENT)
Facility: CLINIC | Age: 48
LOS: 1 days | Discharge: HOME | End: 2018-08-31
Attending: EMERGENCY MEDICINE | Admitting: FAMILY MEDICINE
Payer: COMMERCIAL

## 2018-08-30 DIAGNOSIS — T78.2XXA DRUG-INDUCED ANAPHYLAXIS, INITIAL ENCOUNTER: ICD-10-CM

## 2018-08-30 DIAGNOSIS — T78.40XA ALLERGIC REACTION, INITIAL ENCOUNTER: ICD-10-CM

## 2018-08-30 DIAGNOSIS — T78.2XXA ANAPHYLAXIS, INITIAL ENCOUNTER: ICD-10-CM

## 2018-08-30 DIAGNOSIS — D41.4 BLADDER POLYPS: Primary | ICD-10-CM

## 2018-08-30 DIAGNOSIS — T50.905A DRUG-INDUCED ANAPHYLAXIS, INITIAL ENCOUNTER: ICD-10-CM

## 2018-08-30 PROBLEM — E87.6 HYPOKALEMIA: Status: ACTIVE | Noted: 2018-08-30

## 2018-08-30 PROBLEM — J30.2 SEASONAL ALLERGIC RHINITIS: Status: ACTIVE | Noted: 2018-08-30

## 2018-08-30 LAB
ALBUMIN UR-MCNC: 30 MG/DL
AMPHETAMINES UR QL: NOT DETECTED NG/ML
ANION GAP SERPL CALCULATED.3IONS-SCNC: 8 MMOL/L (ref 3–14)
APPEARANCE UR: ABNORMAL
B-HCG SERPL-ACNC: 6 IU/L (ref 0–5)
BARBITURATES UR QL SCN: NOT DETECTED NG/ML
BASE DEFICIT BLDV-SCNC: 7.3 MMOL/L
BASE DEFICIT BLDV-SCNC: 8.2 MMOL/L
BASOPHILS # BLD AUTO: 0 10E9/L (ref 0–0.2)
BASOPHILS NFR BLD AUTO: 0.1 %
BENZODIAZ UR QL SCN: ABNORMAL NG/ML
BILIRUB UR QL STRIP: NEGATIVE
BUN SERPL-MCNC: 14 MG/DL (ref 7–30)
BUPRENORPHINE UR QL: NOT DETECTED NG/ML
CALCIUM SERPL-MCNC: 8.1 MG/DL (ref 8.5–10.1)
CANNABINOIDS UR QL: ABNORMAL NG/ML
CHLORIDE SERPL-SCNC: 109 MMOL/L (ref 94–109)
CO2 SERPL-SCNC: 24 MMOL/L (ref 20–32)
COCAINE UR QL SCN: NOT DETECTED NG/ML
COLOR UR AUTO: YELLOW
CREAT SERPL-MCNC: 0.98 MG/DL (ref 0.52–1.04)
D-METHAMPHET UR QL: NOT DETECTED NG/ML
DIFFERENTIAL METHOD BLD: NORMAL
EOSINOPHIL NFR BLD AUTO: 0.9 %
ERYTHROCYTE [DISTWIDTH] IN BLOOD BY AUTOMATED COUNT: 12 % (ref 10–15)
GFR SERPL CREATININE-BSD FRML MDRD: 61 ML/MIN/1.7M2
GLUCOSE BLDC GLUCOMTR-MCNC: 232 MG/DL (ref 70–99)
GLUCOSE BLDC GLUCOMTR-MCNC: 237 MG/DL (ref 70–99)
GLUCOSE SERPL-MCNC: 182 MG/DL (ref 70–99)
GLUCOSE UR STRIP-MCNC: >499 MG/DL
HBA1C MFR BLD: 5.3 % (ref 0–5.6)
HCG UR QL: POSITIVE
HCO3 BLDV-SCNC: 18 MMOL/L (ref 21–28)
HCO3 BLDV-SCNC: 20 MMOL/L (ref 21–28)
HCT VFR BLD AUTO: 45.9 % (ref 35–47)
HGB BLD-MCNC: 15.2 G/DL (ref 11.7–15.7)
HGB UR QL STRIP: ABNORMAL
HYALINE CASTS #/AREA URNS LPF: 5 /LPF (ref 0–2)
IMM GRANULOCYTES # BLD: 0 10E9/L (ref 0–0.4)
IMM GRANULOCYTES NFR BLD: 0.2 %
KETONES UR STRIP-MCNC: NEGATIVE MG/DL
LACTATE BLD-SCNC: 2.3 MMOL/L (ref 0.7–2)
LACTATE BLD-SCNC: 2.3 MMOL/L (ref 0.7–2)
LACTATE BLD-SCNC: 5.8 MMOL/L (ref 0.7–2)
LACTATE BLD-SCNC: 6.4 MMOL/L (ref 0.7–2)
LEUKOCYTE ESTERASE UR QL STRIP: NEGATIVE
LYMPHOCYTES # BLD AUTO: 3.7 10E9/L (ref 0.8–5.3)
LYMPHOCYTES NFR BLD AUTO: 43.7 %
MCH RBC QN AUTO: 32.3 PG (ref 26.5–33)
MCHC RBC AUTO-ENTMCNC: 33.1 G/DL (ref 31.5–36.5)
MCV RBC AUTO: 98 FL (ref 78–100)
METHADONE UR QL SCN: NOT DETECTED NG/ML
MONOCYTES # BLD AUTO: 0.4 10E9/L (ref 0–1.3)
MONOCYTES NFR BLD AUTO: 4.8 %
MUCOUS THREADS #/AREA URNS LPF: PRESENT /LPF
NEUTROPHILS # BLD AUTO: 4.2 10E9/L (ref 1.6–8.3)
NEUTROPHILS NFR BLD AUTO: 50.3 %
NITRATE UR QL: NEGATIVE
NRBC # BLD AUTO: 0 10*3/UL
NRBC BLD AUTO-RTO: 0 /100
O2/TOTAL GAS SETTING VFR VENT: 21 %
O2/TOTAL GAS SETTING VFR VENT: 25 %
OPIATES UR QL SCN: NOT DETECTED NG/ML
OXYCODONE UR QL SCN: NOT DETECTED NG/ML
PCO2 BLDV: 37 MM HG (ref 40–50)
PCO2 BLDV: 47 MM HG (ref 40–50)
PCP UR QL SCN: NOT DETECTED NG/ML
PH BLDV: 7.24 PH (ref 7.32–7.43)
PH BLDV: 7.29 PH (ref 7.32–7.43)
PH UR STRIP: 6 PH (ref 5–7)
PLATELET # BLD AUTO: 315 10E9/L (ref 150–450)
PO2 BLDV: 46 MM HG (ref 25–47)
PO2 BLDV: 56 MM HG (ref 25–47)
POTASSIUM SERPL-SCNC: 2.9 MMOL/L (ref 3.4–5.3)
PROPOXYPH UR QL: NOT DETECTED NG/ML
RBC # BLD AUTO: 4.7 10E12/L (ref 3.8–5.2)
RBC #/AREA URNS AUTO: 8 /HPF (ref 0–2)
SODIUM SERPL-SCNC: 141 MMOL/L (ref 133–144)
SOURCE: ABNORMAL
SP GR UR STRIP: 1.02 (ref 1–1.03)
SQUAMOUS #/AREA URNS AUTO: <1 /HPF (ref 0–1)
TRICYCLICS UR QL SCN: NOT DETECTED NG/ML
UROBILINOGEN UR STRIP-MCNC: 0 MG/DL (ref 0–2)
WBC # BLD AUTO: 8.5 10E9/L (ref 4–11)
WBC #/AREA URNS AUTO: 1 /HPF (ref 0–5)

## 2018-08-30 PROCEDURE — 99291 CRITICAL CARE FIRST HOUR: CPT | Mod: 25 | Performed by: EMERGENCY MEDICINE

## 2018-08-30 PROCEDURE — 83036 HEMOGLOBIN GLYCOSYLATED A1C: CPT | Performed by: FAMILY MEDICINE

## 2018-08-30 PROCEDURE — 93010 ELECTROCARDIOGRAM REPORT: CPT | Mod: Z6 | Performed by: EMERGENCY MEDICINE

## 2018-08-30 PROCEDURE — 31500 INSERT EMERGENCY AIRWAY: CPT | Performed by: EMERGENCY MEDICINE

## 2018-08-30 PROCEDURE — 25000131 ZZH RX MED GY IP 250 OP 636 PS 637: Performed by: FAMILY MEDICINE

## 2018-08-30 PROCEDURE — 76937 US GUIDE VASCULAR ACCESS: CPT | Performed by: EMERGENCY MEDICINE

## 2018-08-30 PROCEDURE — 94002 VENT MGMT INPAT INIT DAY: CPT

## 2018-08-30 PROCEDURE — 83605 ASSAY OF LACTIC ACID: CPT | Performed by: FAMILY MEDICINE

## 2018-08-30 PROCEDURE — 36556 INSERT NON-TUNNEL CV CATH: CPT | Performed by: EMERGENCY MEDICINE

## 2018-08-30 PROCEDURE — 82803 BLOOD GASES ANY COMBINATION: CPT | Performed by: EMERGENCY MEDICINE

## 2018-08-30 PROCEDURE — 84702 CHORIONIC GONADOTROPIN TEST: CPT | Performed by: EMERGENCY MEDICINE

## 2018-08-30 PROCEDURE — 40000986 XR CHEST PORT 1 VW

## 2018-08-30 PROCEDURE — 80048 BASIC METABOLIC PNL TOTAL CA: CPT | Performed by: EMERGENCY MEDICINE

## 2018-08-30 PROCEDURE — 96366 THER/PROPH/DIAG IV INF ADDON: CPT | Performed by: EMERGENCY MEDICINE

## 2018-08-30 PROCEDURE — 25000128 H RX IP 250 OP 636: Performed by: FAMILY MEDICINE

## 2018-08-30 PROCEDURE — 96376 TX/PRO/DX INJ SAME DRUG ADON: CPT | Performed by: EMERGENCY MEDICINE

## 2018-08-30 PROCEDURE — 99291 CRITICAL CARE FIRST HOUR: CPT | Performed by: FAMILY MEDICINE

## 2018-08-30 PROCEDURE — 00000146 ZZHCL STATISTIC GLUCOSE BY METER IP

## 2018-08-30 PROCEDURE — 96372 THER/PROPH/DIAG INJ SC/IM: CPT | Performed by: EMERGENCY MEDICINE

## 2018-08-30 PROCEDURE — 25000125 ZZHC RX 250: Performed by: EMERGENCY MEDICINE

## 2018-08-30 PROCEDURE — 80306 DRUG TEST PRSMV INSTRMNT: CPT | Performed by: EMERGENCY MEDICINE

## 2018-08-30 PROCEDURE — 99292 CRITICAL CARE ADDL 30 MIN: CPT | Performed by: EMERGENCY MEDICINE

## 2018-08-30 PROCEDURE — 82803 BLOOD GASES ANY COMBINATION: CPT | Performed by: FAMILY MEDICINE

## 2018-08-30 PROCEDURE — 96365 THER/PROPH/DIAG IV INF INIT: CPT | Performed by: EMERGENCY MEDICINE

## 2018-08-30 PROCEDURE — 40000275 ZZH STATISTIC RCP TIME EA 10 MIN

## 2018-08-30 PROCEDURE — 31500 INSERT EMERGENCY AIRWAY: CPT | Mod: Z6 | Performed by: EMERGENCY MEDICINE

## 2018-08-30 PROCEDURE — 25000128 H RX IP 250 OP 636: Performed by: EMERGENCY MEDICINE

## 2018-08-30 PROCEDURE — 85025 COMPLETE CBC W/AUTO DIFF WBC: CPT | Performed by: EMERGENCY MEDICINE

## 2018-08-30 PROCEDURE — 5A1935Z RESPIRATORY VENTILATION, LESS THAN 24 CONSECUTIVE HOURS: ICD-10-PCS | Performed by: EMERGENCY MEDICINE

## 2018-08-30 PROCEDURE — 96375 TX/PRO/DX INJ NEW DRUG ADDON: CPT | Performed by: EMERGENCY MEDICINE

## 2018-08-30 PROCEDURE — 93005 ELECTROCARDIOGRAM TRACING: CPT | Performed by: EMERGENCY MEDICINE

## 2018-08-30 PROCEDURE — 70450 CT HEAD/BRAIN W/O DYE: CPT

## 2018-08-30 PROCEDURE — 20000003 ZZH R&B ICU

## 2018-08-30 PROCEDURE — 81001 URINALYSIS AUTO W/SCOPE: CPT | Performed by: EMERGENCY MEDICINE

## 2018-08-30 PROCEDURE — 94003 VENT MGMT INPAT SUBQ DAY: CPT

## 2018-08-30 PROCEDURE — 83605 ASSAY OF LACTIC ACID: CPT | Performed by: EMERGENCY MEDICINE

## 2018-08-30 PROCEDURE — 99223 1ST HOSP IP/OBS HIGH 75: CPT | Mod: AI | Performed by: FAMILY MEDICINE

## 2018-08-30 PROCEDURE — 81025 URINE PREGNANCY TEST: CPT | Performed by: EMERGENCY MEDICINE

## 2018-08-30 PROCEDURE — 76937 US GUIDE VASCULAR ACCESS: CPT | Mod: 26 | Performed by: EMERGENCY MEDICINE

## 2018-08-30 PROCEDURE — 96361 HYDRATE IV INFUSION ADD-ON: CPT | Performed by: EMERGENCY MEDICINE

## 2018-08-30 PROCEDURE — 71045 X-RAY EXAM CHEST 1 VIEW: CPT | Mod: TC

## 2018-08-30 PROCEDURE — 25000128 H RX IP 250 OP 636

## 2018-08-30 PROCEDURE — 36556 INSERT NON-TUNNEL CV CATH: CPT | Mod: Z6 | Performed by: EMERGENCY MEDICINE

## 2018-08-30 RX ORDER — NALOXONE HYDROCHLORIDE 0.4 MG/ML
.1-.4 INJECTION, SOLUTION INTRAMUSCULAR; INTRAVENOUS; SUBCUTANEOUS
Status: DISCONTINUED | OUTPATIENT
Start: 2018-08-30 | End: 2018-08-31 | Stop reason: HOSPADM

## 2018-08-30 RX ORDER — POTASSIUM CHLORIDE 1500 MG/1
20-40 TABLET, EXTENDED RELEASE ORAL
Status: DISCONTINUED | OUTPATIENT
Start: 2018-08-30 | End: 2018-08-31 | Stop reason: HOSPADM

## 2018-08-30 RX ORDER — FENTANYL CITRATE 50 UG/ML
INJECTION, SOLUTION INTRAMUSCULAR; INTRAVENOUS
Status: COMPLETED
Start: 2018-08-30 | End: 2018-08-30

## 2018-08-30 RX ORDER — PROPOFOL 10 MG/ML
5-75 INJECTION, EMULSION INTRAVENOUS CONTINUOUS
Status: DISCONTINUED | OUTPATIENT
Start: 2018-08-30 | End: 2018-08-30 | Stop reason: CLARIF

## 2018-08-30 RX ORDER — PROPOFOL 10 MG/ML
10-20 INJECTION, EMULSION INTRAVENOUS EVERY 30 MIN PRN
Status: DISCONTINUED | OUTPATIENT
Start: 2018-08-30 | End: 2018-08-30 | Stop reason: CLARIF

## 2018-08-30 RX ORDER — VECURONIUM BROMIDE 1 MG/ML
10 INJECTION, POWDER, LYOPHILIZED, FOR SOLUTION INTRAVENOUS ONCE
Status: COMPLETED | OUTPATIENT
Start: 2018-08-30 | End: 2018-08-30

## 2018-08-30 RX ORDER — SODIUM CHLORIDE 9 MG/ML
1000 INJECTION, SOLUTION INTRAVENOUS CONTINUOUS
Status: DISCONTINUED | OUTPATIENT
Start: 2018-08-30 | End: 2018-08-31

## 2018-08-30 RX ORDER — POTASSIUM CL/LIDO/0.9 % NACL 10MEQ/0.1L
10 INTRAVENOUS SOLUTION, PIGGYBACK (ML) INTRAVENOUS
Status: DISCONTINUED | OUTPATIENT
Start: 2018-08-30 | End: 2018-08-30 | Stop reason: RX

## 2018-08-30 RX ORDER — METHYLPREDNISOLONE SODIUM SUCCINATE 125 MG/2ML
80 INJECTION, POWDER, LYOPHILIZED, FOR SOLUTION INTRAMUSCULAR; INTRAVENOUS ONCE
Status: COMPLETED | OUTPATIENT
Start: 2018-08-30 | End: 2018-08-30

## 2018-08-30 RX ORDER — METHYLPREDNISOLONE SODIUM SUCCINATE 40 MG/ML
40 INJECTION, POWDER, LYOPHILIZED, FOR SOLUTION INTRAMUSCULAR; INTRAVENOUS EVERY 6 HOURS
Status: DISCONTINUED | OUTPATIENT
Start: 2018-08-30 | End: 2018-08-31

## 2018-08-30 RX ORDER — ONDANSETRON 4 MG/1
4 TABLET, ORALLY DISINTEGRATING ORAL EVERY 6 HOURS PRN
Status: DISCONTINUED | OUTPATIENT
Start: 2018-08-30 | End: 2018-08-31 | Stop reason: HOSPADM

## 2018-08-30 RX ORDER — POTASSIUM CHLORIDE 29.8 MG/ML
20 INJECTION INTRAVENOUS
Status: DISCONTINUED | OUTPATIENT
Start: 2018-08-30 | End: 2018-08-31 | Stop reason: HOSPADM

## 2018-08-30 RX ORDER — HEPARIN SODIUM,PORCINE 10 UNIT/ML
5-10 VIAL (ML) INTRAVENOUS EVERY 24 HOURS
Status: DISCONTINUED | OUTPATIENT
Start: 2018-08-30 | End: 2018-08-31 | Stop reason: HOSPADM

## 2018-08-30 RX ORDER — EPINEPHRINE 1 MG/ML
0.3 INJECTION, SOLUTION, CONCENTRATE INTRAVENOUS ONCE
Status: DISCONTINUED | OUTPATIENT
Start: 2018-08-30 | End: 2018-08-30

## 2018-08-30 RX ORDER — POTASSIUM CHLORIDE 7.45 MG/ML
10 INJECTION INTRAVENOUS
Status: DISCONTINUED | OUTPATIENT
Start: 2018-08-30 | End: 2018-08-31 | Stop reason: HOSPADM

## 2018-08-30 RX ORDER — CITALOPRAM HYDROBROMIDE 20 MG/1
20 TABLET ORAL DAILY
Status: DISCONTINUED | OUTPATIENT
Start: 2018-08-31 | End: 2018-08-31 | Stop reason: HOSPADM

## 2018-08-30 RX ORDER — FENTANYL CITRATE 50 UG/ML
100 INJECTION, SOLUTION INTRAMUSCULAR; INTRAVENOUS ONCE
Status: COMPLETED | OUTPATIENT
Start: 2018-08-30 | End: 2018-08-30

## 2018-08-30 RX ORDER — DEXTROSE MONOHYDRATE 25 G/50ML
25-50 INJECTION, SOLUTION INTRAVENOUS
Status: DISCONTINUED | OUTPATIENT
Start: 2018-08-30 | End: 2018-08-31 | Stop reason: HOSPADM

## 2018-08-30 RX ORDER — FENTANYL CITRATE 50 UG/ML
100 INJECTION, SOLUTION INTRAMUSCULAR; INTRAVENOUS
Status: DISCONTINUED | OUTPATIENT
Start: 2018-08-30 | End: 2018-08-30 | Stop reason: CLARIF

## 2018-08-30 RX ORDER — ONDANSETRON 2 MG/ML
4 INJECTION INTRAMUSCULAR; INTRAVENOUS EVERY 6 HOURS PRN
Status: DISCONTINUED | OUTPATIENT
Start: 2018-08-30 | End: 2018-08-31 | Stop reason: HOSPADM

## 2018-08-30 RX ORDER — NICOTINE POLACRILEX 4 MG
15-30 LOZENGE BUCCAL
Status: DISCONTINUED | OUTPATIENT
Start: 2018-08-30 | End: 2018-08-31 | Stop reason: HOSPADM

## 2018-08-30 RX ORDER — ALPRAZOLAM 0.5 MG
0.5 TABLET ORAL 3 TIMES DAILY PRN
Status: DISCONTINUED | OUTPATIENT
Start: 2018-08-30 | End: 2018-08-31 | Stop reason: HOSPADM

## 2018-08-30 RX ORDER — LIDOCAINE 40 MG/G
CREAM TOPICAL
Status: DISCONTINUED | OUTPATIENT
Start: 2018-08-30 | End: 2018-08-31 | Stop reason: HOSPADM

## 2018-08-30 RX ORDER — POTASSIUM CHLORIDE 1.5 G/1.58G
20-40 POWDER, FOR SOLUTION ORAL
Status: DISCONTINUED | OUTPATIENT
Start: 2018-08-30 | End: 2018-08-31 | Stop reason: HOSPADM

## 2018-08-30 RX ORDER — ETOMIDATE 2 MG/ML
20 INJECTION INTRAVENOUS ONCE
Status: COMPLETED | OUTPATIENT
Start: 2018-08-30 | End: 2018-08-30

## 2018-08-30 RX ORDER — HEPARIN SODIUM,PORCINE 10 UNIT/ML
5-10 VIAL (ML) INTRAVENOUS
Status: DISCONTINUED | OUTPATIENT
Start: 2018-08-30 | End: 2018-08-31

## 2018-08-30 RX ORDER — NALOXONE HYDROCHLORIDE 0.4 MG/ML
.1-.4 INJECTION, SOLUTION INTRAMUSCULAR; INTRAVENOUS; SUBCUTANEOUS
Status: DISCONTINUED | OUTPATIENT
Start: 2018-08-30 | End: 2018-08-30

## 2018-08-30 RX ADMIN — FENTANYL CITRATE 100 MCG: 50 INJECTION, SOLUTION INTRAMUSCULAR; INTRAVENOUS at 11:37

## 2018-08-30 RX ADMIN — SODIUM CHLORIDE, PRESERVATIVE FREE 5 ML: 5 INJECTION INTRAVENOUS at 23:14

## 2018-08-30 RX ADMIN — SODIUM CHLORIDE 1000 ML: 9 INJECTION, SOLUTION INTRAVENOUS at 15:26

## 2018-08-30 RX ADMIN — EPINEPHRINE 0.03 MCG/KG/MIN: 1 INJECTION INTRAMUSCULAR; INTRAVENOUS; SUBCUTANEOUS at 22:53

## 2018-08-30 RX ADMIN — FENTANYL CITRATE 100 MCG: 50 INJECTION, SOLUTION INTRAMUSCULAR; INTRAVENOUS at 10:42

## 2018-08-30 RX ADMIN — ONDANSETRON 4 MG: 2 INJECTION INTRAMUSCULAR; INTRAVENOUS at 15:23

## 2018-08-30 RX ADMIN — SODIUM CHLORIDE 1000 ML: 9 INJECTION, SOLUTION INTRAVENOUS at 10:09

## 2018-08-30 RX ADMIN — EPINEPHRINE 0.03 MCG/KG/MIN: 1 INJECTION INTRAMUSCULAR; INTRAVENOUS; SUBCUTANEOUS at 10:10

## 2018-08-30 RX ADMIN — POTASSIUM CHLORIDE 20 MEQ: 400 INJECTION, SOLUTION INTRAVENOUS at 20:47

## 2018-08-30 RX ADMIN — SODIUM CHLORIDE 1000 ML: 9 INJECTION, SOLUTION INTRAVENOUS at 22:07

## 2018-08-30 RX ADMIN — METHYLPREDNISOLONE SODIUM SUCCINATE 40 MG: 40 INJECTION, POWDER, FOR SOLUTION INTRAMUSCULAR; INTRAVENOUS at 22:03

## 2018-08-30 RX ADMIN — PROPOFOL 5 MCG/KG/MIN: 10 INJECTION, EMULSION INTRAVENOUS at 14:22

## 2018-08-30 RX ADMIN — FAMOTIDINE 20 MG: 10 INJECTION, SOLUTION INTRAVENOUS at 22:03

## 2018-08-30 RX ADMIN — VECURONIUM BROMIDE 10 MG: 1 INJECTION, POWDER, LYOPHILIZED, FOR SOLUTION INTRAVENOUS at 11:42

## 2018-08-30 RX ADMIN — FAMOTIDINE 20 MG: 10 INJECTION, SOLUTION INTRAVENOUS at 10:17

## 2018-08-30 RX ADMIN — EPINEPHRINE 0.3 MG: 1 INJECTION, SOLUTION INTRAMUSCULAR; SUBCUTANEOUS at 09:52

## 2018-08-30 RX ADMIN — SODIUM CHLORIDE 1000 ML: 9 INJECTION, SOLUTION INTRAVENOUS at 11:05

## 2018-08-30 RX ADMIN — METHYLPREDNISOLONE SODIUM SUCCINATE 40 MG: 40 INJECTION, POWDER, FOR SOLUTION INTRAMUSCULAR; INTRAVENOUS at 17:13

## 2018-08-30 RX ADMIN — FENTANYL CITRATE 100 MCG: 50 INJECTION INTRAMUSCULAR; INTRAVENOUS at 13:43

## 2018-08-30 RX ADMIN — SUCCINYLCHOLINE CHLORIDE 94 MG: 20 INJECTION, SOLUTION INTRAMUSCULAR; INTRAVENOUS at 10:34

## 2018-08-30 RX ADMIN — SODIUM CHLORIDE 1000 ML: 9 INJECTION, SOLUTION INTRAVENOUS at 20:41

## 2018-08-30 RX ADMIN — POTASSIUM CHLORIDE 20 MEQ: 400 INJECTION, SOLUTION INTRAVENOUS at 19:48

## 2018-08-30 RX ADMIN — FENTANYL CITRATE 100 MCG: 50 INJECTION INTRAMUSCULAR; INTRAVENOUS at 11:37

## 2018-08-30 RX ADMIN — POTASSIUM CHLORIDE 20 MEQ: 400 INJECTION, SOLUTION INTRAVENOUS at 22:06

## 2018-08-30 RX ADMIN — INSULIN ASPART 1 UNITS: 100 INJECTION, SOLUTION INTRAVENOUS; SUBCUTANEOUS at 22:03

## 2018-08-30 RX ADMIN — FENTANYL CITRATE 100 MCG: 50 INJECTION, SOLUTION INTRAMUSCULAR; INTRAVENOUS at 15:20

## 2018-08-30 RX ADMIN — FENTANYL CITRATE 100 MCG: 50 INJECTION INTRAMUSCULAR; INTRAVENOUS at 10:42

## 2018-08-30 RX ADMIN — METHYLPREDNISOLONE SODIUM SUCCINATE 81.25 MG: 125 INJECTION, POWDER, FOR SOLUTION INTRAMUSCULAR; INTRAVENOUS at 10:09

## 2018-08-30 RX ADMIN — VECURONIUM BROMIDE 10 MG: 1 INJECTION, POWDER, LYOPHILIZED, FOR SOLUTION INTRAVENOUS at 11:00

## 2018-08-30 RX ADMIN — ETOMIDATE 20 MG: 2 INJECTION, SOLUTION INTRAVENOUS at 10:34

## 2018-08-30 ASSESSMENT — PAIN DESCRIPTION - DESCRIPTORS
DESCRIPTORS: DISCOMFORT
DESCRIPTORS: DISCOMFORT

## 2018-08-30 ASSESSMENT — ACTIVITIES OF DAILY LIVING (ADL)
ADLS_ACUITY_SCORE: 15
ADLS_ACUITY_SCORE: 10

## 2018-08-30 ASSESSMENT — VISUAL ACUITY: OU: NORMAL ACUITY

## 2018-08-30 NOTE — IP AVS SNAPSHOT
[facility]    [address] [place] [address]    Phone:  [phone]                                       After Visit Summary   8/30/2018    Liliana Kat    MRN: [id number]           After Visit Summary Signature Page     I have received my discharge instructions, and my questions have been answered. I have discussed any challenges I see with this plan with the nurse or doctor.    ..........................................................................................................................................  Patient/Patient Representative Signature      ..........................................................................................................................................  Patient Representative Print Name and Relationship to Patient    ..................................................               ................................................  Date                                            Time    ..........................................................................................................................................  Reviewed by Signature/Title    ...................................................              ..............................................  Date                                                            Time          22EPIC Rev 08/18

## 2018-08-30 NOTE — ED PROVIDER NOTES
History     Chief Complaint   Patient presents with     Allergic Reaction     HPI  Liliana Kat is a 48 year old female who with a history of amoxicillin allergy presents the emergency department with concerns for anaphylaxis.  She took her first dose of amoxicillin this morning at 8 AM that was prescribed by her dentist.  She is apparently tolerated some amoxicillin in the past.  I am uncertain as to whether or not she has had anaphylaxis in the past.  She started swelling, had some redness to her skin and complained of difficulty with breathing.  Her  tried to give her oral Benadryl which she vomited.  She became lethargic and minimally responsive.  He called 911.  The paramedics arrived.  They got the call at 9:20 AM.  They are arriving here about 9:55 AM.  In route paramedics gave 50 mg of Benadryl IV and also gave 0.3 mg of epinephrine.  She had very minimal improvement and remains obtunded and minimally responsive.    Problem List:    Patient Active Problem List    Diagnosis Date Noted     Drug-induced anaphylaxis, initial encounter 08/30/2018     Priority: Medium     Labral tear of shoulder, right, subsequent encounter 05/03/2017     Priority: Medium     Acute biliary pancreatitis, unspecified complication status 04/05/2017     Priority: Medium     Adjustment disorder with mixed anxiety and depressed mood 02/01/2017     Priority: Medium     Acute pain of right shoulder 02/01/2017     Priority: Medium     Superficial varicosities 05/16/2013     Priority: Medium     Sacroiliitis (H) 05/16/2013     Priority: Medium     Bladder polyps      Priority: Medium     CARDIOVASCULAR SCREENING; LDL GOAL LESS THAN 160 10/31/2010     Priority: Medium     Need for postmenopausal hormone replacement 11/11/2008     Priority: Medium     Decreased libido 11/11/2008     Priority: Medium        Past Medical History:    Past Medical History:   Diagnosis Date     Allergic rhinitis, cause unspecified      Anxiety       Bladder polyps      Depressive disorder      Obstructive chronic bronchitis with exacerbation (H)      Other and unspecified ovarian cyst      Other and unspecified ovarian cyst 2005     Tobacco abuse      Urinary tract infection, site not specified        Past Surgical History:    Past Surgical History:   Procedure Laterality Date     C LIGATE FALLOPIAN TUBE,POSTPARTUM      Tubal Ligation     ESOPHAGOSCOPY, GASTROSCOPY, DUODENOSCOPY (EGD), COMBINED  2012    Procedure: COMBINED ESOPHAGOSCOPY, GASTROSCOPY, DUODENOSCOPY (EGD), BIOPSY SINGLE OR MULTIPLE;  ESOPHAGOSCOPY, GASTROSCOPY, DUODENOSCOPY (EGD) with biopsy;  Surgeon: Herson Cabrera MD;  Location: [facility]     EXCIS VAGINAL CYST/TUMOR       HC REMOVAL OF OVARY/TUBE(S)  3/6/2006    Laparoscopic bilateral salpingo-oophorectomy.     HC TYMPANOPLASTY W/O MASTOID, INIT/REV W/O OSS CHAIN RECONST       LAPAROSCOPIC CHOLECYSTECTOMY N/A 2017    Procedure: LAPAROSCOPIC CHOLECYSTECTOMY;  Surgeon: Shivam Luevano MD;  Location: [facility]       Family History:    Family History   Problem Relation Age of Onset     Alcohol/Drug Father       at age 53 of alcohol     Arthritis Father      Cancer Father      lung     Alcohol/Drug Paternal Grandfather      Cancer Paternal Grandfather      lung     Alcohol/Drug Paternal Grandmother      Cancer Paternal Grandmother      lung     Alcohol/Drug Paternal Aunt      Allergies Daughter      animals and grass     Allergies Son      dust mite     Arthritis Mother      Hypertension Mother      Lipids Mother      Depression Mother      HEART DISEASE Mother      Blood Disease Mother      DVTs     Depression Brother        Social History:  Marital Status:   [2]  Social History   Substance Use Topics     Smoking status: Current Some Day Smoker     Packs/day: 0.00     Years: 10.00     Smokeless tobacco: Never Used      Comment: 3 cigs a day      Alcohol use No      Comment: recovering  - 2005        Medications:       Acetaminophen (TYLENOL PO)   albuterol (2.5 MG/3ML) 0.083% neb solution   albuterol (PROAIR HFA/PROVENTIL HFA/VENTOLIN HFA) 108 (90 BASE) MCG/ACT Inhaler   ALPRAZolam (XANAX) 0.5 MG tablet   aspirin 81 MG tablet   citalopram (CELEXA) 20 MG tablet   estradiol (ESTRACE) 1 MG tablet   fluticasone (FLONASE) 50 MCG/ACT spray   loratadine (CLARITIN) 10 MG tablet   meclizine (ANTIVERT) 25 MG tablet   medroxyPROGESTERone (PROVERA) 5 MG tablet   MELATONIN         Review of Systems   Unable to perform ROS: Mental status change       Physical Exam   BP: 104/90  Heart Rate: 99  Resp: (!) 31  SpO2: 95 %      Physical Exam   Constitutional: She appears well-developed and well-nourished. No distress.   HENT:   Head: Normocephalic and atraumatic.   Nose: Nose normal.   Mouth/Throat: Oropharynx is clear and moist. No oropharyngeal exudate.   Eyes: Conjunctivae and EOM are normal. Right eye exhibits no discharge. Left eye exhibits no discharge. No scleral icterus.   Neck: Normal range of motion. Neck supple.   Cardiovascular: Normal rate and normal heart sounds.  Exam reveals no gallop and no friction rub.    No murmur heard.  Pulmonary/Chest: Effort normal and breath sounds normal. No stridor. No respiratory distress.   Abdominal: Soft. She exhibits no distension. There is no tenderness. There is no rebound.   Musculoskeletal: Normal range of motion. She exhibits no edema.   Neurological: She is alert. No cranial nerve deficit. Coordination normal.   Skin: Skin is warm and dry. No rash noted. She is not diaphoretic. No erythema. No pallor.   Psychiatric: She has a normal mood and affect. Her behavior is normal.   Nursing note and vitals reviewed.     GCS:   Motor 5=Localizes pain   Verbal 1=None   Eye Opening 2=To pain   Total: 8           ED Course     ED Course     Procedures    Presumed anaphylaxis.  The patient is erythematous and she has swollen lips after taking amoxicillin.  An IV was started in the ambulance.  Second IV  started here in the emergency department.  The patient was given a second dose of IM epinephrine.  She was also given Solu-Medrol, Pepcid and put on an epinephrine drip.  Her GCS is 8.  I question whether or not she can protect her airway at this point.  If she does not improve shortly we will plan for rapid sequence intubation.            Results for orders placed or performed during the hospital encounter of 08/30/18 (from the past 24 hour(s))   Basic metabolic panel   Result Value Ref Range    Sodium 141 133 - 144 mmol/L    Potassium 2.9 (L) 3.4 - 5.3 mmol/L    Chloride 109 94 - 109 mmol/L    Carbon Dioxide 24 20 - 32 mmol/L    Anion Gap 8 3 - 14 mmol/L    Glucose 182 (H) 70 - 99 mg/dL    Urea Nitrogen 14 7 - 30 mg/dL    Creatinine 0.98 0.52 - 1.04 mg/dL    GFR Estimate 61 >60 mL/min/1.7m2    GFR Estimate If Black 74 >60 mL/min/1.7m2    Calcium 8.1 (L) 8.5 - 10.1 mg/dL   CBC with platelets differential   Result Value Ref Range    WBC 8.5 4.0 - 11.0 10e9/L    RBC Count 4.70 3.8 - 5.2 10e12/L    Hemoglobin 15.2 11.7 - 15.7 g/dL    Hematocrit 45.9 35.0 - 47.0 %    MCV 98 78 - 100 fl    MCH 32.3 26.5 - 33.0 pg    MCHC 33.1 31.5 - 36.5 g/dL    RDW 12.0 10.0 - 15.0 %    Platelet Count 315 150 - 450 10e9/L    Diff Method Automated Method     % Neutrophils 50.3 %    % Lymphocytes 43.7 %    % Monocytes 4.8 %    % Eosinophils 0.9 %    % Basophils 0.1 %    % Immature Granulocytes 0.2 %    Nucleated RBCs 0 0 /100    Absolute Neutrophil 4.2 1.6 - 8.3 10e9/L    Absolute Lymphocytes 3.7 0.8 - 5.3 10e9/L    Absolute Monocytes 0.4 0.0 - 1.3 10e9/L    Absolute Basophils 0.0 0.0 - 0.2 10e9/L    Abs Immature Granulocytes 0.0 0 - 0.4 10e9/L    Absolute Nucleated RBC 0.0    Lactic acid whole blood   Result Value Ref Range    Lactic Acid 2.3 (H) 0.7 - 2.0 mmol/L   HCG quantitative pregnancy (blood)   Result Value Ref Range    HCG Quantitative Serum 6 (H) 0 - 5 IU/L   Chest  XR, 1 view portable    Narrative    CHEST ONE VIEW PORTABLE   8/30/2018 11:01 AM     HISTORY:  Anaphylaxis.     COMPARISON: Chest x-ray 9/4/2010.      Impression    IMPRESSION: Portable view of the chest is performed. Endotracheal tube  is approximately 1 cm above the stevo. Reposition if clinically  warranted. Lungs are well expanded and clear. No pneumothorax or  obvious pleural effusions on this supine film. Heart is normal in  size.    SHAILESH ROBERTO MD   Urine Drugs of Abuse Screen Panel 13   Result Value Ref Range    Cannabinoids (26-tcu-7-carboxy-9-THC) Detected, Abnormal Result (A) NDET^Not Detected ng/mL    Phencyclidine (Phencyclidine) Not Detected NDET^Not Detected ng/mL    Cocaine (Benzoylecgonine) Not Detected NDET^Not Detected ng/mL    Methamphetamine (d-Methamphetamine) Not Detected NDET^Not Detected ng/mL    Opiates (Morphine) Not Detected NDET^Not Detected ng/mL    Amphetamine (d-Amphetamine) Not Detected NDET^Not Detected ng/mL    Benzodiazepines (Nordiazepam) Detected, Abnormal Result (A) NDET^Not Detected ng/mL    Tricyclic Antidepressants (Desipramine) Not Detected NDET^Not Detected ng/mL    Methadone (Methadone) Not Detected NDET^Not Detected ng/mL    Barbiturates (Butalbital) Not Detected NDET^Not Detected ng/mL    Oxycodone (Oxycodone) Not Detected NDET^Not Detected ng/mL    Propoxyphene (Norpropoxyphene) Not Detected NDET^Not Detected ng/mL    Buprenorphine (Buprenorphine) Not Detected NDET^Not Detected ng/mL   UA reflex to Microscopic and Culture   Result Value Ref Range    Color Urine Yellow     Appearance Urine Slightly Cloudy     Glucose Urine >499 (A) NEG^Negative mg/dL    Bilirubin Urine Negative NEG^Negative    Ketones Urine Negative NEG^Negative mg/dL    Specific Gravity Urine 1.016 1.003 - 1.035    Blood Urine Small (A) NEG^Negative    pH Urine 6.0 5.0 - 7.0 pH    Protein Albumin Urine 30 (A) NEG^Negative mg/dL    Urobilinogen mg/dL 0.0 0.0 - 2.0 mg/dL    Nitrite Urine Negative NEG^Negative    Leukocyte Esterase Urine Negative NEG^Negative     Source Catheterized Urine     RBC Urine 8 (H) 0 - 2 /HPF    WBC Urine 1 0 - 5 /HPF    Squamous Epithelial /HPF Urine <1 0 - 1 /HPF    Mucous Urine Present (A) NEG^Negative /LPF    Hyaline Casts 5 (H) 0 - 2 /LPF   HCG qualitative urine   Result Value Ref Range    HCG Qual Urine Positive (A) NEG^Negative   Head CT w/o contrast    Narrative    CT SCAN OF THE HEAD WITHOUT CONTRAST   8/30/2018 11:27 AM     HISTORY: Altered mental status.     TECHNIQUE:  Axial images of the head and coronal reformations without  IV contrast material. Radiation dose for this scan was reduced using  automated exposure control, adjustment of the mA and/or kV according  to patient size, or iterative reconstruction technique.    COMPARISON: 9/8/2017.    FINDINGS:  Mild volume loss is present commensurate with age. The  cerebral hemispheres, brainstem, and cerebellum demonstrate normal  morphology and attenuation. No evidence of ischemia, hemorrhage, mass,  mass effect, or hydrocephalus. The visualized calvarium, skull base,  and paranasal sinuses are unremarkable. There are pooled secretions  within the nasopharynx.      Impression    IMPRESSION: No acute intracranial abnormality.    SUKUMAR CABRERA MD   Chest  XR, 1 view portable    Narrative    CHEST ONE VIEW SUPINE  8/30/2018 12:41 PM     HISTORY: Tube placement.    COMPARISON: August 30, 2018      Impression    IMPRESSION: Endotracheal tube slightly retracted with tip now in the  mid to lower trachea. Right IJ line with tip at the atrial caval  junction noted.    YVAN BRAUN MD   Blood gas venous   Result Value Ref Range    Ph Venous 7.24 (L) 7.32 - 7.43 pH    PCO2 Venous 47 40 - 50 mm Hg    PO2 Venous 56 (H) 25 - 47 mm Hg    Bicarbonate Venous 20 (L) 21 - 28 mmol/L    Base Deficit Venous 7.3 mmol/L    FIO2 25    Lactic acid whole blood   Result Value Ref Range    Lactic Acid 2.3 (H) 0.7 - 2.0 mmol/L       Medications   EPINEPHrine (ADRENALIN) 5 mg in sodium chloride 0.9 % 250 mL  "infusion (0.06 mcg/kg/min × 62.1 kg Intravenous Rate/Dose Change 8/30/18 1025)   EPINEPHrine PF (ADRENALIN) injection 0.3 mg (not administered)   famotidine (PEPCID) injection 20 mg (20 mg Intravenous Given 8/30/18 1017)   0.9% sodium chloride BOLUS (0 mLs Intravenous Stopped 8/30/18 1104)     Followed by   sodium chloride 0.9% infusion (1,000 mLs Intravenous New Bag 8/30/18 1105)   fentaNYL (PF) (SUBLIMAZE) injection 100 mcg (100 mcg Intravenous Given 8/30/18 1137)   methylPREDNISolone sodium succinate (solu-MEDROL) injection 81.25 mg (81.25 mg Intravenous Given 8/30/18 1009)   etomidate (AMIDATE) injection 20 mg (20 mg Intravenous Given 8/30/18 1034)   succinylcholine (ANECTINE) injection 94 mg (94 mg Intravenous Given 8/30/18 1034)   vecuronium (NORCURON) injection 10 mg (10 mg Intravenous Given 8/30/18 1100)   fentaNYL (PF) (SUBLIMAZE) injection 100 mcg (100 mcg Intravenous Given 8/30/18 1042)   vecuronium (NORCURON) injection 10 mg (10 mg Intravenous Given 8/30/18 1142)     [facility] Procedure Note          Intubation:     Date/Time: 12:22 PM  Performed by: Blake Holcomb    Consent was obtained after discussing the risks, benefits and alternatives with the  Family.  Risks and benefits: risks, benefits and alternatives were discussed.  Patient identity confirmed: verbally with patient and arm band  Time out: Immediately prior to procedure a \"time out\" was called to verify the correct patient, procedure, equipment, support staff and site/side marked as required.    Indication: Airway protection.    Intubation method: Glidescope  Patient status: RSI  Preoxygenation: Mask  Pretreatment medications: None  Sedatives: Etomidate  Paralytic: succinylcholine  Laryngoscope size: Mac 3  Tube size: 7.5 cuffed with cuff inflated after placement  Number of attempts: 1  Cricoid pressure: yes  Cords visualized: yes  Post-procedure assessment: Breath sounds equal bilaterally with chest rise and absent over the " epigastrium and Chest x-ray interpreted by me demonstrating endotracheal tube in appropriate position.  ETT to teeth: 24 cm  Tube secured with: ETT mills    Patient tolerated the procedure well with no immediate complications.  Complications:  None      Central Line Placement     Procedure:  Central Line Placement with Ultrasound Guidance.    Indications: epi drip for anaphylaxis    Consent:  Risks (including but not limited to: pneumothorax,bleeding, infection or artery puncture), benefits and alternatives were discussed with  spouse and consent for procedure was obtained.    Timeout:  Universal protocol was followed. TIME OUT conducted just prior to starting procedure confirmed patient identity, site/side, procedure, patient position, and availability of correct equipment and implants.?  Yes    Procedure note:  Right Internal Jugular approach was selected and the right anterior neck was prepped, cleansed and draped in a sterile fashion.  Mask, gown and gloves were used per sterile protocol.  Patient was then placed into Trendelenburg position and lidocaine was used for local anesthesia.  Vascular probe with ultrasound was used in a sterile fashion for guidence.  Introducer needle was then used to gain access to the central venous circulation.  Using Seldinger technique the  Triple lumen catheter was placed.  Catheter port(s) were aspirated and flushed.  Central line was sutured in place and sterile dressing applied.   Appropriate placement was confirmed by x-ray and no pneumothorax was visualized.    Patient Status:  Patient tolerated the procedure well.  There were no complications.      This patient meets the criteria for critical care.  Time spent on critical care excluding procedures:45 min         EKG Interpretation:      Interpreted by Blake Holcomb  Time reviewed:1019   Symptoms at time of EKG: obtunded   Rhythm: normal sinus   Rate: normal  Axis: NORMAL  Ectopy: none  Conduction: normal  ST Segments/ T  Waves: No ST-T wave changes  Q Waves: none  Comparison to prior: No old EKG available    Clinical Impression: normal EKG      Assessments & Plan (with Medical Decision Making)  48-year-old presents the emergency department with anaphylaxis.  This is most likely secondary to amoxicillin.  She was treated with IV epinephrine drip after IM epinephrine did not turn her around.  She was also given Solu-Medrol, Pepcid, Benadryl, IV fluids.  She had minimal response to these medications and was not protecting her airway adequately.  She had bubbling saliva in her mouth and I was worried about aspiration with a GCS of 8 and deeply obtunded.  I discussed the recommendation of intubation with her significant other who agreed and understands the risks.  She was intubated without complications.  Head CT was performed because of her being obtunded and having a history of being able to tolerate 250 mg tablets of amoxicillin.  I would presume that her clinical presentation secondary to anaphylaxis secondary to taking amoxicillin prior to her dental procedure.  After intubation with etomidate and succinylcholine the patient was given vecuronium and fentanyl for sedation.  I discussed the case with Dr. Shanell Chew who requested a central line that she could maintain an epinephrine drip.  That was performed as above without complications.  The patient's coloration improved in the emergency department as did her lip swelling.  She did well maintaining her blood pressure on the epinephrine drip with rocuronium and fentanyl.  She was given a total of 200 mcg of fentanyl in the emergency department and 20 mg of vecuronium.  She was seen by Dr. Chew in the emergency department and questions were answered regarding the treatment to her significant other and her mother who arrived here as well.  The patient will be moved up to the ICU in stabilized condition.  The differential diagnosis, treatment and admission was discussed with  her family who agreed to the plan.  Incidentally her pregnancy test was positive but she has had bilateral oophorectomy so a quantitative hCG will now be sent.  I discussed these results with Dr. Chew who will follow up in the hospital.    As above the patient meets criteria for critical care, 45 minutes approximately.     I have reviewed the nursing notes.    I have reviewed the findings, diagnosis, plan and need for follow up with the patient.      New Prescriptions    No medications on file       Final diagnoses:   Anaphylaxis, initial encounter       8/30/2018   [facility] EMERGENCY DEPARTMENT     Blake Holcomb MD  08/30/18 1230       Blake Holcomb MD  08/30/18 1330       Blake Holcomb MD  08/30/18 0260

## 2018-08-30 NOTE — PROGRESS NOTES
Initiation of Restraints    S- Patient presented to ED with anaphylactic reaction from amoxicillin. Patient intubated.    B- Patient took antibiotic amoxicillin that was prescribed by dentist.     A- (Events leading to initiation of restraints) Patient pulling at lines.     R- Patient is currently on propofol.       Type of restraints applied- soft wrist restraints  Where restraints were applied- bilateral wrists  Provider Notified (name)- Dr. Chew  Order received within 1 hour of initiation- YES  All flowsheet rows filled out including less restrictive alternatives- YES  Care Plan initiated- YES  Education initiated and documented  YES

## 2018-08-30 NOTE — PROGRESS NOTES
Hospitalist discussed case with TELE ICU, which they agreed with extubation if air was heard around ETT once cuff was deflated.  Pt was put on CPAP/PS of 5/5 cmH2O and 21% FiO2, and sedation was shut off.  After 10 minutes pt was alert and oriented, MD at bedside, and pt was able to follow commands.  Cuff was deflated and air was heard around ETT.  Pt was suctioned down ETT with scant white secretions, and upper airway was suctioned with clear/ thin secretions.  Pt was extubated successfully and placed on a cool aerosol mask at 28% FiO2.  Vitals post extubation are , RR 20, sat's 98%, bp 115/68 mmHg.    Bernardo Man RT on 8/30/2018 at 5:52 PM

## 2018-08-30 NOTE — PROGRESS NOTES
Discontinuation of Restraints    S- Patient was intubated secondary to anaphylactic reaction to amoxicillin.     B- Patient tolerated CPAP weaning and extubated.     A- Circumstances that led to the removal of restraints, patient extubated and alert and orintated. Specific behaviors that met discontinuation criteria patient not pulling at tubes. Preventive measures used to reduce the need to reapply restraints yes..    R- patient is not pulling at IV lines and cont to be alert and orientated .

## 2018-08-30 NOTE — PROGRESS NOTES
Pt was intubated with a 7.5 ETT at 24 at the teeth.  Pending post intubation xray.  Sat's are stable at 99%, with vent settings of CMV/AC vt 500, rate 12, peep 5 cmH2O, Fio2 50%.  Will wean FiO2.  BS were clear bilaterally post intubation and ETCO2 reading was 35 mmHg.      Bernardo Man, RT on 8/30/2018 at 10:49 AM

## 2018-08-30 NOTE — PROGRESS NOTES
SPIRITUAL HEALTH SERVICES  SPIRITUAL ASSESSMENT Progress Note  [facility]      Rapid Response -  accompanied pt's  to ED Family Room and affirmed him for his decision to call 911.  He shared that he deals with COPD and became concerned when her oxygen level dropped.   provided compassionate presence, prayer, and escorted  when he was able to join his wife.   is available for pt/family needs.    Wero Sanchez M.Div., [facility]  Staff   Office tel: [phone]

## 2018-08-30 NOTE — H&P
[facility] History and Physical    Liliana Kat MRN# [id number]   Age: 48 year old YOB: 1970     Date of Admission:  August 30, 2018    Home clinic: [facility]  Primary care provider: Paula Villa          Assessment and Plan:   Assessment/Plan:   Principal Problem:     Drug-induced anaphylaxis, initial encounter; Anaphylactic reaction    Assessment:  patient presented with life-threatening anaphylactic reaction to amoxicillin, continuing to worsen despite epinephrine bolus administration ×2, Solu-Medrol, IV Pepcid, and epinephrine drip initiation and required intubation for airway protection, now seeming to stabilize but with ongoing need for epinephrine administration and critical care management.    Plan: Central line will be placed by the emergency room provider prior to transition to inpatient status in the ICU for ongoing critical care management.  We will continue with epinephrine drip, perform Solu-Medrol 40 mg every 6 hours, continue with Pepcid 20 mg every 12 hours, provide propofol for sedation and monitor closely for signs of ongoing improvement.  Given patient's unawareness and risk of pulling at tubes and lines during this time of critical illness, restraints will be placed until patient is able to follow commands and is mentating appropriately.   Amoxicillin has been placed on her allergy list is a high reaction and has been has been informed that she should never have another penicillin product going forward given this life-threatening reaction.    Active Problems:    Hypokalemia    Assessment: Potassium is 2.9    Plan: We will replace with low replacement potassium protocol and recheck tomorrow morning.      Positive pregnancy test    Assessment:   Noted in the emergency room, however patient had an oophorectomy bilaterally 13 years ago therefore is felt false positive.  Of note Xanax which patient does take, can cause a false positive pregnancy  "test    Plan:   We will obtain a quantitative beta-hCG to further evaluate.       Need for postmenopausal hormone replacement    Assessment: Patient normally on Estrace and Provera    Plan: Hold while intubated and sedated       Adjustment disorder with mixed anxiety and depressed mood    Assessment: On Celexa and as needed Xanax    Plan: Hold at this time secondary to sedation and intubation      Seasonal allergic rhinitis    Assessment: Patient takes Claritin and Flonase as needed, not recently taking her     Plan: Will not order at this time, reassess as patient improves    # Pain Assessment:  Current Pain Score 9/8/2017   Patient currently in pain? -   Pain score (0-10) 6   Pain location -   Pain descriptors -   Patient currently sedated and intubated but does not appear in pain.  Does have prn fentanyl ordered.      VTE:  SCDs  Code Status:  Full code     Core Measures   Acute MI, CHF, or stroke core measures do not apply for this admission            Chief Complaint:   Severe allergic reaction causing unconsciousness just prior to the EMS arriving.     History is obtained from the patient's  and the emergency room provider         History of Present Illness:   This patient is a 48 year old  female with a significant past medical history of adjustment disorder with depression and anxiety, status post bilateral oophorectomy on hormone replacement with Estrace and Provera who presents with severe anaphylactic reaction that started approximately 25 minutes after she took amoxicillin prior to a dental procedure.  Of note she does have a documented history to Augmentin of \"swelling and difficulty breathing\" but per the  she had been able to tolerate low doses of amoxicillin prior to procedures in the past since the initial reaction in 2012.  Patient initially had tingling of her hands and feet 25 minutes after taking the medication, followed 10 minutes later by shortness of breath and " lip swelling and 5 minutes after she began vomiting, having profuse diarrhea, was noted to be sweaty.  Ambulance was called and just prior to their arrival patient was noted to clench all of her muscles and go rigid followed by a loss of consciousness.  Patient was noted by the ambulance to be minimally responsive but oxygenating well.  They did give 0.3 mg of epinephrine as well as 50 mg of Benadryl on route to the hospital.  In the emergency room 0.3 mg of epinephrine IM was administered again along with 80 mg of Solu-Medrol, 20 mg of famotidine however patient had ongoing worsening of her facial swelling and growing redness of her skin.  Blood pressures were in the low 90s systolic and given her ongoing worsening and epinephrine drip was initiated.  10 minutes after this initiation patient had ongoing worsening and decision was made to intubate, which occurred without incident.  Patient was continued on a fentanyl drip and within 10 minutes of intubation was noted to have some improvement of her erythema.  Blood pressure is improved into the 120-130 range systolic following intubation and ongoing epinephrine drip.  Decision was made to admit patient for ongoing management of anaphylactic shock reaction.  Given ongoing need for epinephrine drip, decision was made for central line to be placed, which will be performed by the emergency room provider prior to patient being transitioned to ICU.          Past Medical History:     Past Medical History:   Diagnosis Date     Allergic rhinitis, cause unspecified     Allergic rhinitis - weeds and pollan     Anxiety      Bladder polyps      Depressive disorder      Obstructive chronic bronchitis with exacerbation (H)      Other and unspecified ovarian cyst     Ovarian cyst - rupured cyst + laser x3     Other and unspecified ovarian cyst 9/14/2005    Left hemorrhagic ovarian cyst.     Tobacco abuse      Urinary tract infection, site not specified              Past Surgical  History:     Past Surgical History:   Procedure Laterality Date     C LIGATE FALLOPIAN TUBE,POSTPARTUM      Tubal Ligation     ESOPHAGOSCOPY, GASTROSCOPY, DUODENOSCOPY (EGD), COMBINED  2012    Procedure: COMBINED ESOPHAGOSCOPY, GASTROSCOPY, DUODENOSCOPY (EGD), BIOPSY SINGLE OR MULTIPLE;  ESOPHAGOSCOPY, GASTROSCOPY, DUODENOSCOPY (EGD) with biopsy;  Surgeon: Herson Cabrera MD;  Location: [facility]     EXCIS VAGINAL CYST/TUMOR       HC REMOVAL OF OVARY/TUBE(S)  3/6/2006    Laparoscopic bilateral salpingo-oophorectomy.     HC TYMPANOPLASTY W/O MASTOID, INIT/REV W/O OSS CHAIN RECONST       LAPAROSCOPIC CHOLECYSTECTOMY N/A 2017    Procedure: LAPAROSCOPIC CHOLECYSTECTOMY;  Surgeon: Shivam Luevano MD;  Location: [facility]            Family History:     Family History   Problem Relation Age of Onset     Alcohol/Drug Father       at age 53 of alcohol     Arthritis Father      Cancer Father      lung     Alcohol/Drug Paternal Grandfather      Cancer Paternal Grandfather      lung     Alcohol/Drug Paternal Grandmother      Cancer Paternal Grandmother      lung     Alcohol/Drug Paternal Aunt      Allergies Daughter      animals and grass     Allergies Son      dust mite     Arthritis Mother      Hypertension Mother      Lipids Mother      Depression Mother      HEART DISEASE Mother      Blood Disease Mother      DVTs     Depression Brother             Social History:     Social History     Social History     Marital status:      Spouse name: N/A     Number of children: N/A     Years of education: N/A     Occupational History      [employer]     assembly     Social History Main Topics     Smoking status: Current Some Day Smoker     Packs/day: 0.00     Years: 10.00     Smokeless tobacco: Never Used      Comment: 3 cigs a day      Alcohol use No      Comment: recovering  - 2005     Drug use: No     Sexual activity: Not Currently     Partners: Male     Birth control/ protection: Surgical      Comment: tubal       Other Topics Concern     Not on file     Social History Narrative    Lives with spouse and children. No domestic violence issues.             Allergies:     Allergies   Allergen Reactions     Amoxicillin Anaphylaxis     Augmentin Swelling and Difficulty breathing     Avelox Nausea and Vomiting     Effexor Xr [Venlafaxine Hydrochloride]      Shaky and heart racing     Sulfa Drugs              Medications:     No current outpatient prescriptions on file.             Review of Systems:   Patient is sedated and intubated and unable to answer.  's recollection of symptoms patient voiced are as above          Physical Exam:   Blood pressure 127/74, resp. rate 17, weight 67.3 kg (148 lb 5.9 oz), last menstrual period 03/06/2006, SpO2 97 %, not currently breastfeeding.  Constitutional:   Sedated and intubated with obvious lip swelling noted     HEENT:       Patient has obvious swelling of the lips and tongue as well as some noteable swelling of the cheeks but none noted in the eyes, forehead or neck region.  ET tube is in place and anchored     Lungs:   No increased work of breathing while sedated and intubated, good air exchange, clear to auscultation bilaterally, no crackles or wheezing     Cardiovascular:   Normal apical impulse, regular rate and rhythm     Abdomen:   Bowel sounds present, abdomen soft and non obvious mass     Musculoskeletal:   Patient no edema or mottling of extremities at this time, no spontaneous movement     Neurologic:   Sedated and intubated     Skin:   Patient has swelling of the lips and tongue that is obvious despite ET in place, mild erythema but no hives noted at this time             Data:   All laboratory and imaging data in the past 24 hours reviewed     Attestation:    I have reviewed today's vital signs, notes, medications, labs and imaging.    Electronically Signed:  Shanell Chew MD    Note: Chart documentation done in part with Dragon Voice Recognition software.  Although reviewed after completion, some word and grammatical errors may remain.

## 2018-08-30 NOTE — ED NOTES
Notice patients redness of her body started to go away after intubation. Legs, arms and chest redness went away. Did notice still  some modeling on upper arms.

## 2018-08-30 NOTE — PROGRESS NOTES
S-(situation): Patient arrives to room 217 via cart from ED    B-(background): Patient had anaphylactic reaction to amoxicillin.    A-(assessment): Patient was prescribed amoxicillin from her dentist. Patient started to vomitt at home after taking amoxicillin. Patient intubated in ER and now is in ICU being monitored.    R-(recommendations): Orders reviewed with patient spouse and daughter. Will monitor patient per MD orders. yes    Inpatient nursing criteria listed below were met:    Health care directives status obtained and documented: Yes  SCD's Documented: No  Skin issues/needs documented:NA  Isolation needs addressed, if appropriate: NA  Fall Prevention: Care plan updated, Education given and documented Yes  MRSA swab completed for patient 55 years and older (exclude MERLIN and TKA): NA  Care Plan initiated: Yes  Education Assessment documented:Yes  Education Documented (Pre-existing chronic infection such as, MRSA/VRE need education on admission): Yes  Admission Medication Reconciliation completed: Yes  New medication patient education completed and documented (Possible Side Effects of Common Medications handout): Yes  Home medications if not able to send immediately home with family stored here: NA  Reminder note placed in discharge instructions: NA  Discharge planning review completed (admission navigator) Yes

## 2018-08-30 NOTE — PROGRESS NOTES
Patient admitted earlier today after an anaphylactic reaction to oral amoxicillin that was administered for dental prophylaxis.  In the emergency department she presented with facial swelling swelling of lips tongue with overall red rash itching.  In route to the hospital she received adrenaline injection which was repeated in the ED and then started on an adrenaline infusion.  Because of the facial swelling and worsening respiratory status she was intubated.  She is receiving is continued and receive IV Solu-Medrol, continuous epinephrine infusion, IV H2 blocker.  At the change of shift, staff is reporting that the rash is totally resolved and the swelling of her lips and tongue have pretty much resolved.  Patient is alert and indicating that she wants the tube removed.  Her vital signs are stable.  She is oxygenating normally through the tube.  Exam is showing that her lungs are clear.  Tongue does not appear swollen and her lips may be only slightly swollen.  There is no signs of any hives at this point.  Her case was reviewed and discussed with the telemetry ICU physician, Dr. Rodgers.  This time it was felt that it was probably safe to extubate her given that she tolerated CPAP and had good movement around the tube when this cuff was deflated.  Respiratory therapy was available and they moved her CPAP which she tolerated quite well.  The cuff was deflated and she had good air movement.  Patient was extubated without difficulty.  We will continue to monitor and treat in the ICU.  Continue adrenaline infusion at this point, potentially wean later today.  The restraints were removed at this time.  I spent 35 minutes with the patient examining, reviewing the chart and discussing her case with the tele-ICU physician with subsequent removal of the ET tube.  This was in the setting of management of a ICU patient with potentially life-threatening condition.    Electronically signed by DEMETRIA Lopes on August 30,  2018

## 2018-08-30 NOTE — IP AVS SNAPSHOT
MRN:[id number]                      After Visit Summary   8/30/2018    Liliana Kat    MRN: [id number]           Thank you!     Thank you for choosing [facility] for your care. Our goal is always to provide you with excellent care. Hearing back from our patients is one way we can continue to improve our services. Please take a few minutes to complete the written survey that you may receive in the mail after you visit with us. Thank you!        Patient Information     Date Of Birth          1970        Designated Caregiver       Most Recent Value    Caregiver    Will someone help with your care after discharge? yes    Name of designated caregiver Elvin    Phone number of caregiver [phone]    Caregiver address [address]       About your hospital stay     You were admitted on:  August 30, 2018 You last received care in the:  [facility] ICU    You were discharged on:  August 31, 2018        Reason for your hospital stay       Anaphylactic reaction to amoxicillin                  Who to Call     For medical emergencies, please call 911.  For non-urgent questions about your medical care, please call your primary care provider or clinic, [phone]          Attending Provider     Provider Specialty    Blake Holcomb MD Emergency Medicine    [place], Shanell Clark MD Family Practice       Primary Care Provider Office Phone # Fax #    Paula LOLIS Renee [facility] [phone]       When to contact your care team       Call your primary doctor if you have any of the following:  shortness of breath, increased pain, or other acute concerning symptoms.                  After Care Instructions     Activity       Your activity upon discharge: activity as tolerated and may return to work on 9/4/18            Diet       Follow this diet upon discharge: Orders Placed This Encounter      Advance Diet as Tolerated: Regular Diet Adult                 "  Follow-up Appointments     Follow-up and recommended labs and tests        Follow up with primary care provider, Paula Villa, within 7 days for hospital follow- up.  No follow up labs or test are needed.                  Your next 10 appointments already scheduled     Sep 06, 2018 11:00 AM CDT   Office Visit with LOLIS Osorio CNP   [facility]    [address] [place] [id number] [phone]           Bring a current list of meds and any records pertaining to this visit. For Physicals, please bring immunization records and any forms needing to be filled out. Please arrive 10 minutes early to complete paperwork.              Pending Results     Date and Time Order Name Status Description    8/30/2018 1117 POC US GUIDED VASCULAR ACCESS In process             Statement of Approval     Ordered          08/31/18 0752  I have reviewed and agree with all the recommendations and orders detailed in this document.  EFFECTIVE NOW     Approved and electronically signed by:  Estephania Yusuf MD             Admission Information     Date & Time Provider Department Dept. Phone    8/30/2018 Shanell Chew MD [facility] [phone]      Your Vitals Were     Blood Pressure Temperature Respirations Height Weight Last Period    107/68 99  F (37.2  C) 8 1.651 m (5' 5\") 67.3 kg (148 lb 5.9 oz) 03/06/2006    Pulse Oximetry BMI (Body Mass Index)                98% 24.69 kg/m2          MyChart Information     [facility] lets you send messages to your doctor, view your test results, renew your prescriptions, schedule appointments and more. To sign up, go to [email or web address] . Click on \"Log in\" on the left side of the screen, which will take you to the Welcome page. Then click on \"Sign up Now\" on the right side of the page.     You will be asked to enter the access code listed below, as well as some personal information. Please follow the " directions to create your username and password.     Your access code is: [email or web address]  Expires: 2018 11:02 AM     Your access code will  in 90 days. If you need help or a new code, please call your [facility] clinic or [phone].        Care EveryWhere ID     This is your Care EveryWhere ID. This could be used by other organizations to access your [facility] medical records  [id number]        Equal Access to Services     NORBERTO WILSON : Hadii aad ku hadasho Soomaali, waaxda luqadaha, qaybta kaalmada adeegyada, waxay idiin hayaan harlan rodriguezjuneed lamatthew . So [facility] [phone].    ATENCIÓN: Si habla español, tiene a garcia disposición servicios gratuitos de asistencia lingüística. Llame al [phone].    We comply with applicable federal civil rights laws and [place] laws. We do not discriminate on the basis of race, color, national origin, age, disability, sex, sexual orientation, or gender identity.               Review of your medicines      START taking        Dose / Directions    benzocaine-menthol 6-10 MG lozenge   Commonly known as:  CHLORASEPTIC        Dose:  1 lozenge   Place 1 lozenge inside cheek every hour as needed for sore throat   Quantity:  36 lozenge   Refills:  0       famotidine 20 MG tablet   Commonly known as:  PEPCID        Dose:  20 mg   Take 1 tablet (20 mg) by mouth 2 times daily for 7 days   Quantity:  14 tablet   Refills:  0       predniSONE 10 MG tablet   Commonly known as:  DELTASONE        Take 40mg po x1day, then 30mg po x1day, then 20mg po x1day, then 10mg po x1day   Quantity:  10 tablet   Refills:  0         CONTINUE these medicines which have NOT CHANGED        Dose / Directions    * albuterol 108 (90 Base) MCG/ACT inhaler   Commonly known as:  PROAIR HFA/PROVENTIL HFA/VENTOLIN HFA   Used for:  Tobacco abuse        Dose:  2 puff   Inhale 2 puffs into the lungs every 6 hours as needed for shortness of breath / dyspnea or wheezing   Quantity:  1 Inhaler   Refills:  0       *  albuterol (2.5 MG/3ML) 0.083% neb solution   Used for:  Acute sinusitis, unspecified        ONE NEBULIZATION 4 TIMES DAILY AS NEEDED   Quantity:  1 Box   Refills:  0       ALPRAZolam 0.5 MG tablet   Commonly known as:  XANAX   Used for:  Adjustment disorder with mixed anxiety and depressed mood        Dose:  0.5 mg   Take 1 tablet (0.5 mg) by mouth 3 times daily as needed for anxiety   Quantity:  90 tablet   Refills:  0       aspirin 81 MG tablet   Used for:  Superficial varicosities, unspecified laterality        Dose:  81 mg   Take 1 tablet (81 mg) by mouth daily   Quantity:  30 tablet   Refills:  OTC       citalopram 20 MG tablet   Commonly known as:  celeXA   Used for:  Adjustment disorder with mixed anxiety and depressed mood        TAKE ONE TABLET BY MOUTH EVERY DAY   Quantity:  30 tablet   Refills:  1       estradiol 1 MG tablet   Commonly known as:  ESTRACE   Used for:  Need for postmenopausal hormone replacement        TAKE ONE TABLET BY MOUTH EVERY DAY   Quantity:  30 tablet   Refills:  6       fluticasone 50 MCG/ACT spray   Commonly known as:  FLONASE   Used for:  Acute sinusitis, unspecified        Dose:  2 spray   Spray 2 sprays into both nostrils daily   Quantity:  1 Bottle   Refills:  11       loratadine 10 MG tablet   Commonly known as:  CLARITIN   Used for:  Acute sinusitis        TAKE ONE TABLET BY MOUTH EVERY DAY   Quantity:  30 tablet   Refills:  5       medroxyPROGESTERone 5 MG tablet   Commonly known as:  PROVERA   Used for:  Need for postmenopausal hormone replacement        TAKE ONE TABLET BY MOUTH EVERY DAY   Quantity:  90 tablet   Refills:  0       MELATONIN        Dose:  10 mg   10 mg daily   Refills:  0       TYLENOL PO        Dose:  650 mg   Take 650 mg by mouth every 4 hours as needed for mild pain or fever   Refills:  0       * Notice:  This list has 2 medication(s) that are the same as other medications prescribed for you. Read the directions carefully, and ask your doctor or other  care provider to review them with you.         Where to get your medicines      Some of these will need a paper prescription and others can be bought over the counter. Ask your nurse if you have questions.     Bring a paper prescription for each of these medications     benzocaine-menthol 6-10 MG lozenge    famotidine 20 MG tablet    predniSONE 10 MG tablet                Protect others around you: Learn how to safely use, store and throw away your medicines at www.disposemymeds.org.             Medication List: This is a list of all your medications and when to take them. Check marks below indicate your daily home schedule. Keep this list as a reference.      Medications           Morning Afternoon Evening Bedtime As Needed    * albuterol 108 (90 Base) MCG/ACT inhaler   Commonly known as:  PROAIR HFA/PROVENTIL HFA/VENTOLIN HFA   Inhale 2 puffs into the lungs every 6 hours as needed for shortness of breath / dyspnea or wheezing                                   * albuterol (2.5 MG/3ML) 0.083% neb solution   ONE NEBULIZATION 4 TIMES DAILY AS NEEDED                                   ALPRAZolam 0.5 MG tablet   Commonly known as:  XANAX   Take 1 tablet (0.5 mg) by mouth 3 times daily as needed for anxiety   Last time this was given:  0.5 mg on 8/31/2018 12:24 AM                                   aspirin 81 MG tablet   Take 1 tablet (81 mg) by mouth daily                                   benzocaine-menthol 6-10 MG lozenge   Commonly known as:  CHLORASEPTIC   Place 1 lozenge inside cheek every hour as needed for sore throat   Last time this was given:  1 lozenge on 8/31/2018 12:25 AM                                   citalopram 20 MG tablet   Commonly known as:  celeXA   TAKE ONE TABLET BY MOUTH EVERY DAY   Last time this was given:  20 mg on 8/31/2018  9:01 AM                                   estradiol 1 MG tablet   Commonly known as:  ESTRACE   TAKE ONE TABLET BY MOUTH EVERY DAY                                    famotidine 20 MG tablet   Commonly known as:  PEPCID   Take 1 tablet (20 mg) by mouth 2 times daily for 7 days   Last time this was given:  20 mg on 8/31/2018  9:01 AM                                      fluticasone 50 MCG/ACT spray   Commonly known as:  FLONASE   Spray 2 sprays into both nostrils daily                                   loratadine 10 MG tablet   Commonly known as:  CLARITIN   TAKE ONE TABLET BY MOUTH EVERY DAY                                   medroxyPROGESTERone 5 MG tablet   Commonly known as:  PROVERA   TAKE ONE TABLET BY MOUTH EVERY DAY                                   MELATONIN   10 mg daily                                   predniSONE 10 MG tablet   Commonly known as:  DELTASONE   Take 40mg po x1day, then 30mg po x1day, then 20mg po x1day, then 10mg po x1day   Last time this was given:  40 mg on 8/31/2018  9:01 AM                                   TYLENOL PO   Take 650 mg by mouth every 4 hours as needed for mild pain or fever                                   * Notice:  This list has 2 medication(s) that are the same as other medications prescribed for you. Read the directions carefully, and ask your doctor or other care provider to review them with you.              More Information        Anaphylaxis  Anaphylaxis is a severe allergic reaction that can be life threatening. This reaction can happen in a few minutes, or a few hours after exposure to what you are allergic to. Some people are more prone to this than others.  The symptoms of an anaphylactic reaction may at first seem similar to other allergic reactions. If this has happened to you in the past, do not let the initial mild symptoms, such as a rash, hives and itching, mislead you. Your reaction can worsen very quickly and become much more severe and life threatening within minutes.  More severe symptoms include:    Trouble swallowing, feeling like your throat is closing    Trouble breathing, wheezing    Cool, moist or pale (blue  in color) skin    Hoarse voice or trouble speaking    Nausea, vomiting, diarrhea, stomach cramps, or pain    Feeling faint or lightheaded, rapid heart rate, low blood pressure    Feeling dizzy or confused    Becoming very drowsy, poorly responsive, or trouble awakening    Seizure  Sometimes the cause may be obvious, like knowing you are allergic to peanuts. To help identify your allergen, remember:    When it started    What you were doing at the time or just before that    Any activities you were involved in    Any new products or contacts   Here are some common causes, but remember almost anything can cause a reaction, and you may not even be aware that you came into contact with one of these things.    Dust, mold, pollen    Plants, such as poison ivy and poison oak    Animals    Foods such as shrimp, shellfish, peanuts, milk products, gluten, eggs; also colorings, flavorings, additives    Insect bites or stings such as bees, mosquitos, fleas, ticks    Medicines such as penicillin, sulfa drugs, amoxicillin, aspirin, ibuprofen; any medicine can cause a reaction    Jewelry such as nickel, or gold (new, or something you ve worn for a while including zippers, and buttons)    Latex such as in gloves, clothes, toys, balloons, or some tapes (some people allergic to latex may also  have problems with foods like bananas, avocados, kiwi, papaya, or chestnuts)    Lotions, perfumes, cosmetics, soaps, shampoos, skincare products, nail products    Chemicals or dyes in clothing, linen, , hair dyes, soaps, iodine  If you are exposed to the same substance again, you may have the same or more severe reaction. Treatment for anaphylaxis is epinephrine (adrenalin). This is available by prescription as a self-injectable pen. If the cause of your reaction is known, you should avoid exposure in the future. If the cause is not known, follow up with your doctor for special testing to determine what you are allergic to.     Home  care  If it was safe for you to go home, watch for any worsening of symptoms. You may need to be treated again.  Medicines  Injectable epinephrine  One of the key tools in treating anaphylaxis is early use of epinephrine. If you had a severe allergic or anaphylactic reaction, the doctor may prescribe a self- injectable epinephrine kit. If this was prescribed, carry it at all times. It can be life saving. Epinephrine can help stop the progression of an allergic reaction. Its effects are brief, so after you use the medicine, it is still very important to call 911 and get to an emergency room.  When to use injectable epinephrine. Use the epinephrine if you have a history of severe reactions or any of the following symptoms:    Swelling in your mouth or throat     Trouble speaking or swallowing    Trouble breathing    Feeling faint, low blood pressure, or becoming drowsy or poorly responsive    Worsening rash  How to use injectable epinephrine:    Hold the syringe firmly in your hand with the orange (or black) needle end away from your thumb    Be careful not to stick your fingers or hand with the needle.    At the opposite end, pull off the activation cap- the blue or grey tab    Holding the syringe tightly, jab it into the outer part of your upper thigh. This is one of the softest, fleshiest parts of the upper leg, and is not near a major blood vessel or nerve. Be careful not to inject it into your hip or any place that there is a pulse.    You can inject it through pants, but make sure not to inject it into the seam of the pants.    Do not pull it out right away. Try to hold the needle in place for 10 seconds.    Massage the spot for a few seconds or as directed by your healthcare provider.    If you are injecting it in someone else or a child, try to hold them or their leg still. If they jerk or yank their legs away as you are doing it, it can cause a cut on their leg.  You may feel shaky, jittery, nervous, and  anxious after the injection. Although it is difficult, try to relax. This is a side effect of the epinephrine, and should stop after a few minutes   Important    Get to the emergency room after using the epinephrine. Its affect will wear off, and you may have a second reaction. This could even happen hours later.    Never intentionally eat, use, or expose yourself to the substance that caused the anaphylactic reaction.  Nothing is foolproof, including the injectable epinephrine.  Other medicines  The doctor may prescribe medicine to relieve swelling, itching, and pain. Follow the doctor s instructions when using this medicine.     Oral diphenhydramine is an antihistamine available at drug and grocery stores. Unless a prescription antihistamine was given, diphenhydramine may be used to reduce itching if large areas of the skin are involved. It may make you sleepy, so be careful using it in the daytime or when going to school, working, or driving.     Do not use diphenhydramine cream on your skin, because in some people it can cause a further reaction.    You may use over-the-counter acetaminophen or ibuprofen to control pain, unless another pain medicine was prescribed.    If you were prescribed any medicines to prevent symptoms from returning, be sure to take them exactly as directed.  General care    Rest at home for the next 24 hours.    Avoid tobacco and alcohol consumption. These may worsen your symptoms.    If you know what caused your reaction today, avoid that in the future since the next reaction may be worse. Let your family members, friends and personal physician know about your allergic reaction.    If your allergy was to food, learn how to read food labels so you can check for that ingredient. If a product does not have a label, it is best to avoid it.    Consider carrying an identification card or getting a medical alert bracelet to inform medical personnel of your condition in case you cannot tell  them.    Tell all of your healthcare providers that you had an anaphylactic reaction. Make certain the information is added to your electronic or paper medical records.  Follow-up care  Follow up with your doctor or as advised if you are not improving over the next 1 to 2 days.  Call 911  Call 911 if any of these occur:    Trouble breathing or swallowing, wheezing    Hoarse voice or trouble speaking    Chest pain    Confused    Very drowsy or trouble awakening    Fainting or loss of consciousness    Rapid heart rate    Vomiting blood, or large amounts of blood in stool    Seizure    Swelling in the eyes, mouth, face, or tongue    Dizziness or weakness    Cool, moist, or pale (blue in color) skin    Nausea, vomiting, diarrhea, stomach cramps, or abdominal pain  When to seek medical advice  Call your healthcare provider right away if any of these occur:    Worsening of your symptoms    New symptoms develop    Symptoms don't go away or come back  Date Last Reviewed: 4/1/2017 2000-2017 [employer]. [address] [place] [address], [place] [address]. All rights reserved. This information is not intended as a substitute for professional medical care. Always follow your healthcare professional's instructions.

## 2018-08-31 VITALS
SYSTOLIC BLOOD PRESSURE: 107 MMHG | HEIGHT: 65 IN | WEIGHT: 148.37 LBS | OXYGEN SATURATION: 98 % | DIASTOLIC BLOOD PRESSURE: 68 MMHG | BODY MASS INDEX: 24.72 KG/M2 | RESPIRATION RATE: 8 BRPM | TEMPERATURE: 99 F

## 2018-08-31 PROBLEM — E87.20 ACIDOSIS: Status: ACTIVE | Noted: 2018-08-31

## 2018-08-31 LAB
ANION GAP SERPL CALCULATED.3IONS-SCNC: 8 MMOL/L (ref 3–14)
B-HCG SERPL-ACNC: 6 IU/L (ref 0–5)
BASE DEFICIT BLDV-SCNC: 6 MMOL/L
BUN SERPL-MCNC: 6 MG/DL (ref 7–30)
CALCIUM SERPL-MCNC: 7.1 MG/DL (ref 8.5–10.1)
CHLORIDE SERPL-SCNC: 117 MMOL/L (ref 94–109)
CO2 SERPL-SCNC: 19 MMOL/L (ref 20–32)
CREAT SERPL-MCNC: 0.78 MG/DL (ref 0.52–1.04)
GFR SERPL CREATININE-BSD FRML MDRD: 80 ML/MIN/1.7M2
GLUCOSE BLDC GLUCOMTR-MCNC: 124 MG/DL (ref 70–99)
GLUCOSE SERPL-MCNC: 113 MG/DL (ref 70–99)
HCO3 BLDV-SCNC: 20 MMOL/L (ref 21–28)
O2/TOTAL GAS SETTING VFR VENT: 21 %
PCO2 BLDV: 38 MM HG (ref 40–50)
PH BLDV: 7.32 PH (ref 7.32–7.43)
PO2 BLDV: 36 MM HG (ref 25–47)
POTASSIUM SERPL-SCNC: 4.4 MMOL/L (ref 3.4–5.3)
POTASSIUM SERPL-SCNC: 5.1 MMOL/L (ref 3.4–5.3)
SODIUM SERPL-SCNC: 144 MMOL/L (ref 133–144)

## 2018-08-31 PROCEDURE — 00000146 ZZHCL STATISTIC GLUCOSE BY METER IP

## 2018-08-31 PROCEDURE — 84132 ASSAY OF SERUM POTASSIUM: CPT | Performed by: FAMILY MEDICINE

## 2018-08-31 PROCEDURE — 25000125 ZZHC RX 250: Performed by: HOSPITALIST

## 2018-08-31 PROCEDURE — 82803 BLOOD GASES ANY COMBINATION: CPT | Performed by: FAMILY MEDICINE

## 2018-08-31 PROCEDURE — 25000128 H RX IP 250 OP 636: Performed by: FAMILY MEDICINE

## 2018-08-31 PROCEDURE — 80048 BASIC METABOLIC PNL TOTAL CA: CPT | Performed by: FAMILY MEDICINE

## 2018-08-31 PROCEDURE — 25000132 ZZH RX MED GY IP 250 OP 250 PS 637: Performed by: HOSPITALIST

## 2018-08-31 PROCEDURE — 84702 CHORIONIC GONADOTROPIN TEST: CPT | Performed by: FAMILY MEDICINE

## 2018-08-31 PROCEDURE — 99239 HOSP IP/OBS DSCHRG MGMT >30: CPT | Performed by: HOSPITALIST

## 2018-08-31 PROCEDURE — 25000132 ZZH RX MED GY IP 250 OP 250 PS 637: Performed by: FAMILY MEDICINE

## 2018-08-31 RX ORDER — CALCIUM CARBONATE 500 MG/1
500 TABLET, CHEWABLE ORAL 2 TIMES DAILY PRN
Status: DISCONTINUED | OUTPATIENT
Start: 2018-08-31 | End: 2018-08-31 | Stop reason: HOSPADM

## 2018-08-31 RX ORDER — PREDNISONE 10 MG/1
TABLET ORAL
Qty: 10 TABLET | Refills: 0 | Status: CANCELLED | OUTPATIENT
Start: 2018-09-01

## 2018-08-31 RX ORDER — FAMOTIDINE 20 MG/1
20 TABLET, FILM COATED ORAL 2 TIMES DAILY
Status: DISCONTINUED | OUTPATIENT
Start: 2018-08-31 | End: 2018-08-31 | Stop reason: HOSPADM

## 2018-08-31 RX ORDER — PREDNISONE 20 MG/1
40 TABLET ORAL DAILY
Status: DISCONTINUED | OUTPATIENT
Start: 2018-08-31 | End: 2018-08-31 | Stop reason: HOSPADM

## 2018-08-31 RX ORDER — PREDNISONE 10 MG/1
TABLET ORAL
Qty: 10 TABLET | Refills: 0 | Status: SHIPPED | OUTPATIENT
Start: 2018-08-31 | End: 2018-09-06

## 2018-08-31 RX ORDER — FAMOTIDINE 20 MG/1
20 TABLET, FILM COATED ORAL 2 TIMES DAILY
Qty: 14 TABLET | Refills: 0 | Status: SHIPPED | OUTPATIENT
Start: 2018-08-31 | End: 2018-09-07

## 2018-08-31 RX ORDER — FAMOTIDINE 20 MG/1
20 TABLET, FILM COATED ORAL 2 TIMES DAILY
Qty: 14 TABLET | Refills: 0 | Status: CANCELLED | OUTPATIENT
Start: 2018-08-31 | End: 2018-09-07

## 2018-08-31 RX ADMIN — PREDNISONE 40 MG: 20 TABLET ORAL at 09:01

## 2018-08-31 RX ADMIN — Medication 1 LOZENGE: at 00:25

## 2018-08-31 RX ADMIN — CITALOPRAM HYDROBROMIDE 20 MG: 20 TABLET ORAL at 09:01

## 2018-08-31 RX ADMIN — METHYLPREDNISOLONE SODIUM SUCCINATE 40 MG: 40 INJECTION, POWDER, FOR SOLUTION INTRAMUSCULAR; INTRAVENOUS at 04:55

## 2018-08-31 RX ADMIN — ALPRAZOLAM 0.5 MG: 0.5 TABLET ORAL at 00:24

## 2018-08-31 RX ADMIN — CALCIUM CARBONATE (ANTACID) CHEW TAB 500 MG 500 MG: 500 CHEW TAB at 04:55

## 2018-08-31 RX ADMIN — FAMOTIDINE 20 MG: 20 TABLET, FILM COATED ORAL at 09:01

## 2018-08-31 ASSESSMENT — ACTIVITIES OF DAILY LIVING (ADL)
ADLS_ACUITY_SCORE: 12

## 2018-08-31 ASSESSMENT — VISUAL ACUITY: OU: NORMAL ACUITY

## 2018-08-31 NOTE — PLAN OF CARE
Problem: Anaphylactic/Systemic Hypersensitivity Reaction (Adult)  Goal: Signs and Symptoms of Listed Potential Problems Will be Absent, Minimized or Managed (Anaphylactic/Systemic Hypersensitivity Reaction)  Signs and symptoms of listed potential problems will be absent, minimized or managed by discharge/transition of care (reference Anaphylactic/Systemic Hypersensitivity Reaction (Adult) CPG).  Outcome: Improving  Swelling improving, no complaints of shortness of breath or difficulty breathing. Was on aerosol mask until 0330 maintains saturations > 95% on room air. Complains of a sore throat but it is alleviated some by the lozenge and ice. Will continue to monitor and will notify MD of any changes.

## 2018-08-31 NOTE — PROVIDER NOTIFICATION
Notified MD that Lactic was 5.8 at 2230. No orders obtained. MD feels she is not septic but that this value is elevated due to the steroids and epi drip.     MD said we could turn the patient down to 0.03mcg/kg/min on the epi drip at this time.

## 2018-08-31 NOTE — PLAN OF CARE
Problem: Patient Care Overview  Goal: Individualization & Mutuality  epinephrine drip off at 0030, continue to give scheduled solumedrol. Swelling to hands slightly better than beginning of shift, no facial edema noted. Patient complains of sore throat but is relieved slightly with ice and Cepacol lozenge. Vitals stable. Urine output adequate. Patient able to move about and reposition self with out difficulty. She complains of lack of sleep but I explained that one of the side effects of epinephrine and solumedrol was anxiousness and jitteriness she voiced understanding. Will continue to monitor for any reoccurrence of angioedema or any signs of respiratory distress. Will continue xanax as ordered to help alleviate any anxiety.

## 2018-08-31 NOTE — PLAN OF CARE
Problem: Patient Care Overview  Goal: Plan of Care/Patient Progress Review  Outcome: No Change  Four hour shift note:     VSS on RA. Afebrile. A&Ox4. Shacky from steroids. Lungs are clear throughout. Epi drip 0.06mcg/kg/min most of the shift. Weaned to 0.03mcg/kg/min at 2253. Pt.'s Lactic at 1930 was 6.4. 1L bolus administered. Lactic at 2230 was 5.8. MD notified and no orders obtained. Good urine output from Reyna. Independently repositioning. BG was 237. MD ordered sliding scale. 1 unit of insulin given. Denies pain but has a sore throat from the tube. ST to NSR on monitor. K+ 2.9. IV replacement given and recheck placed for 0100 tomorrow. Swelling in face and bilateral hands has decreased.

## 2018-08-31 NOTE — PROGRESS NOTES
WORK EXCUSE NOTE    To whom it may concern:  KELLE ARCE was hospitalized at [facility] 8/30/18-8/31/18.  The patient will need to rest at home through 9/3/18 as part of her post-hospital recovery.  The patient may return to work without restrictions on 9/4/18.  If there are further questions, please do not hesitate to contact [facility].  Sincerely,    -Estephania Yusuf MD

## 2018-08-31 NOTE — DISCHARGE SUMMARY
"[facility]    Discharge Summary  Hospitalist    Date of Admission:  8/30/2018  Date of Discharge:  8/31/18  Discharging Provider: Estephania Yusuf MD    Discharge Diagnoses   - Drug-induced anaphylaxis (AMOXICILLIN)    History of Present Illness   Liliana Kat is a 48 year old  female with a significant past medical history of adjustment disorder with depression and anxiety, status post bilateral oophorectomy on hormone replacement with Estrace and Provera who presents with severe anaphylactic reaction that started approximately 25 minutes after she took amoxicillin prior to a dental procedure.  Of note she does have a documented history to Augmentin of \"swelling and difficulty breathing\" but per the  she had been able to tolerate low doses of amoxicillin prior to procedures in the past since the initial reaction in 2012.  Patient initially had tingling of her hands and feet 25 minutes after taking the medication, followed 10 minutes later by shortness of breath and lip swelling and 5 minutes after she began vomiting, having profuse diarrhea, was noted to be sweaty.  Ambulance was called and just prior to their arrival patient was noted to clench all of her muscles and go rigid followed by a loss of consciousness.  Patient was noted by the ambulance to be minimally responsive but oxygenating well.  They did give 0.3 mg of epinephrine as well as 50 mg of Benadryl on route to the hospital.  In the emergency room 0.3 mg of epinephrine IM was administered again along with 80 mg of Solu-Medrol, 20 mg of famotidine however patient had ongoing worsening of her facial swelling and growing redness of her skin.  Blood pressures were in the low 90s systolic and given her ongoing worsening and epinephrine drip was initiated.  10 minutes after this initiation patient had ongoing worsening and decision was made to intubate, which occurred without incident.  Patient was continued " on a fentanyl drip and within 10 minutes of intubation was noted to have some improvement of her erythema.  Blood pressure is improved into the 120-130 range systolic following intubation and ongoing epinephrine drip.  Decision was made to admit patient for ongoing management of anaphylactic shock reaction.  Given ongoing need for epinephrine drip, decision was made for central line to be placed, which will be performed by the emergency room provider prior to patient being transitioned to ICU.    Hospital Course   Liliana Kat was admitted on 8/30/2018.  The following problems were addressed during her hospitalization:    Principal Problem:    Drug-induced anaphylaxis, initial encounter  Active Problems:    Need for postmenopausal hormone replacement    Adjustment disorder with mixed anxiety and depressed mood    Anaphylactic reaction    Seasonal allergic rhinitis    Hypokalemia    Acidosis    Drug-induced anaphylaxis / Anaphylactic reaction secondary to AMOXICILLIN intake  - Patient presented with life-threatening anaphylactic reaction to amoxicillin, necessitating ICU admission, IV epinephrine drip, IV SoluMedrol, IV Pepcid, and intubation on 8/30 for airway protection  - Patient's clinical status improved in the ICU and was successfully extubated on the evening of 8/30  - Patient's lactate peaked at 6.4 while receiving an epinephrine drip --> lactate levels downtrended after epinephrine drip was stopped (lactic acidosis was purely secondary to epinephrine drip)  - Transitioned to oral prednisone taper (40mg po daily x1day, then 30mg po daily x1day, then 20mg po daily x1day, then 10mg po daily x1day, then STOP)  - Scripts given for patient to continue taking Pepcid 20mg po bid x7days as well as PRN Cepacol lozenges for throat pain  - Patient's mentation and clinical status returned to her baseline prior to discharge; she was able to tolerate a soft diet prior to discharge  - Amoxicillin has been placed on  her allergy list is a high reaction and she has been has been informed that she should never take another penicillin product going forward (given this life-threatening reaction); patient voiced understanding  - Medically stable for discharge with outpatient PCP follow-up    Hypokalemia resolved after potassium supplementation    Adjustment disorder with mixed anxiety and depressed mood  -Resumed home Celexa and as needed Xanax on discharge    Need for postmenopausal hormone replacement  - Resumed home meds at discharge    Positive pregnancy test  - Noted in the emergency room, however patient had an oophorectomy bilaterally 13 years ago therefore is felt false positive.  Of note Xanax (which patient does take) can cause a false positive pregnancy test    Seasonal allergic rhinitis  - Resumed home meds at discharge    Normal renal indices with GFR >60    Code status: Full Code    Estephania Yusuf MD    Significant Results and Procedures   - patient was intubated 8/30 and then successfully extubated the evening of the same day  - patient's lactate peaked at 6.4 while receiving an epinephrine drip --> lactate levels downtrended after epinephrine drip was stopped (lactic acidosis was purely secondary to epinephrine drip)    Pending Results   Unresulted Labs Ordered in the Past 30 Days of this Admission     No orders found for last 61 day(s).        Code Status   Full Code       Primary Care Physician   Paula Villa    Physical Exam   Temp: 99  F (37.2  C) Temp src: Oral BP: 106/73   Heart Rate: 78 Resp: 8 SpO2: 96 % O2 Device: None (Room air) Oxygen Delivery: 5 LPM  Vitals:    08/30/18 1419   Weight: 67.3 kg (148 lb 5.9 oz)     Vital Signs with Ranges  Temp:  [98.5  F (36.9  C)-99  F (37.2  C)] 99  F (37.2  C)  Heart Rate:  [] 78  Resp:  [0-40] 8  BP: ()/() 106/73  FiO2 (%):  [21 %-28 %] 28 %  SpO2:  [95 %-100 %] 96 %  I/O last 3 completed shifts:  In: 2452 [P.O.:220; I.V.:2232]  Out: 2785  [Urine:2725]    Constitutional: Alert, well-nourished, well-developed, resting in bed in NAD  Eyes: EOMI, PERRL  ENT: Hoarse voices secondary to recent intubation/extubation, no external deformities of nose, trachea midline, no stridor  Respiratory: No increased WOB at rest on room air, no wheezes/rales/rhonchi/crackles, CTAB  Cardiovascular: S2S2 RRR with no murmurs/rubs/gallops  GI: Normoactive BS, soft, NTND  Lymph/Hematologic: No cervical lymphadenopathy  Skin: Warm and dry, no rashes  Musculoskeletal: Moving all extremities with 5/5 strength  Neurologic: CN2-12 intact with no focal neuro deficits identified, sensation intact  Psychiatric: Mood and affect appropriate, cooperative    Discharge Disposition   Discharged to home  Condition at discharge: Good; stable    Consultations This Hospital Stay   None    Time Spent on this Encounter   Estephania MORFIN, personally saw the patient today and spent greater than 30 minutes discharging this patient.    Discharge Orders   No discharge procedures on file.  Discharge Medications   Current Discharge Medication List      START taking these medications    Details   benzocaine-menthol (CHLORASEPTIC) 6-10 MG lozenge Place 1 lozenge inside cheek every hour as needed for sore throat  Qty: 36 lozenge, Refills: 0    Associated Diagnoses: Drug-induced anaphylaxis, initial encounter      famotidine (PEPCID) 20 MG tablet Take 1 tablet (20 mg) by mouth 2 times daily for 7 days  Qty: 14 tablet, Refills: 0    Associated Diagnoses: Drug-induced anaphylaxis, initial encounter      predniSONE (DELTASONE) 10 MG tablet Take 40mg po x1day, then 30mg po x1day, then 20mg po x1day, then 10mg po x1day  Qty: 10 tablet, Refills: 0    Associated Diagnoses: Drug-induced anaphylaxis, initial encounter         CONTINUE these medications which have NOT CHANGED    Details   Acetaminophen (TYLENOL PO) Take 650 mg by mouth every 4 hours as needed for mild pain or fever      albuterol (2.5 MG/3ML) 0.083%  neb solution ONE NEBULIZATION 4 TIMES DAILY AS NEEDED  Qty: 1 Box, Refills: 0    Associated Diagnoses: Acute sinusitis, unspecified      albuterol (PROAIR HFA/PROVENTIL HFA/VENTOLIN HFA) 108 (90 BASE) MCG/ACT Inhaler Inhale 2 puffs into the lungs every 6 hours as needed for shortness of breath / dyspnea or wheezing  Qty: 1 Inhaler, Refills: 0    Associated Diagnoses: Tobacco abuse      ALPRAZolam (XANAX) 0.5 MG tablet Take 1 tablet (0.5 mg) by mouth 3 times daily as needed for anxiety  Qty: 90 tablet, Refills: 0    Comments: LAST PICKED UP 06/14/2018  #90 / 30 DAY SUPPLY  Associated Diagnoses: Adjustment disorder with mixed anxiety and depressed mood      aspirin 81 MG tablet Take 1 tablet (81 mg) by mouth daily  Qty: 30 tablet, Refills: OTC    Associated Diagnoses: Superficial varicosities, unspecified laterality      citalopram (CELEXA) 20 MG tablet TAKE ONE TABLET BY MOUTH EVERY DAY  Qty: 30 tablet, Refills: 1    Associated Diagnoses: Adjustment disorder with mixed anxiety and depressed mood      estradiol (ESTRACE) 1 MG tablet TAKE ONE TABLET BY MOUTH EVERY DAY  Qty: 30 tablet, Refills: 6    Associated Diagnoses: Need for postmenopausal hormone replacement      fluticasone (FLONASE) 50 MCG/ACT spray Spray 2 sprays into both nostrils daily  Qty: 1 Bottle, Refills: 11    Associated Diagnoses: Acute sinusitis, unspecified      loratadine (CLARITIN) 10 MG tablet TAKE ONE TABLET BY MOUTH EVERY DAY  Qty: 30 tablet, Refills: 5    Associated Diagnoses: Acute sinusitis      medroxyPROGESTERone (PROVERA) 5 MG tablet TAKE ONE TABLET BY MOUTH EVERY DAY  Qty: 90 tablet, Refills: 0    Associated Diagnoses: Need for postmenopausal hormone replacement      MELATONIN 10 mg daily            Allergies   Allergies   Allergen Reactions     Amoxicillin Anaphylaxis     Augmentin Swelling and Difficulty breathing     Avelox Nausea and Vomiting     Effexor Xr [Venlafaxine Hydrochloride]      Shaky and heart racing     Sulfa Drugs       Data   Most Recent 3 CBC's:  Recent Labs   Lab Test  08/30/18   0958  06/27/18   1221  09/08/17   1230   WBC  8.5  9.5  10.0   HGB  15.2  14.5  14.3   MCV  98  98  96   PLT  315  320  326      Most Recent 3 BMP's:  Recent Labs   Lab Test  08/31/18   0600  08/31/18   0115  08/30/18   0958  06/27/18   1221   NA  144   --   141  141   POTASSIUM  4.4  5.1  2.9*  3.9   CHLORIDE  117*   --   109  108   CO2  19*   --   24  28   BUN  6*   --   14  9   CR  0.78   --   0.98  0.74   ANIONGAP  8   --   8  5   REJI  7.1*   --   8.1*  9.1   GLC  113*   --   182*  92     Most Recent 2 LFT's:  Recent Labs   Lab Test  09/08/17   1230  04/12/17   1959   AST  16  11   ALT  27  34   ALKPHOS  105  88   BILITOTAL  0.4  0.3     Most Recent INR's and Anticoagulation Dosing History:  Anticoagulation Dose History     Recent Dosing and Labs Latest Ref Rng & Units 11/18/2003 9/14/2005 12/13/2005    INR 0.86 - 1.14 1.00 0.98 0.95        Most Recent 3 Troponin's:  Recent Labs   Lab Test  07/19/11   1427  09/04/10   1900   TROPI  <0.012  <0.012     Most Recent Cholesterol Panel:  Recent Labs   Lab Test  12/31/15   0917   CHOL  222*   LDL  141*   HDL  51   TRIG  152*     Most Recent 6 Bacteria Isolates From Any Culture (See EPIC Reports for Culture Details):  Recent Labs   Lab Test  05/12/16   1144  11/18/15   1035  02/25/15   1354  05/06/14   1827  11/07/13   1440  09/20/13   1303   CULT  10,000 to 50,000 colonies/mL Mixed gram positive junito  50,000 to 100,000 colonies/mL Mixed gram positive junito  No growth  <10,000 colonies/mL Mixed gram positive junito  <10,000 colonies/mL Mixed gram positive junito  10,000 to 50,000 colonies/mL Mixed gram positive junito     Most Recent TSH, T4 and A1c Labs:  Recent Labs   Lab Test  08/30/18   1002  06/27/18   1221   TSH   --   1.37   A1C  5.3   --      Results for orders placed or performed during the hospital encounter of 08/30/18   Chest  XR, 1 view portable    Narrative    CHEST ONE VIEW PORTABLE   8/30/2018 11:01 AM     HISTORY:  Anaphylaxis.     COMPARISON: Chest x-ray 9/4/2010.      Impression    IMPRESSION: Portable view of the chest is performed. Endotracheal tube  is approximately 1 cm above the stevo. Reposition if clinically  warranted. Lungs are well expanded and clear. No pneumothorax or  obvious pleural effusions on this supine film. Heart is normal in  size.    SHAILESH ROBERTO MD   Head CT w/o contrast    Narrative    CT SCAN OF THE HEAD WITHOUT CONTRAST   8/30/2018 11:27 AM     HISTORY: Altered mental status.     TECHNIQUE:  Axial images of the head and coronal reformations without  IV contrast material. Radiation dose for this scan was reduced using  automated exposure control, adjustment of the mA and/or kV according  to patient size, or iterative reconstruction technique.    COMPARISON: 9/8/2017.    FINDINGS:  Mild volume loss is present commensurate with age. The  cerebral hemispheres, brainstem, and cerebellum demonstrate normal  morphology and attenuation. No evidence of ischemia, hemorrhage, mass,  mass effect, or hydrocephalus. The visualized calvarium, skull base,  and paranasal sinuses are unremarkable. There are pooled secretions  within the nasopharynx.      Impression    IMPRESSION: No acute intracranial abnormality.    SUKUMAR CABRERA MD   Chest  XR, 1 view portable    Narrative    CHEST ONE VIEW SUPINE  8/30/2018 12:41 PM     HISTORY: Tube placement.    COMPARISON: August 30, 2018      Impression    IMPRESSION: Endotracheal tube slightly retracted with tip now in the  mid to lower trachea. Right IJ line with tip at the atrial caval  junction noted.    YVAN BRAUN MD

## 2018-08-31 NOTE — PLAN OF CARE
Problem: Patient Care Overview  Goal: Plan of Care/Patient Progress Review  Outcome: Adequate for Discharge Date Met: 08/31/18  S-(situation): Patient discharged to home via wheelchair with spouse    B-(background): anaphylaxis d/t abx    A-(assessment): Temp: 99  F (37.2  C) Temp src: Oral BP: 107/68   Heart Rate: 63 Resp: 8 SpO2: 98 % O2 Device: None (Room air) Oxygen Delivery: 5 LPM  Pt is alert x4. Resp even and unlabored Lungs are clear. Denies SOB/N/V/Pain. Adequate PO intake. Reyna dc'd voids w/o difficulty clear yellow urine. Steady gait when ambulating. R CVC dc'd, no s/s of bleeding, pressure drsg applied. DC instructions given to pt and spouse prescriptions sent down to [place] pharmacy. Pt will . No signs of distress noted. Pt verbalized understanding of DC instructions    R-(recommendations): Discharge instructions reviewed with patient and spouse. Listed belongings gathered and returned to patient. yes         Discharge Nursing Criteria:     Care Plan and Patient education resolved: Yes    New Medications- pt has been educated about purpose and side effects: Yes    Vaccines  Influenza status verified at discharge:  Yes        MISC  Prescriptions if needed, hard copies sent with patient  NA  Home and hospital aquired medications returned to patient: NA  Medication Bin checked and emptied on discharge Yes  Patient reports post-discharge pain management plan is effective: Yes      Problem: Anaphylactic/Systemic Hypersensitivity Reaction (Adult)  Goal: Signs and Symptoms of Listed Potential Problems Will be Absent, Minimized or Managed (Anaphylactic/Systemic Hypersensitivity Reaction)  Signs and symptoms of listed potential problems will be absent, minimized or managed by discharge/transition of care (reference Anaphylactic/Systemic Hypersensitivity Reaction (Adult) CPG).   Outcome: Adequate for Discharge Date Met: 08/31/18  Temp: 99  F (37.2  C) Temp src: Oral BP: 107/68   Heart Rate: 63 Resp: 8  SpO2: 98 % O2 Device: None (Room air) Oxygen Delivery: 5 LPM

## 2018-09-04 ENCOUNTER — TELEPHONE (OUTPATIENT)
Dept: FAMILY MEDICINE | Facility: CLINIC | Age: 48
End: 2018-09-04

## 2018-09-04 NOTE — TELEPHONE ENCOUNTER
Patient called to schedule an appointment for a hospital follow-up or appeared on a report showing that they were recently discharged from the hospital.    Patient was admitted to [facility]:  8/30/18  Discharged date: 8/31/18  Reason for hospital admission:  Anaphylaxis, Bladder Polyps  Does patient have future appointment scheduled with provider? Yes Paula Villa  Date of future appointment:  9/6/18      This information will be used to help the care team plan for the patients upcoming visit.  The triage RN may determine that a follow up call is necessary and reach out to the patient via the phone number listed in the chart.     Please route this message on routine priority to the Triage RN pool.

## 2018-09-04 NOTE — TELEPHONE ENCOUNTER
TRIAGE RN Patient Contact    Attempt # 1    Was call answered?  No.  Left message on voicemail with information to call me back.    Sanam Lyles RN. . .  9/4/2018, 3:40 PM

## 2018-09-05 NOTE — TELEPHONE ENCOUNTER
ED / Discharge Outreach Protocol    Patient Contact    Attempt # 2    Was call answered?  No.  Left message on voicemail with information to call me back.  MEHRAN GallegoN, RN

## 2018-09-05 NOTE — TELEPHONE ENCOUNTER
"Hospital/TCU/ED for chronic condition Discharge Protocol    \"Hi, my name is Leilani Mcconnell, a registered nurse, and I am calling from [facility].  I am calling to follow up and see how things are going for you after your recent emergency visit/hospital/TCU stay.\"    Tell me how you are doing now that you are home?\" Patient states she is very scared about going back to work.  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  She states she does not feel right yet and does not feel good about going back to work, if something were to happen there would be no one there to help her.  She is informed if she does not feel right yet, she should wait to see PCP at follow up and discuss back to work plan then.      Discharge Instructions    \"Let's review your discharge instructions.  What is/are the follow-up recommendations?  Pt. Response: Follow up appointment tomorrow, 9/6/18    \"Has an appointment with your primary care provider been scheduled?\"   Yes. (confirm)    \"When you see the provider, I would recommend that you bring your medications with you.\"    Medications    \"Tell me what changed about your medicines when you discharged?\"    Changes to chronic meds?    0-1    \"What questions do you have about your medications?\"    None     New diagnoses of heart failure, COPD, diabetes, or MI?    No              Medication reconciliation completed? Yes  Was MTM referral placed (*Make sure to put transitions as reason for referral)?   No    Call Summary    \"What questions or concerns do you have about your recent visit and your follow-up care?\"     none    \"If you have questions or things don't continue to improve, we encourage you contact us through the main clinic number (give number).  Even if the clinic is not open, triage nurses are available 24/7 to help you.     We would like you to know that our clinic has extended hours (provide information).  We also have urgent care (provide " "details on closest location and hours/contact info)\"      \"Thank you for your time and take care!\"     MEHRAN GallegoN, RN           "

## 2018-09-06 ENCOUNTER — OFFICE VISIT (OUTPATIENT)
Dept: BEHAVIORAL HEALTH | Facility: CLINIC | Age: 48
End: 2018-09-06
Payer: COMMERCIAL

## 2018-09-06 ENCOUNTER — OFFICE VISIT (OUTPATIENT)
Dept: FAMILY MEDICINE | Facility: CLINIC | Age: 48
End: 2018-09-06
Payer: COMMERCIAL

## 2018-09-06 VITALS
WEIGHT: 135.3 LBS | HEART RATE: 96 BPM | HEIGHT: 65 IN | DIASTOLIC BLOOD PRESSURE: 68 MMHG | BODY MASS INDEX: 22.54 KG/M2 | RESPIRATION RATE: 16 BRPM | SYSTOLIC BLOOD PRESSURE: 126 MMHG | TEMPERATURE: 97.9 F | OXYGEN SATURATION: 99 %

## 2018-09-06 DIAGNOSIS — F43.23 ADJUSTMENT DISORDER WITH MIXED ANXIETY AND DEPRESSED MOOD: Primary | ICD-10-CM

## 2018-09-06 DIAGNOSIS — E87.6 HYPOKALEMIA: ICD-10-CM

## 2018-09-06 DIAGNOSIS — E87.20 ACIDOSIS: ICD-10-CM

## 2018-09-06 DIAGNOSIS — F43.23 ADJUSTMENT DISORDER WITH MIXED ANXIETY AND DEPRESSED MOOD: ICD-10-CM

## 2018-09-06 DIAGNOSIS — T78.2XXD ANAPHYLAXIS, SUBSEQUENT ENCOUNTER: Primary | ICD-10-CM

## 2018-09-06 LAB
ALBUMIN SERPL-MCNC: 4.2 G/DL (ref 3.4–5)
ALP SERPL-CCNC: 73 U/L (ref 40–150)
ALT SERPL W P-5'-P-CCNC: 51 U/L (ref 0–50)
ANION GAP SERPL CALCULATED.3IONS-SCNC: 7 MMOL/L (ref 3–14)
AST SERPL W P-5'-P-CCNC: 21 U/L (ref 0–45)
BILIRUB SERPL-MCNC: 0.4 MG/DL (ref 0.2–1.3)
BUN SERPL-MCNC: 10 MG/DL (ref 7–30)
CALCIUM SERPL-MCNC: 9.1 MG/DL (ref 8.5–10.1)
CHLORIDE SERPL-SCNC: 105 MMOL/L (ref 94–109)
CO2 SERPL-SCNC: 28 MMOL/L (ref 20–32)
CREAT SERPL-MCNC: 0.8 MG/DL (ref 0.52–1.04)
GFR SERPL CREATININE-BSD FRML MDRD: 77 ML/MIN/1.7M2
GLUCOSE SERPL-MCNC: 95 MG/DL (ref 70–99)
POTASSIUM SERPL-SCNC: 4.1 MMOL/L (ref 3.4–5.3)
PROT SERPL-MCNC: 7.7 G/DL (ref 6.8–8.8)
SODIUM SERPL-SCNC: 140 MMOL/L (ref 133–144)

## 2018-09-06 PROCEDURE — 36415 COLL VENOUS BLD VENIPUNCTURE: CPT | Performed by: NURSE PRACTITIONER

## 2018-09-06 PROCEDURE — 99496 TRANSJ CARE MGMT HIGH F2F 7D: CPT | Performed by: NURSE PRACTITIONER

## 2018-09-06 PROCEDURE — 99207 ZZC NO CHARGE BEHAVIORAL WARM HANDOFF: CPT | Performed by: MARRIAGE & FAMILY THERAPIST

## 2018-09-06 PROCEDURE — 80053 COMPREHEN METABOLIC PANEL: CPT | Performed by: NURSE PRACTITIONER

## 2018-09-06 RX ORDER — CITALOPRAM HYDROBROMIDE 40 MG/1
40 TABLET ORAL DAILY
Qty: 30 TABLET | Refills: 3 | Status: SHIPPED | OUTPATIENT
Start: 2018-09-06 | End: 2019-05-16

## 2018-09-06 RX ORDER — IBUPROFEN 600 MG/1
TABLET, FILM COATED ORAL
Refills: 0 | COMMUNITY
Start: 2018-08-29 | End: 2021-03-11

## 2018-09-06 ASSESSMENT — PAIN SCALES - GENERAL: PAINLEVEL: SEVERE PAIN (6)

## 2018-09-06 NOTE — MR AVS SNAPSHOT
"              After Visit Summary   9/6/2018    Liliana Kat    MRN: [id number]           Patient Information     Date Of Birth          1970        Visit Information        Provider Department      9/6/2018 11:00 AM Paula Villa APRN CNP [facility]        Today's Diagnoses     Anaphylaxis, subsequent encounter    -  1    Adjustment disorder with mixed anxiety and depressed mood        Hypokalemia        Acidosis           Follow-ups after your visit        Your next 10 appointments already scheduled     Sep 12, 2018 10:30 AM CDT   New Visit with ELVA Felton   [facility] ([facility]    [address] [place] [address] [phone]              Who to contact     If you have questions or need follow up information about today's clinic visit or your schedule please contact [facility] directly at [phone].  Normal or non-critical lab and imaging results will be communicated to you by MyChart, letter or phone within 4 business days after the clinic has received the results. If you do not hear from us within 7 days, please contact the clinic through MyChart or phone. If you have a critical or abnormal lab result, we will notify you by phone as soon as possible.  Submit refill requests through [employer] or call your pharmacy and they will forward the refill request to us. Please allow 3 business days for your refill to be completed.          Additional Information About Your Visit        MyChart Information     [employer] lets you send messages to your doctor, view your test results, renew your prescriptions, schedule appointments and more. To sign up, go to [email or web address] . Click on \"Log in\" on the left side of the screen, which will take you to the Welcome page. Then click on \"Sign up Now\" on the right side of the page.     You will be asked to enter the access code listed below, as well as some personal " "information. Please follow the directions to create your username and password.     Your access code is: [email or web address]  Expires: 2018 11:02 AM     Your access code will  in 90 days. If you need help or a new code, please call your [facility] clinic or [phone].        Care EveryWhere ID     This is your Care EveryWhere ID. This could be used by other organizations to access your [facility] medical records  [id number]        Your Vitals Were     Pulse Temperature Respirations Height Last Period Pulse Oximetry    96 97.9  F (36.6  C) (Tympanic) 16 5' 5\" (1.651 m) 2006 99%    Breastfeeding? BMI (Body Mass Index)                No 22.52 kg/m2           Blood Pressure from Last 3 Encounters:   18 126/68   18 107/68   18 106/60    Weight from Last 3 Encounters:   18 135 lb 4.8 oz (61.4 kg)   18 148 lb 5.9 oz (67.3 kg)   18 137 lb (62.1 kg)              We Performed the Following     Comprehensive metabolic panel          Today's Medication Changes          These changes are accurate as of 18 11:42 AM.  If you have any questions, ask your nurse or doctor.               These medicines have changed or have updated prescriptions.        Dose/Directions    citalopram 40 MG tablet   Commonly known as:  celeXA   This may have changed:    - medication strength  - See the new instructions.   Used for:  Adjustment disorder with mixed anxiety and depressed mood   Changed by:  Paula Villa APRN CNP        Dose:  40 mg   Take 1 tablet (40 mg) by mouth daily   Quantity:  30 tablet   Refills:  3            Where to get your medicines      These medications were sent to [facility] [place] [address] [id number]     Phone:  [phone]     citalopram 40 MG tablet                Primary Care Provider Office Phone # Fax #    LOLIS Osorio [phone]       Kenneth BLANCHARD " [id number]        Equal Access to Services     [facility]: Hadii ermias galindo azucena Johnson, wafabioda luqadaha, qaybta kanelson deangelo, waxvelma gretchen rodriguezjuneed leslie. So [facility] [phone].    ATENCIÓN: Si habla español, tiene a garcia disposición servicios gratuitos de asistencia lingüística. Roxane al [phone].    We comply with applicable federal civil rights laws and [place] laws. We do not discriminate on the basis of race, color, national origin, age, disability, sex, sexual orientation, or gender identity.            Thank you!     Thank you for choosing [facility]  for your care. Our goal is always to provide you with excellent care. Hearing back from our patients is one way we can continue to improve our services. Please take a few minutes to complete the written survey that you may receive in the mail after your visit with us. Thank you!             Your Updated Medication List - Protect others around you: Learn how to safely use, store and throw away your medicines at www.disposemymeds.org.          This list is accurate as of 9/6/18 11:42 AM.  Always use your most recent med list.                   Brand Name Dispense Instructions for use Diagnosis    * albuterol 108 (90 Base) MCG/ACT inhaler    PROAIR HFA/PROVENTIL HFA/VENTOLIN HFA    1 Inhaler    Inhale 2 puffs into the lungs every 6 hours as needed for shortness of breath / dyspnea or wheezing    Tobacco abuse       * albuterol (2.5 MG/3ML) 0.083% neb solution     1 Box    ONE NEBULIZATION 4 TIMES DAILY AS NEEDED    Acute sinusitis, unspecified       ALPRAZolam 0.5 MG tablet    XANAX    90 tablet    Take 1 tablet (0.5 mg) by mouth 3 times daily as needed for anxiety    Adjustment disorder with mixed anxiety and depressed mood       aspirin 81 MG tablet     30 tablet    Take 1 tablet (81 mg) by mouth daily    Superficial varicosities, unspecified laterality       benzocaine-menthol 6-10 MG lozenge    CHLORASEPTIC    36 lozenge     Place 1 lozenge inside cheek every hour as needed for sore throat    Drug-induced anaphylaxis, initial encounter       citalopram 40 MG tablet    celeXA    30 tablet    Take 1 tablet (40 mg) by mouth daily    Adjustment disorder with mixed anxiety and depressed mood       estradiol 1 MG tablet    ESTRACE    30 tablet    TAKE ONE TABLET BY MOUTH EVERY DAY    Need for postmenopausal hormone replacement       famotidine 20 MG tablet    PEPCID    14 tablet    Take 1 tablet (20 mg) by mouth 2 times daily for 7 days    Drug-induced anaphylaxis, initial encounter       fluticasone 50 MCG/ACT spray    FLONASE    1 Bottle    Spray 2 sprays into both nostrils daily    Acute sinusitis, unspecified       ibuprofen 600 MG tablet    ADVIL/MOTRIN          loratadine 10 MG tablet    CLARITIN    30 tablet    TAKE ONE TABLET BY MOUTH EVERY DAY    Acute sinusitis       medroxyPROGESTERone 5 MG tablet    PROVERA    90 tablet    TAKE ONE TABLET BY MOUTH EVERY DAY    Need for postmenopausal hormone replacement       MELATONIN      10 mg daily        TYLENOL PO      Take 650 mg by mouth every 4 hours as needed for mild pain or fever        * Notice:  This list has 2 medication(s) that are the same as other medications prescribed for you. Read the directions carefully, and ask your doctor or other care provider to review them with you.

## 2018-09-06 NOTE — LETTER
[address] [address]  Phone: [phone]  Fax: [phone]    September 6, 2018        Liliana Kat  [address] [place] [address]          To whom it may concern:    RE: Liliana Kat    Patient was seen and treated today at our clinic.  She has been very ill, recently discharged from the hospital.  She is not yet ready to return to work.  Please allow her some time from work to recuperate and excuse her from work 9/4/18 through 9/14/18.  I anticipate her being able to return to her usual schedule 9/18.    Please contact me for questions or concerns.      Sincerely,        LOLIS Osorio CNP

## 2018-09-06 NOTE — PROGRESS NOTES
SUBJECTIVE:   Liliana Kat is a 48 year old female who presents to clinic today for the following health issues:          Hospital Follow-up Visit:    Hospital/Nursing Home/IP Rehab Facility: [facility]  Date of Admission: 8/30/2018  Date of Discharge: 8/31/2018  Reason(s) for Admission: Anaphylactic reaction to amoxicillin; hypokalemia, acidosis            Problems taking medications regularly:  None       Medication changes since discharge: None       Problems adhering to non-medication therapy:  None    Summary of hospitalization:  [facility] discharge summary reviewed.  The patient had an anaphylactic reaction to amoxicillin, was brought into the ER.  Intubated and admitted to the intensive care unit.  She was hypokalemic, received IV potassium supplementation, intravenous fluids and steroids, epinephrine drip     Diagnostic Tests/Treatments reviewed.  Follow up needed: Recheck in clinic  Other Healthcare Providers Involved in Patient s Care:         None  Update since discharge: She feels very weak, states her body aches all over.  Emotionally she is having a great deal of difficulty dealing with the seriousness of her recent incident.    Post Discharge Medication Reconciliation: discharge medications reconciled, continue medications without change.  Plan of care communicated with patient     Coding guidelines for this visit:  Type of Medical   Decision Making Face-to-Face Visit       within 7 Days of discharge Face-to-Face Visit        within 14 days of discharge   Moderate Complexity [id number]   High Complexity [id number]                Problem list and histories reviewed & adjusted, as indicated.  Additional history: as documented    Patient Active Problem List   Diagnosis     Need for postmenopausal hormone replacement     Decreased libido     CARDIOVASCULAR SCREENING; LDL GOAL LESS THAN 160     Bladder polyps     Superficial varicosities     Sacroiliitis (H)      Adjustment disorder with mixed anxiety and depressed mood     Acute pain of right shoulder     Acute biliary pancreatitis, unspecified complication status     Labral tear of shoulder, right, subsequent encounter     Drug-induced anaphylaxis, initial encounter     Anaphylactic reaction     Seasonal allergic rhinitis     Hypokalemia     Acidosis     Past Surgical History:   Procedure Laterality Date     C LIGATE FALLOPIAN TUBE,POSTPARTUM      Tubal Ligation     ESOPHAGOSCOPY, GASTROSCOPY, DUODENOSCOPY (EGD), COMBINED  2012    Procedure: COMBINED ESOPHAGOSCOPY, GASTROSCOPY, DUODENOSCOPY (EGD), BIOPSY SINGLE OR MULTIPLE;  ESOPHAGOSCOPY, GASTROSCOPY, DUODENOSCOPY (EGD) with biopsy;  Surgeon: Herson Cabrera MD;  Location: [facility]     EXCIS VAGINAL CYST/TUMOR       HC REMOVAL OF OVARY/TUBE(S)  3/6/2006    Laparoscopic bilateral salpingo-oophorectomy.     HC TYMPANOPLASTY W/O MASTOID, INIT/REV W/O OSS CHAIN RECONST       LAPAROSCOPIC CHOLECYSTECTOMY N/A 2017    Procedure: LAPAROSCOPIC CHOLECYSTECTOMY;  Surgeon: Shivam Luevano MD;  Location:  OR       Social History   Substance Use Topics     Smoking status: Current Some Day Smoker     Packs/day: 0.00     Years: 10.00     Smokeless tobacco: Never Used      Comment: 3 cigs a day      Alcohol use No      Comment: recovering  -      Family History   Problem Relation Age of Onset     Alcohol/Drug Father       at age 53 of alcohol     Arthritis Father      Cancer Father      lung     Alcohol/Drug Paternal Grandfather      Cancer Paternal Grandfather      lung     Alcohol/Drug Paternal Grandmother      Cancer Paternal Grandmother      lung     Alcohol/Drug Paternal Aunt      Allergies Daughter      animals and grass     Allergies Son      dust mite     Arthritis Mother      Hypertension Mother      Lipids Mother      Depression Mother      HEART DISEASE Mother      Blood Disease Mother      DVTs     Depression Brother          Current Outpatient  Prescriptions   Medication Sig Dispense Refill     Acetaminophen (TYLENOL PO) Take 650 mg by mouth every 4 hours as needed for mild pain or fever       albuterol (2.5 MG/3ML) 0.083% neb solution ONE NEBULIZATION 4 TIMES DAILY AS NEEDED 1 Box 0     albuterol (PROAIR HFA/PROVENTIL HFA/VENTOLIN HFA) 108 (90 BASE) MCG/ACT Inhaler Inhale 2 puffs into the lungs every 6 hours as needed for shortness of breath / dyspnea or wheezing 1 Inhaler 0     ALPRAZolam (XANAX) 0.5 MG tablet Take 1 tablet (0.5 mg) by mouth 3 times daily as needed for anxiety 90 tablet 0     aspirin 81 MG tablet Take 1 tablet (81 mg) by mouth daily 30 tablet OTC     benzocaine-menthol (CHLORASEPTIC) 6-10 MG lozenge Place 1 lozenge inside cheek every hour as needed for sore throat 36 lozenge 0     citalopram (CELEXA) 40 MG tablet Take 1 tablet (40 mg) by mouth daily 30 tablet 3     estradiol (ESTRACE) 1 MG tablet TAKE ONE TABLET BY MOUTH EVERY DAY 30 tablet 6     famotidine (PEPCID) 20 MG tablet Take 1 tablet (20 mg) by mouth 2 times daily for 7 days 14 tablet 0     fluticasone (FLONASE) 50 MCG/ACT spray Spray 2 sprays into both nostrils daily 1 Bottle 11     ibuprofen (ADVIL/MOTRIN) 600 MG tablet   0     loratadine (CLARITIN) 10 MG tablet TAKE ONE TABLET BY MOUTH EVERY DAY 30 tablet 5     medroxyPROGESTERone (PROVERA) 5 MG tablet TAKE ONE TABLET BY MOUTH EVERY DAY 90 tablet 0     MELATONIN 10 mg daily        [DISCONTINUED] citalopram (CELEXA) 20 MG tablet TAKE ONE TABLET BY MOUTH EVERY DAY 30 tablet 1     BP Readings from Last 3 Encounters:   09/06/18 126/68   08/31/18 107/68   08/13/18 106/60    Wt Readings from Last 3 Encounters:   09/06/18 135 lb 4.8 oz (61.4 kg)   08/30/18 148 lb 5.9 oz (67.3 kg)   08/13/18 137 lb (62.1 kg)                  Labs reviewed in EPIC    Reviewed and updated as needed this visit by clinical staff  Tobacco  Allergies  Meds       Reviewed and updated as needed this visit by Provider         ROS:  Constitutional, HEENT,  "cardiovascular, pulmonary, gi and gu systems are negative, except as otherwise noted.    OBJECTIVE:     /68  Pulse 96  Temp 97.9  F (36.6  C) (Tympanic)  Resp 16  Ht 5' 5\" (1.651 m)  Wt 135 lb 4.8 oz (61.4 kg)  LMP 03/06/2006  SpO2 99%  Breastfeeding? No  BMI 22.52 kg/m2  Body mass index is 22.52 kg/(m^2).   GENERAL: Nutrition and hydration status appear adequate.  Patient appears emotionally distressed  NECK: no adenopathy, no asymmetry, masses, or scars and thyroid normal to palpation  RESP: lungs clear to auscultation - no rales, rhonchi or wheezes  CV: regular rate and rhythm, normal S1 S2, no S3 or S4, no murmur, click or rub, no peripheral edema and peripheral pulses strong  ABDOMEN: Soft, flat.  Bowel sounds present.  No masses, no organomegaly.  Patient reports diffuse tenderness across her whole body.  No one point of tenderness of the abdomen  MS: Normal muscle bulk and tone.  No joint deformity.  She reports significant tenderness across the sternal area.  PSYCH: Mentation normal.  Appears distressed, fearful.  She is on the verge of tears at times.  Quite anxious    Diagnostic Test Results:  Results for orders placed or performed in visit on 09/06/18 (from the past 24 hour(s))   Comprehensive metabolic panel   Result Value Ref Range    Sodium 140 133 - 144 mmol/L    Potassium 4.1 3.4 - 5.3 mmol/L    Chloride 105 94 - 109 mmol/L    Carbon Dioxide 28 20 - 32 mmol/L    Anion Gap 7 3 - 14 mmol/L    Glucose 95 70 - 99 mg/dL    Urea Nitrogen 10 7 - 30 mg/dL    Creatinine 0.80 0.52 - 1.04 mg/dL    GFR Estimate 77 >60 mL/min/1.7m2    GFR Estimate If Black >90 >60 mL/min/1.7m2    Calcium 9.1 8.5 - 10.1 mg/dL    Bilirubin Total 0.4 0.2 - 1.3 mg/dL    Albumin 4.2 3.4 - 5.0 g/dL    Protein Total 7.7 6.8 - 8.8 g/dL    Alkaline Phosphatase 73 40 - 150 U/L    ALT 51 (H) 0 - 50 U/L    AST 21 0 - 45 U/L       ASSESSMENT/PLAN:     Problem List Items Addressed This Visit        Medium    Adjustment disorder " with mixed anxiety and depressed mood    Relevant Medications    citalopram (CELEXA) 40 MG tablet    Anaphylactic reaction - Primary    Relevant Orders    Comprehensive metabolic panel (Completed)    Hypokalemia    Relevant Orders    Comprehensive metabolic panel (Completed)    Acidosis    Relevant Orders    Comprehensive metabolic panel (Completed)           Chem panel is completely normalized at this time.  Citalopram was increased to 40 mg daily,  she did receive a prescription for Xanax prior to discharge from the hospital.  Shira Renae came to the room and visited with the patient and her , has scheduled her for a clinic follow-up with her next week.  I provided a letter for the patient requesting she be excused from work for another week.  Hopefully that will give her time to further recuperate and mentally be prepared to return to work.  She will follow-up with myself in 1 month, sooner if having worsening problems      LOLIS Osorio [facility]

## 2018-09-06 NOTE — MR AVS SNAPSHOT
"              After Visit Summary   9/6/2018    Liliana Kat    MRN: [id number]           Patient Information     Date Of Birth          1970        Visit Information        Provider Department      9/6/2018 1:03 PM Shira Renae LMFT [facility]        Today's Diagnoses     Adjustment disorder with mixed anxiety and depressed mood    -  1       Follow-ups after your visit        Your next 10 appointments already scheduled     Sep 12, 2018 10:30 AM CDT   New Visit with ELVA Felton   [facility]    [address] [place] [address] [phone]              Who to contact     If you have questions or need follow up information about today's clinic visit or your schedule please contact [facility] directly at [phone].  Normal or non-critical lab and imaging results will be communicated to you by MyChart, letter or phone within 4 business days after the clinic has received the results. If you do not hear from us within 7 days, please contact the clinic through MyChart or phone. If you have a critical or abnormal lab result, we will notify you by phone as soon as possible.  Submit refill requests through [employer] or call your pharmacy and they will forward the refill request to us. Please allow 3 business days for your refill to be completed.          Additional Information About Your Visit        MyChart Information     [employer] lets you send messages to your doctor, view your test results, renew your prescriptions, schedule appointments and more. To sign up, go to [email or web address] . Click on \"Log in\" on the left side of the screen, which will take you to the Welcome page. Then click on \"Sign up Now\" on the right side of the page.     You will be asked to enter the access code listed below, as well as some personal information. Please follow the directions to create your username and password.   "   Your access code is: [email or web address]  Expires: 2018 11:02 AM     Your access code will  in 90 days. If you need help or a new code, please call your [facility] clinic or [phone].        Care EveryWhere ID     This is your Care EveryWhere ID. This could be used by other organizations to access your [facility] medical records  [id number]        Your Vitals Were     Last Period                   2006            Blood Pressure from Last 3 Encounters:   18 126/68   18 107/68   18 106/60    Weight from Last 3 Encounters:   18 61.4 kg (135 lb 4.8 oz)   18 67.3 kg (148 lb 5.9 oz)   18 62.1 kg (137 lb)              Today, you had the following     No orders found for display         Today's Medication Changes          These changes are accurate as of 18 11:59 PM.  If you have any questions, ask your nurse or doctor.               These medicines have changed or have updated prescriptions.        Dose/Directions    citalopram 40 MG tablet   Commonly known as:  celeXA   This may have changed:    - medication strength  - See the new instructions.   Used for:  Adjustment disorder with mixed anxiety and depressed mood   Changed by:  Paula Villa APRN CNP        Dose:  40 mg   Take 1 tablet (40 mg) by mouth daily   Quantity:  30 tablet   Refills:  3            Where to get your medicines      These medications [facility] [place] [address] [facility]   [facility] [address]     Phone:  [phone]     citalopram 40 MG tablet                Primary Care Provider Office Phone # Fax #    LOLIS Osorio [phone]       [address] [place] [address]   [facility] [address]        Equal Access to Services     NAREN W[facility] : Paulie reyeso Solizbeth, waaxda luqadaha, qaybta kaalmada harlanyaharris, anirudh leslie. So [facility] [phone].    ATENCIÓN: Si favian salinas garcia  disposición servicios gratuitos de asistencia lingüística. Roxane guadarrama [phone].    We comply with applicable federal civil rights laws and [place] laws. We do not discriminate on the basis of race, color, national origin, age, disability, sex, sexual orientation, or gender identity.            Thank you!     Thank you for choosing [facility]  for your care. Our goal is always to provide you with excellent care. Hearing back from our patients is one way we can continue to improve our services. Please take a few minutes to complete the written survey that you may receive in the mail after your visit with us. Thank you!             Your Updated Medication List - Protect others around you: Learn how to safely use, store and throw away your medicines at www.disposemymeds.org.          This list is accurate as of 9/6/18 11:59 PM.  Always use your most recent med list.                   Brand Name Dispense Instructions for use Diagnosis    * albuterol 108 (90 Base) MCG/ACT inhaler    PROAIR HFA/PROVENTIL HFA/VENTOLIN HFA    1 Inhaler    Inhale 2 puffs into the lungs every 6 hours as needed for shortness of breath / dyspnea or wheezing    Tobacco abuse       * albuterol (2.5 MG/3ML) 0.083% neb solution     1 Box    ONE NEBULIZATION 4 TIMES DAILY AS NEEDED    Acute sinusitis, unspecified       ALPRAZolam 0.5 MG tablet    XANAX    90 tablet    Take 1 tablet (0.5 mg) by mouth 3 times daily as needed for anxiety    Adjustment disorder with mixed anxiety and depressed mood       aspirin 81 MG tablet     30 tablet    Take 1 tablet (81 mg) by mouth daily    Superficial varicosities, unspecified laterality       benzocaine-menthol 6-10 MG lozenge    CHLORASEPTIC    36 lozenge    Place 1 lozenge inside cheek every hour as needed for sore throat    Drug-induced anaphylaxis, initial encounter       citalopram 40 MG tablet    celeXA    30 tablet    Take 1 tablet (40 mg) by mouth daily    Adjustment disorder with  mixed anxiety and depressed mood       estradiol 1 MG tablet    ESTRACE    30 tablet    TAKE ONE TABLET BY MOUTH EVERY DAY    Need for postmenopausal hormone replacement       famotidine 20 MG tablet    PEPCID    14 tablet    Take 1 tablet (20 mg) by mouth 2 times daily for 7 days    Drug-induced anaphylaxis, initial encounter       fluticasone 50 MCG/ACT spray    FLONASE    1 Bottle    Spray 2 sprays into both nostrils daily    Acute sinusitis, unspecified       ibuprofen 600 MG tablet    ADVIL/MOTRIN          loratadine 10 MG tablet    CLARITIN    30 tablet    TAKE ONE TABLET BY MOUTH EVERY DAY    Acute sinusitis       medroxyPROGESTERone 5 MG tablet    PROVERA    90 tablet    TAKE ONE TABLET BY MOUTH EVERY DAY    Need for postmenopausal hormone replacement       MELATONIN      10 mg daily        TYLENOL PO      Take 650 mg by mouth every 4 hours as needed for mild pain or fever        * Notice:  This list has 2 medication(s) that are the same as other medications prescribed for you. Read the directions carefully, and ask your doctor or other care provider to review them with you.

## 2018-09-07 NOTE — PROGRESS NOTES
Warm-handoff    [facility] met with patient and her spouse, per patient request. Patient reports that she didn't attend her last visit because she wanted to get through her son's court hearing. She states that that is now complete and after a recent medical scare she is wanting to schedule. Patient denies any current homicidal or suicidal thoughts. She scheduled a visit with this writer for 9/12/18.

## 2018-09-07 NOTE — PROGRESS NOTES
"Liliana VELASCO Shey  Gender: female  : 1970  [address] [facility] [id number] [phone] (home)     Medical Record: [id number]  Pharmacy:    [facility] [address] [place] [address] - [address]  [facility] [place], [place] - [address] [facility] [facility]   [facility] PHARMACY  Primary Care Provider: Paula Villa    Parent's names are: Carol Gannon (mother) and Data Unavailable (father).      [facility]  2018     Discharge Phone Call:  Key Words/Key Times      Introduction - AIDET (Acknowledge, Introduce, Duration, Explanation)      Empathy-   We are calling to see how you are since your recent stay in the hospital?     Call back COMMENTS:       Clinical Questions -  (f/u appts, medication side effects/purpose, ability to care for self at home) \"For your safety, it is important to us that you understand the purpose and side effects of your medications, can you tell me what your new medications are?\"     Call back COMMENTS:       Staff Recognition -  We like to recognize staff and physicians who have done an excellent job.  Do you remember any people from your care team that you would like recognize?     Call back COMMENTS:       Very Good Care -  We want to provide very good care to all patients.  How was your care?     Call back COMMENTS:       Opportunities for Improvement -  Our goal is to be the best.  Do you have any suggestions for things that we could improve upon?     Call back COMMENTS:       Thank You       18 [id number] - 1st attempt call back. No answer. No message left. Maria C Greene, RN, BSN          "

## 2018-09-07 NOTE — PROGRESS NOTES
"Liliana A Shey  Gender: female  : 1970  [address] [facility] [id number] [phone] (home)     Medical Record: [id number]  Pharmacy:    [facility] [address] [place] [address] - [address]  [facility] [place], [place] - [address] [facility]   [facility]  Primary Care Provider: Paula Villa    Parent's names are: Carol Gannon (mother) and Data Unavailable (father).      [facility]  2018     Discharge Phone Call:  Key Words/Key Times      Introduction - AIDET (Acknowledge, Introduce, Duration, Explanation)      Empathy-   We are calling to see how you are since your recent stay in the hospital?     Call back COMMENTS: Patient states she is feeling better.       Clinical Questions -  (f/u appts, medication side effects/purpose, ability to care for self at home) \"For your safety, it is important to us that you understand the purpose and side effects of your medications, can you tell me what your new medications are?\"     Call back COMMENTS: Follow up appointment yesterday went well. Finished with steroid treatment.       Staff Recognition -  We like to recognize staff and physicians who have done an excellent job.  Do you remember any people from your care team that you would like recognize?     Call back COMMENTS: Too many good nurses to choose from.       Very Good Care -  We want to provide very good care to all patients.  How was your care?     Call back COMMENTS: Excellent care. Very impressed with staff.       Opportunities for Improvement -  Our goal is to be the best.  Do you have any suggestions for things that we could improve upon?     Call back COMMENTS: None.       Thank You               "

## 2018-09-12 ENCOUNTER — OFFICE VISIT (OUTPATIENT)
Dept: BEHAVIORAL HEALTH | Facility: CLINIC | Age: 48
End: 2018-09-12
Payer: COMMERCIAL

## 2018-09-12 DIAGNOSIS — F41.1 GAD (GENERALIZED ANXIETY DISORDER): Primary | ICD-10-CM

## 2018-09-12 DIAGNOSIS — F32.1 MODERATE MAJOR DEPRESSION (H): ICD-10-CM

## 2018-09-12 DIAGNOSIS — F43.10 PTSD (POST-TRAUMATIC STRESS DISORDER): ICD-10-CM

## 2018-09-12 PROCEDURE — 90791 PSYCH DIAGNOSTIC EVALUATION: CPT | Performed by: MARRIAGE & FAMILY THERAPIST

## 2018-09-12 ASSESSMENT — ANXIETY QUESTIONNAIRES
1. FEELING NERVOUS, ANXIOUS, OR ON EDGE: NEARLY EVERY DAY
5. BEING SO RESTLESS THAT IT IS HARD TO SIT STILL: MORE THAN HALF THE DAYS
GAD7 TOTAL SCORE: 17
2. NOT BEING ABLE TO STOP OR CONTROL WORRYING: NEARLY EVERY DAY
7. FEELING AFRAID AS IF SOMETHING AWFUL MIGHT HAPPEN: NOT AT ALL
IF YOU CHECKED OFF ANY PROBLEMS ON THIS QUESTIONNAIRE, HOW DIFFICULT HAVE THESE PROBLEMS MADE IT FOR YOU TO DO YOUR WORK, TAKE CARE OF THINGS AT HOME, OR GET ALONG WITH OTHER PEOPLE: VERY DIFFICULT
3. WORRYING TOO MUCH ABOUT DIFFERENT THINGS: NEARLY EVERY DAY
6. BECOMING EASILY ANNOYED OR IRRITABLE: NEARLY EVERY DAY

## 2018-09-12 ASSESSMENT — PATIENT HEALTH QUESTIONNAIRE - PHQ9: 5. POOR APPETITE OR OVEREATING: NEARLY EVERY DAY

## 2018-09-12 NOTE — MR AVS SNAPSHOT
"              After Visit Summary   2018    Liliana Kat    MRN: [id number]           Patient Information     Date Of Birth          1970        Visit Information        Provider Department      2018 10:30 AM Shira Renae LMFT [facility]        Care Instructions    [facility]  [facility]   [address]   [address]  [place], [place] [address]  [phone]          Follow-ups after your visit        Who to contact     If you have questions or need follow up information about today's clinic visit or your schedule please contact [facility] directly at [phone].  Normal or non-critical lab and imaging results will be communicated to you by MyChart, letter or phone within 4 business days after the clinic has received the results. If you do not hear from us within 7 days, please contact the clinic through MyChart or phone. If you have a critical or abnormal lab result, we will notify you by phone as soon as possible.  Submit refill requests through [employer] or call your pharmacy and they will forward the refill request to us. Please allow 3 business days for your refill to be completed.          Additional Information About Your Visit        MyChart Information     [employer] lets you send messages to your doctor, view your test results, renew your prescriptions, schedule appointments and more. To sign up, go to [email or web address] . Click on \"Log in\" on the left side of the screen, which will take you to the Welcome page. Then click on \"Sign up Now\" on the right side of the page.     You will be asked to enter the access code listed below, as well as some personal information. Please follow the directions to create your username and password.     Your access code is: [email or web address]  Expires: 2018 10:26 AM     Your access code will  in 90 days. If you need help or a new code, please call your [facility] or [phone].        Care " EveryWhere ID     This is your Care EveryWhere ID. This could be used by other organizations to access your [facility] medical records  [id number]        Your Vitals Were     Last Period                   03/06/2006            Blood Pressure from Last 3 Encounters:   09/06/18 126/68   08/31/18 107/68   08/13/18 106/60    Weight from Last 3 Encounters:   09/06/18 61.4 kg (135 lb 4.8 oz)   08/30/18 67.3 kg (148 lb 5.9 oz)   08/13/18 62.1 kg (137 lb)              Today, you had the following     No orders found for display       Primary Care Provider Office Phone # Fax #    Paula Escalanteluciana Villa, LOLIS [facility] [phone] [address] [place] [address]        Equal Access to Services     NAREN WILSON : Hadii erimas zeng Solizbeth, waaxda luqadaha, qaybta kaalmada adeegyada, anirudh cade . So [facility] [phone].    ATENCIÓN: Si habla español, tiene a garcia disposición servicios gratuitos de asistencia lingüística. Llame al [phone].    We comply with applicable federal civil rights laws and [place] laws. We do not discriminate on the basis of race, color, national origin, age, disability, sex, sexual orientation, or gender identity.            Thank you!     Thank you for choosing [facility]  for your care. Our goal is always to provide you with excellent care. Hearing back from our patients is one way we can continue to improve our services. Please take a few minutes to complete the written survey that you may receive in the mail after your visit with us. Thank you!             Your Updated Medication List - Protect others around you: Learn how to safely use, store and throw away your medicines at www.disposemymeds.org.          This list is accurate as of 9/12/18 11:28 AM.  Always use your most recent med list.                   Brand Name Dispense Instructions for use Diagnosis    * albuterol 108 (90 Base) MCG/ACT inhaler    PROAIR HFA/PROVENTIL  HFA/VENTOLIN HFA    1 Inhaler    Inhale 2 puffs into the lungs every 6 hours as needed for shortness of breath / dyspnea or wheezing    Tobacco abuse       * albuterol (2.5 MG/3ML) 0.083% neb solution     1 Box    ONE NEBULIZATION 4 TIMES DAILY AS NEEDED    Acute sinusitis, unspecified       ALPRAZolam 0.5 MG tablet    XANAX    90 tablet    Take 1 tablet (0.5 mg) by mouth 3 times daily as needed for anxiety    Adjustment disorder with mixed anxiety and depressed mood       aspirin 81 MG tablet     30 tablet    Take 1 tablet (81 mg) by mouth daily    Superficial varicosities, unspecified laterality       benzocaine-menthol 6-10 MG lozenge    CHLORASEPTIC    36 lozenge    Place 1 lozenge inside cheek every hour as needed for sore throat    Drug-induced anaphylaxis, initial encounter       citalopram 40 MG tablet    celeXA    30 tablet    Take 1 tablet (40 mg) by mouth daily    Adjustment disorder with mixed anxiety and depressed mood       estradiol 1 MG tablet    ESTRACE    30 tablet    TAKE ONE TABLET BY MOUTH EVERY DAY    Need for postmenopausal hormone replacement       fluticasone 50 MCG/ACT spray    FLONASE    1 Bottle    Spray 2 sprays into both nostrils daily    Acute sinusitis, unspecified       ibuprofen 600 MG tablet    ADVIL/MOTRIN          loratadine 10 MG tablet    CLARITIN    30 tablet    TAKE ONE TABLET BY MOUTH EVERY DAY    Acute sinusitis       medroxyPROGESTERone 5 MG tablet    PROVERA    90 tablet    TAKE ONE TABLET BY MOUTH EVERY DAY    Need for postmenopausal hormone replacement       MELATONIN      10 mg daily        TYLENOL PO      Take 650 mg by mouth every 4 hours as needed for mild pain or fever        * Notice:  This list has 2 medication(s) that are the same as other medications prescribed for you. Read the directions carefully, and ask your doctor or other care provider to review them with you.

## 2018-09-12 NOTE — PATIENT INSTRUCTIONS
Jennifer Graves, [facility]  [facility]   [address]   [address]  [place], [place] [address]  [phone]

## 2018-09-12 NOTE — LETTER
[address] [address]  Phone: [phone]  Fax: [phone]    09/12/18    Liliana Kat  [address] [place] [id number]      To whom it may concern:     RE: Liliana Kat    Patient was seen and treated today at our clinic.  She has been very ill, recently discharged from the hospital and is having continued difficulty.  She is not yet ready to return to work.  Please allow her some time from work to continue to recuperate and extend her excuse from work 9/21/18.  I anticipate her being able to return to her usual schedule 9/24.    Please contact me for questions or concerns.        Sincerely,      Shira Renae [facility]  Behavioral Health Clinician
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
## 2018-09-13 ASSESSMENT — PATIENT HEALTH QUESTIONNAIRE - PHQ9: SUM OF ALL RESPONSES TO PHQ QUESTIONS 1-9: 19

## 2018-09-13 ASSESSMENT — ANXIETY QUESTIONNAIRES: GAD7 TOTAL SCORE: 17

## 2018-10-24 DIAGNOSIS — F43.23 ADJUSTMENT DISORDER WITH MIXED ANXIETY AND DEPRESSED MOOD: ICD-10-CM

## 2018-10-25 RX ORDER — ALPRAZOLAM 0.5 MG
0.5 TABLET ORAL 3 TIMES DAILY PRN
Qty: 90 TABLET | Refills: 0 | Status: SHIPPED | OUTPATIENT
Start: 2018-10-25 | End: 2018-12-20

## 2018-10-25 NOTE — TELEPHONE ENCOUNTER
Xanax   0.5 mg   Last Written Prescription Date:  8/13/18  Last Fill Quantity: 90,   # refills: 0  Last Office Visit: 9/6/18  Future Office visit:       Routing refill request to provider for review/approval because:  Drug not on the [facility], [facility] or [facility] refill protocol or controlled substance

## 2018-11-02 PROBLEM — F41.1 GAD (GENERALIZED ANXIETY DISORDER): Status: ACTIVE | Noted: 2018-09-12

## 2018-11-02 PROBLEM — F32.1 MODERATE MAJOR DEPRESSION (H): Status: ACTIVE | Noted: 2018-11-02

## 2018-11-02 PROBLEM — F43.10 PTSD (POST-TRAUMATIC STRESS DISORDER): Status: ACTIVE | Noted: 2018-09-12

## 2018-11-02 PROBLEM — F41.1 GAD (GENERALIZED ANXIETY DISORDER): Status: ACTIVE | Noted: 2018-11-02

## 2018-11-02 PROBLEM — F32.1 MODERATE MAJOR DEPRESSION (H): Status: ACTIVE | Noted: 2018-09-12

## 2018-11-02 PROBLEM — F43.10 PTSD (POST-TRAUMATIC STRESS DISORDER): Status: ACTIVE | Noted: 2018-11-02

## 2018-11-15 DIAGNOSIS — Z79.890 NEED FOR POSTMENOPAUSAL HORMONE REPLACEMENT: ICD-10-CM

## 2018-11-15 RX ORDER — ESTRADIOL 1 MG/1
TABLET ORAL
Qty: 30 TABLET | Refills: 2 | Status: SHIPPED | OUTPATIENT
Start: 2018-11-15 | End: 2019-03-18

## 2018-11-16 NOTE — TELEPHONE ENCOUNTER
Estrace  Last Written Prescription Date:  2/21/18  Last Fill Quantity: 30,  # refills: 6   Last office visit: 9/6/2018 with prescribing provider:     Future Office Visit:  NONE.    Last Mammogram. 7/11/18 with instruction to Follow-up in 6 months.- Jan 2019.  3 month supply of Estrace approved..............NARGIS Wilks

## 2018-12-05 ENCOUNTER — TELEPHONE (OUTPATIENT)
Dept: FAMILY MEDICINE | Facility: OTHER | Age: 48
End: 2018-12-05

## 2018-12-05 NOTE — TELEPHONE ENCOUNTER
Pt is on [facility] schedule tomorrow for : not feeling well, dizzy, sweats.     Please review and triage if appropriate.     Thank you.

## 2018-12-06 NOTE — TELEPHONE ENCOUNTER
Left message for patient to return call.      Appointment was previously cancelled for today.    Delfin Hernandez, RN, BSN

## 2018-12-19 DIAGNOSIS — F43.23 ADJUSTMENT DISORDER WITH MIXED ANXIETY AND DEPRESSED MOOD: ICD-10-CM

## 2018-12-20 RX ORDER — ALPRAZOLAM 0.5 MG
0.5 TABLET ORAL 3 TIMES DAILY PRN
Qty: 90 TABLET | Refills: 0 | Status: SHIPPED | OUTPATIENT
Start: 2018-12-20 | End: 2019-02-12

## 2018-12-20 NOTE — TELEPHONE ENCOUNTER
Xanax       Last Written Prescription Date:  10/25/2018  Last Fill Quantity: 90,   # refills: 0  Last Office Visit: 9/6/2018  Future Office visit:       Routing refill request to provider for review/approval because:  Drug not on the [facility], [facility] or [facility] refill protocol or controlled substance

## 2019-01-04 ENCOUNTER — OFFICE VISIT (OUTPATIENT)
Dept: FAMILY MEDICINE | Facility: CLINIC | Age: 49
End: 2019-01-04
Payer: COMMERCIAL

## 2019-01-04 VITALS
DIASTOLIC BLOOD PRESSURE: 66 MMHG | OXYGEN SATURATION: 98 % | BODY MASS INDEX: 22.88 KG/M2 | TEMPERATURE: 97.8 F | HEART RATE: 92 BPM | HEIGHT: 65 IN | WEIGHT: 137.3 LBS | RESPIRATION RATE: 16 BRPM | SYSTOLIC BLOOD PRESSURE: 124 MMHG

## 2019-01-04 DIAGNOSIS — R19.7 DIARRHEA, UNSPECIFIED TYPE: ICD-10-CM

## 2019-01-04 DIAGNOSIS — R30.0 DYSURIA: Primary | ICD-10-CM

## 2019-01-04 LAB
ALBUMIN UR-MCNC: NEGATIVE MG/DL
ANION GAP SERPL CALCULATED.3IONS-SCNC: 6 MMOL/L (ref 3–14)
APPEARANCE UR: CLEAR
BASOPHILS # BLD AUTO: 0 10E9/L (ref 0–0.2)
BASOPHILS NFR BLD AUTO: 0.3 %
BILIRUB UR QL STRIP: NEGATIVE
BUN SERPL-MCNC: 9 MG/DL (ref 7–30)
CALCIUM SERPL-MCNC: 9.5 MG/DL (ref 8.5–10.1)
CHLORIDE SERPL-SCNC: 107 MMOL/L (ref 94–109)
CO2 SERPL-SCNC: 28 MMOL/L (ref 20–32)
COLOR UR AUTO: COLORLESS
CREAT SERPL-MCNC: 0.85 MG/DL (ref 0.52–1.04)
DIFFERENTIAL METHOD BLD: NORMAL
EOSINOPHIL NFR BLD AUTO: 0.8 %
ERYTHROCYTE [DISTWIDTH] IN BLOOD BY AUTOMATED COUNT: 11.9 % (ref 10–15)
GFR SERPL CREATININE-BSD FRML MDRD: 82 ML/MIN/{1.73_M2}
GLUCOSE SERPL-MCNC: 86 MG/DL (ref 70–99)
GLUCOSE UR STRIP-MCNC: NEGATIVE MG/DL
HCT VFR BLD AUTO: 45.6 % (ref 35–47)
HGB BLD-MCNC: 15 G/DL (ref 11.7–15.7)
HGB UR QL STRIP: ABNORMAL
IMM GRANULOCYTES # BLD: 0 10E9/L (ref 0–0.4)
IMM GRANULOCYTES NFR BLD: 0.3 %
KETONES UR STRIP-MCNC: NEGATIVE MG/DL
LEUKOCYTE ESTERASE UR QL STRIP: NEGATIVE
LYMPHOCYTES # BLD AUTO: 2.7 10E9/L (ref 0.8–5.3)
LYMPHOCYTES NFR BLD AUTO: 26.4 %
MCH RBC QN AUTO: 31.9 PG (ref 26.5–33)
MCHC RBC AUTO-ENTMCNC: 32.9 G/DL (ref 31.5–36.5)
MCV RBC AUTO: 97 FL (ref 78–100)
MONOCYTES # BLD AUTO: 0.6 10E9/L (ref 0–1.3)
MONOCYTES NFR BLD AUTO: 5.8 %
NEUTROPHILS # BLD AUTO: 6.8 10E9/L (ref 1.6–8.3)
NEUTROPHILS NFR BLD AUTO: 66.4 %
NITRATE UR QL: NEGATIVE
NRBC # BLD AUTO: 0 10*3/UL
NRBC BLD AUTO-RTO: 0 /100
PH UR STRIP: 6 PH (ref 5–7)
PLATELET # BLD AUTO: 302 10E9/L (ref 150–450)
POTASSIUM SERPL-SCNC: 3.6 MMOL/L (ref 3.4–5.3)
RBC # BLD AUTO: 4.7 10E12/L (ref 3.8–5.2)
RBC #/AREA URNS AUTO: 0 /HPF (ref 0–2)
SODIUM SERPL-SCNC: 141 MMOL/L (ref 133–144)
SOURCE: ABNORMAL
SP GR UR STRIP: 1 (ref 1–1.03)
SQUAMOUS #/AREA URNS AUTO: <1 /HPF (ref 0–1)
UROBILINOGEN UR STRIP-MCNC: 0 MG/DL (ref 0–2)
WBC # BLD AUTO: 10.3 10E9/L (ref 4–11)
WBC #/AREA URNS AUTO: <1 /HPF (ref 0–5)

## 2019-01-04 PROCEDURE — 99214 OFFICE O/P EST MOD 30 MIN: CPT | Performed by: FAMILY MEDICINE

## 2019-01-04 PROCEDURE — 85025 COMPLETE CBC W/AUTO DIFF WBC: CPT | Performed by: FAMILY MEDICINE

## 2019-01-04 PROCEDURE — 80048 BASIC METABOLIC PNL TOTAL CA: CPT | Performed by: FAMILY MEDICINE

## 2019-01-04 PROCEDURE — 81001 URINALYSIS AUTO W/SCOPE: CPT | Performed by: FAMILY MEDICINE

## 2019-01-04 PROCEDURE — 36415 COLL VENOUS BLD VENIPUNCTURE: CPT | Performed by: FAMILY MEDICINE

## 2019-01-04 RX ORDER — CIPROFLOXACIN 500 MG/1
500 TABLET, FILM COATED ORAL 2 TIMES DAILY
Qty: 14 TABLET | Refills: 0 | Status: SHIPPED | OUTPATIENT
Start: 2019-01-04 | End: 2019-06-27

## 2019-01-04 ASSESSMENT — MIFFLIN-ST. JEOR: SCORE: 1263.67

## 2019-01-04 NOTE — PROGRESS NOTES
SUBJECTIVE:   Liliana Kat is a 48 year old female who presents to clinic today for the following health issues:      URINARY TRACT SYMPTOMS  Onset: x one week    Description:   Painful urination (Dysuria): no   Blood in urine (Hematuria): no   Delay in urine (Hesitency): YES    Intensity: moderate, severe    Progression of Symptoms:  worsening    Accompanying Signs & Symptoms:  Fever/chills: YES  Flank pain YES  Nausea and vomiting: YES  Any vaginal symptoms: none  Abdominal/Pelvic Pain: YES    History:   History of frequent UTI's: YES  History of kidney stones: no   Sexually Active: YES   Possibility of pregnancy: No    Precipitating factors:   nothing    Therapies Tried and outcome: Cranberry juice prn (contraindicated in Coumadin patients), Increase fluid intake and OTC advil or tylenol         Problem list and histories reviewed & adjusted, as indicated.  Additional history: as documented        Reviewed and updated as needed this visit by clinical staff       Reviewed and updated as needed this visit by Provider        SUBJECTIVE:  Liliana  is a 48 year old female who presents for: Symptoms as noted above however when she arrives here she states she has had lower abdominal pain.  Change in her stools with yellowing diarrhea.  Been diagnosed with UTIs in the past but has been much documentation of any bacterial organisms.  Denies any recent travel.  She states she has had polyuria over the last couple days it has been severe.  He has had a cholecystectomy.  History of bladder polyps and urethral polyps apparently that are to be treated by urology but she has not attended to this because she has not had insurance until recently.    Past Medical History:   Diagnosis Date     Allergic rhinitis, cause unspecified     Allergic rhinitis - weeds and pollan     Anxiety      Bladder polyps      Depressive disorder      Obstructive chronic bronchitis with exacerbation (H)      Other and unspecified ovarian  cyst     Ovarian cyst - rupured cyst + laser x3     Other and unspecified ovarian cyst 9/14/2005    Left hemorrhagic ovarian cyst.     Tobacco abuse      Urinary tract infection, site not specified      Past Surgical History:   Procedure Laterality Date     C LIGATE FALLOPIAN TUBE,POSTPARTUM      Tubal Ligation     ESOPHAGOSCOPY, GASTROSCOPY, DUODENOSCOPY (EGD), COMBINED  11/28/2012    Procedure: COMBINED ESOPHAGOSCOPY, GASTROSCOPY, DUODENOSCOPY (EGD), BIOPSY SINGLE OR MULTIPLE;  ESOPHAGOSCOPY, GASTROSCOPY, DUODENOSCOPY (EGD) with biopsy;  Surgeon: Herson Cabrera MD;  Location: [facility]     EXCIS VAGINAL CYST/TUMOR       HC REMOVAL OF OVARY/TUBE(S)  3/6/2006    Laparoscopic bilateral salpingo-oophorectomy.     HC TYMPANOPLASTY W/O MASTOID, INIT/REV W/O OSS CHAIN RECONST  04/02     LAPAROSCOPIC CHOLECYSTECTOMY N/A 4/6/2017    Procedure: LAPAROSCOPIC CHOLECYSTECTOMY;  Surgeon: Shivam Luevano MD;  Location: [facility]     Social History     Tobacco Use     Smoking status: Current Some Day Smoker     Packs/day: 0.00     Years: 10.00     Pack years: 0.00     Smokeless tobacco: Never Used     Tobacco comment: 3 cigs a day    Substance Use Topics     Alcohol use: No     Alcohol/week: 0.0 oz     Comment: recovering  - 2005     Current Outpatient Medications   Medication Sig Dispense Refill     Acetaminophen (TYLENOL PO) Take 650 mg by mouth every 4 hours as needed for mild pain or fever       albuterol (2.5 MG/3ML) 0.083% neb solution ONE NEBULIZATION 4 TIMES DAILY AS NEEDED 1 Box 0     albuterol (PROAIR HFA/PROVENTIL HFA/VENTOLIN HFA) 108 (90 BASE) MCG/ACT Inhaler Inhale 2 puffs into the lungs every 6 hours as needed for shortness of breath / dyspnea or wheezing 1 Inhaler 0     ALPRAZolam (XANAX) 0.5 MG tablet Take 1 tablet (0.5 mg) by mouth 3 times daily as needed for anxiety 90 tablet 0     aspirin 81 MG tablet Take 1 tablet (81 mg) by mouth daily 30 tablet OTC     benzocaine-menthol (CHLORASEPTIC) 6-10 MG lozenge Place 1  "lozenge inside cheek every hour as needed for sore throat 36 lozenge 0     ciprofloxacin (CIPRO) 500 MG tablet Take 1 tablet (500 mg) by mouth 2 times daily for 10 days 14 tablet 0     citalopram (CELEXA) 40 MG tablet Take 1 tablet (40 mg) by mouth daily 30 tablet 3     estradiol (ESTRACE) 1 MG tablet TAKE ONE TABLET BY MOUTH EVERY DAY 30 tablet 2     fluticasone (FLONASE) 50 MCG/ACT spray Spray 2 sprays into both nostrils daily 1 Bottle 11     ibuprofen (ADVIL/MOTRIN) 600 MG tablet   0     loratadine (CLARITIN) 10 MG tablet TAKE ONE TABLET BY MOUTH EVERY DAY 30 tablet 5     medroxyPROGESTERone (PROVERA) 5 MG tablet TAKE ONE TABLET BY MOUTH EVERY DAY 90 tablet 0     MELATONIN 10 mg daily          REVIEW OF SYSTEMS:   5 point ROS negative except as noted above in HPI, including Gen., Resp, CV, GI &  system review.     OBJECTIVE:  Vitals: /66   Pulse 92   Temp 97.8  F (36.6  C) (Temporal)   Resp 16   Ht 1.651 m (5' 5\")   Wt 62.3 kg (137 lb 4.8 oz)   LMP 03/06/2006   SpO2 98%   BMI 22.85 kg/m    BMI= Body mass index is 22.85 kg/m .  She is alert.  Does not appear toxic at all.  Mucous membranes are moist.  Throat clear.  Neck supple.  Lungs are clear.  Heart regular rhythm.  Abdomen soft bowel sounds present but she has generalized tenderness to palpation no masses.  Urinalysis shows low specific gravity and small amount of heme in the urine but no RBCs negative nitrite negative leukocyte Estrace and no WBCs.  White count and differential are normal.  Basic chemistry panel is normal.    ASSESSMENT:  1 dysuria #2 abdominal pain    PLAN:  Is a bit hard to read.  Is quite tender to palpation on physical exam but she has active bowel sounds afebrile normal white count and when distracted she seems to be in no pain at all.  Ambulates without any discomfort.  Nevertheless she is told to watch for localized right lower quadrant pain and report to the emergency room if it worsens.  We will going to treat her " with Cipro 500 twice daily.  She states she has had UTIs in the past and they have not shown up.  Her urine is quite dilute.  She will need further workup probably with urology.        Brayan So MD  [facility]

## 2019-01-10 ENCOUNTER — TRANSFERRED RECORDS (OUTPATIENT)
Dept: HEALTH INFORMATION MANAGEMENT | Facility: CLINIC | Age: 49
End: 2019-01-10

## 2019-01-11 DIAGNOSIS — Z79.890 NEED FOR POSTMENOPAUSAL HORMONE REPLACEMENT: ICD-10-CM

## 2019-01-11 RX ORDER — MEDROXYPROGESTERONE ACETATE 5 MG
TABLET ORAL
Qty: 90 TABLET | Refills: 0 | Status: SHIPPED | OUTPATIENT
Start: 2019-01-11 | End: 2019-04-22

## 2019-01-11 NOTE — TELEPHONE ENCOUNTER
Medroxyprogesterone 5 MG       Last Written Prescription Date:  8/7/18  Last Fill Quantity: 90,   # refills: 0  Last Office Visit: 9-6-18  Future Office visit:       Routing refill request to provider for review/approval because:  Drug not on the [facility], [facility] or [facility] refill protocol or controlled substance

## 2019-01-18 ENCOUNTER — HOSPITAL ENCOUNTER (OUTPATIENT)
Dept: MAMMOGRAPHY | Facility: CLINIC | Age: 49
End: 2019-01-18
Payer: COMMERCIAL

## 2019-01-18 DIAGNOSIS — N60.01 BREAST CYST, RIGHT: ICD-10-CM

## 2019-01-18 PROCEDURE — G0279 TOMOSYNTHESIS, MAMMO: HCPCS

## 2019-01-24 DIAGNOSIS — F43.23 ADJUSTMENT DISORDER WITH MIXED ANXIETY AND DEPRESSED MOOD: ICD-10-CM

## 2019-01-25 RX ORDER — CITALOPRAM HYDROBROMIDE 20 MG/1
TABLET ORAL
Qty: 30 TABLET | Refills: 0 | Status: SHIPPED | OUTPATIENT
Start: 2019-01-25 | End: 2019-02-27

## 2019-01-25 NOTE — TELEPHONE ENCOUNTER
"Requested Prescriptions   Pending Prescriptions Disp Refills     citalopram (CELEXA) 20 MG tablet [Pharmacy Med Name: CITALOPRAM HYDROBROMIDE 20MG TABS] 30 tablet 1    Last Written Prescription Date:  9/6/18  Last Fill Quantity: 30,  # refills: 3   Last office visit: 1/4/2019 with prescribing provider:     Future Office Visit:     Sig: TAKE ONE TABLET BY MOUTH EVERY DAY    SSRIs Protocol Failed - 1/24/2019  9:21 AM       Failed - No positive pregnancy test in last 12 months       Passed - Recent (12 mo) or future (30 days) visit within the authorizing provider's specialty    Patient had office visit in the last 12 months or has a visit in the next 30 days with authorizing provider or within the authorizing provider's specialty.  See \"Patient Info\" tab in inbasket, or \"Choose Columns\" in Meds & Orders section of the refill encounter.    PHQ-9 score:    PHQ-9 SCORE 9/12/2018   PHQ-9 Total Score -   PHQ-9 Total Score 19          Passed - Medication is active on med list       Passed - Patient is age 18 or older       Passed - No active pregnancy on record      Routing refill request to provider for review/approval because:  Labs out of range:  PHQ9    T'd up 1 month for provider review.    Ariadna Joya RN            "

## 2019-02-08 DIAGNOSIS — F43.23 ADJUSTMENT DISORDER WITH MIXED ANXIETY AND DEPRESSED MOOD: ICD-10-CM

## 2019-02-11 NOTE — TELEPHONE ENCOUNTER
Requested Prescriptions   Pending Prescriptions Disp Refills     ALPRAZolam (XANAX) 0.5 MG tablet 90 tablet 0     Sig: Take 1 tablet (0.5 mg) by mouth 3 times daily as needed for anxiety    There is no refill protocol information for this order        Last Written Prescription Date:  12/20/18  Last Fill Quantity: 90,  # refills: 0   Last office visit: 1/4/2019 with prescribing provider:     Future Office Visit:      PHQ-9 score:    PHQ-9 SCORE 9/12/2018   PHQ-9 Total Score -   PHQ-9 Total Score 19     Routing refill request to provider for review/approval because:  Drug not on the [facility] refill protocol     T'd up 1 month for provider review.    Ariadna Joya RN

## 2019-02-12 RX ORDER — ALPRAZOLAM 0.5 MG
0.5 TABLET ORAL 3 TIMES DAILY PRN
Qty: 90 TABLET | Refills: 0 | Status: SHIPPED | OUTPATIENT
Start: 2019-02-12 | End: 2019-04-02

## 2019-03-18 DIAGNOSIS — Z79.890 NEED FOR POSTMENOPAUSAL HORMONE REPLACEMENT: ICD-10-CM

## 2019-03-19 RX ORDER — ESTRADIOL 1 MG/1
1 TABLET ORAL DAILY
Qty: 90 TABLET | Refills: 2 | Status: SHIPPED | OUTPATIENT
Start: 2019-03-19 | End: 2020-03-18

## 2019-03-19 NOTE — TELEPHONE ENCOUNTER
"estridoil  Last Written Prescription Date:  11/15/2018  Last Fill Quantity: 30,  # refills: 2   Last office visit: 1/4/2019 with prescribing provider:      Future Office Visit:      Requested Prescriptions   Pending Prescriptions Disp Refills     estradiol (ESTRACE) 1 MG tablet [Pharmacy Med Name: ESTRADIOL 1MG TABS] 30 tablet 2     Sig: TAKE ONE TABLET BY MOUTH ONCE DAILY - DUE FOR FOLLOW-UP IN JANUARY    Hormone Replacement Therapy Failed - 3/18/2019  8:58 AM       Failed - No positive pregnancy test on record in past 12 months       Passed - Blood pressure under 140/90 in past 12 months    BP Readings from Last 3 Encounters:   01/04/19 124/66   09/06/18 126/68   08/31/18 107/68                Passed - Recent (12 mo) or future (30 days) visit within the authorizing provider's specialty    Patient had office visit in the last 12 months or has a visit in the next 30 days with authorizing provider or within the authorizing provider's specialty.  See \"Patient Info\" tab in inbasket, or \"Choose Columns\" in Meds & Orders section of the refill encounter.             Passed - Medication is active on med list       Passed - Patient is 18 years of age or older       Passed - No active pregnancy on record          Prescription approved per [facility] Refill Protocol.      Arin So RN on 3/19/2019 at 11:00 AM    "

## 2019-04-01 DIAGNOSIS — F43.23 ADJUSTMENT DISORDER WITH MIXED ANXIETY AND DEPRESSED MOOD: ICD-10-CM

## 2019-04-01 NOTE — TELEPHONE ENCOUNTER
Alprazolam 0.5 MG       Last Written Prescription Date:  2/12/19  Last Fill Quantity: 90,   # refills: 0  Last Office Visit: 1/4/19  Future Office visit:       Routing refill request to provider for review/approval because:  Drug not on the [facility], [facility] or [facility] refill protocol or controlled substance

## 2019-04-02 RX ORDER — ALPRAZOLAM 0.5 MG
0.5 TABLET ORAL 3 TIMES DAILY PRN
Qty: 90 TABLET | Refills: 0 | Status: SHIPPED | OUTPATIENT
Start: 2019-04-02 | End: 2019-05-10

## 2019-04-02 RX ORDER — CITALOPRAM HYDROBROMIDE 20 MG/1
TABLET ORAL
Qty: 30 TABLET | Refills: 0 | Status: SHIPPED | OUTPATIENT
Start: 2019-04-02 | End: 2019-05-02

## 2019-04-02 NOTE — TELEPHONE ENCOUNTER
"citalopram  Last Written Prescription Date:  3/1/2019  Last Fill Quantity: 30,  # refills: 0   Last office visit: 1/4/2019 with prescribing provider:      Future Office Visit:      Requested Prescriptions   Pending Prescriptions Disp Refills     citalopram (CELEXA) 20 MG tablet [Pharmacy Med Name: CITALOPRAM HYDROBROMIDE 20MG TABS] 30 tablet 0     Sig: TAKE ONE TABLET BY MOUTH ONCE DAILY - NEED CLINIC FOLLOW-UP FOR FURTHER REFILLS    SSRIs Protocol Failed - 4/1/2019  8:44 AM       Failed - No positive pregnancy test in last 12 months       Passed - Recent (12 mo) or future (30 days) visit within the authorizing provider's specialty    Patient had office visit in the last 12 months or has a visit in the next 30 days with authorizing provider or within the authorizing provider's specialty.  See \"Patient Info\" tab in inbasket, or \"Choose Columns\" in Meds & Orders section of the refill encounter.             Passed - Medication is active on med list       Passed - Patient is age 18 or older       Passed - No active pregnancy on record        Routing refill request to provider for review/approval because:  Patient needs to be seen because:  Due for depression follow up. Sending to scheduling for outreach.    Unable to fill per RN protocol due to PHQ-9 >5.    PHQ-9 SCORE 2/1/2017 9/28/2017 9/12/2018   PHQ-9 Total Score - - -   PHQ-9 Total Score 20 9 19         Arin So RN on 4/2/2019 at 3:38 PM    "

## 2019-04-22 DIAGNOSIS — Z79.890 NEED FOR POSTMENOPAUSAL HORMONE REPLACEMENT: ICD-10-CM

## 2019-04-24 RX ORDER — MEDROXYPROGESTERONE ACETATE 5 MG
TABLET ORAL
Qty: 90 TABLET | Refills: 1 | Status: SHIPPED | OUTPATIENT
Start: 2019-04-24 | End: 2020-03-17

## 2019-04-24 NOTE — TELEPHONE ENCOUNTER
Provera  Last Written Prescription Date:  1/11/2019  Last Fill Quantity: 90,  # refills: 0   Last office visit: 1/4/2019 with prescribing provider:      Future Office Visit:   Next 5 appointments (look out 90 days)    May 03, 2019  1:00 PM CDT  Office Visit with Brayan So MD  [facility] [address] [place] [address] [id number] [phone]           Requested Prescriptions   Pending Prescriptions Disp Refills     medroxyPROGESTERone (PROVERA) 5 MG tablet [Pharmacy Med Name: MEDROXYPROGESTERONE 5MG TAB] 90 tablet 0     Sig: TAKE ONE TABLET BY MOUTH EVERY DAY       There is no refill protocol information for this order          Prescription approved per [facility] Refill Protocol.      Arin So RN on 4/24/2019 at 9:39 AM

## 2019-05-02 DIAGNOSIS — F43.23 ADJUSTMENT DISORDER WITH MIXED ANXIETY AND DEPRESSED MOOD: ICD-10-CM

## 2019-05-06 NOTE — TELEPHONE ENCOUNTER
"Requested Prescriptions   Pending Prescriptions Disp Refills     citalopram (CELEXA) 20 MG tablet [Pharmacy Med Name: CITALOPRAM HYDROBROMIDE 20MG TABS] 30 tablet 0     Sig: TAKE ONE TABLET BY MOUTH ONCE DAILY - NEED CLINIC FOLLOW-UP FOR FURTHER REFILLS   Last Written Prescription Date:  4/2/19  Last Fill Quantity: 30,  # refills: 0   Last office visit: 9/6/18 with prescribing provider:     Future Office Visit:        SSRIs Protocol Failed - 5/2/2019 10:49 PM        Failed - No positive pregnancy test in last 12 months        Passed - Recent (12 mo) or future (30 days) visit within the authorizing provider's specialty     Patient had office visit in the last 12 months or has a visit in the next 30 days with authorizing provider or within the authorizing provider's specialty.  See \"Patient Info\" tab in inbasket, or \"Choose Columns\" in Meds & Orders section of the refill encounter.      PHQ-9 score:    PHQ-9 SCORE 9/12/2018   PHQ-9 Total Score -   PHQ-9 Total Score 19           Passed - Medication is active on med list        Passed - Patient is age 18 or older        Passed - No active pregnancy on record      Routing refill request to provider for review/approval because:  Fatimah given x1 and patient did not follow up, please advise    Will forward to schedulers to schedule patient for overdue OV.  Ariadna Joya RN            "

## 2019-05-07 RX ORDER — CITALOPRAM HYDROBROMIDE 20 MG/1
TABLET ORAL
Qty: 30 TABLET | Refills: 0 | Status: SHIPPED | OUTPATIENT
Start: 2019-05-07 | End: 2019-05-16

## 2019-05-08 DIAGNOSIS — F43.23 ADJUSTMENT DISORDER WITH MIXED ANXIETY AND DEPRESSED MOOD: ICD-10-CM

## 2019-05-10 RX ORDER — ALPRAZOLAM 0.5 MG
0.5 TABLET ORAL 3 TIMES DAILY PRN
Qty: 90 TABLET | Refills: 0 | Status: SHIPPED | OUTPATIENT
Start: 2019-05-10 | End: 2019-06-14

## 2019-05-10 NOTE — TELEPHONE ENCOUNTER
Requested Prescriptions   Pending Prescriptions Disp Refills     ALPRAZolam (XANAX) 0.5 MG tablet 90 tablet 0     Sig: Take 1 tablet (0.5 mg) by mouth 3 times daily as needed for anxiety       There is no refill protocol information for this order        Last Written Prescription Date:  4/2/19  Last Fill Quantity: 90,  # refills: 0   Last office visit: 1/4/2019 with prescribing provider:     Future Office Visit:   Next 5 appointments (look out 90 days)    May 16, 2019 11:20 AM CDT  [facility] [address] [place] [address] [id number] [phone]         Routing refill request to provider for review/approval because:  Drug not on the [facility] refill protocol     Ariadna Joya RN

## 2019-05-16 ENCOUNTER — OFFICE VISIT (OUTPATIENT)
Dept: FAMILY MEDICINE | Facility: CLINIC | Age: 49
End: 2019-05-16
Payer: COMMERCIAL

## 2019-05-16 VITALS
OXYGEN SATURATION: 96 % | RESPIRATION RATE: 14 BRPM | HEART RATE: 90 BPM | DIASTOLIC BLOOD PRESSURE: 64 MMHG | WEIGHT: 138.2 LBS | BODY MASS INDEX: 23 KG/M2 | SYSTOLIC BLOOD PRESSURE: 114 MMHG | TEMPERATURE: 97.3 F

## 2019-05-16 DIAGNOSIS — G47.00 INSOMNIA, UNSPECIFIED TYPE: ICD-10-CM

## 2019-05-16 DIAGNOSIS — F43.23 ADJUSTMENT DISORDER WITH MIXED ANXIETY AND DEPRESSED MOOD: Primary | ICD-10-CM

## 2019-05-16 PROCEDURE — 99214 OFFICE O/P EST MOD 30 MIN: CPT | Performed by: FAMILY MEDICINE

## 2019-05-16 RX ORDER — CITALOPRAM HYDROBROMIDE 20 MG/1
TABLET ORAL
Qty: 90 TABLET | Refills: 1 | Status: SHIPPED | OUTPATIENT
Start: 2019-05-16 | End: 2019-09-19

## 2019-05-16 ASSESSMENT — PATIENT HEALTH QUESTIONNAIRE - PHQ9: SUM OF ALL RESPONSES TO PHQ QUESTIONS 1-9: 11

## 2019-05-16 NOTE — PROGRESS NOTES
SUBJECTIVE:   Liliana Kat is a 49 year old female who presents to clinic today for the following   health issues:        Medication Followup of Celexa    Taking Medication as prescribed: yes    Side Effects:  None    Medication Helping Symptoms:  yes           Additional history: as documented    Reviewed  and updated as needed this visit by clinical staff         Reviewed and updated as needed this visit by Provider       SUBJECTIVE:  Liliana  is a 49 year old female who presents for: Follow-up of her depression and anxiety.  She is going through some real hard time right now.  Having trouble sleeping.  Her  who is in very poor health and not able to work large because of COPD just had a shoulder replacement.  Fairly rough postoperative course.  Apparently he is coming home and will be on oxygen.  She is the breadwinner.  She has to take care of him.  Her son is in [facility] in [place] for third-degree murder.  Her daughter on the bright side graduated and is a medical assistant now and actually working for [employer].  She is at [employer].    Past Medical History:   Diagnosis Date     Allergic rhinitis, cause unspecified     Allergic rhinitis - weeds and pollan     Anxiety      Bladder polyps      Depressive disorder      Obstructive chronic bronchitis with exacerbation (H)      Other and unspecified ovarian cyst     Ovarian cyst - rupured cyst + laser x3     Other and unspecified ovarian cyst 9/14/2005    Left hemorrhagic ovarian cyst.     Tobacco abuse      Urinary tract infection, site not specified      Past Surgical History:   Procedure Laterality Date     C LIGATE FALLOPIAN TUBE,POSTPARTUM      Tubal Ligation     ESOPHAGOSCOPY, GASTROSCOPY, DUODENOSCOPY (EGD), COMBINED  11/28/2012    Procedure: COMBINED ESOPHAGOSCOPY, GASTROSCOPY, DUODENOSCOPY (EGD), BIOPSY SINGLE OR MULTIPLE;  ESOPHAGOSCOPY, GASTROSCOPY, DUODENOSCOPY (EGD) with biopsy;  Surgeon: Herson Cabrera MD;  Location: [facility]      EXCIS VAGINAL CYST/TUMOR       HC REMOVAL OF OVARY/TUBE(S)  3/6/2006    Laparoscopic bilateral salpingo-oophorectomy.     HC TYMPANOPLASTY W/O MASTOID, INIT/REV W/O OSS CHAIN RECONST  04/02     LAPAROSCOPIC CHOLECYSTECTOMY N/A 4/6/2017    Procedure: LAPAROSCOPIC CHOLECYSTECTOMY;  Surgeon: Shivam Luevano MD;  Location:  OR     Social History     Tobacco Use     Smoking status: Current Some Day Smoker     Packs/day: 0.00     Years: 10.00     Pack years: 0.00     Smokeless tobacco: Never Used     Tobacco comment: 3 cigs a day    Substance Use Topics     Alcohol use: No     Alcohol/week: 0.0 oz     Comment: recovering  - 2005     Current Outpatient Medications   Medication Sig Dispense Refill     ALPRAZolam (XANAX) 0.5 MG tablet Take 1 tablet (0.5 mg) by mouth 3 times daily as needed for anxiety Please make clinic appointment, a controlled substance agreement must be on file for continued prescription refills for this medication 90 tablet 0     aspirin 81 MG tablet Take 1 tablet (81 mg) by mouth daily 30 tablet OTC     citalopram (CELEXA) 20 MG tablet TAKE ONE TABLET BY MOUTH ONCE DAILY 90 tablet 1     estradiol (ESTRACE) 1 MG tablet Take 1 tablet (1 mg) by mouth daily 90 tablet 2     medroxyPROGESTERone (PROVERA) 5 MG tablet TAKE ONE TABLET BY MOUTH EVERY DAY 90 tablet 1     MELATONIN 10 mg daily        Acetaminophen (TYLENOL PO) Take 650 mg by mouth every 4 hours as needed for mild pain or fever       albuterol (2.5 MG/3ML) 0.083% neb solution ONE NEBULIZATION 4 TIMES DAILY AS NEEDED (Patient not taking: Reported on 5/16/2019) 1 Box 0     albuterol (PROAIR HFA/PROVENTIL HFA/VENTOLIN HFA) 108 (90 BASE) MCG/ACT Inhaler Inhale 2 puffs into the lungs every 6 hours as needed for shortness of breath / dyspnea or wheezing (Patient not taking: Reported on 5/16/2019) 1 Inhaler 0     benzocaine-menthol (CHLORASEPTIC) 6-10 MG lozenge Place 1 lozenge inside cheek every hour as needed for sore throat (Patient not taking: Reported  on 5/16/2019) 36 lozenge 0     fluticasone (FLONASE) 50 MCG/ACT spray Spray 2 sprays into both nostrils daily (Patient not taking: Reported on 5/16/2019) 1 Bottle 11     ibuprofen (ADVIL/MOTRIN) 600 MG tablet   0     loratadine (CLARITIN) 10 MG tablet TAKE ONE TABLET BY MOUTH EVERY DAY (Patient not taking: Reported on 5/16/2019) 30 tablet 5       REVIEW OF SYSTEMS:   5 point ROS negative except as noted above in HPI, including Gen., Resp, CV, GI &  system review.     OBJECTIVE:  Vitals: /64   Pulse 90   Temp 97.3  F (36.3  C) (Temporal)   Resp 14   Wt 62.7 kg (138 lb 3.2 oz)   LMP 03/06/2006   SpO2 96%   BMI 23.00 kg/m    BMI= Body mass index is 23 kg/m .  She is alert but emotionally distraught.  Crying at times.    ASSESSMENT:  #1 anxiety and depression #2 insomnia    PLAN:  Spent some time discussing the situation with her.  She has been in counseling.  She states she just does not have time for that right now with her  being home.  She knows it has been helpful.  Renewed her Celexa which seems to be working fairly well.  She just picked up some Xanax and she knows she can use this for overly stressed moments and may be to help her sleep during this trying time right now.  Have her follow-up in 2 to 3 months.  25 minutes was spent on this encounter over half of which was face-to-face with the patient in counseling and discussion of therapeutic approaches.        Brayan So MD  [facility]

## 2019-05-16 NOTE — TELEPHONE ENCOUNTER
Date:May 23, 2019      Provider requested that no letter be sent. Do not send.       [facility] Health Information       Script brought up front for [employer] to .

## 2019-06-13 DIAGNOSIS — F43.23 ADJUSTMENT DISORDER WITH MIXED ANXIETY AND DEPRESSED MOOD: ICD-10-CM

## 2019-06-13 NOTE — TELEPHONE ENCOUNTER
XANAX 0.5 mg  Last Written Prescription Date:  5/10/19  Last Fill Quantity: 90,  # refills: 0   Last office visit: 5/16/2019 with prescribing provider:     Future Office Visit:  NONE  DX: adjustment disorder with mixed anxiety and depressed Mood.  Routing refill request to provider for review/approval because:  Drug not on the [facility] refill protocol   NARGIS Wilks

## 2019-06-14 RX ORDER — ALPRAZOLAM 0.5 MG
0.5 TABLET ORAL 3 TIMES DAILY PRN
Qty: 90 TABLET | Refills: 0 | Status: SHIPPED | OUTPATIENT
Start: 2019-06-14 | End: 2019-07-26

## 2019-06-19 ENCOUNTER — TELEPHONE (OUTPATIENT)
Dept: FAMILY MEDICINE | Facility: CLINIC | Age: 49
End: 2019-06-19

## 2019-06-19 ENCOUNTER — HOSPITAL ENCOUNTER (EMERGENCY)
Facility: CLINIC | Age: 49
Discharge: HOME | End: 2019-06-19
Attending: EMERGENCY MEDICINE | Admitting: EMERGENCY MEDICINE
Payer: COMMERCIAL

## 2019-06-19 ENCOUNTER — APPOINTMENT (OUTPATIENT)
Dept: CT IMAGING | Facility: CLINIC | Age: 49
End: 2019-06-19
Attending: EMERGENCY MEDICINE
Payer: COMMERCIAL

## 2019-06-19 VITALS
SYSTOLIC BLOOD PRESSURE: 108 MMHG | WEIGHT: 138 LBS | BODY MASS INDEX: 22.96 KG/M2 | HEART RATE: 84 BPM | OXYGEN SATURATION: 99 % | DIASTOLIC BLOOD PRESSURE: 72 MMHG | TEMPERATURE: 98.6 F | RESPIRATION RATE: 16 BRPM

## 2019-06-19 DIAGNOSIS — R42 DIZZINESS: ICD-10-CM

## 2019-06-19 LAB
ALBUMIN SERPL-MCNC: 3.9 G/DL (ref 3.4–5)
ALP SERPL-CCNC: 86 U/L (ref 40–150)
ALT SERPL W P-5'-P-CCNC: 19 U/L (ref 0–50)
ANION GAP SERPL CALCULATED.3IONS-SCNC: 7 MMOL/L (ref 3–14)
AST SERPL W P-5'-P-CCNC: 11 U/L (ref 0–45)
BASOPHILS # BLD AUTO: 0 10E9/L (ref 0–0.2)
BASOPHILS NFR BLD AUTO: 0.3 %
BILIRUB SERPL-MCNC: 0.6 MG/DL (ref 0.2–1.3)
BUN SERPL-MCNC: 13 MG/DL (ref 7–30)
CALCIUM SERPL-MCNC: 8.7 MG/DL (ref 8.5–10.1)
CHLORIDE SERPL-SCNC: 108 MMOL/L (ref 94–109)
CO2 SERPL-SCNC: 25 MMOL/L (ref 20–32)
CREAT SERPL-MCNC: 0.69 MG/DL (ref 0.52–1.04)
DIFFERENTIAL METHOD BLD: ABNORMAL
EOSINOPHIL NFR BLD AUTO: 0.2 %
ERYTHROCYTE [DISTWIDTH] IN BLOOD BY AUTOMATED COUNT: 12 % (ref 10–15)
GFR SERPL CREATININE-BSD FRML MDRD: >90 ML/MIN/{1.73_M2}
GLUCOSE SERPL-MCNC: 96 MG/DL (ref 70–99)
HCT VFR BLD AUTO: 43.8 % (ref 35–47)
HGB BLD-MCNC: 14.7 G/DL (ref 11.7–15.7)
IMM GRANULOCYTES # BLD: 0.1 10E9/L (ref 0–0.4)
IMM GRANULOCYTES NFR BLD: 0.4 %
LYMPHOCYTES # BLD AUTO: 1.8 10E9/L (ref 0.8–5.3)
LYMPHOCYTES NFR BLD AUTO: 14.4 %
MCH RBC QN AUTO: 33 PG (ref 26.5–33)
MCHC RBC AUTO-ENTMCNC: 33.6 G/DL (ref 31.5–36.5)
MCV RBC AUTO: 98 FL (ref 78–100)
MONOCYTES # BLD AUTO: 0.6 10E9/L (ref 0–1.3)
MONOCYTES NFR BLD AUTO: 4.8 %
NEUTROPHILS # BLD AUTO: 9.9 10E9/L (ref 1.6–8.3)
NEUTROPHILS NFR BLD AUTO: 79.9 %
NRBC # BLD AUTO: 0 10*3/UL
NRBC BLD AUTO-RTO: 0 /100
PLATELET # BLD AUTO: 302 10E9/L (ref 150–450)
POTASSIUM SERPL-SCNC: 4.3 MMOL/L (ref 3.4–5.3)
PROT SERPL-MCNC: 7.3 G/DL (ref 6.8–8.8)
RBC # BLD AUTO: 4.46 10E12/L (ref 3.8–5.2)
SODIUM SERPL-SCNC: 140 MMOL/L (ref 133–144)
TROPONIN I SERPL-MCNC: <0.015 UG/L (ref 0–0.04)
WBC # BLD AUTO: 12.3 10E9/L (ref 4–11)

## 2019-06-19 PROCEDURE — 96360 HYDRATION IV INFUSION INIT: CPT | Performed by: EMERGENCY MEDICINE

## 2019-06-19 PROCEDURE — 84484 ASSAY OF TROPONIN QUANT: CPT | Performed by: EMERGENCY MEDICINE

## 2019-06-19 PROCEDURE — 99285 EMERGENCY DEPT VISIT HI MDM: CPT | Mod: 25 | Performed by: EMERGENCY MEDICINE

## 2019-06-19 PROCEDURE — 70498 CT ANGIOGRAPHY NECK: CPT

## 2019-06-19 PROCEDURE — 25000128 H RX IP 250 OP 636: Performed by: EMERGENCY MEDICINE

## 2019-06-19 PROCEDURE — 25000132 ZZH RX MED GY IP 250 OP 250 PS 637: Performed by: EMERGENCY MEDICINE

## 2019-06-19 PROCEDURE — 85025 COMPLETE CBC W/AUTO DIFF WBC: CPT | Performed by: EMERGENCY MEDICINE

## 2019-06-19 PROCEDURE — 93005 ELECTROCARDIOGRAM TRACING: CPT | Performed by: EMERGENCY MEDICINE

## 2019-06-19 PROCEDURE — 93010 ELECTROCARDIOGRAM REPORT: CPT | Mod: Z6 | Performed by: EMERGENCY MEDICINE

## 2019-06-19 PROCEDURE — 25000125 ZZHC RX 250: Performed by: EMERGENCY MEDICINE

## 2019-06-19 PROCEDURE — 70450 CT HEAD/BRAIN W/O DYE: CPT

## 2019-06-19 PROCEDURE — 80053 COMPREHEN METABOLIC PANEL: CPT | Performed by: EMERGENCY MEDICINE

## 2019-06-19 PROCEDURE — 25800030 ZZH RX IP 258 OP 636: Performed by: EMERGENCY MEDICINE

## 2019-06-19 RX ORDER — IOPAMIDOL 755 MG/ML
500 INJECTION, SOLUTION INTRAVASCULAR ONCE
Status: COMPLETED | OUTPATIENT
Start: 2019-06-19 | End: 2019-06-19

## 2019-06-19 RX ORDER — DIAZEPAM 5 MG
5 TABLET ORAL EVERY 6 HOURS PRN
Qty: 5 TABLET | Refills: 0 | Status: SHIPPED | OUTPATIENT
Start: 2019-06-19 | End: 2021-03-11

## 2019-06-19 RX ORDER — DIAZEPAM 5 MG
5 TABLET ORAL ONCE
Status: COMPLETED | OUTPATIENT
Start: 2019-06-19 | End: 2019-06-19

## 2019-06-19 RX ADMIN — IOPAMIDOL 70 ML: 755 INJECTION, SOLUTION INTRAVENOUS at 12:56

## 2019-06-19 RX ADMIN — SODIUM CHLORIDE, POTASSIUM CHLORIDE, SODIUM LACTATE AND CALCIUM CHLORIDE 1000 ML: 600; 310; 30; 20 INJECTION, SOLUTION INTRAVENOUS at 13:59

## 2019-06-19 RX ADMIN — SODIUM CHLORIDE 100 ML: 9 INJECTION, SOLUTION INTRAVENOUS at 12:55

## 2019-06-19 RX ADMIN — DIAZEPAM 5 MG: 5 TABLET ORAL at 13:57

## 2019-06-19 ASSESSMENT — ENCOUNTER SYMPTOMS
NECK STIFFNESS: 0
TROUBLE SWALLOWING: 0
ABDOMINAL PAIN: 0
SHORTNESS OF BREATH: 0
HEADACHES: 1
COUGH: 0
LIGHT-HEADEDNESS: 1
RHINORRHEA: 0
NUMBNESS: 1
BACK PAIN: 0
SPEECH DIFFICULTY: 0
DIAPHORESIS: 1
DYSURIA: 0
SEIZURES: 0
VOICE CHANGE: 0
NECK PAIN: 0
WEAKNESS: 0
PALPITATIONS: 0
FATIGUE: 1
WOUND: 0
APPETITE CHANGE: 0
CONFUSION: 0
TREMORS: 1
FEVER: 0
ACTIVITY CHANGE: 1
SORE THROAT: 0

## 2019-06-19 NOTE — TELEPHONE ENCOUNTER
": 1970  PHONE #'s: [phone] (home)     PRESENTING PROBLEM:  Woke up feeling \": OFF \" kind of confused, eyes foggy, off - balance. Hands were shaky, voice shaky and didn't sound herself at work. I had to leave work     NURSING ASSESSMENT  Description:  As above. My neighbor checked my blood sugar = 118 so that is OK.   Onset/duration:  Today when she woke up, she felt \" OFF \" and still does .  Precip. factors:  Etiology unknown  Assoc. Sx:  Some difficulty walking and talking, hands are shaking   Improves/worsens Sx:  same  Pain scale (1-10)   0/10  Sx specific meds:\" I take Lorazepam and Melatonin to sleep at night. \"  LMP/preg/breast feeding:  NA  Last exam/Tx:  Has NOT been seen for this.     RECOMMENDED DISPOSITION:  To ED, another person to drive -   Will comply with recommendation: YES   If further questions/concerns or if Sx do not improve, worsen or new Sx develop, call your PCP or [facility] as soon as possible.    NOTES:  Disposition was determined by the first positive assessment question, therefore all previous assessment questions were negative.  Informed to check provider manual or call insurance company to assure coverage.    Guideline used: Dizziness  Telephone Triage Protocols for Nurses, Fifth Edition, Kelsey Cunningham RN  '  "

## 2019-06-19 NOTE — LETTER
June 19, 2019      To Whom It May Concern:      Liliana Kat was seen in our Emergency Department today, 06/19/19.  I expect her condition to improve over the next 1-2 days.  Please excuse her from work today and tomorrow.    Sincerely,        Josafat Cespedes MD

## 2019-06-19 NOTE — ED AVS SNAPSHOT
[facility] Emergency Department  [address] [place] [address]  Phone:  [phone]  Fax:  [phone]                                    Liliana Kat   MRN: [id number]    Department:  [facility] Emergency Department   Date of Visit:  6/19/2019           After Visit Summary Signature Page    I have received my discharge instructions, and my questions have been answered. I have discussed any challenges I see with this plan with the nurse or doctor.    ..........................................................................................................................................  Patient/Patient Representative Signature      ..........................................................................................................................................  Patient Representative Print Name and Relationship to Patient    ..................................................               ................................................  Date                                   Time    ..........................................................................................................................................  Reviewed by Signature/Title    ...................................................              ..............................................  Date                                               Time          22EPIC Rev 08/18

## 2019-06-19 NOTE — ED NOTES
"Up to bathroom- would to talk to MD. \"I don't really think I need the MRI. My CT was negative and I feel better after the fluids and food. Md aware and will talk to pt.  "

## 2019-06-27 ENCOUNTER — OFFICE VISIT (OUTPATIENT)
Dept: FAMILY MEDICINE | Facility: OTHER | Age: 49
End: 2019-06-27
Payer: COMMERCIAL

## 2019-06-27 VITALS
RESPIRATION RATE: 18 BRPM | TEMPERATURE: 97.6 F | WEIGHT: 140 LBS | BODY MASS INDEX: 23.32 KG/M2 | SYSTOLIC BLOOD PRESSURE: 132 MMHG | OXYGEN SATURATION: 99 % | DIASTOLIC BLOOD PRESSURE: 70 MMHG | HEIGHT: 65 IN | HEART RATE: 76 BPM

## 2019-06-27 DIAGNOSIS — F32.1 MODERATE MAJOR DEPRESSION (H): ICD-10-CM

## 2019-06-27 DIAGNOSIS — R42 DIZZINESS: ICD-10-CM

## 2019-06-27 DIAGNOSIS — F43.23 ADJUSTMENT DISORDER WITH MIXED ANXIETY AND DEPRESSED MOOD: Primary | ICD-10-CM

## 2019-06-27 DIAGNOSIS — R51.9 RIGHT-SIDED HEADACHE: ICD-10-CM

## 2019-06-27 DIAGNOSIS — H81.13 BENIGN PAROXYSMAL POSITIONAL VERTIGO, BILATERAL: ICD-10-CM

## 2019-06-27 DIAGNOSIS — F43.10 PTSD (POST-TRAUMATIC STRESS DISORDER): ICD-10-CM

## 2019-06-27 PROCEDURE — 99214 OFFICE O/P EST MOD 30 MIN: CPT | Performed by: PHYSICIAN ASSISTANT

## 2019-06-27 RX ORDER — DIAZEPAM 10 MG
10 TABLET ORAL EVERY 6 HOURS PRN
Qty: 1 TABLET | Refills: 0 | Status: SHIPPED | OUTPATIENT
Start: 2019-06-27 | End: 2021-03-11

## 2019-06-27 RX ORDER — MECLIZINE HYDROCHLORIDE 25 MG/1
25 TABLET ORAL 3 TIMES DAILY PRN
Qty: 30 TABLET | Refills: 0 | Status: SHIPPED | OUTPATIENT
Start: 2019-06-27 | End: 2023-01-02

## 2019-06-27 ASSESSMENT — PAIN SCALES - GENERAL: PAINLEVEL: NO PAIN (0)

## 2019-06-27 ASSESSMENT — MIFFLIN-ST. JEOR: SCORE: 1270.92

## 2019-06-27 NOTE — PROGRESS NOTES
Subjective     Liliana Kat is a 49 year old female who presents to clinic today for the following health issues:    HPI   ED/UC Followup:    Facility:  [facility]  Date of visit: 06/19/19  Reason for visit: Dizziness  Current Status: Patient reports driving at night makes this worse, has been causing panic attacks while at work.   She has been under significant stress at work to do more work because they are short handed and there has been some evidence that there is to be no tolerance of abnormal behavior while at work.  They have been pushing her without being conscience of her limitations and this is causing more stress.  We discussed that some of her signs and symptoms may be related to the stress and the way that her body is telling her to slow down but further evaluation is certainly indicated.     Patient Active Problem List   Diagnosis     Need for postmenopausal hormone replacement     Decreased libido     CARDIOVASCULAR SCREENING; LDL GOAL LESS THAN 160     Bladder polyps     Superficial varicosities     Sacroiliitis (H)     Adjustment disorder with mixed anxiety and depressed mood     Acute pain of right shoulder     Acute biliary pancreatitis, unspecified complication status     Labral tear of shoulder, right, subsequent encounter     Drug-induced anaphylaxis, initial encounter     Anaphylactic reaction     Seasonal allergic rhinitis     Hypokalemia     Acidosis     Moderate major depression (H)     TAWANA (generalized anxiety disorder)     PTSD (post-traumatic stress disorder)     Past Surgical History:   Procedure Laterality Date     C LIGATE FALLOPIAN TUBE,POSTPARTUM      Tubal Ligation     ESOPHAGOSCOPY, GASTROSCOPY, DUODENOSCOPY (EGD), COMBINED  11/28/2012    Procedure: COMBINED ESOPHAGOSCOPY, GASTROSCOPY, DUODENOSCOPY (EGD), BIOPSY SINGLE OR MULTIPLE;  ESOPHAGOSCOPY, GASTROSCOPY, DUODENOSCOPY (EGD) with biopsy;  Surgeon: Herson Cabrera MD;  Location: [facility]     EXCIS VAGINAL CYST/TUMOR        HC REMOVAL OF OVARY/TUBE(S)  3/6/2006    Laparoscopic bilateral salpingo-oophorectomy.     HC TYMPANOPLASTY W/O MASTOID, INIT/REV W/O OSS CHAIN RECONST       LAPAROSCOPIC CHOLECYSTECTOMY N/A 2017    Procedure: LAPAROSCOPIC CHOLECYSTECTOMY;  Surgeon: Shivam Luevano MD;  Location:  OR       Social History     Tobacco Use     Smoking status: Current Some Day Smoker     Packs/day: 0.00     Years: 10.00     Pack years: 0.00     Smokeless tobacco: Never Used     Tobacco comment: 3 cigs a day    Substance Use Topics     Alcohol use: No     Alcohol/week: 0.0 oz     Comment: recovering  -      Family History   Problem Relation Age of Onset     Alcohol/Drug Father          at age 53 of alcohol     Arthritis Father      Cancer Father         lung     Alcohol/Drug Paternal Grandfather      Cancer Paternal Grandfather         lung     Alcohol/Drug Paternal Grandmother      Cancer Paternal Grandmother         lung     Alcohol/Drug Paternal Aunt      Allergies Daughter         animals and grass     Allergies Son         dust mite     Arthritis Mother      Hypertension Mother      Lipids Mother      Depression Mother      Heart Disease Mother      Blood Disease Mother         DVTs     Depression Brother          Current Outpatient Medications   Medication Sig Dispense Refill     Acetaminophen (TYLENOL PO) Take 650 mg by mouth every 4 hours as needed for mild pain or fever       albuterol (2.5 MG/3ML) 0.083% neb solution ONE NEBULIZATION 4 TIMES DAILY AS NEEDED 1 Box 0     albuterol (PROAIR HFA/PROVENTIL HFA/VENTOLIN HFA) 108 (90 BASE) MCG/ACT Inhaler Inhale 2 puffs into the lungs every 6 hours as needed for shortness of breath / dyspnea or wheezing 1 Inhaler 0     ALPRAZolam (XANAX) 0.5 MG tablet Take 1 tablet (0.5 mg) by mouth 3 times daily as needed for anxiety Please make clinic appointment, a controlled substance agreement must be on file for continued prescription refills for this medication 90 tablet 0      aspirin 81 MG tablet Take 1 tablet (81 mg) by mouth daily 30 tablet OTC     benzocaine-menthol (CHLORASEPTIC) 6-10 MG lozenge Place 1 lozenge inside cheek every hour as needed for sore throat 36 lozenge 0     citalopram (CELEXA) 20 MG tablet TAKE ONE TABLET BY MOUTH ONCE DAILY 90 tablet 1     diazepam (VALIUM) 10 MG tablet Take 1 tablet (10 mg) by mouth every 6 hours as needed for anxiety (for procedure) 1 tablet 0     diazepam (VALIUM) 5 MG tablet Take 1 tablet (5 mg) by mouth every 6 hours as needed for anxiety (vertigo/dizziness) 5 tablet 0     estradiol (ESTRACE) 1 MG tablet Take 1 tablet (1 mg) by mouth daily 90 tablet 2     fluticasone (FLONASE) 50 MCG/ACT spray Spray 2 sprays into both nostrils daily 1 Bottle 11     ibuprofen (ADVIL/MOTRIN) 600 MG tablet   0     loratadine (CLARITIN) 10 MG tablet TAKE ONE TABLET BY MOUTH EVERY DAY 30 tablet 5     meclizine (ANTIVERT) 25 MG tablet Take 1 tablet (25 mg) by mouth 3 times daily as needed for dizziness 30 tablet 0     medroxyPROGESTERone (PROVERA) 5 MG tablet TAKE ONE TABLET BY MOUTH EVERY DAY 90 tablet 1     MELATONIN 10 mg daily        Allergies   Allergen Reactions     Amoxicillin Anaphylaxis     Augmentin Swelling and Difficulty breathing     Avelox Nausea and Vomiting     Effexor Xr [Venlafaxine Hydrochloride]      Shaky and heart racing     Sulfa Drugs      BP Readings from Last 3 Encounters:   06/27/19 132/70   06/19/19 108/72   05/16/19 114/64    Wt Readings from Last 3 Encounters:   06/27/19 63.5 kg (140 lb)   06/19/19 62.6 kg (138 lb)   05/16/19 62.7 kg (138 lb 3.2 oz)                    Reviewed and updated as needed this visit by Provider         Review of Systems   ROS COMP: CONSTITUTIONAL: NEGATIVE for fever, chills, change in weight  ENT/MOUTH: NEGATIVE for ear, mouth and throat problems  RESP: NEGATIVE for significant cough or SOB  CV: NEGATIVE for chest pain, palpitations or peripheral edema  MUSCULOSKELETAL: NEGATIVE for significant  "arthralgias or myalgia  ENDOCRINE: NEGATIVE for temperature intolerance, skin/hair changes  HEME/ALLERGY/IMMUNE: NEGATIVE for bleeding problems      Objective    /70 (Cuff Size: Adult Regular)   Pulse 76   Temp 97.6  F (36.4  C) (Temporal)   Resp 18   Ht 1.651 m (5' 5\")   Wt 63.5 kg (140 lb)   LMP 03/06/2006   SpO2 99%   BMI 23.30 kg/m    Body mass index is 23.3 kg/m .  Physical Exam   GENERAL: healthy, alert and no distress  HENT: normal cephalic/atraumatic, ear canals and TM's normal, nose and mouth without ulcers or lesions, oropharynx clear and oral mucous membranes moist  NECK: no adenopathy, no asymmetry, masses, or scars and trachea midline and normal to palpation  RESP: lungs clear to auscultation - no rales, rhonchi or wheezes  CV: regular rate and rhythm, normal S1 S2, no S3 or S4, no murmur, click or rub, no peripheral edema and peripheral pulses strong  ABDOMEN: soft, nontender, no hepatosplenomegaly, no masses and bowel sounds normal  MS: no gross musculoskeletal defects noted, no edema  SKIN: no suspicious lesions or rashes to visible skin.  NEURO: Normal strength and tone, sensory exam grossly normal and gait normal including heel/toe/tandem walking  PSYCH: affect flat, fatigued, judgement and insight intact and appearance well groomed    Diagnostic Test Results:  Labs reviewed in Epic  No results found for this or any previous visit (from the past 24 hour(s)).        Assessment & Plan     1. Adjustment disorder with mixed anxiety and depressed mood  2. PTSD (post-traumatic stress disorder)  3. Moderate major depression (H)  4. Dizziness  5. Benign paroxysmal positional vertigo, bilateral  6. Right-sided headache  Further evaluation in my opinion is indicated based on her overall presentation.  I cannot deny however that this may indeed be mental health related.  Our hope of course is that her MRI is clear and that with time and continued counseling she will improve.  She is to follow-up " as directed.  - MR Brain w/o & w Contrast; Future  - diazepam (VALIUM) 10 MG tablet; Take 1 tablet (10 mg) by mouth every 6 hours as needed for anxiety (for procedure)  Dispense: 1 tablet; Refill: 0  - PHYSICAL THERAPY REFERRAL; Future  - meclizine (ANTIVERT) 25 MG tablet; Take 1 tablet (25 mg) by mouth 3 times daily as needed for dizziness  Dispense: 30 tablet; Refill: 0     Tobacco Cessation:   reports that she has been smoking.  She has been smoking about 0.00 packs per day for the past 10.00 years. She has never used smokeless tobacco.  Tobacco Cessation Action Plan: Information offered: Patient not interested at this time    Return in about 2 weeks (around 7/11/2019) for recheck of current condition.    Harris Moreno PA-C  [facility]

## 2019-06-27 NOTE — LETTER
[address] [address]  Phone: [phone]    June 27, 2019        Liliana Kat  [address] [place] [id number]          To whom it may concern:    RE: Liliana Kat    Patient was seen and treated today at our clinic and missed work.  Patient may return to work 6/29/19 with the following:  Needs to be allowed to continue to work daytime shifts until we complete her diagnosis and treatment options related to vertigo, right sided headaches, malaise and fatigue.    Please contact me for questions or concerns.      Sincerely,        Harris Moreno PA-C

## 2019-07-12 ENCOUNTER — HOSPITAL ENCOUNTER (OUTPATIENT)
Dept: MRI IMAGING | Facility: CLINIC | Age: 49
Discharge: HOME | End: 2019-07-12
Attending: PHYSICIAN ASSISTANT | Admitting: PHYSICIAN ASSISTANT
Payer: COMMERCIAL

## 2019-07-12 DIAGNOSIS — H81.13 BENIGN PAROXYSMAL POSITIONAL VERTIGO, BILATERAL: ICD-10-CM

## 2019-07-12 DIAGNOSIS — F43.10 PTSD (POST-TRAUMATIC STRESS DISORDER): ICD-10-CM

## 2019-07-12 DIAGNOSIS — R42 DIZZINESS: ICD-10-CM

## 2019-07-12 DIAGNOSIS — F43.23 ADJUSTMENT DISORDER WITH MIXED ANXIETY AND DEPRESSED MOOD: ICD-10-CM

## 2019-07-12 DIAGNOSIS — R51.9 RIGHT-SIDED HEADACHE: ICD-10-CM

## 2019-07-12 DIAGNOSIS — F32.1 MODERATE MAJOR DEPRESSION (H): ICD-10-CM

## 2019-07-12 PROCEDURE — 70553 MRI BRAIN STEM W/O & W/DYE: CPT

## 2019-07-12 PROCEDURE — A9585 GADOBUTROL INJECTION: HCPCS | Performed by: PHYSICIAN ASSISTANT

## 2019-07-12 PROCEDURE — 25500064 ZZH RX 255 OP 636: Performed by: PHYSICIAN ASSISTANT

## 2019-07-12 RX ORDER — GADOBUTROL 604.72 MG/ML
7.5 INJECTION INTRAVENOUS ONCE
Status: COMPLETED | OUTPATIENT
Start: 2019-07-12 | End: 2019-07-12

## 2019-07-12 RX ADMIN — GADOBUTROL 6.5 ML: 604.72 INJECTION INTRAVENOUS at 17:20

## 2019-07-15 ENCOUNTER — MYC MEDICAL ADVICE (OUTPATIENT)
Dept: FAMILY MEDICINE | Facility: OTHER | Age: 49
End: 2019-07-15

## 2019-07-15 DIAGNOSIS — H81.13 BENIGN PAROXYSMAL POSITIONAL VERTIGO, BILATERAL: ICD-10-CM

## 2019-07-15 DIAGNOSIS — F43.10 PTSD (POST-TRAUMATIC STRESS DISORDER): Primary | ICD-10-CM

## 2019-07-15 DIAGNOSIS — R51.9 RIGHT-SIDED HEADACHE: ICD-10-CM

## 2019-07-26 ENCOUNTER — MYC REFILL (OUTPATIENT)
Dept: FAMILY MEDICINE | Facility: CLINIC | Age: 49
End: 2019-07-26

## 2019-07-26 DIAGNOSIS — F43.23 ADJUSTMENT DISORDER WITH MIXED ANXIETY AND DEPRESSED MOOD: ICD-10-CM

## 2019-07-26 RX ORDER — CITALOPRAM HYDROBROMIDE 20 MG/1
TABLET ORAL
Qty: 90 TABLET | Refills: 1 | Status: CANCELLED | OUTPATIENT
Start: 2019-07-26

## 2019-07-29 NOTE — TELEPHONE ENCOUNTER
Requested Prescriptions   Pending Prescriptions Disp Refills     ALPRAZolam (XANAX) 0.5 MG tablet 90 tablet 0     Sig: Take 1 tablet (0.5 mg) by mouth 3 times daily as needed for anxiety Please make clinic appointment, a controlled substance agreement must be on file for continued prescription refills for this medication       There is no refill protocol information for this order        Last Written Prescription Date:  6/14/2019  Last Fill Quantity: 90,  # refills: 0   Last office visit: 6/27/2019    Future Office Visit:      Adjustment disorder with mixed anxiety and depressed mood [F43.23]   Routing refill request to provider for review/approval because:  Drug not on the [facility] refill protocol       Ariadna Joya RN

## 2019-07-30 RX ORDER — ALPRAZOLAM 0.5 MG
0.5 TABLET ORAL 3 TIMES DAILY PRN
Qty: 90 TABLET | Refills: 0 | Status: SHIPPED | OUTPATIENT
Start: 2019-07-30 | End: 2019-09-19

## 2019-08-08 ENCOUNTER — HOSPITAL ENCOUNTER (OUTPATIENT)
Dept: MAMMOGRAPHY | Facility: CLINIC | Age: 49
Discharge: HOME | End: 2019-08-08
Attending: SURGERY | Admitting: SURGERY
Payer: COMMERCIAL

## 2019-08-08 DIAGNOSIS — R92.8 BI-RADS CATEGORY 3 MAMMOGRAM RESULT: ICD-10-CM

## 2019-08-08 PROCEDURE — G0279 TOMOSYNTHESIS, MAMMO: HCPCS

## 2019-08-08 PROCEDURE — 77066 DX MAMMO INCL CAD BI: CPT

## 2019-09-04 ENCOUNTER — MYC REFILL (OUTPATIENT)
Dept: FAMILY MEDICINE | Facility: CLINIC | Age: 49
End: 2019-09-04

## 2019-09-04 DIAGNOSIS — F43.23 ADJUSTMENT DISORDER WITH MIXED ANXIETY AND DEPRESSED MOOD: ICD-10-CM

## 2019-09-04 DIAGNOSIS — Z79.890 NEED FOR POSTMENOPAUSAL HORMONE REPLACEMENT: ICD-10-CM

## 2019-09-04 RX ORDER — ALPRAZOLAM 0.5 MG
0.5 TABLET ORAL 3 TIMES DAILY PRN
Qty: 90 TABLET | Refills: 0 | OUTPATIENT
Start: 2019-09-04

## 2019-09-04 RX ORDER — MEDROXYPROGESTERONE ACETATE 5 MG
5 TABLET ORAL DAILY
Qty: 90 TABLET | Refills: 1 | OUTPATIENT
Start: 2019-09-04

## 2019-09-04 NOTE — TELEPHONE ENCOUNTER
xanax      Last Written Prescription Date:  7/30/19  Last Fill Quantity: 90,   # refills: 0  Last Office Visit: 6/27/19  Future Office visit:       Routing refill request to provider for review/approval because:  Drug not on the [facility], [facility] or [facility] refill protocol or controlled substance    provera      Last Written Prescription Date:  4/24/19  Last Fill Quantity: 90,   # refills: 1  Last Office Visit: 6/27/19  Future Office visit:       Routing refill request to provider for review/approval because:  Drug not on the [facility], [facility] or [facility] refill protocol or controlled substance

## 2019-09-04 NOTE — TELEPHONE ENCOUNTER
I have not seen this patient in a year.  Prior to that I had not seen her for a year and a half.  I am just not comfortable refilling her prescriptions

## 2019-09-05 RX ORDER — ALPRAZOLAM 0.5 MG
0.5 TABLET ORAL 3 TIMES DAILY PRN
Qty: 90 TABLET | Refills: 0 | OUTPATIENT
Start: 2019-09-05

## 2019-09-05 NOTE — TELEPHONE ENCOUNTER
Advised that she will need to establish care with PCP of choice to set up CSA.  Electronically signed:    Harris Moreno PA-C

## 2019-09-13 DIAGNOSIS — F43.23 ADJUSTMENT DISORDER WITH MIXED ANXIETY AND DEPRESSED MOOD: ICD-10-CM

## 2019-09-13 NOTE — TELEPHONE ENCOUNTER
Xanax      Last Written Prescription Date:  7/30/2019  Last Fill Quantity: 90,   # refills: 0  Last Office Visit: 6/27/2019 with Harris Fulton  Future Office visit:       Routing refill request to provider for review/approval because:  Drug not on the [facility], [facility] or [facility] refill protocol or controlled substance

## 2019-09-17 ENCOUNTER — MYC REFILL (OUTPATIENT)
Dept: FAMILY MEDICINE | Facility: CLINIC | Age: 49
End: 2019-09-17

## 2019-09-17 ENCOUNTER — MYC MEDICAL ADVICE (OUTPATIENT)
Dept: FAMILY MEDICINE | Facility: CLINIC | Age: 49
End: 2019-09-17

## 2019-09-17 DIAGNOSIS — F43.23 ADJUSTMENT DISORDER WITH MIXED ANXIETY AND DEPRESSED MOOD: ICD-10-CM

## 2019-09-17 RX ORDER — ALPRAZOLAM 0.5 MG
0.5 TABLET ORAL 3 TIMES DAILY PRN
Qty: 90 TABLET | Refills: 0 | OUTPATIENT
Start: 2019-09-17

## 2019-09-17 RX ORDER — ALPRAZOLAM 0.5 MG
0.5 TABLET ORAL 3 TIMES DAILY PRN
Qty: 90 TABLET | Refills: 0 | Status: CANCELLED | OUTPATIENT
Start: 2019-09-17

## 2019-09-17 NOTE — TELEPHONE ENCOUNTER
No further refills without office visit.  Patient needs to declare her primary care provider and establish a controlled substance agreement with that provider to be given any further refills of this medication.  If she wishes to establish care with me I would have her make an appointment to discuss this further.  Electronically signed:    Harris Moreno PA-C

## 2019-09-18 ENCOUNTER — MYC MEDICAL ADVICE (OUTPATIENT)
Dept: FAMILY MEDICINE | Facility: CLINIC | Age: 49
End: 2019-09-18

## 2019-09-18 DIAGNOSIS — F43.23 ADJUSTMENT DISORDER WITH MIXED ANXIETY AND DEPRESSED MOOD: ICD-10-CM

## 2019-09-18 NOTE — TELEPHONE ENCOUNTER
Reason for Call:  Same Day Appointment, Requested Provider:  Brayan So MD    PCP: Paula Villa    Reason for visit: anxiety follow up     Duration of symptoms: seen last May and was to follow up in 2-3 months. Is needing refill on medications.    Have you been treated for this in the past? Yes    Additional comments: is off tomorrow and is asking if Dr So can see her.  Please advise.     Can we leave a detailed message on this number? YES    Phone number patient can be reached at: Home number on file [phone] (home)    Best Time: any time    Call taken on 9/18/2019 at 8:31 AM by Ira Reyes

## 2019-09-18 NOTE — TELEPHONE ENCOUNTER
We will look into finding a place for patient. Dr. So will review the message tomorrow.  Maria M Avendaño on 9/18/2019 at 4:55 PM

## 2019-09-19 ENCOUNTER — MYC MEDICAL ADVICE (OUTPATIENT)
Dept: FAMILY MEDICINE | Facility: CLINIC | Age: 49
End: 2019-09-19

## 2019-09-19 RX ORDER — ALPRAZOLAM 0.5 MG
0.5 TABLET ORAL 3 TIMES DAILY PRN
Qty: 90 TABLET | Refills: 0 | Status: SHIPPED | OUTPATIENT
Start: 2019-09-19 | End: 2019-10-23

## 2019-09-19 RX ORDER — CITALOPRAM HYDROBROMIDE 20 MG/1
TABLET ORAL
Qty: 90 TABLET | Refills: 1 | Status: SHIPPED | OUTPATIENT
Start: 2019-09-19 | End: 2019-12-11 | Stop reason: DRUGHIGH

## 2019-09-19 NOTE — TELEPHONE ENCOUNTER
Per Dr. So if patient is doing well he will just go ahead and refill her celexa and xanax. Please ask how she's doing(better, worse, same) and what pharmacy she would prefer using. Let VM on patients phone for return call.

## 2019-09-19 NOTE — TELEPHONE ENCOUNTER
Pt stated she is doing well. Stated the dose she is on now is working. Pt uses [facility].

## 2019-10-15 NOTE — PROGRESS NOTES
"Subjective     Liliana Kat is a 49 year old female who presents to clinic today for the following health issues:    HPI   Right Hip/Buttocks Pain/Work injury    Onset: 9/15/2019    Description:   Location: Right side, hip  Character: Ache. Painful when pushing on area, if she sits for a long time or when she bends to the left and forward     Intensity: 5/10    Progression of Symptoms: worse    Accompanying Signs & Symptoms:  Other symptoms: radiation of pain to buttocks, low back, and down to back of thigh.    History:   Previous similar pain: no       Precipitating factors:   Trauma or overuse: YES- Half-door at cash register swung open when the large outside doors opened. It swung open hard and hit her on the right hip/buttocks. The door that hit her is heavy and it hit her forcefully. 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 She reports the door had been broken for a while and she propped a laundry detergent bottle in front of it to keep it from swinging. She is not sure who moved the detergent bottle. 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     Alleviating factors:  Improved by: Ibuprofen for an hour, 600 every 4 hours, diarrhea from taking ibuprofen so often    Therapies Tried and outcome: Biofreeze, icy hot, heating pads    Patient states [employer] is requiring drug test for workers comp.    Reviewed and updated as needed this visit by Provider         Review of Systems   ROS COMP: Constitutional, HEENT, cardiovascular, pulmonary, gi and gu systems are negative, except as otherwise noted.      Objective    /86   Pulse 68   Temp 98.3  F (36.8  C) (Temporal)   Resp 16   Ht 1.651 m (5' 5\")   Wt 68.1 kg (150 lb 1.6 oz)   LMP 03/06/2006   BMI 24.98 kg/m    Body mass index is 24.98 kg/m .  Physical Exam   GENERAL: alert and appears " uncomfortable  MS: tenderness to palpation over upper right gluteal under iliac crest with tight band of muscle in that area that is very tender to palpation. Pain with forward flexion to the left at waist. No tenderness to palpation over right greater trochanter.   SKIN: no suspicious lesions or rashes  NEURO: Normal strength and tone and sensory exam grossly normal    Diagnostic Test Results:  Labs reviewed in Epic  PELVIS AND HIP RIGHT ONE VIEW   10/16/2019 12:05 PM      HISTORY: Acute right hip pain. Work-related injury.     COMPARISON: None.                                                                      IMPRESSION: Hip joint spaces are well-preserved. No evidence of acute  fracture or subluxation.     EMILI TURNER MD        Assessment & Plan     1. Acute right hip pain  Clear work injury. X-ray normal. Suspect injury to musculature and soft tissue causing pain. I recommended restrictions for work to avoid movements that exacerbate pain. Recommend working with physical therapy, NSAIDS, ice/heat, rest and time for healing. Follow up in 3 weeks to reevaluate work restrictions.  - XR Pelvis and Hip Right 1 View; Future  - PHYSICAL THERAPY REFERRAL; Future  - meloxicam (MOBIC) 15 MG tablet; Take 1 tablet (15 mg) by mouth daily  Dispense: 30 tablet; Refill: 0  - cyclobenzaprine (FLEXERIL) 10 MG tablet; Take 1 tablet (10 mg) by mouth 3 times daily as needed for muscle spasms (pain)  Dispense: 30 tablet; Refill: 0    2. Work related injury  See above notes  - XR Pelvis and Hip Right 1 View; Future  - PHYSICAL THERAPY REFERRAL; Future  - meloxicam (MOBIC) 15 MG tablet; Take 1 tablet (15 mg) by mouth daily  Dispense: 30 tablet; Refill: 0  - cyclobenzaprine (FLEXERIL) 10 MG tablet; Take 1 tablet (10 mg) by mouth 3 times daily as needed for muscle spasms (pain)  Dispense: 30 tablet; Refill: 0    3. Encounter for drug screening  Per protocol from [employer]  - Drug Abuse Screen Panel 13, Urine (Pain Care Package)      Greater than 50% of 25 minute visit were spent on counseling or coordination of care regarding acute right hip pain.       No follow-ups on file.    LOLIS Gold [facility]

## 2019-10-16 ENCOUNTER — OFFICE VISIT (OUTPATIENT)
Dept: FAMILY MEDICINE | Facility: OTHER | Age: 49
End: 2019-10-16
Payer: OTHER MISCELLANEOUS

## 2019-10-16 ENCOUNTER — MYC MEDICAL ADVICE (OUTPATIENT)
Dept: FAMILY MEDICINE | Facility: OTHER | Age: 49
End: 2019-10-16

## 2019-10-16 ENCOUNTER — ANCILLARY PROCEDURE (OUTPATIENT)
Dept: GENERAL RADIOLOGY | Facility: OTHER | Age: 49
End: 2019-10-16
Attending: STUDENT IN AN ORGANIZED HEALTH CARE EDUCATION/TRAINING PROGRAM
Payer: COMMERCIAL

## 2019-10-16 ENCOUNTER — IMMUNIZATION (OUTPATIENT)
Dept: FAMILY MEDICINE | Facility: OTHER | Age: 49
End: 2019-10-16
Payer: COMMERCIAL

## 2019-10-16 VITALS
TEMPERATURE: 98.3 F | BODY MASS INDEX: 25.01 KG/M2 | SYSTOLIC BLOOD PRESSURE: 124 MMHG | RESPIRATION RATE: 16 BRPM | HEIGHT: 65 IN | WEIGHT: 150.1 LBS | DIASTOLIC BLOOD PRESSURE: 86 MMHG | HEART RATE: 68 BPM

## 2019-10-16 DIAGNOSIS — Z23 NEED FOR PROPHYLACTIC VACCINATION AND INOCULATION AGAINST INFLUENZA: Primary | ICD-10-CM

## 2019-10-16 DIAGNOSIS — M25.551 ACUTE RIGHT HIP PAIN: Primary | ICD-10-CM

## 2019-10-16 DIAGNOSIS — Z02.83 ENCOUNTER FOR DRUG SCREENING: ICD-10-CM
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
## 2019-10-16 DIAGNOSIS — M25.551 ACUTE RIGHT HIP PAIN: ICD-10-CM

## 2019-10-16 LAB
AMPHETAMINES UR QL: NOT DETECTED NG/ML
BARBITURATES UR QL SCN: NOT DETECTED NG/ML
BENZODIAZ UR QL SCN: ABNORMAL NG/ML
BUPRENORPHINE UR QL: NOT DETECTED NG/ML
CANNABINOIDS UR QL: NOT DETECTED NG/ML
COCAINE UR QL SCN: NOT DETECTED NG/ML
D-METHAMPHET UR QL: NOT DETECTED NG/ML
METHADONE UR QL SCN: NOT DETECTED NG/ML
OPIATES UR QL SCN: NOT DETECTED NG/ML
OXYCODONE UR QL SCN: NOT DETECTED NG/ML
PCP UR QL SCN: NOT DETECTED NG/ML
PROPOXYPH UR QL: NOT DETECTED NG/ML
TRICYCLICS UR QL SCN: NOT DETECTED NG/ML

## 2019-10-16 PROCEDURE — 90686 IIV4 VACC NO PRSV 0.5 ML IM: CPT | Performed by: STUDENT IN AN ORGANIZED HEALTH CARE EDUCATION/TRAINING PROGRAM

## 2019-10-16 PROCEDURE — 90686 IIV4 VACC NO PRSV 0.5 ML IM: CPT

## 2019-10-16 PROCEDURE — 90471 IMMUNIZATION ADMIN: CPT | Performed by: STUDENT IN AN ORGANIZED HEALTH CARE EDUCATION/TRAINING PROGRAM

## 2019-10-16 PROCEDURE — 99207 ZZC NO CHARGE NURSE ONLY: CPT

## 2019-10-16 PROCEDURE — 73502 X-RAY EXAM HIP UNI 2-3 VIEWS: CPT | Mod: FY

## 2019-10-16 PROCEDURE — 99214 OFFICE O/P EST MOD 30 MIN: CPT | Performed by: STUDENT IN AN ORGANIZED HEALTH CARE EDUCATION/TRAINING PROGRAM

## 2019-10-16 PROCEDURE — 80306 DRUG TEST PRSMV INSTRMNT: CPT | Performed by: STUDENT IN AN ORGANIZED HEALTH CARE EDUCATION/TRAINING PROGRAM

## 2019-10-16 PROCEDURE — 90471 IMMUNIZATION ADMIN: CPT

## 2019-10-16 RX ORDER — CYCLOBENZAPRINE HCL 10 MG
10 TABLET ORAL 3 TIMES DAILY PRN
Qty: 30 TABLET | Refills: 0 | Status: SHIPPED | OUTPATIENT
Start: 2019-10-16 | End: 2021-03-11

## 2019-10-16 RX ORDER — MELOXICAM 15 MG/1
15 TABLET ORAL DAILY
Qty: 30 TABLET | Refills: 0 | Status: SHIPPED | OUTPATIENT
Start: 2019-10-16 | End: 2020-05-27

## 2019-10-16 ASSESSMENT — PAIN SCALES - GENERAL: PAINLEVEL: MODERATE PAIN (5)

## 2019-10-16 ASSESSMENT — MIFFLIN-ST. JEOR: SCORE: 1316.73

## 2019-10-16 NOTE — LETTER
[facility]  [address] [facility] [address]  Phone: [phone]      October 16, 2019      RE: Liliana Kat  [address] [facility] [address]        To whom it may concern:    Liliana Kat is under my professional care. The employee is ABLE to return to work today.    When the patient returns to work, the following restrictions apply until 11/6/19:  A) Bend at waist: Not at all (0 hours)  B) Squat: Occasionally (1-3 hours)  C) Walk/Stand: Frequently (4-8 hours)  D) Reach Above Shoulders: Frequently (4-8 hours)  E) Lift, carry, push, and pull no more than:  0-10 lbs.    Sincerely,          Shanell Esquivel, NP-C

## 2019-10-16 NOTE — PATIENT INSTRUCTIONS
1. Schedule appointment with physical therapy. You will receive a call to set up an appointment for this.    2. Try taking muscle relaxer called cyclobenzaprine 5-10 mg up to three times daily as needed.  This may make you feel sleepy particularly with the first several doses.     3. Start anti-inflammatory medication called meloxicam 15 mg once daily with food. Avoid taking ibuprofen or Aleve with this medication as they are similar medications and can cause stomach upset.    4. Continue to use heat to the area.  May try using ice if there is worsened pain after activity.     5. Drug screen will be done today.    6. Fax any work comp paperwork to [phone].    MEGAN AgiularC

## 2019-10-17 NOTE — TELEPHONE ENCOUNTER
Patient is at work today and they are needing you to fax what she was seen, any treatment, and anything written in notes to send to work comp  Fax [phone] attention Ning العلي

## 2019-10-17 NOTE — TELEPHONE ENCOUNTER
Left message for patient to return call to clinic. When call is returned please inform patient her Work comp. Rep will need to either fax or call with this request. I don't even have a WC claim in her chart yet and we need some sort of request to be sending medical information. WC doesn't typically need a KAYE but I don't even have a claim started yet for WC.     If they can fax us a request that would be preferred to [phone]    Law Lowery,

## 2019-10-18 ENCOUNTER — MYC MEDICAL ADVICE (OUTPATIENT)
Dept: FAMILY MEDICINE | Facility: OTHER | Age: 49
End: 2019-10-18

## 2019-10-21 ENCOUNTER — MYC MEDICAL ADVICE (OUTPATIENT)
Dept: FAMILY MEDICINE | Facility: OTHER | Age: 49
End: 2019-10-21

## 2019-10-21 NOTE — TELEPHONE ENCOUNTER
Left message for patient to return call. When call is returned, please find out from patient if there is a form from her workers comp that provider needs to fill out and find out what patient needs sent to HR. Does she just need a letter stating she was seen? Does she need office visit notes to send to workers comp?

## 2019-10-21 NOTE — TELEPHONE ENCOUNTER
Patient called back. She would like us to send a letter stating that she was seen for the work comp.She would also like us to send the visit notes and UA results.   Patient wanted me to talk to the HR Person and she said she couldn't talk on the patients cell phone. The patient wanted me to call the HR person(Ning) I tried to call her and there was no answer.     HR will send us a form to fill out.     Please send information to HR Ning Loo. She said that we should have the fax number still.

## 2019-10-21 NOTE — TELEPHONE ENCOUNTER
Visit notes were faxed. Will have provider write note for patient in case there are work restrictions.

## 2019-10-23 DIAGNOSIS — F43.23 ADJUSTMENT DISORDER WITH MIXED ANXIETY AND DEPRESSED MOOD: Primary | ICD-10-CM

## 2019-10-23 NOTE — TELEPHONE ENCOUNTER
Alprazolam        Last Written Prescription Date:  9/19/19  Last Fill Quantity: 90,   # refills: 0  Last Office Visit: 5/16/19  Future Office visit:       Routing refill request to provider for review/approval because:  Drug not on the [facility], [facility] or [facility] refill protocol or controlled substance

## 2019-10-24 ENCOUNTER — MYC MEDICAL ADVICE (OUTPATIENT)
Dept: FAMILY MEDICINE | Facility: OTHER | Age: 49
End: 2019-10-24

## 2019-10-25 RX ORDER — ALPRAZOLAM 0.5 MG
0.5 TABLET ORAL 3 TIMES DAILY PRN
Qty: 90 TABLET | Refills: 0 | Status: SHIPPED | OUTPATIENT
Start: 2019-10-25 | End: 2019-12-11

## 2019-11-04 ENCOUNTER — HEALTH MAINTENANCE LETTER (OUTPATIENT)
Age: 49
End: 2019-11-04

## 2019-12-04 DIAGNOSIS — F43.23 ADJUSTMENT DISORDER WITH MIXED ANXIETY AND DEPRESSED MOOD: ICD-10-CM

## 2019-12-04 NOTE — TELEPHONE ENCOUNTER
Alprazolam      Last Written Prescription Date:  10/25/2019  Last Fill Quantity: 90,   # refills: 0  Last Office Visit: 10/16/2019  Future Office visit:       Routing refill request to provider for review/approval because:  Drug not on the [facility], [facility] or [facility] refill protocol or controlled substance

## 2019-12-04 NOTE — LETTER
[address] [address]  Phone: [phone]  Fax: [phone]        December 6, 2019      Liliana Kat                                                                                                                                [address] [place] [address]            Dear Ms. Kat,    We are concerned about your health care.  We recently provided you with a medication refill.  Many medications require routine follow-up with your Doctor.      At this time we ask that: There is no controlled substance agreement on file.  Please make appointment to come in and complete this before any further refills    Your prescription: No further refills will be given until your follow up care is completed.      Thank you,      Paula Villa/ [facility] Team

## 2019-12-05 RX ORDER — ALPRAZOLAM 0.5 MG
0.5 TABLET ORAL 3 TIMES DAILY PRN
Qty: 90 TABLET | Refills: 0 | OUTPATIENT
Start: 2019-12-05

## 2019-12-05 NOTE — TELEPHONE ENCOUNTER
There is no controlled substance agreement on file.  Please make appointment to come in and complete this before any further refills.

## 2019-12-06 NOTE — TELEPHONE ENCOUNTER
2nd attempt to reach pt- left another message. Routing back to team to send letter.  Thank you,  Letitia Deluca- Patient Representative

## 2019-12-11 ENCOUNTER — OFFICE VISIT (OUTPATIENT)
Dept: FAMILY MEDICINE | Facility: CLINIC | Age: 49
End: 2019-12-11
Payer: COMMERCIAL

## 2019-12-11 VITALS
HEIGHT: 65 IN | HEART RATE: 76 BPM | DIASTOLIC BLOOD PRESSURE: 76 MMHG | WEIGHT: 150 LBS | RESPIRATION RATE: 16 BRPM | TEMPERATURE: 97.5 F | SYSTOLIC BLOOD PRESSURE: 120 MMHG | BODY MASS INDEX: 24.99 KG/M2 | OXYGEN SATURATION: 99 %

## 2019-12-11 DIAGNOSIS — F43.23 ADJUSTMENT DISORDER WITH MIXED ANXIETY AND DEPRESSED MOOD: ICD-10-CM

## 2019-12-11 DIAGNOSIS — F32.1 MODERATE MAJOR DEPRESSION (H): Primary | ICD-10-CM

## 2019-12-11 PROCEDURE — 99214 OFFICE O/P EST MOD 30 MIN: CPT | Performed by: FAMILY MEDICINE

## 2019-12-11 RX ORDER — CITALOPRAM HYDROBROMIDE 20 MG/1
30 TABLET ORAL DAILY
Qty: 135 TABLET | Refills: 1 | Status: SHIPPED | OUTPATIENT
Start: 2019-12-11 | End: 2020-06-16

## 2019-12-11 RX ORDER — ALPRAZOLAM 0.5 MG
0.5 TABLET ORAL 3 TIMES DAILY PRN
Qty: 90 TABLET | Refills: 0 | Status: SHIPPED | OUTPATIENT
Start: 2019-12-11 | End: 2020-01-13

## 2019-12-11 ASSESSMENT — ANXIETY QUESTIONNAIRES
2. NOT BEING ABLE TO STOP OR CONTROL WORRYING: SEVERAL DAYS
6. BECOMING EASILY ANNOYED OR IRRITABLE: SEVERAL DAYS
1. FEELING NERVOUS, ANXIOUS, OR ON EDGE: SEVERAL DAYS
3. WORRYING TOO MUCH ABOUT DIFFERENT THINGS: SEVERAL DAYS
7. FEELING AFRAID AS IF SOMETHING AWFUL MIGHT HAPPEN: SEVERAL DAYS
GAD7 TOTAL SCORE: 6
IF YOU CHECKED OFF ANY PROBLEMS ON THIS QUESTIONNAIRE, HOW DIFFICULT HAVE THESE PROBLEMS MADE IT FOR YOU TO DO YOUR WORK, TAKE CARE OF THINGS AT HOME, OR GET ALONG WITH OTHER PEOPLE: VERY DIFFICULT
5. BEING SO RESTLESS THAT IT IS HARD TO SIT STILL: NOT AT ALL

## 2019-12-11 ASSESSMENT — MIFFLIN-ST. JEOR: SCORE: 1316.28

## 2019-12-11 ASSESSMENT — PATIENT HEALTH QUESTIONNAIRE - PHQ9
SUM OF ALL RESPONSES TO PHQ QUESTIONS 1-9: 11
5. POOR APPETITE OR OVEREATING: SEVERAL DAYS

## 2019-12-11 ASSESSMENT — PAIN SCALES - GENERAL: PAINLEVEL: NO PAIN (0)

## 2019-12-11 NOTE — PROGRESS NOTES
Subjective     Liliana Kat is a 49 year old female who presents to clinic today for the following health issues:    HPI   Depression and Anxiety Follow-Up    How are you doing with your depression since your last visit? No change    How are you doing with your anxiety since your last visit?  No change    Are you having other symptoms that might be associated with depression or anxiety? Yes:  wrosenign with job and at night    Have you had a significant life event? Job Concerns     Do you have any concerns with your use of alcohol or other drugs? No    Social History     Tobacco Use     Smoking status: Current Some Day Smoker     Packs/day: 0.00     Years: 10.00     Pack years: 0.00     Types: Cigarettes     Smokeless tobacco: Never Used     Tobacco comment: 3 cigs a day    Substance Use Topics     Alcohol use: No     Alcohol/week: 0.0 standard drinks     Comment: recovering  - 2005     Drug use: No     PHQ 9/12/2018 5/16/2019 11/13/2019   PHQ-9 Total Score 19 11 11   Q9: Thoughts of better off dead/self-harm past 2 weeks Not at all Not at all Not at all     TAWANA-7 SCORE 6/24/2016 2/2/2017 9/12/2018   Total Score - - -   Total Score 4 8 17     Last PHQ-9 11/13/2019   1.  Little interest or pleasure in doing things 2   2.  Feeling down, depressed, or hopeless 2   3.  Trouble falling or staying asleep, or sleeping too much 2   4.  Feeling tired or having little energy 2   5.  Poor appetite or overeating 2   6.  Feeling bad about yourself 1   7.  Trouble concentrating 0   8.  Moving slowly or restless 0   Q9: Thoughts of better off dead/self-harm past 2 weeks 0   PHQ-9 Total Score 11   Difficulty at work, home, or with people -     TAWANA-7  9/12/2018   1. Feeling nervous, anxious, or on edge 3   2. Not being able to stop or control worrying 3   3. Worrying too much about different things 3   4. Trouble relaxing 3   5. Being so restless that it is hard to sit still 2   6. Becoming easily annoyed or irritable 3   7.  Feeling afraid, as if something awful might happen 0   TAWANA-7 Total Score 17   If you checked any problems, how difficult have they made it for you to do your work, take care of things at home, or get along with other people? Very difficult     Suicide Assessment Five-step Evaluation and Treatment (SAFE-T)      How many servings of fruits and vegetables do you eat daily?  2-3    On average, how many sweetened beverages do you drink each day (Examples: soda, juice, sweet tea, etc.  Do NOT count diet or artificially sweetened beverages)?   1    How many days per week do you miss taking your medication? 0        SUBJECTIVE:  Liliana  is a 49 year old female who presents for: Follow-up of her anxiety and depression.  She is not doing well.  She is being harassed at work.  She has to work long hours.  She is the breadwinner.  Her son is still in [facility].  Frightening for her to visit there.  Breaking down in tears all the time.  Very anxious.    Past Medical History:   Diagnosis Date     Allergic rhinitis, cause unspecified     Allergic rhinitis - weeds and pollan     Anxiety      Bladder polyps      Depressive disorder      Obstructive chronic bronchitis with exacerbation (H)      Other and unspecified ovarian cyst     Ovarian cyst - rupured cyst + laser x3     Other and unspecified ovarian cyst 9/14/2005    Left hemorrhagic ovarian cyst.     Tobacco abuse      Urinary tract infection, site not specified      Past Surgical History:   Procedure Laterality Date     C LIGATE FALLOPIAN TUBE,POSTPARTUM      Tubal Ligation     ESOPHAGOSCOPY, GASTROSCOPY, DUODENOSCOPY (EGD), COMBINED  11/28/2012    Procedure: COMBINED ESOPHAGOSCOPY, GASTROSCOPY, DUODENOSCOPY (EGD), BIOPSY SINGLE OR MULTIPLE;  ESOPHAGOSCOPY, GASTROSCOPY, DUODENOSCOPY (EGD) with biopsy;  Surgeon: Herson Cabrera MD;  Location: [facility]     EXCIS VAGINAL CYST/TUMOR       HC REMOVAL OF OVARY/TUBE(S)  3/6/2006    Laparoscopic bilateral  salpingo-oophorectomy.     HC TYMPANOPLASTY W/O MASTOID, INIT/REV W/O OSS CHAIN RECONST  04/02     LAPAROSCOPIC CHOLECYSTECTOMY N/A 4/6/2017    Procedure: LAPAROSCOPIC CHOLECYSTECTOMY;  Surgeon: Shivam Luevano MD;  Location:  OR     Social History     Tobacco Use     Smoking status: Current Some Day Smoker     Packs/day: 0.00     Years: 10.00     Pack years: 0.00     Types: Cigarettes     Smokeless tobacco: Never Used     Tobacco comment: 3 cigs a day    Substance Use Topics     Alcohol use: No     Alcohol/week: 0.0 standard drinks     Comment: recovering  - 2005     Current Outpatient Medications   Medication Sig Dispense Refill     Acetaminophen (TYLENOL PO) Take 650 mg by mouth every 4 hours as needed for mild pain or fever       albuterol (2.5 MG/3ML) 0.083% neb solution ONE NEBULIZATION 4 TIMES DAILY AS NEEDED 1 Box 0     albuterol (PROAIR HFA/PROVENTIL HFA/VENTOLIN HFA) 108 (90 BASE) MCG/ACT Inhaler Inhale 2 puffs into the lungs every 6 hours as needed for shortness of breath / dyspnea or wheezing 1 Inhaler 0     ALPRAZolam (XANAX) 0.5 MG tablet Take 1 tablet (0.5 mg) by mouth 3 times daily as needed for anxiety 90 tablet 0     aspirin 81 MG tablet Take 1 tablet (81 mg) by mouth daily 30 tablet OTC     benzocaine-menthol (CHLORASEPTIC) 6-10 MG lozenge Place 1 lozenge inside cheek every hour as needed for sore throat 36 lozenge 0     citalopram (CELEXA) 20 MG tablet Take 1.5 tablets (30 mg) by mouth daily 135 tablet 1     cyclobenzaprine (FLEXERIL) 10 MG tablet Take 1 tablet (10 mg) by mouth 3 times daily as needed for muscle spasms (pain) 30 tablet 0     diazepam (VALIUM) 10 MG tablet Take 1 tablet (10 mg) by mouth every 6 hours as needed for anxiety (for procedure) 1 tablet 0     diazepam (VALIUM) 5 MG tablet Take 1 tablet (5 mg) by mouth every 6 hours as needed for anxiety (vertigo/dizziness) 5 tablet 0     estradiol (ESTRACE) 1 MG tablet Take 1 tablet (1 mg) by mouth daily 90 tablet 2     fluticasone  "(FLONASE) 50 MCG/ACT spray Spray 2 sprays into both nostrils daily 1 Bottle 11     ibuprofen (ADVIL/MOTRIN) 600 MG tablet   0     loratadine (CLARITIN) 10 MG tablet TAKE ONE TABLET BY MOUTH EVERY DAY 30 tablet 5     meclizine (ANTIVERT) 25 MG tablet Take 1 tablet (25 mg) by mouth 3 times daily as needed for dizziness 30 tablet 0     medroxyPROGESTERone (PROVERA) 5 MG tablet TAKE ONE TABLET BY MOUTH EVERY DAY 90 tablet 1     MELATONIN 10 mg daily        meloxicam (MOBIC) 15 MG tablet Take 1 tablet (15 mg) by mouth daily 30 tablet 0       REVIEW OF SYSTEMS:   5 point ROS negative except as noted above in HPI, including Gen., Resp, CV, GI &  system review.     OBJECTIVE:  Vitals: /76   Pulse 76   Temp 97.5  F (36.4  C) (Temporal)   Resp 16   Ht 1.651 m (5' 5\")   Wt 68 kg (150 lb)   LMP 03/06/2006   SpO2 99%   Breastfeeding No   BMI 24.96 kg/m    BMI= Body mass index is 24.96 kg/m .  She is alert and in moderate distress.  Tearful.  Answers questions appropriately and has good eye contact.  Not suicidal.  PHQ 9 score is 11.    ASSESSMENT:  #1 depression #2 anxiety    PLAN:  Is not doing very well.  Wants to continue on her Celexa but we are going to increase to 30 mg a day.  Continue on Xanax as needed.  Spent time counseling and discussing with her different options.  I strongly suggest she get back into counseling she has met with Shira before.  She is acceptable of this.  Over 25 minutes was spent on this counter greater than 50% of the time was in counseling and discussing different therapeutic options.        Brayan So MD  [facility]            "

## 2019-12-11 NOTE — LETTER
[facility]  12/11/19    Patient: Liliana Kat  YOB: 1970  Medical Record Number: [id number]  CSN: [id number]                                                                              Non-opioid Controlled Substance Agreement    I understand that my care provider has prescribed a controlled substance to help manage my condition(s). I am taking this medicine to help me function or work. I know this is strong medicine, and that it can cause serious side effects. Controlled substances can be sedating, addicting and may cause a dependency on the drug. They can affect my ability to drive or think, and cause depression. They need to be taken exactly as prescribed. Combining controlled substances with certain medicines or chemicals (such as cocaine, sedatives and tranquilizers, sleeping pills, meth) can be dangerous or even fatal. Also, if I stop controlled substances suddenly, I may have severe withdrawal symptoms.  If not helpful, I may be asked to stop them.    The risks, benefits, and side effects of these medicine(s) were explained to me. I agree that:    1. I will take part in other treatments as advised by my care team. This may be psychiatry or counseling, physical therapy, behavioral therapy, group treatment or a referral to a pain clinic. I will reduce or stop my medicine when my care team tells me to do so.  2. I will take my medicines as prescribed. I will not change the dose or schedule unless my care team tells me to. There will be no refills if I  run out early.   I may be contactedwithout warning and asked to complete a urine drug test or pill count at any time.   3. I will keep all my appointments, and understand this is part of the monitoring of controlled substances. My care team may require an office visit for EVERY controlled substance refill. If I miss appointments or don t follow instructions, my care team may stop my medicine.  4. I will not ask  other providers to prescribe controlled substances, and I will not accept controlled substances from other people. If I need another prescribed controlled substance for a new reason, I will tell my care team within 1 business day.  5. I will use one pharmacy to fill all of my controlled substance prescriptions, and it is up to me to make sure that I do not run out of my medicines on weekends or holidays. If my care team is willing to refill my controlled substance prescription without a visit, I must request refills only during office hours, refills may take up to 3 days to process, and it may take up to 5 to 7 days for my medicine to be mailed and ready at my pharmacy. Prescriptions will not be mailed anywhere except my pharmacy.    6. I am responsible for my prescriptions. If the medicine/prescription is lost or stolen, it will not be replaced. I also agree not to share controlled substance medicines with anyone.              [facility]  12/11/19  Patient:  Liliana Kat  YOB: 1970  Medical Record Number: [id number]  [facility]: [id number]    7. I agree to not use ANY illegal or recreational drugs. This includes marijuana, cocaine, bath salts or other drugs. I agree not to use alcohol unless my care team says I may. I agree to give urine samples whenever asked. If I don t give a urine sample, the care team may stop my medicine.    8. If I enroll in the [place] Medical Marijuana program, I will tell my care team. I will also sign an agreement to share my medical records with my care team.    9. I will bring in my list of medicines (or my medicine bottles) each time I come to the clinic.   10. I will tell my care team right away if I become pregnant or have a new medical problem treated outside of my regular clinic.  11. I understand that this medicine can affect my thinking and judgment. It may be unsafe for me to drive, use machinery and do dangerous tasks. I will not  do any of these things until I know how the medicine affects me. If my dose changes, I will wait to see how it affects me. I will contact my care team if I have concerns about medicine side effects.    I understand that if I do not follow any of the conditions above, my prescriptions or treatment may be stopped.      I agree that my provider, clinic care team, and pharmacy may work with any city, state or federal law enforcement agency that investigates the misuse, sale, or other diversion of my controlled medicine. I will allow my provider to discuss my care with or share a copy of this agreement with any other treating provider, pharmacy or emergency room where I receive care. I agree to give up (waive) any right of privacy or confidentiality with respect to these consents.   I have read this agreement and have asked questions about anything I did not understand.    ____________________________________________________    ____________  ________  Patient signature                                                         Date      Time    ____________________________________________________     ____________  ________  Witness                                                          Date      Time    ____________________________________________________  Provider signature

## 2019-12-12 ASSESSMENT — ANXIETY QUESTIONNAIRES: GAD7 TOTAL SCORE: 6

## 2020-01-11 DIAGNOSIS — F43.23 ADJUSTMENT DISORDER WITH MIXED ANXIETY AND DEPRESSED MOOD: ICD-10-CM

## 2020-01-13 ENCOUNTER — MYC MEDICAL ADVICE (OUTPATIENT)
Dept: FAMILY MEDICINE | Facility: CLINIC | Age: 50
End: 2020-01-13

## 2020-01-13 RX ORDER — ALPRAZOLAM 0.5 MG
TABLET ORAL
Qty: 90 TABLET | Refills: 0 | Status: SHIPPED | OUTPATIENT
Start: 2020-01-13 | End: 2020-02-13

## 2020-01-13 NOTE — TELEPHONE ENCOUNTER
Alprazolam 0.5 MG       Last Written Prescription Date:  12/11/19  Last Fill Quantity: 90,   # refills: 0  Last Office Visit: 12/11/19  Future Office visit:       Routing refill request to provider for review/approval because:  Drug not on the [facility], [facility] or [facility] refill protocol or controlled substance

## 2020-02-13 DIAGNOSIS — F43.23 ADJUSTMENT DISORDER WITH MIXED ANXIETY AND DEPRESSED MOOD: ICD-10-CM

## 2020-02-13 RX ORDER — ALPRAZOLAM 0.5 MG
TABLET ORAL
Qty: 90 TABLET | Refills: 0 | Status: SHIPPED | OUTPATIENT
Start: 2020-02-13 | End: 2020-03-13

## 2020-02-13 NOTE — TELEPHONE ENCOUNTER
Alprazolam 0.5 MG      Last Written Prescription Date:  1/13/2020  Last Fill Quantity: 90,   # refills: 0  Last Office Visit: 12/11/19  Future Office visit:    Next 5 appointments (look out 90 days)    Feb 14, 2020 11:00 AM CST  SHORT with Garry Hays NP  [facility] ([facility]) [address] [place] [id number]  [phone]           Routing refill request to provider for review/approval because:  Drug not on the [facility], [facility] or [facility] refill protocol or controlled substance

## 2020-03-13 DIAGNOSIS — F43.23 ADJUSTMENT DISORDER WITH MIXED ANXIETY AND DEPRESSED MOOD: ICD-10-CM

## 2020-03-13 RX ORDER — ALPRAZOLAM 0.5 MG
TABLET ORAL
Qty: 90 TABLET | Refills: 0 | Status: SHIPPED | OUTPATIENT
Start: 2020-03-13 | End: 2020-04-13

## 2020-03-13 NOTE — TELEPHONE ENCOUNTER
Alprazolam        Last Written Prescription Date:  2/13/20  Last Fill Quantity: 90,   # refills: 0  Last Office Visit: 12/11/19  Future Office visit:       Routing refill request to provider for review/approval because:  Drug not on the [facility], [facility] or [facility] refill protocol or controlled substance

## 2020-03-17 ENCOUNTER — MYC REFILL (OUTPATIENT)
Dept: FAMILY MEDICINE | Facility: CLINIC | Age: 50
End: 2020-03-17

## 2020-03-17 DIAGNOSIS — Z79.890 NEED FOR POSTMENOPAUSAL HORMONE REPLACEMENT: ICD-10-CM

## 2020-03-17 DIAGNOSIS — Z72.0 TOBACCO ABUSE: ICD-10-CM

## 2020-03-18 RX ORDER — ESTRADIOL 1 MG/1
1 TABLET ORAL DAILY
Qty: 90 TABLET | Refills: 2 | Status: SHIPPED | OUTPATIENT
Start: 2020-03-18 | End: 2021-08-26

## 2020-03-18 RX ORDER — ALBUTEROL SULFATE 90 UG/1
2 AEROSOL, METERED RESPIRATORY (INHALATION) EVERY 6 HOURS PRN
Qty: 1 INHALER | Refills: 0 | Status: SHIPPED | OUTPATIENT
Start: 2020-03-18 | End: 2020-05-06

## 2020-03-18 RX ORDER — MEDROXYPROGESTERONE ACETATE 5 MG
5 TABLET ORAL DAILY
Qty: 90 TABLET | Refills: 1 | Status: SHIPPED | OUTPATIENT
Start: 2020-03-18 | End: 2021-08-26

## 2020-03-18 NOTE — TELEPHONE ENCOUNTER
"medroxyprogesterone  Last Written Prescription Date:  4/24/2019  Last Fill Quantity: 90,  # refills: 1   Last office visit: 12/11/2019 with prescribing provider:      Future Office Visit:      Requested Prescriptions   Pending Prescriptions Disp Refills     medroxyPROGESTERone (PROVERA) 5 MG tablet 90 tablet 1     Sig: Take 1 tablet (5 mg) by mouth daily       There is no refill protocol information for this order       Albuterol  Last Written Prescription Date:  9/28/2017  Last Fill Quantity: 1,  # refills: 0   Last office visit: 12/11/2019 with prescribing provider:      Future Office Visit:           albuterol (PROAIR HFA/PROVENTIL HFA/VENTOLIN HFA) 108 (90 Base) MCG/ACT inhaler 1 Inhaler 0     Sig: Inhale 2 puffs into the lungs every 6 hours as needed for shortness of breath / dyspnea or wheezing       Asthma Maintenance Inhalers - Anticholinergics Passed - 3/18/2020  3:09 PM        Passed - Patient is age 12 years or older        Passed - Recent (12 mo) or future (30 days) visit within the authorizing provider's specialty     Patient has had an office visit with the authorizing provider or a provider within the authorizing providers department within the previous 12 mos or has a future within next 30 days. See \"Patient Info\" tab in inbasket, or \"Choose Columns\" in Meds & Orders section of the refill encounter.              Passed - Medication is active on med list       Short-Acting Beta Agonist Inhalers Protocol  Passed - 3/18/2020  3:09 PM        Passed - Patient is age 12 or older        Passed - Recent (12 mo) or future (30 days) visit within the authorizing provider's specialty     Patient has had an office visit with the authorizing provider or a provider within the authorizing providers department within the previous 12 mos or has a future within next 30 days. See \"Patient Info\" tab in inbasket, or \"Choose Columns\" in Meds & Orders section of the refill encounter.              Passed - Medication is " "active on med list          Estradiol  Last Written Prescription Date:  3/19/2019  Last Fill Quantity: 90,  # refills: 2   Last office visit: 12/11/2019 with prescribing provider:      Future Office Visit:         estradiol (ESTRACE) 1 MG tablet 90 tablet 2     Sig: Take 1 tablet (1 mg) by mouth daily       Hormone Replacement Therapy Passed - 3/18/2020  3:09 PM        Passed - Blood pressure under 140/90 in past 12 months     BP Readings from Last 3 Encounters:   12/11/19 120/76   10/16/19 124/86   06/27/19 132/70                 Passed - Recent (12 mo) or future (30 days) visit within the authorizing provider's specialty     Patient has had an office visit with the authorizing provider or a provider within the authorizing providers department within the previous 12 mos or has a future within next 30 days. See \"Patient Info\" tab in inbasket, or \"Choose Columns\" in Meds & Orders section of the refill encounter.              Passed - Medication is active on med list        Passed - Patient is 18 years of age or older        Passed - No active pregnancy on record        Passed - No positive pregnancy test on record in past 12 months             Arin So RN on 3/18/2020 at 3:12 PM    "

## 2020-03-18 NOTE — TELEPHONE ENCOUNTER
Routing refill request to provider for review/approval because:  Drug not on the [facility] refill protocol   Arin So RN

## 2020-04-13 DIAGNOSIS — F43.23 ADJUSTMENT DISORDER WITH MIXED ANXIETY AND DEPRESSED MOOD: ICD-10-CM

## 2020-04-13 RX ORDER — ALPRAZOLAM 0.5 MG
TABLET ORAL
Qty: 90 TABLET | Refills: 0 | Status: SHIPPED | OUTPATIENT
Start: 2020-04-13 | End: 2020-05-13

## 2020-04-13 NOTE — TELEPHONE ENCOUNTER
Alprazolam 0.5 MG       Last Written Prescription Date:  3/13/2020  Last Fill Quantity: 90,   # refills: 0  Last Office Visit: 12/11/19  Future Office visit:       Routing refill request to provider for review/approval because:  Drug not on the [facility], [facility] or [facility] refill protocol or controlled substance

## 2020-05-03 ENCOUNTER — MYC MEDICAL ADVICE (OUTPATIENT)
Dept: FAMILY MEDICINE | Facility: CLINIC | Age: 50
End: 2020-05-03

## 2020-05-04 ENCOUNTER — VIRTUAL VISIT (OUTPATIENT)
Dept: FAMILY MEDICINE | Facility: OTHER | Age: 50
End: 2020-05-04

## 2020-05-04 ENCOUNTER — MYC MEDICAL ADVICE (OUTPATIENT)
Dept: FAMILY MEDICINE | Facility: CLINIC | Age: 50
End: 2020-05-04

## 2020-05-04 ENCOUNTER — E-VISIT (OUTPATIENT)
Dept: FAMILY MEDICINE | Facility: CLINIC | Age: 50
End: 2020-05-04
Payer: COMMERCIAL

## 2020-05-04 DIAGNOSIS — Z72.0 TOBACCO ABUSE: ICD-10-CM

## 2020-05-04 DIAGNOSIS — Z53.9 ERRONEOUS ENCOUNTER--DISREGARD: Primary | ICD-10-CM

## 2020-05-04 NOTE — PROGRESS NOTES
"Date: 2020 09:13:37  Clinician: Sanya Ludwig  Clinician NPI: [id number]  Patient: Liliana Kat  Patient : 1970  Patient Address: [address] [place] [address]  Patient Phone: [phone]  Visit Protocol: URI  Patient Summary:  Liliana is a 50 year old ( : 1970 ) female who initiated a Visit for cold, sinus infection, or influenza. When asked the question \"Please sign me up to receive news, health information and promotions. \", Liliana responded \"Yes\".    Liliana states her symptoms started gradually 10-13 days ago. After her symptoms started, they improved and then got worse again.   Her symptoms consist of facial pain or pressure, a cough, nasal congestion, nausea, diarrhea, ear pain, a headache, malaise, and chills. She is experiencing mild difficulty breathing with activities but can speak normally in full sentences.   Symptom details     Nasal secretions: The color of her mucus is yellow.    Cough: Liliana coughs a few times an hour and her cough is more bothersome at night. Phlegm comes into her throat when she coughs. She believes her cough is caused by post-nasal drip. The color of the phlegm is clear.     Facial pain or pressure: The facial pain or pressure feels worse when bending over or leaning forward.     Headache: She states the headache is moderate (4-6 on a 10 point pain scale).      Liliana denies having fever, myalgias, rhinitis, sore throat, vomiting, teeth pain, ageusia, anosmia, and wheezing. She also denies taking antibiotic medication for the symptoms, having a sinus infection within the past year, and having recent facial or sinus surgery in the past 60 days.   Precipitating events  She has not recently been exposed to someone with influenza. Liliana has been in close contact with the following high risk individuals: people with asthma, heart disease or diabetes.   Pertinent COVID-19 (Coronavirus) information  Liliana does not work or volunteer as " healthcare worker or a  and does not work or volunteer in a healthcare facility.   She does not live with a healthcare worker.   Liliana has not had a close contact with a laboratory-confirmed COVID-19 patient within 14 days of symptom onset. She also has not had a close contact with a suspected COVID-19 patient within 14 days of symptom onset.   Pertinent medical history  Liliana typically gets a yeast infection when she takes antibiotics. She has used fluconazole (Diflucan) to treat previous yeast infections. 2 doses of fluconazole (Diflucan) has typically been needed for symptoms to resolve in the past.  Liliana needs a return to work/school note.   Weight: 150 lbs   Liliana smokes or uses smokeless tobacco.   She denies pregnancy and denies breastfeeding. She does not menstruate.   Additional information as reported by the patient (free text): I am allergic  to amoxicillin sulfa   Weight: 150 lbs    MEDICATIONS: No current medications, ALLERGIES: NKDA  Clinician Response:  Dear Liliana,  Based on the information provided, you have acute bacterial sinusitis, also known as a sinus infection. Sinus infections are caused by bacteria or a virus and symptoms are almost always identical. The difference between the 2 types of infections is timing.  Sinus infections start as viral infections and symptoms improve on their own in about 7 days. If symptoms have not improved after 7 days or have even worsened, a bacterial infection may have developed.  Medication information  I am prescribing:     Amoxicillin 500 mg oral tablet. Take 1 tablet by mouth every 8 hours for 10 days. There are no refills with this prescription.   Yeast infections can be a common side effect of antibiotics. The most common symptom of a yeast infection is itchiness in and around the vagina. Other signs and symptoms include burning, redness, or a thick, white vaginal discharge that looks like cottage cheese and does not have a bad  smell.  If you become pregnant during this course of treatment, stop taking the medication and contact your primary care provider.  Self care  Steps you can take to be as comfortable as possible:     Rest.    Drink plenty of fluids.    Take a warm shower to loosen congestion    Use a cool-mist humidifier.    Take a spoonful of honey to reduce your cough.     Also, as your provider, I need you to know that becoming tobacco-free is the most important thing you can do to protect your current and future health.  When to seek care  Please be seen in a clinic or urgent care if any of the following occur:     New symptoms develop, or symptoms become worse    Symptoms do not start to improve after 3 days of treatment     Call ahead before going to the clinic or urgent care.  It is possible to have an allergic reaction to an antibiotic even if you have not had one in the past. If you notice a new rash, significant swelling, or difficulty breathing, stop taking this medication immediately and go to a clinic or urgent care.  COVID-19 (Coronavirus) General Information  Because there is currently no vaccine to prevent infection, the best way to protect yourself is to avoid being exposed to this virus. Common symptoms of COVID-19 include but are not limited to fever, cough, and shortness of breath. These symptoms appear 2-14 days after you are exposed to the virus that causes COVID-19. Click here for more information from the CDC on how to protect yourself.  If you are sick with COVID-19 or suspect you are infected with the virus that causes COVID-19, follow the steps here from the CDC to help prevent the disease from spreading to people in your home and community.  Click here for general information from the CDC on testing.  If you develop any of these emergency warning signs for COVID-19, get medical attention immediately:     Trouble breathing    Persistent pain or pressure in the chest    New confusion or inability to arouse     Bluish lips or face      Call your doctor or clinic before going in. Call 911 if you have a medical emergency and notify the  you have or think you may have COVID-19.  For more detailed and up to date information on COVID-19 (Coronavirus), please visit the CDC website.   Diagnosis: Acute bacterial sinusitis  Diagnosis ICD: J01.90  Prescription: amoxicillin 500 mg oral tablet 30 tablet, 10 days supply. Take 1 tablet by mouth every 8 hours for 10 days. Refills: 0, Refill as needed: no, Allow substitutions: yes  Addendum created: May 04 09:50:27, 2020 created by: Sanya Ludwig body: Patient will miss work today.  She can return on 5/7.  Addendum created: May 04 09:47:19, 2020 created by: Sanya Ludwig body: Please call in  Zpak as directed (#1) no refills  Addendum created: May 04 09:16:52, 2020 created by: Sanya Ludwig body: Do not filler call in amoxicillin.    Call in zpak as directed (#1)  no refills

## 2020-05-04 NOTE — LETTER
May 4, 2020      Liliana Kat  [address] [place] [id number]        To Whom It May Concern:    Liliana Kat was seen at our clinic and missed work on 05- and can return to work on 05-.      Sincerely,        Brayan So MD

## 2020-05-04 NOTE — TELEPHONE ENCOUNTER
What would you like to do see her face to face, have her do a telephone visit with you or another provider, or would you want to just treat her without a visit?    Shira Mendoza, A

## 2020-05-05 NOTE — TELEPHONE ENCOUNTER
Pending Prescriptions:                       Disp   Refills    albuterol (PROAIR HFA/PROVENTIL HFA/VENTOL*18 g   0        Sig: INHALE 2 PUFFS INTO THE LUNGS EVERY 6 HOURS AS NEEDED           FOR SHORTNESS OF BREATH, DIFFICULTY BREATHING OR           WHEEZING.    Routing refill request to provider for review/approval because:  Unable to fill due to associated DX not on protocol for tobacco abuse    No flowsheet data found.  No ACT on file

## 2020-05-06 RX ORDER — ALBUTEROL SULFATE 90 UG/1
AEROSOL, METERED RESPIRATORY (INHALATION)
Qty: 18 G | Refills: 0 | Status: SHIPPED | OUTPATIENT
Start: 2020-05-06 | End: 2023-01-02

## 2020-05-13 DIAGNOSIS — F43.23 ADJUSTMENT DISORDER WITH MIXED ANXIETY AND DEPRESSED MOOD: ICD-10-CM

## 2020-05-13 RX ORDER — ALPRAZOLAM 0.5 MG
TABLET ORAL
Qty: 45 TABLET | Refills: 0 | Status: SHIPPED | OUTPATIENT
Start: 2020-05-13 | End: 2020-05-27

## 2020-05-13 NOTE — TELEPHONE ENCOUNTER
Requested Prescriptions   Pending Prescriptions Disp Refills     ALPRAZolam (XANAX) 0.5 MG tablet [Pharmacy Med Name: ALPRAZOLAM 0.5MG TABS] 90 tablet 0     Sig: TAKE ONE TABLET BY MOUTH THREE TIMES A DAY AS NEEDED FOR ANXIETY     Last Written Prescription Date:  04/13/2020  Last Fill Quantity: 90,   # refills: 0  Last Office Visit: 05/04/2020  Future Office visit:       Routing refill request to provider for review/approval because:  Drug not on the [facility], [facility] or [facility] refill protocol or controlled substance    Susi Arenas MA

## 2020-05-14 ENCOUNTER — MYC MEDICAL ADVICE (OUTPATIENT)
Dept: FAMILY MEDICINE | Facility: CLINIC | Age: 50
End: 2020-05-14

## 2020-05-21 ENCOUNTER — MYC MEDICAL ADVICE (OUTPATIENT)
Dept: FAMILY MEDICINE | Facility: CLINIC | Age: 50
End: 2020-05-21

## 2020-05-27 ENCOUNTER — VIRTUAL VISIT (OUTPATIENT)
Dept: FAMILY MEDICINE | Facility: CLINIC | Age: 50
End: 2020-05-27
Payer: COMMERCIAL

## 2020-05-27 DIAGNOSIS — R21 RASH AND NONSPECIFIC SKIN ERUPTION: ICD-10-CM

## 2020-05-27 DIAGNOSIS — R19.7 DIARRHEA, UNSPECIFIED TYPE: ICD-10-CM

## 2020-05-27 DIAGNOSIS — F43.23 ADJUSTMENT DISORDER WITH MIXED ANXIETY AND DEPRESSED MOOD: Primary | ICD-10-CM

## 2020-05-27 PROCEDURE — 99214 OFFICE O/P EST MOD 30 MIN: CPT | Mod: 95 | Performed by: FAMILY MEDICINE

## 2020-05-27 PROCEDURE — 96127 BRIEF EMOTIONAL/BEHAV ASSMT: CPT | Mod: 59 | Performed by: FAMILY MEDICINE

## 2020-05-27 RX ORDER — ALPRAZOLAM 0.5 MG
TABLET ORAL
Qty: 90 TABLET | Refills: 0 | Status: SHIPPED | OUTPATIENT
Start: 2020-05-27 | End: 2020-06-29

## 2020-05-27 ASSESSMENT — PATIENT HEALTH QUESTIONNAIRE - PHQ9
SUM OF ALL RESPONSES TO PHQ QUESTIONS 1-9: 22
5. POOR APPETITE OR OVEREATING: NEARLY EVERY DAY

## 2020-05-27 ASSESSMENT — ANXIETY QUESTIONNAIRES
IF YOU CHECKED OFF ANY PROBLEMS ON THIS QUESTIONNAIRE, HOW DIFFICULT HAVE THESE PROBLEMS MADE IT FOR YOU TO DO YOUR WORK, TAKE CARE OF THINGS AT HOME, OR GET ALONG WITH OTHER PEOPLE: EXTREMELY DIFFICULT
2. NOT BEING ABLE TO STOP OR CONTROL WORRYING: NEARLY EVERY DAY
5. BEING SO RESTLESS THAT IT IS HARD TO SIT STILL: NEARLY EVERY DAY
6. BECOMING EASILY ANNOYED OR IRRITABLE: NEARLY EVERY DAY
1. FEELING NERVOUS, ANXIOUS, OR ON EDGE: NEARLY EVERY DAY
3. WORRYING TOO MUCH ABOUT DIFFERENT THINGS: NEARLY EVERY DAY
GAD7 TOTAL SCORE: 21
7. FEELING AFRAID AS IF SOMETHING AWFUL MIGHT HAPPEN: NEARLY EVERY DAY

## 2020-05-27 NOTE — PROGRESS NOTES
"Liliana Kat is a 50 year old female who is being evaluated via a billable telephone visit.      The patient has been notified of following:     \"This telephone visit will be conducted via a call between you and your physician/provider. We have found that certain health care needs can be provided without the need for a physical exam.  This service lets us provide the care you need with a short phone conversation.  If a prescription is necessary we can send it directly to your pharmacy.  If lab work is needed we can place an order for that and you can then stop by our lab to have the test done at a later time.    Telephone visits are billed at different rates depending on your insurance coverage. During this emergency period, for some insurers they may be billed the same as an in-person visit.  Please reach out to your insurance provider with any questions.    If during the course of the call the physician/provider feels a telephone visit is not appropriate, you will not be charged for this service.\"    Patient has given verbal consent for Telephone visit?  Yes    What phone number would you like to be contacted at? [phone]    How would you like to obtain your AVS? Maciet    Subjective     Liliana Kat is a 50 year old female who presents via phone visit today for the following health issues:    HPI  Depression and Anxiety Follow-Up    How are you doing with your depression since your last visit? Worsened     How are you doing with your anxiety since your last visit?  Worsened     Are you having other symptoms that might be associated with depression or anxiety? Yes:  Nervous, on edge, not doing well    Have you had a significant life event? OTHER: Covid-19     Do you have any concerns with your use of alcohol or other drugs? No    Social History     Tobacco Use     Smoking status: Current Some Day Smoker     Packs/day: 0.00     Years: 10.00     Pack years: 0.00     Types: Cigarettes     Smokeless " tobacco: Never Used     Tobacco comment: 3 cigs a day    Substance Use Topics     Alcohol use: No     Alcohol/week: 0.0 standard drinks     Comment: recovering  - 2005     Drug use: No     PHQ 11/13/2019 12/11/2019 5/27/2020   PHQ-9 Total Score 11 11 22   Q9: Thoughts of better off dead/self-harm past 2 weeks Not at all Not at all Not at all     TAWANA-7 SCORE 9/12/2018 12/11/2019 5/27/2020   Total Score - - -   Total Score 17 6 21     Last PHQ-9 5/27/2020   1.  Little interest or pleasure in doing things 3   2.  Feeling down, depressed, or hopeless 3   3.  Trouble falling or staying asleep, or sleeping too much 3   4.  Feeling tired or having little energy 3   5.  Poor appetite or overeating 3   6.  Feeling bad about yourself 3   7.  Trouble concentrating 2   8.  Moving slowly or restless 2   Q9: Thoughts of better off dead/self-harm past 2 weeks 0   PHQ-9 Total Score 22   Difficulty at work, home, or with people Extremely dIfficult     TAWANA-7  5/27/2020   1. Feeling nervous, anxious, or on edge 3   2. Not being able to stop or control worrying 3   3. Worrying too much about different things 3   4. Trouble relaxing 3   5. Being so restless that it is hard to sit still 3   6. Becoming easily annoyed or irritable 3   7. Feeling afraid, as if something awful might happen 3   TAWANA-7 Total Score 21   If you checked any problems, how difficult have they made it for you to do your work, take care of things at home, or get along with other people? Extremely difficult         Suicide Assessment Five-step Evaluation and Treatment (SAFE-T)                 Patient Active Problem List   Diagnosis     Need for postmenopausal hormone replacement     Decreased libido     CARDIOVASCULAR SCREENING; LDL GOAL LESS THAN 160     Bladder polyps     Superficial varicosities     Adjustment disorder with mixed anxiety and depressed mood     Acute pain of right shoulder     Acute biliary pancreatitis, unspecified complication status     Labral  tear of shoulder, right, subsequent encounter     Drug-induced anaphylaxis, initial encounter     Anaphylactic reaction     Seasonal allergic rhinitis     Hypokalemia     Acidosis     Moderate major depression (H)     TAWANA (generalized anxiety disorder)     PTSD (post-traumatic stress disorder)     Past Surgical History:   Procedure Laterality Date     C LIGATE FALLOPIAN TUBE,POSTPARTUM      Tubal Ligation     ESOPHAGOSCOPY, GASTROSCOPY, DUODENOSCOPY (EGD), COMBINED  2012    Procedure: COMBINED ESOPHAGOSCOPY, GASTROSCOPY, DUODENOSCOPY (EGD), BIOPSY SINGLE OR MULTIPLE;  ESOPHAGOSCOPY, GASTROSCOPY, DUODENOSCOPY (EGD) with biopsy;  Surgeon: Herson Cabrera MD;  Location:  GI     EXCIS VAGINAL CYST/TUMOR       HC REMOVAL OF OVARY/TUBE(S)  3/6/2006    Laparoscopic bilateral salpingo-oophorectomy.     HC TYMPANOPLASTY W/O MASTOID, INIT/REV W/O OSS CHAIN RECONST       LAPAROSCOPIC CHOLECYSTECTOMY N/A 2017    Procedure: LAPAROSCOPIC CHOLECYSTECTOMY;  Surgeon: Shivam Luevano MD;  Location:  OR       Social History     Tobacco Use     Smoking status: Current Some Day Smoker     Packs/day: 0.00     Years: 10.00     Pack years: 0.00     Types: Cigarettes     Smokeless tobacco: Never Used     Tobacco comment: 3 cigs a day    Substance Use Topics     Alcohol use: No     Alcohol/week: 0.0 standard drinks     Comment: recovering  -      Family History   Problem Relation Age of Onset     Alcohol/Drug Father          at age 53 of alcohol     Arthritis Father      Cancer Father         lung     Alcohol/Drug Paternal Grandfather      Cancer Paternal Grandfather         lung     Alcohol/Drug Paternal Grandmother      Cancer Paternal Grandmother         lung     Alcohol/Drug Paternal Aunt      Allergies Daughter         animals and grass     Allergies Son         dust mite     Arthritis Mother      Hypertension Mother      Lipids Mother      Depression Mother      Heart Disease Mother      Blood Disease  Mother         DVTs     Bleeding Disorder Mother      Liver Disease Mother      Depression Brother          Current Outpatient Medications   Medication Sig Dispense Refill     Acetaminophen (TYLENOL PO) Take 650 mg by mouth every 4 hours as needed for mild pain or fever       albuterol (2.5 MG/3ML) 0.083% neb solution ONE NEBULIZATION 4 TIMES DAILY AS NEEDED 1 Box 0     albuterol (PROAIR HFA/PROVENTIL HFA/VENTOLIN HFA) 108 (90 Base) MCG/ACT inhaler INHALE 2 PUFFS INTO THE LUNGS EVERY 6 HOURS AS NEEDED FOR SHORTNESS OF BREATH, DIFFICULTY BREATHING OR WHEEZING. 18 g 0     ALPRAZolam (XANAX) 0.5 MG tablet TAKE ONE TABLET BY MOUTH THREE TIMES A DAY AS NEEDED FOR ANXIETY 90 tablet 0     aspirin 81 MG tablet Take 1 tablet (81 mg) by mouth daily 30 tablet OTC     benzocaine-menthol (CHLORASEPTIC) 6-10 MG lozenge Place 1 lozenge inside cheek every hour as needed for sore throat 36 lozenge 0     citalopram (CELEXA) 20 MG tablet Take 1.5 tablets (30 mg) by mouth daily 135 tablet 1     cyclobenzaprine (FLEXERIL) 10 MG tablet Take 1 tablet (10 mg) by mouth 3 times daily as needed for muscle spasms (pain) 30 tablet 0     estradiol (ESTRACE) 1 MG tablet Take 1 tablet (1 mg) by mouth daily 90 tablet 2     fluticasone (FLONASE) 50 MCG/ACT spray Spray 2 sprays into both nostrils daily 1 Bottle 11     ibuprofen (ADVIL/MOTRIN) 600 MG tablet   0     loratadine (CLARITIN) 10 MG tablet TAKE ONE TABLET BY MOUTH EVERY DAY 30 tablet 5     meclizine (ANTIVERT) 25 MG tablet Take 1 tablet (25 mg) by mouth 3 times daily as needed for dizziness 30 tablet 0     medroxyPROGESTERone (PROVERA) 5 MG tablet Take 1 tablet (5 mg) by mouth daily 90 tablet 1     MELATONIN 10 mg daily        diazepam (VALIUM) 10 MG tablet Take 1 tablet (10 mg) by mouth every 6 hours as needed for anxiety (for procedure) 1 tablet 0     diazepam (VALIUM) 5 MG tablet Take 1 tablet (5 mg) by mouth every 6 hours as needed for anxiety (vertigo/dizziness) 5 tablet 0        Reviewed and updated as needed this visit by Provider         Review of Systems   CONSTITUTIONAL: NEGATIVE for fever, chills, change in weight  INTEGUMENTARY/SKIN: pigmentation blotches on her torso.  EYES: NEGATIVE for vision changes or irritation  ENT/MOUTH: NEGATIVE for ear, mouth and throat problems  RESP: NEGATIVE for significant cough or SOB  BREAST: NEGATIVE for masses, tenderness or discharge  CV: NEGATIVE for chest pain, palpitations or peripheral edema  GI: diarrhea  : NEGATIVE for frequency, dysuria, or hematuria  MUSCULOSKELETAL: NEGATIVE for significant arthralgias or myalgia  NEURO: NEGATIVE for weakness, dizziness or paresthesias  ENDOCRINE: NEGATIVE for temperature intolerance, skin/hair changes  HEME: NEGATIVE for bleeding problems  PSYCHIATRIC: NEGATIVE for changes in mood or affect       Objective   Reported vitals:  LMP 03/06/2006    healthy, alert and no distress  PSYCH: Alert and oriented times 3; coherent speech, normal   rate and volume, able to articulate logical thoughts, able   to abstract reason, no tangential thoughts, no hallucinations   or delusions  Her affect is anxious  RESP: No cough, no audible wheezing, able to talk in full sentences  Remainder of exam unable to be completed due to telephone visits    Diagnostic Test Results:  none       Telephone encounter: She calls in to discuss several issues.  Her anxiety is really high.  Work and the COVID situation are making things worse.  For years she is taking Xanax 0.5 mg 3 times a day.  In my absence her prescription was cut down to just 45 pills and this is even made her more anxious.  Needs a refill to go back to the 90 pills/month.  She has not abused this.  Really helps her get along.  She also takes Celexa 30 mg a day.  Her PHQ 9 score is 22.  TAWANA is 21.  Anxious sounding on the phone.  Other issue is a rash she has had for about 4 weeks. not pruritic.  It is more of a pigmentation blotchy rash over her torso which  started with just it sounds like 1 area and has spread.  Flat.  Not scaly.  Finally complaining of diarrhea for the last couple of weeks.  Liquidy and yellow.  No real abdominal pain.  Just some cramping.   does not have this.    Assessment/Plan:  1. Adjustment disorder with mixed anxiety and depressed mood  Renewed this for 30 days and she can continue doing on with this.  She has not abuse this.  - ALPRAZolam (XANAX) 0.5 MG tablet; TAKE ONE TABLET BY MOUTH THREE TIMES A DAY AS NEEDED FOR ANXIETY  Dispense: 90 tablet; Refill: 0    2. Rash and nonspecific skin eruption  Discussed the possibilities here sounds like either pityriasis rosea or tenia versicolor.  May need to see her next week in person.    3. Diarrhea, unspecified type  She is to try Pepto-Bismol and/or Imodium AD if not improving will see her next week.      No follow-ups on file.      Phone call duration:  13 minutes    Brayan So MD

## 2020-05-28 ASSESSMENT — ANXIETY QUESTIONNAIRES: GAD7 TOTAL SCORE: 21

## 2020-06-29 DIAGNOSIS — F43.23 ADJUSTMENT DISORDER WITH MIXED ANXIETY AND DEPRESSED MOOD: ICD-10-CM

## 2020-06-29 RX ORDER — ALPRAZOLAM 0.5 MG
TABLET ORAL
Qty: 90 TABLET | Refills: 0 | Status: SHIPPED | OUTPATIENT
Start: 2020-06-29 | End: 2020-07-29

## 2020-06-29 NOTE — TELEPHONE ENCOUNTER
ALPRAZolam (XANAX) 0.5 MG tablet   Last Written Prescription Date:  05/27/2020  Last Fill Quantity: 90,   # refills: 0  Last Office Visit: 12/11/2019  Future Office visit:       Routing refill request to provider for review/approval because:  Drug not on the [facility], [facility] or [facility] refill protocol or controlled substance

## 2020-07-29 DIAGNOSIS — F43.23 ADJUSTMENT DISORDER WITH MIXED ANXIETY AND DEPRESSED MOOD: ICD-10-CM

## 2020-07-29 RX ORDER — ALPRAZOLAM 0.5 MG
TABLET ORAL
Qty: 90 TABLET | Refills: 0 | Status: SHIPPED | OUTPATIENT
Start: 2020-07-29 | End: 2020-08-28

## 2020-07-29 NOTE — TELEPHONE ENCOUNTER
Xanax      Last Written Prescription Date:  6/29/2020  Last Fill Quantity: 90,   # refills: 0  Last Office Visit: 5/27/2020  Future Office visit:    Next 5 appointments (look out 90 days)    Jul 30, 2020  2:20 PM CDT  [facility] [address] [place] [phone]  [phone]           Routing refill request to provider for review/approval because:  Drug not on the [facility], [facility] or  [facility] refill protocol or controlled substance

## 2020-07-30 ENCOUNTER — OFFICE VISIT (OUTPATIENT)
Dept: FAMILY MEDICINE | Facility: CLINIC | Age: 50
End: 2020-07-30
Payer: COMMERCIAL

## 2020-07-30 VITALS
RESPIRATION RATE: 16 BRPM | TEMPERATURE: 97.8 F | BODY MASS INDEX: 25.96 KG/M2 | DIASTOLIC BLOOD PRESSURE: 80 MMHG | SYSTOLIC BLOOD PRESSURE: 130 MMHG | OXYGEN SATURATION: 97 % | HEART RATE: 115 BPM | WEIGHT: 156 LBS

## 2020-07-30 DIAGNOSIS — R21 RASH: Primary | ICD-10-CM

## 2020-07-30 LAB
ALBUMIN SERPL-MCNC: 4.1 G/DL (ref 3.4–5)
ALP SERPL-CCNC: 106 U/L (ref 40–150)
ALT SERPL W P-5'-P-CCNC: 39 U/L (ref 0–50)
ANION GAP SERPL CALCULATED.3IONS-SCNC: 3 MMOL/L (ref 3–14)
APTT PPP: 30 SEC (ref 22–37)
AST SERPL W P-5'-P-CCNC: 24 U/L (ref 0–45)
BASOPHILS # BLD AUTO: 0.1 10E9/L (ref 0–0.2)
BASOPHILS NFR BLD AUTO: 0.5 %
BILIRUB SERPL-MCNC: 0.6 MG/DL (ref 0.2–1.3)
BUN SERPL-MCNC: 12 MG/DL (ref 7–30)
CALCIUM SERPL-MCNC: 9.1 MG/DL (ref 8.5–10.1)
CHLORIDE SERPL-SCNC: 108 MMOL/L (ref 94–109)
CO2 SERPL-SCNC: 28 MMOL/L (ref 20–32)
CREAT SERPL-MCNC: 0.81 MG/DL (ref 0.52–1.04)
CRP SERPL-MCNC: <2.9 MG/L (ref 0–8)
DIFFERENTIAL METHOD BLD: ABNORMAL
EOSINOPHIL NFR BLD AUTO: 1.2 %
ERYTHROCYTE [DISTWIDTH] IN BLOOD BY AUTOMATED COUNT: 12.5 % (ref 10–15)
ERYTHROCYTE [SEDIMENTATION RATE] IN BLOOD BY WESTERGREN METHOD: 7 MM/H (ref 0–20)
GFR SERPL CREATININE-BSD FRML MDRD: 85 ML/MIN/{1.73_M2}
GLUCOSE SERPL-MCNC: 95 MG/DL (ref 70–99)
HCT VFR BLD AUTO: 47.6 % (ref 35–47)
HGB BLD-MCNC: 15.9 G/DL (ref 11.7–15.7)
IMM GRANULOCYTES # BLD: 0 10E9/L (ref 0–0.4)
IMM GRANULOCYTES NFR BLD: 0.4 %
INR PPP: 0.95 (ref 0.86–1.14)
LYMPHOCYTES # BLD AUTO: 2.6 10E9/L (ref 0.8–5.3)
LYMPHOCYTES NFR BLD AUTO: 24.2 %
MCH RBC QN AUTO: 33.5 PG (ref 26.5–33)
MCHC RBC AUTO-ENTMCNC: 33.4 G/DL (ref 31.5–36.5)
MCV RBC AUTO: 100 FL (ref 78–100)
MONOCYTES # BLD AUTO: 0.8 10E9/L (ref 0–1.3)
MONOCYTES NFR BLD AUTO: 7.8 %
NEUTROPHILS # BLD AUTO: 7 10E9/L (ref 1.6–8.3)
NEUTROPHILS NFR BLD AUTO: 65.9 %
NRBC # BLD AUTO: 0 10*3/UL
NRBC BLD AUTO-RTO: 0 /100
PLATELET # BLD AUTO: 311 10E9/L (ref 150–450)
POTASSIUM SERPL-SCNC: 3.9 MMOL/L (ref 3.4–5.3)
PROT SERPL-MCNC: 7.8 G/DL (ref 6.8–8.8)
RBC # BLD AUTO: 4.74 10E12/L (ref 3.8–5.2)
SODIUM SERPL-SCNC: 139 MMOL/L (ref 133–144)
TSH SERPL DL<=0.005 MIU/L-ACNC: 1.09 MU/L (ref 0.4–4)
WBC # BLD AUTO: 10.7 10E9/L (ref 4–11)

## 2020-07-30 PROCEDURE — 86431 RHEUMATOID FACTOR QUANT: CPT | Performed by: FAMILY MEDICINE

## 2020-07-30 PROCEDURE — 80053 COMPREHEN METABOLIC PANEL: CPT | Performed by: FAMILY MEDICINE

## 2020-07-30 PROCEDURE — 85025 COMPLETE CBC W/AUTO DIFF WBC: CPT | Performed by: FAMILY MEDICINE

## 2020-07-30 PROCEDURE — 36415 COLL VENOUS BLD VENIPUNCTURE: CPT | Performed by: FAMILY MEDICINE

## 2020-07-30 PROCEDURE — 86617 LYME DISEASE ANTIBODY: CPT | Mod: 90 | Performed by: FAMILY MEDICINE

## 2020-07-30 PROCEDURE — 85730 THROMBOPLASTIN TIME PARTIAL: CPT | Performed by: FAMILY MEDICINE

## 2020-07-30 PROCEDURE — 84443 ASSAY THYROID STIM HORMONE: CPT | Performed by: FAMILY MEDICINE

## 2020-07-30 PROCEDURE — 86039 ANTINUCLEAR ANTIBODIES (ANA): CPT | Performed by: FAMILY MEDICINE

## 2020-07-30 PROCEDURE — 99000 SPECIMEN HANDLING OFFICE-LAB: CPT | Performed by: FAMILY MEDICINE

## 2020-07-30 PROCEDURE — 86038 ANTINUCLEAR ANTIBODIES: CPT | Performed by: FAMILY MEDICINE

## 2020-07-30 PROCEDURE — 86140 C-REACTIVE PROTEIN: CPT | Performed by: FAMILY MEDICINE

## 2020-07-30 PROCEDURE — 85652 RBC SED RATE AUTOMATED: CPT | Performed by: FAMILY MEDICINE

## 2020-07-30 PROCEDURE — 85610 PROTHROMBIN TIME: CPT | Performed by: FAMILY MEDICINE

## 2020-07-30 PROCEDURE — 99213 OFFICE O/P EST LOW 20 MIN: CPT | Performed by: FAMILY MEDICINE

## 2020-07-30 NOTE — PROGRESS NOTES
Subjective     Liliana Kat is a 50 year old female who presents to clinic today for the following health issues:    HPI       Concern - Derm problems  Onset: 2 months    Description:   Changes in skin on abdomen and arms and back    Intensity: mild, moderate    Progression of Symptoms:  worsening    Accompanying Signs & Symptoms:  none    Previous history of similar problem:   no    Precipitating factors:   Worsened by: none    Alleviating factors:  Improved by: none    Therapies Tried and outcome:       SUBJECTIVE:  Liliana  is a 50 year old female who presents for: That is been there for several months.  Seems to be worsening and spreading.  Is on her abdomen arms and back.  Not pruritic.    Past Medical History:   Diagnosis Date     Allergic rhinitis, cause unspecified     Allergic rhinitis - weeds and pollan     Anxiety      Bladder polyps      Depressive disorder      Obstructive chronic bronchitis with exacerbation (H)      Other and unspecified ovarian cyst     Ovarian cyst - rupured cyst + laser x3     Other and unspecified ovarian cyst 9/14/2005    Left hemorrhagic ovarian cyst.     Tobacco abuse      Urinary tract infection, site not specified      Past Surgical History:   Procedure Laterality Date     C LIGATE FALLOPIAN TUBE,POSTPARTUM      Tubal Ligation     ESOPHAGOSCOPY, GASTROSCOPY, DUODENOSCOPY (EGD), COMBINED  11/28/2012    Procedure: COMBINED ESOPHAGOSCOPY, GASTROSCOPY, DUODENOSCOPY (EGD), BIOPSY SINGLE OR MULTIPLE;  ESOPHAGOSCOPY, GASTROSCOPY, DUODENOSCOPY (EGD) with biopsy;  Surgeon: Herson Cabrera MD;  Location:  GI     EXCIS VAGINAL CYST/TUMOR       HC REMOVAL OF OVARY/TUBE(S)  3/6/2006    Laparoscopic bilateral salpingo-oophorectomy.     HC TYMPANOPLASTY W/O MASTOID, INIT/REV W/O OSS CHAIN RECONST  04/02     LAPAROSCOPIC CHOLECYSTECTOMY N/A 4/6/2017    Procedure: LAPAROSCOPIC CHOLECYSTECTOMY;  Surgeon: Shivam Luevano MD;  Location:  OR     Social History     Tobacco Use      Smoking status: Former Smoker     Packs/day: 0.00     Years: 10.00     Pack years: 0.00     Types: Cigarettes     Last attempt to quit: 2020     Years since quittin.3     Smokeless tobacco: Never Used     Tobacco comment: 3 cigs a day    Substance Use Topics     Alcohol use: No     Alcohol/week: 0.0 standard drinks     Comment: recovering  -      Current Outpatient Medications   Medication Sig Dispense Refill     Acetaminophen (TYLENOL PO) Take 650 mg by mouth every 4 hours as needed for mild pain or fever       albuterol (2.5 MG/3ML) 0.083% neb solution ONE NEBULIZATION 4 TIMES DAILY AS NEEDED 1 Box 0     albuterol (PROAIR HFA/PROVENTIL HFA/VENTOLIN HFA) 108 (90 Base) MCG/ACT inhaler INHALE 2 PUFFS INTO THE LUNGS EVERY 6 HOURS AS NEEDED FOR SHORTNESS OF BREATH, DIFFICULTY BREATHING OR WHEEZING. 18 g 0     ALPRAZolam (XANAX) 0.5 MG tablet TAKE ONE TABLET BY MOUTH THREE TIMES A DAY AS NEEDED FOR ANXIETY 90 tablet 0     aspirin 81 MG tablet Take 1 tablet (81 mg) by mouth daily 30 tablet OTC     citalopram (CELEXA) 10 MG tablet TAKE ONE TABLET BY MOUTH ONCE DAILY (ALONG WITH THE 20 MG DOSE FOR TOTAL DOSE OF 30 MG DAILY) 90 tablet 1     citalopram (CELEXA) 20 MG tablet TAKE ONE TABLET BY MOUTH ONCE DAILY (ALONG WITH THE 10 MG DOSE FOR TOTAL DOSE OF 30 MG DAILY) 90 tablet 1     estradiol (ESTRACE) 1 MG tablet Take 1 tablet (1 mg) by mouth daily 90 tablet 2     fluticasone (FLONASE) 50 MCG/ACT spray Spray 2 sprays into both nostrils daily 1 Bottle 11     ibuprofen (ADVIL/MOTRIN) 600 MG tablet   0     loratadine (CLARITIN) 10 MG tablet TAKE ONE TABLET BY MOUTH EVERY DAY 30 tablet 5     medroxyPROGESTERone (PROVERA) 5 MG tablet Take 1 tablet (5 mg) by mouth daily 90 tablet 1     MELATONIN 10 mg daily        cyclobenzaprine (FLEXERIL) 10 MG tablet Take 1 tablet (10 mg) by mouth 3 times daily as needed for muscle spasms (pain) (Patient not taking: Reported on 2020) 30 tablet 0     diazepam (VALIUM) 10 MG  tablet Take 1 tablet (10 mg) by mouth every 6 hours as needed for anxiety (for procedure) 1 tablet 0     diazepam (VALIUM) 5 MG tablet Take 1 tablet (5 mg) by mouth every 6 hours as needed for anxiety (vertigo/dizziness) 5 tablet 0     meclizine (ANTIVERT) 25 MG tablet Take 1 tablet (25 mg) by mouth 3 times daily as needed for dizziness (Patient not taking: Reported on 7/30/2020) 30 tablet 0       REVIEW OF SYSTEMS:   5 point ROS negative except as noted above in HPI, including Gen., Resp, CV, GI &  system review.     OBJECTIVE:  Vitals: /80   Pulse 115   Temp 97.8  F (36.6  C) (Temporal)   Resp 16   Wt 70.8 kg (156 lb)   LMP 03/06/2006   SpO2 97%   BMI 25.96 kg/m    BMI= Body mass index is 25.96 kg/m .  He is alert and oriented and appears in no distress.  She has good be described as a reticulated bluish-red discoloration of the skin on the arms legs and trunk.  No peeling.  Joints all appear normal.    ASSESSMENT:  Rash    PLAN:  We will do a host of blood tests.  Notify her with results and discuss where to go from here.  We will have her see dermatology for further work-up as this seems to be persisting for a long time and it certainly is not due to cold weather and        Brayan So MD  [facility]

## 2020-07-31 LAB
ANA PAT SER IF-IMP: ABNORMAL
ANA SER QL IF: ABNORMAL
ANA TITR SER IF: ABNORMAL {TITER}
RHEUMATOID FACT SER NEPH-ACNC: <7 IU/ML (ref 0–20)

## 2020-08-02 LAB — B BURGDOR IGG SER QL IB: NEGATIVE

## 2020-08-03 ENCOUNTER — MYC MEDICAL ADVICE (OUTPATIENT)
Dept: FAMILY MEDICINE | Facility: CLINIC | Age: 50
End: 2020-08-03

## 2020-08-03 DIAGNOSIS — R76.8 ELEVATED ANTINUCLEAR ANTIBODY (ANA) LEVEL: Primary | ICD-10-CM

## 2020-08-04 NOTE — TELEPHONE ENCOUNTER
Discussed lab results. Due borderline elevated PAULETTE, family history of autoimmune disorders, and increased fatigue she would like referral to Rheumatology, this will be completed for her.     Garry Hays CNP     Please set up Rheumatology.

## 2020-08-10 NOTE — PROGRESS NOTES
She was calling to ask about lab results.i explained that all of the results were normal except for the borderline positive PAULETTE.  She already has an appointment with the rheumatology consultation.  I reassured her that the sed rate and CRP tests are normal.  Therefore I think the likelihood that the PAULETTE is significant is low.  She was reassured by today's discussion.DEVEN Washington MD

## 2020-08-28 DIAGNOSIS — F43.23 ADJUSTMENT DISORDER WITH MIXED ANXIETY AND DEPRESSED MOOD: ICD-10-CM

## 2020-08-28 RX ORDER — ALPRAZOLAM 0.5 MG
TABLET ORAL
Qty: 90 TABLET | Refills: 0 | Status: SHIPPED | OUTPATIENT
Start: 2020-08-28 | End: 2020-09-28

## 2020-08-28 NOTE — TELEPHONE ENCOUNTER
ALPRAZolam (XANAX) 0.5 MG tablet   Last Written Prescription Date:  08/28/2020  Last Fill Quantity: 90,   # refills: 0  Last Office Visit: 07/30/2020  Future Office visit:       Routing refill request to provider for review/approval because:  Drug not on the [facility], [facility] or [facility] refill protocol or controlled substance

## 2020-09-28 DIAGNOSIS — F43.23 ADJUSTMENT DISORDER WITH MIXED ANXIETY AND DEPRESSED MOOD: ICD-10-CM

## 2020-09-28 RX ORDER — ALPRAZOLAM 0.5 MG
TABLET ORAL
Qty: 90 TABLET | Refills: 0 | Status: SHIPPED | OUTPATIENT
Start: 2020-09-28 | End: 2020-10-28

## 2020-09-28 NOTE — TELEPHONE ENCOUNTER
Requested Prescriptions   Pending Prescriptions Disp Refills     ALPRAZolam (XANAX) 0.5 MG tablet [Pharmacy Med Name: ALPRAZOLAM 0.5MG TABS] 90 tablet 0     Sig: TAKE ONE TABLET BY MOUTH THREE TIMES A DAY AS NEEDED ANXIETY     Last Written Prescription Date:  08/28/2020  Last Fill Quantity: 90,   # refills: 0  Last Office Visit: 05/27/2020  Future Office visit:       Routing refill request to provider for review/approval because:  Drug not on the [facility], [facility] or [facility] refill protocol or controlled substance    Susi Arensa MA

## 2020-10-27 DIAGNOSIS — F43.23 ADJUSTMENT DISORDER WITH MIXED ANXIETY AND DEPRESSED MOOD: ICD-10-CM

## 2020-10-27 NOTE — TELEPHONE ENCOUNTER
Xanax      Last Written Prescription Date:  9/28/2020  Last Fill Quantity: 90,   # refills: 0  Last Office Visit: 7/30/2020  Future Office visit:       Routing refill request to provider for review/approval because:  Drug not on the [facility], [facility] or [facility] refill protocol or controlled substance

## 2020-10-28 RX ORDER — ALPRAZOLAM 0.5 MG
TABLET ORAL
Qty: 90 TABLET | Refills: 0 | Status: SHIPPED | OUTPATIENT
Start: 2020-10-28 | End: 2020-11-27

## 2020-11-16 ENCOUNTER — HEALTH MAINTENANCE LETTER (OUTPATIENT)
Age: 50
End: 2020-11-16

## 2020-11-17 ENCOUNTER — MYC MEDICAL ADVICE (OUTPATIENT)
Dept: FAMILY MEDICINE | Facility: CLINIC | Age: 50
End: 2020-11-17

## 2020-11-17 DIAGNOSIS — F43.23 ADJUSTMENT DISORDER WITH MIXED ANXIETY AND DEPRESSED MOOD: ICD-10-CM

## 2020-11-17 RX ORDER — ALPRAZOLAM 0.5 MG
TABLET ORAL
Qty: 90 TABLET | Refills: 0 | Status: CANCELLED | OUTPATIENT
Start: 2020-11-17

## 2020-11-17 NOTE — TELEPHONE ENCOUNTER
xanax      Last Written Prescription Date:  10/28 /20  Last Fill Quantity: 90,   # refills: 0  Last Office Visit: 7/30/20  Future Office visit:       Routing refill request to provider for review/approval because:  Drug not on the [facility], [facility] or [facility] refill protocol or controlled substance

## 2020-11-27 DIAGNOSIS — F43.23 ADJUSTMENT DISORDER WITH MIXED ANXIETY AND DEPRESSED MOOD: ICD-10-CM

## 2020-11-27 RX ORDER — ALPRAZOLAM 0.5 MG
TABLET ORAL
Qty: 90 TABLET | Refills: 0 | Status: SHIPPED | OUTPATIENT
Start: 2020-11-27 | End: 2020-12-28

## 2020-11-27 NOTE — TELEPHONE ENCOUNTER
ALPRAZolam (XANAX) 0.5 MG tablet  Last Written Prescription Date:  10/28/2020  Last Fill Quantity: 90,   # refills: 0  Last Office Visit: 07/30/2020  Future Office visit:       Routing refill request to provider for review/approval because:  Drug not on the [facility], [facility] or [facility] refill protocol or controlled substance

## 2020-12-23 ENCOUNTER — MYC MEDICAL ADVICE (OUTPATIENT)
Dept: FAMILY MEDICINE | Facility: CLINIC | Age: 50
End: 2020-12-23

## 2020-12-23 ENCOUNTER — VIRTUAL VISIT (OUTPATIENT)
Dept: FAMILY MEDICINE | Facility: OTHER | Age: 50
End: 2020-12-23

## 2020-12-23 DIAGNOSIS — F32.1 MODERATE MAJOR DEPRESSION (H): ICD-10-CM

## 2020-12-23 NOTE — PROGRESS NOTES
"Date: 2020 10:25:59  Clinician: Karly Santiago  Clinician NPI: [id number]  Patient: Liliana Kat  Patient : 1970  Patient Address: [address] [place] [address]  Patient Phone: [phone]  Visit Protocol: URI  Patient Summary:  Liliana is a 50 year old ( : 1970 ) female who initiated a [facility] Visit for COVID-19 (Coronavirus) evaluation and screening. When asked the question \"Please sign me up to receive news, health information and promotions. \", Liliana responded \"No\".    Liliana states her symptoms started 1-2 days ago.   Her symptoms consist of ear pain, a headache, a cough, and nasal congestion. She is experiencing difficulty breathing due to nasal congestion but she is not short of breath.   Symptom details     Nasal secretions: The color of her mucus is clear.    Cough: Liliana coughs a few times an hour and her cough is not more bothersome at night. Phlegm does not come into her throat when she coughs. She believes her cough is caused by post-nasal drip.     Headache: She states the headache is mild (1-3 on a 10 point pain scale).      Liliana denies having diarrhea, facial pain or pressure, myalgias, anosmia, wheezing, fever, nausea, chills, malaise, sore throat, teeth pain, ageusia, vomiting, and rhinitis. She also denies having recent facial or sinus surgery in the past 60 days and taking antibiotic medication in the past month.   Precipitating events  She has not recently been exposed to someone with influenza. Liliana has been in close contact with the following high risk individuals: people with asthma, heart disease or diabetes.   Pertinent COVID-19 (Coronavirus) information  Liliana does not work or volunteer as healthcare worker or a . In the past 14 days, Liliana has not worked or volunteered at a healthcare facility or group living setting.   In the past 14 days, she also has not lived in a congregate living setting.   Liliana has not had a " close contact with a laboratory-confirmed COVID-19 patient within 14 days of symptom onset.    Liliana has not been tested for COVID-19.   Pertinent medical history  Liliana typically gets a yeast infection when she takes antibiotics. She has used fluconazole (Diflucan) to treat previous yeast infections. 2 doses of fluconazole (Diflucan) has typically been needed for symptoms to resolve in the past.  She has not been told by her provider to avoid NSAIDs.   Liliana does not have diabetes. She denies having immunosuppressive conditions (e.g., chemotherapy, HIV, organ transplant, long-term use of steroids or other immunosuppressive medications, splenectomy). She denies having congestive heart failure and severe COPD. She does not have asthma.   Liliana needs a return to work/school note.   Liliana does not smoke or use smokeless tobacco.   She denies pregnancy and denies breastfeeding. She does not menstruate.   Weight: 149 lbs    MEDICATIONS: estradiol-norethindrone acetate oral, ALLERGIES: amoxicillin  Clinician Response:  Dear Liliana,   Your symptoms show that you may have coronavirus (COVID-19). This illness can cause fever, cough and trouble breathing. Many people get a mild case and get better on their own. Some people can get very sick.  What should I do?  We would like to test you for this virus.   1. Please call [phone] to schedule your visit. Explain that you were referred by [facility] to have a COVID-19 test. Be ready to share your [facility] visit ID number.  * If you need to schedule in [facility] please call [phone] or for [employer] employees please call [phone].  * If you need to schedule in the [place] area please call [phone]. [place] employees call [phone].  The following will serve as your written order for this COVID Test, ordered by me, for the indication of suspected COVID [Z20.828]: The test will be ordered in [employer], our electronic health record, after you are scheduled.  "It will show as ordered and authorized by Hernán Vaughn MD.  Order: COVID-19 (Coronavirus) PCR [facility].   2. When it's time for your COVID test:  Stay at least 6 feet away from others. (If someone will drive you to your test, stay in the backseat, as far away from the  as you can.)   Cover your mouth and nose with a mask, tissue or washcloth.  Go straight to the testing site. Don't make any stops on the way there or back.      3.Starting now: Stay home and away from others (self-isolate) until:   You've had no fever---and no medicine that reduces fever---for one full day (24 hours). And...   Your other symptoms have gotten better. For example, your cough or breathing has improved. And...   At least 10 days have passed since your symptoms started.       During this time, don't leave the house except for testing or medical care.   Stay in your own room, even for meals. Use your own bathroom if you can.   Stay away from others in your home. No hugging, kissing or shaking hands. No visitors.  Don't go to work, school or anywhere else.    Clean \"high touch\" surfaces often (doorknobs, counters, handles, etc.). Use a household cleaning spray or wipes. You'll find a full list of  on the EPA website: www.epa.gov/pesticide-registration/list-n-disinfectants-use-against-sars-cov-2.   Cover your mouth and nose with a mask, tissue or washcloth to avoid spreading germs.  Wash your hands and face often. Use soap and water.  Caregivers in these groups are at risk for severe illness due to COVID-19:  o People 65 years and older  o People who live in a nursing home or long-term care facility  o People with chronic disease (lung, heart, cancer, diabetes, kidney, liver, immunologic)  o People who have a weakened immune system, including those who:   Are in cancer treatment  Take medicine that weakens the immune system, such as corticosteroids  Had a bone marrow or organ transplant  Have an immune " deficiency  Have poorly controlled HIV or AIDS  Are obese (body mass index of 40 or higher)  Smoke regularly   o Caregivers should wear gloves while washing dishes, handling laundry and cleaning bedrooms and bathrooms.  o Use caution when washing and drying laundry: Don't shake dirty laundry, and use the warmest water setting that you can.  o For more tips, go to www.cdc.gov/coronavirus/2019-ncov/downloads/10Things.pdf.    4.Sign up for [employer]. We know it's scary to hear that you might have COVID-19. We want to track your symptoms to make sure you're okay over the next 2 weeks. Please look for an email from [employer]---this is a free, online program that we'll use to keep in touch. To sign up, follow the link in the email. Learn more at [email or web address]  How can I take care of myself?   Get lots of rest. Drink extra fluids (unless a doctor has told you not to).   Take Tylenol (acetaminophen) for fever or pain. If you have liver or kidney problems, ask your family doctor if it's okay to take Tylenol.   Adults can take either:    650 mg (two 325 mg pills) every 4 to 6 hours, or...   1,000 mg (two 500 mg pills) every 8 hours as needed.    Note: Don't take more than 3,000 mg in one day. Acetaminophen is found in many medicines (both prescribed and over-the-counter medicines). Read all labels to be sure you don't take too much.   For children, check the Tylenol bottle for the right dose. The dose is based on the child's age or weight.    If you have other health problems (like cancer, heart failure, an organ transplant or severe kidney disease): Call your specialty clinic if you don't feel better in the next 2 days.       Know when to call 911. Emergency warning signs include:    Trouble breathing or shortness of breath Pain or pressure in the chest that doesn't go away Feeling confused like you haven't felt before, or not being able to wake up Bluish-colored lips or face.  Where can I get more  information?   [facility] -- About COVID-19: [email or web address]   CDC -- What to Do If You're Sick: www.cdc.gov/coronavirus/2019-ncov/about/steps-when-sick.html   [facility] -- Ending Home Isolation: www.cdc.gov/coronavirus/2019-ncov/hcp/disposition-in-home-patients.html   [facility] -- Caring for Someone: www.cdc.gov/coronavirus/2019-ncov/if-you-are-sick/care-for-someone.html   [facility] -- Interim Guidance for Hospital Discharge to Home: www.[facility].mn./diseases/coronavirus/hcp/hospdischarge.pdf   [facility] clinical trials (COVID-19 research studies): [email or web address].[facility]-clinical-trials    Below are the COVID-19 hotlines at the [place] Department of Health ([facility]). Interpreters are available.    For health questions: Call 991-340-5235 or 1-305.341.2118 (7 a.m. to 7 p.m.) For questions about schools and childcare: Call 224-068-4012 or 1-818.254.8648 (7 a.m. to 7 p.m.)    Diagnosis: Contact with and (suspected) exposure to other viral communicable diseases  Diagnosis ICD: Z20.828

## 2020-12-24 RX ORDER — CITALOPRAM HYDROBROMIDE 20 MG/1
TABLET ORAL
Qty: 90 TABLET | Refills: 1 | Status: SHIPPED | OUTPATIENT
Start: 2020-12-24 | End: 2021-08-25

## 2020-12-24 NOTE — TELEPHONE ENCOUNTER
Routing refill request to provider for review/approval because:  PHQ-9 >4 (score of 22)    MEHRAN RichardN, RN  [facility]

## 2020-12-27 DIAGNOSIS — F43.23 ADJUSTMENT DISORDER WITH MIXED ANXIETY AND DEPRESSED MOOD: ICD-10-CM

## 2020-12-28 DIAGNOSIS — Z20.822 SUSPECTED COVID-19 VIRUS INFECTION: Primary | ICD-10-CM

## 2020-12-28 DIAGNOSIS — Z20.822 SUSPECTED COVID-19 VIRUS INFECTION: ICD-10-CM

## 2020-12-28 PROCEDURE — U0003 INFECTIOUS AGENT DETECTION BY NUCLEIC ACID (DNA OR RNA); SEVERE ACUTE RESPIRATORY SYNDROME CORONAVIRUS 2 (SARS-COV-2) (CORONAVIRUS DISEASE [COVID-19]), AMPLIFIED PROBE TECHNIQUE, MAKING USE OF HIGH THROUGHPUT TECHNOLOGIES AS DESCRIBED BY CMS-2020-01-R: HCPCS | Performed by: FAMILY MEDICINE

## 2020-12-28 RX ORDER — ALPRAZOLAM 0.5 MG
TABLET ORAL
Qty: 90 TABLET | Refills: 0 | Status: SHIPPED | OUTPATIENT
Start: 2020-12-28 | End: 2021-01-27

## 2020-12-28 NOTE — TELEPHONE ENCOUNTER
Xanax      Last Written Prescription Date:  11/27/2020  Last Fill Quantity: 90,   # refills: 0  Last Office Visit: 7/30/2020  Future Office visit:       Routing refill request to provider for review/approval because:  Drug not on the [facility], P or [facility] refill protocol or controlled substance

## 2020-12-29 LAB
SARS-COV-2 RNA SPEC QL NAA+PROBE: NOT DETECTED
SPECIMEN SOURCE: NORMAL

## 2021-01-27 DIAGNOSIS — F43.23 ADJUSTMENT DISORDER WITH MIXED ANXIETY AND DEPRESSED MOOD: ICD-10-CM

## 2021-01-27 RX ORDER — ALPRAZOLAM 0.5 MG
TABLET ORAL
Qty: 90 TABLET | Refills: 0 | Status: SHIPPED | OUTPATIENT
Start: 2021-01-27 | End: 2021-03-01

## 2021-01-27 NOTE — TELEPHONE ENCOUNTER
Xanax      Last Written Prescription Date:  12/28/2020  Last Fill Quantity: 90,   # refills: 0  Last Office Visit: 7/30/2020  Future Office visit:       Routing refill request to provider for review/approval because:  Drug not on the [facility], P or [facility] refill protocol or controlled substance

## 2021-02-07 ENCOUNTER — HEALTH MAINTENANCE LETTER (OUTPATIENT)
Age: 51
End: 2021-02-07

## 2021-02-26 DIAGNOSIS — F43.23 ADJUSTMENT DISORDER WITH MIXED ANXIETY AND DEPRESSED MOOD: ICD-10-CM

## 2021-02-27 ENCOUNTER — MYC REFILL (OUTPATIENT)
Dept: FAMILY MEDICINE | Facility: CLINIC | Age: 51
End: 2021-02-27

## 2021-02-27 ENCOUNTER — MYC MEDICAL ADVICE (OUTPATIENT)
Dept: FAMILY MEDICINE | Facility: CLINIC | Age: 51
End: 2021-02-27

## 2021-02-27 DIAGNOSIS — F43.23 ADJUSTMENT DISORDER WITH MIXED ANXIETY AND DEPRESSED MOOD: ICD-10-CM

## 2021-02-27 RX ORDER — ALPRAZOLAM 0.5 MG
TABLET ORAL
Qty: 90 TABLET | Refills: 0 | Status: CANCELLED | OUTPATIENT
Start: 2021-02-27

## 2021-03-01 RX ORDER — ALPRAZOLAM 0.5 MG
TABLET ORAL
Qty: 90 TABLET | Refills: 0 | Status: SHIPPED | OUTPATIENT
Start: 2021-03-01 | End: 2021-03-31

## 2021-03-01 NOTE — TELEPHONE ENCOUNTER
Script was just signed by Garry alvarez on 03/01/2021 sent to [facility] [place] pharmacy. Patient notified via [employer].   Susi Arenas MA

## 2021-03-11 ENCOUNTER — OFFICE VISIT (OUTPATIENT)
Dept: FAMILY MEDICINE | Facility: CLINIC | Age: 51
End: 2021-03-11
Payer: COMMERCIAL

## 2021-03-11 VITALS
SYSTOLIC BLOOD PRESSURE: 131 MMHG | WEIGHT: 158.4 LBS | TEMPERATURE: 98.3 F | BODY MASS INDEX: 26.36 KG/M2 | OXYGEN SATURATION: 97 % | HEART RATE: 81 BPM | RESPIRATION RATE: 14 BRPM | DIASTOLIC BLOOD PRESSURE: 89 MMHG

## 2021-03-11 DIAGNOSIS — M25.511 CHRONIC RIGHT SHOULDER PAIN: Primary | ICD-10-CM

## 2021-03-11 DIAGNOSIS — R22.31 FOREARM MASS, RIGHT: ICD-10-CM

## 2021-03-11 DIAGNOSIS — G89.29 CHRONIC RIGHT SHOULDER PAIN: Primary | ICD-10-CM

## 2021-03-11 PROCEDURE — 99214 OFFICE O/P EST MOD 30 MIN: CPT | Performed by: PHYSICIAN ASSISTANT

## 2021-03-11 ASSESSMENT — ANXIETY QUESTIONNAIRES
3. WORRYING TOO MUCH ABOUT DIFFERENT THINGS: NEARLY EVERY DAY
IF YOU CHECKED OFF ANY PROBLEMS ON THIS QUESTIONNAIRE, HOW DIFFICULT HAVE THESE PROBLEMS MADE IT FOR YOU TO DO YOUR WORK, TAKE CARE OF THINGS AT HOME, OR GET ALONG WITH OTHER PEOPLE: SOMEWHAT DIFFICULT
7. FEELING AFRAID AS IF SOMETHING AWFUL MIGHT HAPPEN: NOT AT ALL
5. BEING SO RESTLESS THAT IT IS HARD TO SIT STILL: NEARLY EVERY DAY
1. FEELING NERVOUS, ANXIOUS, OR ON EDGE: NEARLY EVERY DAY
2. NOT BEING ABLE TO STOP OR CONTROL WORRYING: NEARLY EVERY DAY
GAD7 TOTAL SCORE: 18
6. BECOMING EASILY ANNOYED OR IRRITABLE: NEARLY EVERY DAY

## 2021-03-11 ASSESSMENT — PATIENT HEALTH QUESTIONNAIRE - PHQ9
SUM OF ALL RESPONSES TO PHQ QUESTIONS 1-9: 16
5. POOR APPETITE OR OVEREATING: NEARLY EVERY DAY

## 2021-03-11 NOTE — PROGRESS NOTES
Assessment & Plan     Chronic right shoulder pain  - Orthopedic & Spine  Referral; Future    Forearm mass, right  - US Extremity Non Vascular Right; Future  - Orthopedic & Spine  Referral; Future      35 minutes spent on the date of the encounter doing chart review, patient visit and documentation     Return in about 1 week (around 3/18/2021) for ultrasound and sports med consult.    [facility] is a 50 year old who presents for the following health issues     HPI       Concern - lump on right wrist and getting worse  Onset: 1 month  Description: seemed to happen when her right rotary cuff when out   Intensity: moderate, severe  Progression of Symptoms:  worsening  Accompanying Signs & Symptoms: pain shooting up right arm, right arm numbs up off and a lot of swelling and get cold at night when sleeping   Previous history of similar problem: none  Precipitating factors:        Worsened by: none  Alleviating factors:        Improved by: none  Therapies tried and outcome: None    Shoulder injections  Now shoulder hurting again  Pain in right wrist  Strength decreased  Pain with extension (worse) and flexion, and deviation  All movements hurt    Kassandra presents to the clinic today for evaluation of right forearm pain.  She notes that she has a history of a labral tear in her right shoulder. She did have cortisone injections at one time in her shoulder although it has been several years since she had these.  Her shoulder has been pain-free for quite a while but she notes that in the last month the pain seems to be returning.  She also notes that she has pain in her right dorsal distal forearm.  She has pain with movements in her wrist, particularly extension, and notes that her  strength is weakened on the right side.  She feels that this area gets cold in the evenings.    Review of Systems   ROS negative except as noted above       Objective    /89 (BP Location: Right arm)   Pulse 81   Temp 98.3  F (36.8  C) (Temporal)   Resp 14   Wt 71.8 kg (158 lb 6.4 oz)   LMP 03/06/2006   SpO2 97%   BMI 26.36 kg/m    Body mass index is 26.36 kg/m .  Physical Exam   GENERAL: healthy, alert and no distress  NECK: no adenopathy, no asymmetry, masses, or scars and thyroid normal to palpation  RESP: lungs clear to auscultation - no rales, rhonchi or wheezes  CV: regular rate and rhythm, normal S1 S2, no S3 or S4, no murmur, click or rub, no peripheral edema and peripheral pulses strong  FOREARM/WRIST: Right dorsal forearm with a palpable mass proximal to the wrist about 5 cm x 3 cm x 1 cm in size.  This is firm and feels muscular in nature. It is cool to the touch, color is normal.  It is tender to the touch over this mass and proximal up into the elbow.  She notes pain with flexion and extension of wrist lateral and medial deviation and pronation and supination of the forearm.   strength is decreased on the right side.  SHOULDER: Full ROM including adduction, abduction, internal and external rotation, flexion and extension. Pain with empty can test. Drop arm normal. Of note, she did not note any pain in her wrist while examining her shoulder.    NEURO: Normal strength and tone, mentation intact and speech normal    No results found for any visits on 03/11/21.

## 2021-03-12 ASSESSMENT — ANXIETY QUESTIONNAIRES: GAD7 TOTAL SCORE: 18

## 2021-03-12 NOTE — PATIENT INSTRUCTIONS
Heat and ice to affected area  Ultrasound to arm  Follow up with sports med for evaluation of forearm and shoulder

## 2021-03-19 ENCOUNTER — HOSPITAL ENCOUNTER (OUTPATIENT)
Dept: ULTRASOUND IMAGING | Facility: CLINIC | Age: 51
Discharge: HOME | End: 2021-03-19
Attending: PHYSICIAN ASSISTANT | Admitting: PHYSICIAN ASSISTANT
Payer: COMMERCIAL

## 2021-03-19 DIAGNOSIS — R22.31 FOREARM MASS, RIGHT: ICD-10-CM

## 2021-03-19 PROCEDURE — 76882 US LMTD JT/FCL EVL NVASC XTR: CPT | Mod: RT

## 2021-03-25 NOTE — RESULT ENCOUNTER NOTE
No suspicious mass appreciated on Ultrasound.     Joann Milan PA-C  [facility]

## 2021-03-30 NOTE — PROGRESS NOTES
Sports Medicine Clinic Visit    PCP: No Ref-Primary, Physician    CC: Patient presents with:  Right Shoulder - Pain  Right Forearm - Pain      HPI:  Liliana Kat is a 50 year old female who is seen in consultation at the request of Lorraine Milan PA-C.   She notes right arm pain that started over a month ago. Has lost strength in her hand and an electric pain occurs. Area of pain is over the dorsal distal forearm area. She rates the pain at a  8/10 at its worst and a 5/10 currently.  Symptoms are relieved with nothing. Symptoms are worsened by gripping with her hand. Other treatment has included compression.    Shoulder pain started over a year ago. History of injury when lifting a 50 lb bin of chicken and strained her shoulder. She notes pain over the anterior shoulder that radiates into mid-upper arm. Over past few days, has started to get superior and posterior shoulder pain. She rates the pain at a  9/10 at its worst and a 5/10 currently.  Symptoms are relieved with not moving her arm. Other treatment has included heat, cold, Biofreeze, Ibuprofen. She notes difficulty with putting arm over head and external rotation with shoulder abduction.  Has had steroid injections in the past that helped somewhat.    History reviewed. No pertinent past surgical/medical/family/social history other than as mentioned in HPI.  Review of systems negative except per HPI.      Patient Active Problem List   Diagnosis     Need for postmenopausal hormone replacement     Decreased libido     CARDIOVASCULAR SCREENING; LDL GOAL LESS THAN 160     Bladder polyps     Superficial varicosities     Adjustment disorder with mixed anxiety and depressed mood     Acute pain of right shoulder     Acute biliary pancreatitis, unspecified complication status     Labral tear of shoulder, right, subsequent encounter     Drug-induced anaphylaxis, initial encounter     Anaphylactic reaction     Seasonal allergic rhinitis     Hypokalemia      Acidosis     Moderate major depression (H)     TAWANA (generalized anxiety disorder)     PTSD (post-traumatic stress disorder)     Past Medical History:   Diagnosis Date     Allergic rhinitis, cause unspecified     Allergic rhinitis - weeds and pollan     Anxiety      Bladder polyps      Depressive disorder      Obstructive chronic bronchitis with exacerbation (H)      Other and unspecified ovarian cyst     Ovarian cyst - rupured cyst + laser x3     Other and unspecified ovarian cyst 2005    Left hemorrhagic ovarian cyst.     Tobacco abuse      Urinary tract infection, site not specified      Past Surgical History:   Procedure Laterality Date     C LIGATE FALLOPIAN TUBE,POSTPARTUM      Tubal Ligation     ESOPHAGOSCOPY, GASTROSCOPY, DUODENOSCOPY (EGD), COMBINED  2012    Procedure: COMBINED ESOPHAGOSCOPY, GASTROSCOPY, DUODENOSCOPY (EGD), BIOPSY SINGLE OR MULTIPLE;  ESOPHAGOSCOPY, GASTROSCOPY, DUODENOSCOPY (EGD) with biopsy;  Surgeon: Herson Cabrera MD;  Location: [facility]     EXCIS VAGINAL CYST/TUMOR       HC REMOVAL OF OVARY/TUBE(S)  3/6/2006    Laparoscopic bilateral salpingo-oophorectomy.     HC TYMPANOPLASTY W/O MASTOID, INIT/REV W/O OSS CHAIN RECONST       LAPAROSCOPIC CHOLECYSTECTOMY N/A 2017    Procedure: LAPAROSCOPIC CHOLECYSTECTOMY;  Surgeon: Shivam Luevano MD;  Location: [facility]     Family History   Problem Relation Age of Onset     Alcohol/Drug Father          at age 53 of alcohol     Arthritis Father      Cancer Father         lung     Alcohol/Drug Paternal Grandfather      Cancer Paternal Grandfather         lung     Alcohol/Drug Paternal Grandmother      Cancer Paternal Grandmother         lung     Alcohol/Drug Paternal Aunt      Allergies Daughter         animals and grass     Allergies Son         dust mite     Arthritis Mother      Hypertension Mother      Lipids Mother      Depression Mother      Heart Disease Mother      Blood Disease Mother         DVTs     Bleeding Disorder  Mother      Liver Disease Mother      Depression Brother      Social History     Socioeconomic History     Marital status:      Spouse name: Not on file     Number of children: Not on file     Years of education: Not on file     Highest education level: Not on file   Occupational History     Employer: [employer]     Comment: assembly   Social Needs     Financial resource strain: Not on file     Food insecurity     Worry: Not on file     Inability: Not on file     Transportation needs     Medical: Not on file     Non-medical: Not on file   Tobacco Use     Smoking status: Former Smoker     Packs/day: 0.00     Years: 10.00     Pack years: 0.00     Types: Cigarettes     Quit date: 2020     Years since quittin.9     Smokeless tobacco: Never Used     Tobacco comment: 3 cigs a day    Substance and Sexual Activity     Alcohol use: No     Alcohol/week: 0.0 standard drinks     Comment: recovering  - 2005     Drug use: No     Sexual activity: Not Currently     Partners: Male     Birth control/protection: Surgical     Comment: tubal    Lifestyle     Physical activity     Days per week: Not on file     Minutes per session: Not on file     Stress: Not on file   Relationships     Social connections     Talks on phone: Not on file     Gets together: Not on file     Attends Latter day service: Not on file     Active member of club or organization: Not on file     Attends meetings of clubs or organizations: Not on file     Relationship status: Not on file     Intimate partner violence     Fear of current or ex partner: Not on file     Emotionally abused: Not on file     Physically abused: Not on file     Forced sexual activity: Not on file   Other Topics Concern     Parent/sibling w/ CABG, MI or angioplasty before 65F 55M? Not Asked   Social History Narrative    Lives with spouse and children. No domestic violence issues.         Current Outpatient Medications   Medication     albuterol (2.5 MG/3ML) 0.083% neb  solution     albuterol (PROAIR HFA/PROVENTIL HFA/VENTOLIN HFA) 108 (90 Base) MCG/ACT inhaler     ALPRAZolam (XANAX) 0.5 MG tablet     aspirin 81 MG tablet     citalopram (CELEXA) 10 MG tablet     citalopram (CELEXA) 20 MG tablet     estradiol (ESTRACE) 1 MG tablet     fluticasone (FLONASE) 50 MCG/ACT spray     loratadine (CLARITIN) 10 MG tablet     meclizine (ANTIVERT) 25 MG tablet     medroxyPROGESTERone (PROVERA) 5 MG tablet     MELATONIN     No current facility-administered medications for this visit.      Allergies   Allergen Reactions     Amoxicillin Anaphylaxis     Augmentin Swelling and Difficulty breathing     Avelox Nausea and Vomiting     Effexor Xr [Venlafaxine Hydrochloride]      Shaky and heart racing     Sulfa Drugs          Objective:  /82 (BP Location: Right arm, Patient Position: Sitting, Cuff Size: Adult Regular)   Wt 71.7 kg (158 lb)   LMP 03/06/2006   BMI 26.29 kg/m      General: Alert and in no distress    Head: Normocephalic, atraumatic  Eyes: no scleral icterus or conjunctival erythema   Skin: no erythema, petechiae, or jaundice  Resp: normal respiratory effort without conversational dyspnea   Psych: normal mood and affect      Musculoskeletal:  -Diffusely tender over the right shoulder  -Right distal dorsal forearm swelling, tender to touch  -Right shoulder active abduction 90 degrees and painful, right shoulder active flexion 100 degrees and painful.  -Decreased right shoulder external rotation and internal rotation behind the back decreased and painful.  -Right wrist extension and flexion are full, but right wrist extension is painful  -Sensation to light touch over the bilateral upper extremities.      Radiology:  X-rays ordered and independent visualization of images performed and reviewed with Kassandra.      Recent Results (from the past 744 hour(s))   US Extremity Non Vascular Right    Narrative    US EXTREMITY NON VASCULAR RIGHT 3/19/2021 1:23 PM     HISTORY: pain in right dorsal  forearm, mass in arm; Forearm mass,  right     FINDINGS: Sonography was performed in the area of clinical concern  (dorsal aspect of distal forearm).      Impression    IMPRESSION: No fluid collection or suspicious mass is appreciated  sonographically. Clinical follow-up recommended. If there is  enlargement or increased symptoms, MRI would be the study of choice  for the evaluation of a soft tissue mass.       DONTE GUPTA MD   XR Forearm RT 2 vw    Narrative    FOREARM RIGHT TWO VIEW   4/2/2021 4:38 PM     HISTORY: Forearm mass, right.    COMPARISON: None.      Impression    IMPRESSION: Normal bony mineralization and alignment. No evidence of  acute fracture.   XR Shoulder Right G/E 3 Views    Narrative    SHOULDER RIGHT THREE OR MORE VIEWS    4/2/2021 4:38 PM     HISTORY: Chronic right shoulder pain    COMPARISON: None.      Impression    IMPRESSION: Mild to moderate acromioclavicular degenerative changes.  Glenohumeral joint is unremarkable. No evidence of acute fracture or  dislocation.           Large Joint Injection/Arthocentesis: R subacromial bursa    Date/Time: 4/2/2021 5:03 PM  Performed by: Shanell Mascorro MD  Authorized by: Shanell Mascorro MD     Indications:  Pain  Needle Size:  25 G  Guidance: landmark guided    Approach:  Posterolateral  Location:  Shoulder      Site:  R subacromial bursa  Medications:  40 mg triamcinolone 40 MG/ML  Medications comment:  4ml 0.5% bupivacaine  NDC:11442-806-06  Lot: [id number]  Ex: 11/30/21    Outcome:  Tolerated well, no immediate complications  Consent Given by:  Patient        Assessment:  1. Chronic right shoulder pain    2. Forearm mass, right        Plan:  Discussed the assessment with the patient and developed a plan together:  -Right shoulder subacromial steroid injection performed today.  Take it easy over the next few days. Keep in mind that the steroid may take up to 3 days to start working and up to 2 weeks to reach maximal effect.     -Ice for 15-20 minutes as needed for soreness and swelling.  -Patient's preferred over the counter medications as needed for soreness.  -MRI of the right forearm ordered.  Please call [phone] to schedule.    -Also discussed physical therapy and MRI of the right shoulder    -Follow up after completion of MRI.  Please schedule at least 1-2 business days after MRI is completed to ensure we have the results of the MRI.  We can also have evaluate the back/hip at that time.      Kelly Mascorro MD, CAQ Sports Medicine  [facility]

## 2021-03-31 DIAGNOSIS — F43.23 ADJUSTMENT DISORDER WITH MIXED ANXIETY AND DEPRESSED MOOD: ICD-10-CM

## 2021-03-31 RX ORDER — ALPRAZOLAM 0.5 MG
TABLET ORAL
Qty: 90 TABLET | Refills: 0 | Status: SHIPPED | OUTPATIENT
Start: 2021-03-31 | End: 2021-04-30

## 2021-03-31 NOTE — TELEPHONE ENCOUNTER
Requested Prescriptions   Pending Prescriptions Disp Refills     ALPRAZolam (XANAX) 0.5 MG tablet [Pharmacy Med Name: ALPRAZOLAM 0.5MG TABS] 90 tablet 0     Sig: TAKE ONE TABLET BY MOUTH THREE TIMES A DAY AS NEEDED FOR ANXIETY     Last Written Prescription Date:  03/01/2021  Last Fill Quantity: 90,   # refills: 0  Last Office Visit: 07/30/2020  Future Office visit:       Routing refill request to provider for review/approval because:  Drug not on the [facility], [facility] or [facility] refill protocol or controlled substance  Susi Arenas MA

## 2021-04-02 ENCOUNTER — OFFICE VISIT (OUTPATIENT)
Dept: ORTHOPEDICS | Facility: CLINIC | Age: 51
End: 2021-04-02
Attending: PHYSICIAN ASSISTANT
Payer: COMMERCIAL

## 2021-04-02 ENCOUNTER — ANCILLARY PROCEDURE (OUTPATIENT)
Dept: GENERAL RADIOLOGY | Facility: CLINIC | Age: 51
End: 2021-04-02
Attending: PHYSICAL MEDICINE & REHABILITATION
Payer: COMMERCIAL

## 2021-04-02 VITALS — WEIGHT: 158 LBS | SYSTOLIC BLOOD PRESSURE: 122 MMHG | BODY MASS INDEX: 26.29 KG/M2 | DIASTOLIC BLOOD PRESSURE: 82 MMHG

## 2021-04-02 DIAGNOSIS — M25.511 CHRONIC RIGHT SHOULDER PAIN: ICD-10-CM

## 2021-04-02 DIAGNOSIS — G89.29 CHRONIC RIGHT SHOULDER PAIN: ICD-10-CM

## 2021-04-02 DIAGNOSIS — R22.31 FOREARM MASS, RIGHT: ICD-10-CM

## 2021-04-02 PROCEDURE — 73030 X-RAY EXAM OF SHOULDER: CPT | Mod: TC | Performed by: RADIOLOGY

## 2021-04-02 PROCEDURE — 20610 DRAIN/INJ JOINT/BURSA W/O US: CPT | Mod: RT | Performed by: PHYSICAL MEDICINE & REHABILITATION

## 2021-04-02 PROCEDURE — 99204 OFFICE O/P NEW MOD 45 MIN: CPT | Mod: 25 | Performed by: PHYSICAL MEDICINE & REHABILITATION

## 2021-04-02 PROCEDURE — 73090 X-RAY EXAM OF FOREARM: CPT | Mod: TC | Performed by: RADIOLOGY

## 2021-04-02 RX ORDER — TRIAMCINOLONE ACETONIDE 40 MG/ML
40 INJECTION, SUSPENSION INTRA-ARTICULAR; INTRAMUSCULAR
Status: SHIPPED | OUTPATIENT
Start: 2021-04-02

## 2021-04-02 RX ADMIN — TRIAMCINOLONE ACETONIDE 40 MG: 40 INJECTION, SUSPENSION INTRA-ARTICULAR; INTRAMUSCULAR at 17:03

## 2021-04-02 NOTE — LETTER
4/2/2021         RE: Liliana Kat  [address] [facility] [address]        Dear Colleague,    Thank you for referring your patient, Liliana Kat, to the [facility] [place]. Please see a copy of my visit note below.    Sports Medicine Clinic Visit    PCP: No Ref-Primary, Physician    CC: Patient presents with:  Right Shoulder - Pain  Right Forearm - Pain      HPI:  Liliana Kat is a 50 year old female who is seen in consultation at the request of Lorraine Milan PA-C.   She notes right arm pain that started over a month ago. Has lost strength in her hand and an electric pain occurs. Area of pain is over the dorsal distal forearm area. She rates the pain at a  8/10 at its worst and a 5/10 currently.  Symptoms are relieved with nothing. Symptoms are worsened by gripping with her hand. Other treatment has included compression.    Shoulder pain started over a year ago. History of injury when lifting a 50 lb bin of chicken and strained her shoulder. She notes pain over the anterior shoulder that radiates into mid-upper arm. Over past few days, has started to get superior and posterior shoulder pain. She rates the pain at a  9/10 at its worst and a 5/10 currently.  Symptoms are relieved with not moving her arm. Other treatment has included heat, cold, Biofreeze, Ibuprofen. She notes difficulty with putting arm over head and external rotation with shoulder abduction.  Has had steroid injections in the past that helped somewhat.    History reviewed. No pertinent past surgical/medical/family/social history other than as mentioned in HPI.  Review of systems negative except per HPI.      Patient Active Problem List   Diagnosis     Need for postmenopausal hormone replacement     Decreased libido     CARDIOVASCULAR SCREENING; LDL GOAL LESS THAN 160     Bladder polyps     Superficial varicosities     Adjustment disorder with mixed anxiety and depressed mood      Acute pain of right shoulder     Acute biliary pancreatitis, unspecified complication status     Labral tear of shoulder, right, subsequent encounter     Drug-induced anaphylaxis, initial encounter     Anaphylactic reaction     Seasonal allergic rhinitis     Hypokalemia     Acidosis     Moderate major depression (H)     TAWANA (generalized anxiety disorder)     PTSD (post-traumatic stress disorder)     Past Medical History:   Diagnosis Date     Allergic rhinitis, cause unspecified     Allergic rhinitis - weeds and pollan     Anxiety      Bladder polyps      Depressive disorder      Obstructive chronic bronchitis with exacerbation (H)      Other and unspecified ovarian cyst     Ovarian cyst - rupured cyst + laser x3     Other and unspecified ovarian cyst 2005    Left hemorrhagic ovarian cyst.     Tobacco abuse      Urinary tract infection, site not specified      Past Surgical History:   Procedure Laterality Date     C LIGATE FALLOPIAN TUBE,POSTPARTUM      Tubal Ligation     ESOPHAGOSCOPY, GASTROSCOPY, DUODENOSCOPY (EGD), COMBINED  2012    Procedure: COMBINED ESOPHAGOSCOPY, GASTROSCOPY, DUODENOSCOPY (EGD), BIOPSY SINGLE OR MULTIPLE;  ESOPHAGOSCOPY, GASTROSCOPY, DUODENOSCOPY (EGD) with biopsy;  Surgeon: Herson Cabrera MD;  Location: [facility]     EXCIS VAGINAL CYST/TUMOR       HC REMOVAL OF OVARY/TUBE(S)  3/6/2006    Laparoscopic bilateral salpingo-oophorectomy.     HC TYMPANOPLASTY W/O MASTOID, INIT/REV W/O OSS CHAIN RECONST       LAPAROSCOPIC CHOLECYSTECTOMY N/A 2017    Procedure: LAPAROSCOPIC CHOLECYSTECTOMY;  Surgeon: Shivam Luevano MD;  Location: [facility]     Family History   Problem Relation Age of Onset     Alcohol/Drug Father          at age 53 of alcohol     Arthritis Father      Cancer Father         lung     Alcohol/Drug Paternal Grandfather      Cancer Paternal Grandfather         lung     Alcohol/Drug Paternal Grandmother      Cancer Paternal Grandmother         lung     Alcohol/Drug  Paternal Aunt      Allergies Daughter         animals and grass     Allergies Son         dust mite     Arthritis Mother      Hypertension Mother      Lipids Mother      Depression Mother      Heart Disease Mother      Blood Disease Mother         DVTs     Bleeding Disorder Mother      Liver Disease Mother      Depression Brother      Social History     Socioeconomic History     Marital status:      Spouse name: Not on file     Number of children: Not on file     Years of education: Not on file     Highest education level: Not on file   Occupational History     Employer: [employer]     Comment: assembly   Social Needs     Financial resource strain: Not on file     Food insecurity     Worry: Not on file     Inability: Not on file     Transportation needs     Medical: Not on file     Non-medical: Not on file   Tobacco Use     Smoking status: Former Smoker     Packs/day: 0.00     Years: 10.00     Pack years: 0.00     Types: Cigarettes     Quit date: 2020     Years since quittin.9     Smokeless tobacco: Never Used     Tobacco comment: 3 cigs a day    Substance and Sexual Activity     Alcohol use: No     Alcohol/week: 0.0 standard drinks     Comment: recovering  - 2005     Drug use: No     Sexual activity: Not Currently     Partners: Male     Birth control/protection: Surgical     Comment: tubal    Lifestyle     Physical activity     Days per week: Not on file     Minutes per session: Not on file     Stress: Not on file   Relationships     Social connections     Talks on phone: Not on file     Gets together: Not on file     Attends Restorationism service: Not on file     Active member of club or organization: Not on file     Attends meetings of clubs or organizations: Not on file     Relationship status: Not on file     Intimate partner violence     Fear of current or ex partner: Not on file     Emotionally abused: Not on file     Physically abused: Not on file     Forced sexual activity: Not on file   Other  Topics Concern     Parent/sibling w/ CABG, MI or angioplasty before 65F 55M? Not Asked   Social History Narrative    Lives with spouse and children. No domestic violence issues.         Current Outpatient Medications   Medication     albuterol (2.5 MG/3ML) 0.083% neb solution     albuterol (PROAIR HFA/PROVENTIL HFA/VENTOLIN HFA) 108 (90 Base) MCG/ACT inhaler     ALPRAZolam (XANAX) 0.5 MG tablet     aspirin 81 MG tablet     citalopram (CELEXA) 10 MG tablet     citalopram (CELEXA) 20 MG tablet     estradiol (ESTRACE) 1 MG tablet     fluticasone (FLONASE) 50 MCG/ACT spray     loratadine (CLARITIN) 10 MG tablet     meclizine (ANTIVERT) 25 MG tablet     medroxyPROGESTERone (PROVERA) 5 MG tablet     MELATONIN     No current facility-administered medications for this visit.      Allergies   Allergen Reactions     Amoxicillin Anaphylaxis     Augmentin Swelling and Difficulty breathing     Avelox Nausea and Vomiting     Effexor Xr [Venlafaxine Hydrochloride]      Shaky and heart racing     Sulfa Drugs          Objective:  /82 (BP Location: Right arm, Patient Position: Sitting, Cuff Size: Adult Regular)   Wt 71.7 kg (158 lb)   LMP 03/06/2006   BMI 26.29 kg/m      General: Alert and in no distress    Head: Normocephalic, atraumatic  Eyes: no scleral icterus or conjunctival erythema   Skin: no erythema, petechiae, or jaundice  Resp: normal respiratory effort without conversational dyspnea   Psych: normal mood and affect      Musculoskeletal:  -Diffusely tender over the right shoulder  -Right distal dorsal forearm swelling, tender to touch  -Right shoulder active abduction 90 degrees and painful, right shoulder active flexion 100 degrees and painful.  -Decreased right shoulder external rotation and internal rotation behind the back decreased and painful.  -Right wrist extension and flexion are full, but right wrist extension is painful  -Sensation to light touch over the bilateral upper extremities.       Radiology:  X-rays ordered and independent visualization of images performed and reviewed with Kassandra.      Recent Results (from the past 744 hour(s))   US Extremity Non Vascular Right    Narrative    US EXTREMITY NON VASCULAR RIGHT 3/19/2021 1:23 PM     HISTORY: pain in right dorsal forearm, mass in arm; Forearm mass,  right     FINDINGS: Sonography was performed in the area of clinical concern  (dorsal aspect of distal forearm).      Impression    IMPRESSION: No fluid collection or suspicious mass is appreciated  sonographically. Clinical follow-up recommended. If there is  enlargement or increased symptoms, MRI would be the study of choice  for the evaluation of a soft tissue mass.       DONTE GUPTA MD   XR Forearm RT 2 vw    Narrative    FOREARM RIGHT TWO VIEW   4/2/2021 4:38 PM     HISTORY: Forearm mass, right.    COMPARISON: None.      Impression    IMPRESSION: Normal bony mineralization and alignment. No evidence of  acute fracture.   XR Shoulder Right G/E 3 Views    Narrative    SHOULDER RIGHT THREE OR MORE VIEWS    4/2/2021 4:38 PM     HISTORY: Chronic right shoulder pain    COMPARISON: None.      Impression    IMPRESSION: Mild to moderate acromioclavicular degenerative changes.  Glenohumeral joint is unremarkable. No evidence of acute fracture or  dislocation.           Large Joint Injection/Arthocentesis: R subacromial bursa    Date/Time: 4/2/2021 5:03 PM  Performed by: Shanell Mascorro MD  Authorized by: Shanell Mascorro MD     Indications:  Pain  Needle Size:  25 G  Guidance: landmark guided    Approach:  Posterolateral  Location:  Shoulder      Site:  R subacromial bursa  Medications:  40 mg triamcinolone 40 MG/ML  Medications comment:  4ml 0.5% bupivacaine  NDC:52752-376-14  Lot: [id number]  Ex: 11/30/21    Outcome:  Tolerated well, no immediate complications  Consent Given by:  Patient        Assessment:  1. Chronic right shoulder pain    2. Forearm mass, right         Plan:  Discussed the assessment with the patient and developed a plan together:  -Right shoulder subacromial steroid injection performed today.  Take it easy over the next few days. Keep in mind that the steroid may take up to 3 days to start working and up to 2 weeks to reach maximal effect.    -Ice for 15-20 minutes as needed for soreness and swelling.  -Patient's preferred over the counter medications as needed for soreness.  -MRI of the right forearm ordered.  Please call [phone] to schedule.    -Also discussed physical therapy and MRI of the right shoulder    -Follow up after completion of MRI.  Please schedule at least 1-2 business days after MRI is completed to ensure we have the results of the MRI.  We can also have evaluate the back/hip at that time.      Kelly Mascorro MD, [facility] Sports Medicine  [facility]        Again, thank you for allowing me to participate in the care of your patient.        Sincerely,        Shanell Mascorro MD

## 2021-04-02 NOTE — PATIENT INSTRUCTIONS
-Steroid injection performed today.  Take it easy over the next few days. Keep in mind that the steroid may take up to 3 days to start working and up to 2 weeks to reach maximal effect.    -Ice for 15-20 minutes as needed for soreness and swelling.  -Patient's preferred over the counter medications as needed for soreness.  -MRI of the right forearm ordered.  Please call [phone] to schedule.    -Also discussed physical therapy and MRI of the right shoulder    -Follow up after completion of MRI.  Please schedule at least 1-2 business days after MRI is completed to ensure we have the results of the MRI.  We can also have evaluate the back/hip at that time.

## 2021-04-30 ENCOUNTER — MYC MEDICAL ADVICE (OUTPATIENT)
Dept: FAMILY MEDICINE | Facility: CLINIC | Age: 51
End: 2021-04-30

## 2021-04-30 DIAGNOSIS — F43.23 ADJUSTMENT DISORDER WITH MIXED ANXIETY AND DEPRESSED MOOD: ICD-10-CM

## 2021-04-30 RX ORDER — ALPRAZOLAM 0.5 MG
TABLET ORAL
Qty: 90 TABLET | Refills: 0 | Status: SHIPPED | OUTPATIENT
Start: 2021-04-30 | End: 2021-05-31

## 2021-04-30 NOTE — TELEPHONE ENCOUNTER
Xanax       Last Written Prescription Date:  3/31/2021  Last Fill Quantity: 90,   # refills: 0  Last Office Visit: 7/30/2020  Future Office visit:       Routing refill request to provider for review/approval because:  Drug not on the [facility], [facility] or [facility] refill protocol or controlled substance

## 2021-05-03 ENCOUNTER — HOSPITAL ENCOUNTER (EMERGENCY)
Facility: CLINIC | Age: 51
Discharge: HOME | End: 2021-05-03
Attending: NURSE PRACTITIONER | Admitting: NURSE PRACTITIONER
Payer: COMMERCIAL

## 2021-05-03 ENCOUNTER — TELEPHONE (OUTPATIENT)
Dept: EMERGENCY MEDICINE | Facility: CLINIC | Age: 51
End: 2021-05-03

## 2021-05-03 ENCOUNTER — APPOINTMENT (OUTPATIENT)
Dept: CT IMAGING | Facility: CLINIC | Age: 51
End: 2021-05-03
Attending: NURSE PRACTITIONER
Payer: COMMERCIAL

## 2021-05-03 ENCOUNTER — APPOINTMENT (OUTPATIENT)
Dept: GENERAL RADIOLOGY | Facility: CLINIC | Age: 51
End: 2021-05-03
Attending: NURSE PRACTITIONER
Payer: COMMERCIAL

## 2021-05-03 VITALS
OXYGEN SATURATION: 93 % | SYSTOLIC BLOOD PRESSURE: 105 MMHG | RESPIRATION RATE: 18 BRPM | WEIGHT: 158 LBS | HEART RATE: 74 BPM | DIASTOLIC BLOOD PRESSURE: 74 MMHG | BODY MASS INDEX: 26.29 KG/M2 | TEMPERATURE: 98.8 F

## 2021-05-03 DIAGNOSIS — R06.02 SHORTNESS OF BREATH: ICD-10-CM

## 2021-05-03 DIAGNOSIS — R19.7 NAUSEA VOMITING AND DIARRHEA: ICD-10-CM

## 2021-05-03 DIAGNOSIS — R11.2 NAUSEA VOMITING AND DIARRHEA: ICD-10-CM

## 2021-05-03 DIAGNOSIS — R05.9 COUGH: ICD-10-CM

## 2021-05-03 DIAGNOSIS — Z20.822 SUSPECTED COVID-19 VIRUS INFECTION: ICD-10-CM

## 2021-05-03 LAB
ALBUMIN SERPL-MCNC: 3.5 G/DL (ref 3.4–5)
ALBUMIN UR-MCNC: 100 MG/DL
ALP SERPL-CCNC: 111 U/L (ref 40–150)
ALT SERPL W P-5'-P-CCNC: 32 U/L (ref 0–50)
ANION GAP SERPL CALCULATED.3IONS-SCNC: 8 MMOL/L (ref 3–14)
APPEARANCE UR: ABNORMAL
AST SERPL W P-5'-P-CCNC: 22 U/L (ref 0–45)
BASOPHILS # BLD AUTO: 0 10E9/L (ref 0–0.2)
BASOPHILS NFR BLD AUTO: 0.2 %
BILIRUB SERPL-MCNC: 0.3 MG/DL (ref 0.2–1.3)
BILIRUB UR QL STRIP: NEGATIVE
BUN SERPL-MCNC: 7 MG/DL (ref 7–30)
CALCIUM SERPL-MCNC: 8.2 MG/DL (ref 8.5–10.1)
CHLORIDE SERPL-SCNC: 108 MMOL/L (ref 94–109)
CO2 SERPL-SCNC: 22 MMOL/L (ref 20–32)
COLOR UR AUTO: YELLOW
CREAT SERPL-MCNC: 0.7 MG/DL (ref 0.52–1.04)
DIFFERENTIAL METHOD BLD: ABNORMAL
EOSINOPHIL # BLD AUTO: 0 10E9/L (ref 0–0.7)
EOSINOPHIL NFR BLD AUTO: 0 %
ERYTHROCYTE [DISTWIDTH] IN BLOOD BY AUTOMATED COUNT: 12.3 % (ref 10–15)
GFR SERPL CREATININE-BSD FRML MDRD: >90 ML/MIN/{1.73_M2}
GLUCOSE SERPL-MCNC: 99 MG/DL (ref 70–99)
GLUCOSE UR STRIP-MCNC: NEGATIVE MG/DL
HCT VFR BLD AUTO: 47.7 % (ref 35–47)
HGB BLD-MCNC: 16.2 G/DL (ref 11.7–15.7)
HGB UR QL STRIP: ABNORMAL
HYALINE CASTS #/AREA URNS LPF: 27 /LPF (ref 0–2)
IMM GRANULOCYTES # BLD: 0 10E9/L (ref 0–0.4)
IMM GRANULOCYTES NFR BLD: 0.3 %
KETONES UR STRIP-MCNC: 5 MG/DL
LABORATORY COMMENT REPORT: ABNORMAL
LEUKOCYTE ESTERASE UR QL STRIP: NEGATIVE
LIPASE SERPL-CCNC: 94 U/L (ref 73–393)
LYMPHOCYTES # BLD AUTO: 0.9 10E9/L (ref 0.8–5.3)
LYMPHOCYTES NFR BLD AUTO: 9.7 %
MCH RBC QN AUTO: 32.9 PG (ref 26.5–33)
MCHC RBC AUTO-ENTMCNC: 34 G/DL (ref 31.5–36.5)
MCV RBC AUTO: 97 FL (ref 78–100)
MONOCYTES # BLD AUTO: 0.5 10E9/L (ref 0–1.3)
MONOCYTES NFR BLD AUTO: 5.9 %
MUCOUS THREADS #/AREA URNS LPF: PRESENT /LPF
NEUTROPHILS # BLD AUTO: 7.4 10E9/L (ref 1.6–8.3)
NEUTROPHILS NFR BLD AUTO: 83.9 %
NITRATE UR QL: NEGATIVE
NRBC # BLD AUTO: 0 10*3/UL
NRBC BLD AUTO-RTO: 0 /100
PH UR STRIP: 6 PH (ref 5–7)
PLATELET # BLD AUTO: 221 10E9/L (ref 150–450)
POTASSIUM SERPL-SCNC: 3.5 MMOL/L (ref 3.4–5.3)
PROT SERPL-MCNC: 7.2 G/DL (ref 6.8–8.8)
RBC # BLD AUTO: 4.93 10E12/L (ref 3.8–5.2)
RBC #/AREA URNS AUTO: 4 /HPF (ref 0–2)
SARS-COV-2 RNA RESP QL NAA+PROBE: POSITIVE
SODIUM SERPL-SCNC: 138 MMOL/L (ref 133–144)
SOURCE: ABNORMAL
SP GR UR STRIP: 1.01 (ref 1–1.03)
SPECIMEN SOURCE: ABNORMAL
SQUAMOUS #/AREA URNS AUTO: 2 /HPF (ref 0–1)
UROBILINOGEN UR STRIP-MCNC: 0 MG/DL (ref 0–2)
WBC # BLD AUTO: 8.8 10E9/L (ref 4–11)
WBC #/AREA URNS AUTO: 4 /HPF (ref 0–5)

## 2021-05-03 PROCEDURE — 96376 TX/PRO/DX INJ SAME DRUG ADON: CPT | Performed by: NURSE PRACTITIONER

## 2021-05-03 PROCEDURE — U0003 INFECTIOUS AGENT DETECTION BY NUCLEIC ACID (DNA OR RNA); SEVERE ACUTE RESPIRATORY SYNDROME CORONAVIRUS 2 (SARS-COV-2) (CORONAVIRUS DISEASE [COVID-19]), AMPLIFIED PROBE TECHNIQUE, MAKING USE OF HIGH THROUGHPUT TECHNOLOGIES AS DESCRIBED BY CMS-2020-01-R: HCPCS | Performed by: NURSE PRACTITIONER

## 2021-05-03 PROCEDURE — 81001 URINALYSIS AUTO W/SCOPE: CPT | Performed by: NURSE PRACTITIONER

## 2021-05-03 PROCEDURE — 99285 EMERGENCY DEPT VISIT HI MDM: CPT | Mod: 25 | Performed by: NURSE PRACTITIONER

## 2021-05-03 PROCEDURE — 96375 TX/PRO/DX INJ NEW DRUG ADDON: CPT | Performed by: NURSE PRACTITIONER

## 2021-05-03 PROCEDURE — 258N000003 HC RX IP 258 OP 636: Performed by: NURSE PRACTITIONER

## 2021-05-03 PROCEDURE — 96374 THER/PROPH/DIAG INJ IV PUSH: CPT | Mod: 59 | Performed by: NURSE PRACTITIONER

## 2021-05-03 PROCEDURE — 71045 X-RAY EXAM CHEST 1 VIEW: CPT

## 2021-05-03 PROCEDURE — 96361 HYDRATE IV INFUSION ADD-ON: CPT | Performed by: NURSE PRACTITIONER

## 2021-05-03 PROCEDURE — 99285 EMERGENCY DEPT VISIT HI MDM: CPT | Performed by: NURSE PRACTITIONER

## 2021-05-03 PROCEDURE — 74177 CT ABD & PELVIS W/CONTRAST: CPT

## 2021-05-03 PROCEDURE — 250N000011 HC RX IP 250 OP 636: Performed by: NURSE PRACTITIONER

## 2021-05-03 PROCEDURE — 80053 COMPREHEN METABOLIC PANEL: CPT | Performed by: NURSE PRACTITIONER

## 2021-05-03 PROCEDURE — C9803 HOPD COVID-19 SPEC COLLECT: HCPCS | Performed by: NURSE PRACTITIONER

## 2021-05-03 PROCEDURE — 250N000009 HC RX 250: Performed by: NURSE PRACTITIONER

## 2021-05-03 PROCEDURE — 83690 ASSAY OF LIPASE: CPT | Performed by: NURSE PRACTITIONER

## 2021-05-03 PROCEDURE — U0005 INFEC AGEN DETEC AMPLI PROBE: HCPCS | Performed by: NURSE PRACTITIONER

## 2021-05-03 PROCEDURE — 85025 COMPLETE CBC W/AUTO DIFF WBC: CPT | Performed by: NURSE PRACTITIONER

## 2021-05-03 RX ORDER — HYDROMORPHONE HYDROCHLORIDE 1 MG/ML
0.5 INJECTION, SOLUTION INTRAMUSCULAR; INTRAVENOUS; SUBCUTANEOUS
Status: COMPLETED | OUTPATIENT
Start: 2021-05-03 | End: 2021-05-03

## 2021-05-03 RX ORDER — SODIUM CHLORIDE 9 MG/ML
INJECTION, SOLUTION INTRAVENOUS CONTINUOUS
Status: DISCONTINUED | OUTPATIENT
Start: 2021-05-03 | End: 2021-05-03 | Stop reason: HOSPADM

## 2021-05-03 RX ORDER — HYDROCODONE BITARTRATE AND ACETAMINOPHEN 5; 325 MG/1; MG/1
1 TABLET ORAL EVERY 6 HOURS PRN
Qty: 10 TABLET | Refills: 0 | Status: SHIPPED | OUTPATIENT
Start: 2021-05-03 | End: 2021-05-06

## 2021-05-03 RX ORDER — ONDANSETRON 2 MG/ML
4 INJECTION INTRAMUSCULAR; INTRAVENOUS ONCE
Status: COMPLETED | OUTPATIENT
Start: 2021-05-03 | End: 2021-05-03

## 2021-05-03 RX ORDER — IOPAMIDOL 755 MG/ML
500 INJECTION, SOLUTION INTRAVASCULAR ONCE
Status: COMPLETED | OUTPATIENT
Start: 2021-05-03 | End: 2021-05-03

## 2021-05-03 RX ORDER — ONDANSETRON 4 MG/1
4 TABLET, ORALLY DISINTEGRATING ORAL EVERY 8 HOURS PRN
Qty: 10 TABLET | Refills: 0 | Status: SHIPPED | OUTPATIENT
Start: 2021-05-03 | End: 2021-05-06

## 2021-05-03 RX ORDER — ONDANSETRON 2 MG/ML
4 INJECTION INTRAMUSCULAR; INTRAVENOUS EVERY 30 MIN PRN
Status: DISCONTINUED | OUTPATIENT
Start: 2021-05-03 | End: 2021-05-03 | Stop reason: HOSPADM

## 2021-05-03 RX ORDER — HYDROMORPHONE HYDROCHLORIDE 1 MG/ML
0.5 INJECTION, SOLUTION INTRAMUSCULAR; INTRAVENOUS; SUBCUTANEOUS ONCE
Status: COMPLETED | OUTPATIENT
Start: 2021-05-03 | End: 2021-05-03

## 2021-05-03 RX ADMIN — HYDROMORPHONE HYDROCHLORIDE 0.5 MG: 1 INJECTION, SOLUTION INTRAMUSCULAR; INTRAVENOUS; SUBCUTANEOUS at 15:37

## 2021-05-03 RX ADMIN — SODIUM CHLORIDE: 9 INJECTION, SOLUTION INTRAVENOUS at 15:34

## 2021-05-03 RX ADMIN — SODIUM CHLORIDE 60 ML: 9 INJECTION, SOLUTION INTRAVENOUS at 14:49

## 2021-05-03 RX ADMIN — HYDROMORPHONE HYDROCHLORIDE 0.5 MG: 1 INJECTION, SOLUTION INTRAMUSCULAR; INTRAVENOUS; SUBCUTANEOUS at 14:16

## 2021-05-03 RX ADMIN — SODIUM CHLORIDE 1000 ML: 9 INJECTION, SOLUTION INTRAVENOUS at 14:14

## 2021-05-03 RX ADMIN — ONDANSETRON 4 MG: 2 INJECTION INTRAMUSCULAR; INTRAVENOUS at 14:15

## 2021-05-03 RX ADMIN — ONDANSETRON 4 MG: 2 INJECTION INTRAMUSCULAR; INTRAVENOUS at 15:35

## 2021-05-03 RX ADMIN — IOPAMIDOL 75 ML: 755 INJECTION, SOLUTION INTRAVENOUS at 14:49

## 2021-05-03 ASSESSMENT — ENCOUNTER SYMPTOMS
ABDOMINAL PAIN: 1
CHILLS: 1
SHORTNESS OF BREATH: 1
VOMITING: 1
DIARRHEA: 1
FREQUENCY: 1
DYSURIA: 0
FATIGUE: 1
FEVER: 1
NAUSEA: 1
APPETITE CHANGE: 1
LIGHT-HEADEDNESS: 1
COUGH: 1
BACK PAIN: 1
WEAKNESS: 1
MYALGIAS: 1

## 2021-05-03 NOTE — ED PROVIDER NOTES
History     Chief Complaint   Patient presents with     Shortness of Breath     HPI  Liliana Kat is a 51 year old female with history of PTSD, anxiety, depression, pancreatitis, and seasonal allergic rhinitis who presents to the emergency department for evaluation of cough, nausea, vomiting, diarrhea, and abdominal pain.  Symptoms started on Friday, 3 days ago with abdominal pain, vomiting, and diarrhea.  Cough and congestion started yesterday. Reports fever and chills.  States temps have been >100.  Unable to keep fluids down.  Having multiple watery stools a day.  Denies black or bloody stools.  Abdominal pain is focal to the epigastric region and radiates into her back.  Increased urinary frequency, but no dysuria.  Prior abdominal surgeries include cholecystectomy, removal of ovaries and fallopian tubes, but still has her uterus.  No known ill contacts, but she works at [employer]. patient is a current everyday smoker.  Denies alcohol use or illicit drug use.    Allergies:  Allergies   Allergen Reactions     Amoxicillin Anaphylaxis     Augmentin Swelling and Difficulty breathing     Avelox Nausea and Vomiting     Effexor Xr [Venlafaxine Hydrochloride]      Shaky and heart racing     Sulfa Drugs        Problem List:    Patient Active Problem List    Diagnosis Date Noted     Moderate major depression (H) 09/12/2018     Priority: Medium     TAWANA (generalized anxiety disorder) 09/12/2018     Priority: Medium     PTSD (post-traumatic stress disorder) 09/12/2018     Priority: Medium     Acidosis 08/31/2018     Priority: Medium     Drug-induced anaphylaxis, initial encounter 08/30/2018     Priority: Medium     Anaphylactic reaction 08/30/2018     Priority: Medium     Seasonal allergic rhinitis 08/30/2018     Priority: Medium     Hypokalemia 08/30/2018     Priority: Medium     Labral tear of shoulder, right, subsequent encounter 05/03/2017     Priority: Medium     Acute biliary pancreatitis,  unspecified complication status 2017     Priority: Medium     Adjustment disorder with mixed anxiety and depressed mood 2017     Priority: Medium     Acute pain of right shoulder 2017     Priority: Medium     Superficial varicosities 2013     Priority: Medium     Bladder polyps      Priority: Medium     CARDIOVASCULAR SCREENING; LDL GOAL LESS THAN 160 10/31/2010     Priority: Medium     Need for postmenopausal hormone replacement 2008     Priority: Medium     Decreased libido 2008     Priority: Medium        Past Medical History:    Past Medical History:   Diagnosis Date     Allergic rhinitis, cause unspecified      Anxiety      Bladder polyps      Depressive disorder      Obstructive chronic bronchitis with exacerbation (H)      Other and unspecified ovarian cyst      Other and unspecified ovarian cyst 2005     Tobacco abuse      Urinary tract infection, site not specified        Past Surgical History:    Past Surgical History:   Procedure Laterality Date     C LIGATE FALLOPIAN TUBE,POSTPARTUM      Tubal Ligation     ESOPHAGOSCOPY, GASTROSCOPY, DUODENOSCOPY (EGD), COMBINED  2012    Procedure: COMBINED ESOPHAGOSCOPY, GASTROSCOPY, DUODENOSCOPY (EGD), BIOPSY SINGLE OR MULTIPLE;  ESOPHAGOSCOPY, GASTROSCOPY, DUODENOSCOPY (EGD) with biopsy;  Surgeon: Herson Cabrera MD;  Location: [facility]     EXCIS VAGINAL CYST/TUMOR       HC REMOVAL OF OVARY/TUBE(S)  3/6/2006    Laparoscopic bilateral salpingo-oophorectomy.     HC TYMPANOPLASTY W/O MASTOID, INIT/REV W/O OSS CHAIN RECONST       LAPAROSCOPIC CHOLECYSTECTOMY N/A 2017    Procedure: LAPAROSCOPIC CHOLECYSTECTOMY;  Surgeon: Shivam Luevano MD;  Location: [facility]       Family History:    Family History   Problem Relation Age of Onset     Alcohol/Drug Father          at age 53 of alcohol     Arthritis Father      Cancer Father         lung     Alcohol/Drug Paternal Grandfather      Cancer Paternal Grandfather          lung     Alcohol/Drug Paternal Grandmother      Cancer Paternal Grandmother         lung     Alcohol/Drug Paternal Aunt      Allergies Daughter         animals and grass     Allergies Son         dust mite     Arthritis Mother      Hypertension Mother      Lipids Mother      Depression Mother      Heart Disease Mother      Blood Disease Mother         DVTs     Bleeding Disorder Mother      Liver Disease Mother      Depression Brother        Social History:  Marital Status:   [2]  Social History     Tobacco Use     Smoking status: Former Smoker     Packs/day: 0.00     Years: 10.00     Pack years: 0.00     Types: Cigarettes     Quit date: 2020     Years since quittin.0     Smokeless tobacco: Never Used     Tobacco comment: 3 cigs a day    Substance Use Topics     Alcohol use: No     Alcohol/week: 0.0 standard drinks     Comment: recovering  -      Drug use: No        Medications:    HYDROcodone-acetaminophen (NORCO) 5-325 MG tablet  ondansetron (ZOFRAN ODT) 4 MG ODT tab  albuterol (2.5 MG/3ML) 0.083% neb solution  albuterol (PROAIR HFA/PROVENTIL HFA/VENTOLIN HFA) 108 (90 Base) MCG/ACT inhaler  ALPRAZolam (XANAX) 0.5 MG tablet  aspirin 81 MG tablet  citalopram (CELEXA) 10 MG tablet  citalopram (CELEXA) 20 MG tablet  estradiol (ESTRACE) 1 MG tablet  fluticasone (FLONASE) 50 MCG/ACT spray  loratadine (CLARITIN) 10 MG tablet  meclizine (ANTIVERT) 25 MG tablet  medroxyPROGESTERone (PROVERA) 5 MG tablet  MELATONIN          Review of Systems   Constitutional: Positive for appetite change, chills, fatigue and fever.   HENT: Positive for congestion.    Respiratory: Positive for cough and shortness of breath.    Gastrointestinal: Positive for abdominal pain, diarrhea, nausea and vomiting.   Genitourinary: Positive for frequency. Negative for dysuria.   Musculoskeletal: Positive for back pain and myalgias.   Skin: Negative.    Neurological: Positive for weakness and light-headedness.   All other systems  reviewed and are negative.      Physical Exam   BP: 107/77  Pulse: 69  Temp: 98.8  F (37.1  C)  Resp: 18  Weight: 71.7 kg (158 lb)  SpO2: 97 %      Physical Exam  Vitals signs reviewed.   Constitutional:       General: She is in acute distress.      Appearance: She is ill-appearing. She is not toxic-appearing.   HENT:      Head: Normocephalic and atraumatic.   Eyes:      General: No scleral icterus.     Conjunctiva/sclera: Conjunctivae normal.   Cardiovascular:      Rate and Rhythm: Normal rate and regular rhythm.      Heart sounds: No murmur.   Pulmonary:      Effort: Pulmonary effort is normal. No respiratory distress.      Breath sounds: Normal breath sounds.      Comments: Frequent dry cough during exam.  Abdominal:      General: Bowel sounds are normal. There is no distension.      Palpations: Abdomen is soft. There is no hepatomegaly or splenomegaly.      Tenderness: There is abdominal tenderness in the epigastric area.   Musculoskeletal: Normal range of motion.      Right lower leg: No edema.      Left lower leg: No edema.   Skin:     General: Skin is warm and dry.   Neurological:      General: No focal deficit present.      Mental Status: She is alert and oriented to person, place, and time.         ED Course        Procedures               Results for orders placed or performed during the hospital encounter of 05/03/21 (from the past 24 hour(s))   CBC with platelets differential   Result Value Ref Range    WBC 8.8 4.0 - 11.0 10e9/L    RBC Count 4.93 3.8 - 5.2 10e12/L    Hemoglobin 16.2 (H) 11.7 - 15.7 g/dL    Hematocrit 47.7 (H) 35.0 - 47.0 %    MCV 97 78 - 100 fl    MCH 32.9 26.5 - 33.0 pg    MCHC 34.0 31.5 - 36.5 g/dL    RDW 12.3 10.0 - 15.0 %    Platelet Count 221 150 - 450 10e9/L    Diff Method Automated Method     % Neutrophils 83.9 %    % Lymphocytes 9.7 %    % Monocytes 5.9 %    % Eosinophils 0.0 %    % Basophils 0.2 %    % Immature Granulocytes 0.3 %    Nucleated RBCs 0 0 /100    Absolute Neutrophil  7.4 1.6 - 8.3 10e9/L    Absolute Lymphocytes 0.9 0.8 - 5.3 10e9/L    Absolute Monocytes 0.5 0.0 - 1.3 10e9/L    Absolute Eosinophils 0.0 0.0 - 0.7 10e9/L    Absolute Basophils 0.0 0.0 - 0.2 10e9/L    Abs Immature Granulocytes 0.0 0 - 0.4 10e9/L    Absolute Nucleated RBC 0.0    Comprehensive metabolic panel   Result Value Ref Range    Sodium 138 133 - 144 mmol/L    Potassium 3.5 3.4 - 5.3 mmol/L    Chloride 108 94 - 109 mmol/L    Carbon Dioxide 22 20 - 32 mmol/L    Anion Gap 8 3 - 14 mmol/L    Glucose 99 70 - 99 mg/dL    Urea Nitrogen 7 7 - 30 mg/dL    Creatinine 0.70 0.52 - 1.04 mg/dL    GFR Estimate >90 >60 mL/min/[1.73_m2]    GFR Estimate If Black >90 >60 mL/min/[1.73_m2]    Calcium 8.2 (L) 8.5 - 10.1 mg/dL    Bilirubin Total 0.3 0.2 - 1.3 mg/dL    Albumin 3.5 3.4 - 5.0 g/dL    Protein Total 7.2 6.8 - 8.8 g/dL    Alkaline Phosphatase 111 40 - 150 U/L    ALT 32 0 - 50 U/L    AST 22 0 - 45 U/L   Lipase   Result Value Ref Range    Lipase 94 73 - 393 U/L   UA with Microscopic   Result Value Ref Range    Color Urine Yellow     Appearance Urine Slightly Cloudy     Glucose Urine Negative NEG^Negative mg/dL    Bilirubin Urine Negative NEG^Negative    Ketones Urine 5 (A) NEG^Negative mg/dL    Specific Gravity Urine 1.014 1.003 - 1.035    Blood Urine Small (A) NEG^Negative    pH Urine 6.0 5.0 - 7.0 pH    Protein Albumin Urine 100 (A) NEG^Negative mg/dL    Urobilinogen mg/dL 0.0 0.0 - 2.0 mg/dL    Nitrite Urine Negative NEG^Negative    Leukocyte Esterase Urine Negative NEG^Negative    Source Midstream Urine     WBC Urine 4 0 - 5 /HPF    RBC Urine 4 (H) 0 - 2 /HPF    Squamous Epithelial /HPF Urine 2 (H) 0 - 1 /HPF    Mucous Urine Present (A) NEG^Negative /LPF    Hyaline Casts 27 (H) 0 - 2 /LPF   CT ABDOMEN PELVIS W CONTRAST    Narrative    CT ABDOMEN AND PELVIS WITH CONTRAST 5/3/2021 3:11 PM    CLINICAL HISTORY: Nausea/vomiting. Epigastric pain.    TECHNIQUE: CT scan of the abdomen and pelvis was performed  following  injection of IV contrast. Multiplanar reformats were obtained. Dose  reduction techniques were used.    CONTRAST: 75mL Isovue 370     COMPARISON: 4/12/2017    FINDINGS:   LOWER CHEST: Minor groundglass opacities are noted in the lungs.    HEPATOBILIARY: Cholecystectomy. Liver is unremarkable.    PANCREAS: Normal.    SPLEEN: Normal.    ADRENAL GLANDS: Normal.    KIDNEYS/BLADDER: Normal.    BOWEL: Normal appendix. No bowel obstruction or free air.    PELVIC ORGANS: Normal.    ADDITIONAL FINDINGS: No ascites or lymphadenopathy.    MUSCULOSKELETAL: Normal.      Impression    IMPRESSION:   1.  Minor groundglass opacities at the lung bases are nonspecific but  raise the possibility of COVID-19.  2.  No acute abnormality demonstrated in the abdomen or pelvis.    LUZ MARINA DIALLO MD   XR Chest Port 1 View    Narrative    CHEST ONE VIEW PORTABLE   5/3/2021 4:17 PM     HISTORY: Cough, shortness of breath.    COMPARISON: 8/30/2018      Impression    IMPRESSION: Unremarkable single view of the chest.    LUZ MARINA DIALLO MD       Medications   0.9% sodium chloride BOLUS (0 mLs Intravenous Stopped 5/3/21 1531)     Followed by   sodium chloride 0.9% infusion (0 mLs Intravenous Stopped 5/3/21 1637)   ondansetron (ZOFRAN) injection 4 mg (4 mg Intravenous Given 5/3/21 1415)   HYDROmorphone (PF) (DILAUDID) injection 0.5 mg (0.5 mg Intravenous Given 5/3/21 1416)   iopamidol (ISOVUE-370) solution 500 mL (75 mLs Intravenous Given 5/3/21 1449)   sodium chloride (PF) 0.9% PF flush 3 mL (3 mLs Intravenous Given 5/3/21 1449)   sodium chloride 0.9 % bag 100mL for CT scan flush use (60 mLs Intravenous Given 5/3/21 1449)   ondansetron (ZOFRAN) injection 4 mg (4 mg Intravenous Given 5/3/21 1535)   HYDROmorphone (PF) (DILAUDID) injection 0.5 mg (0.5 mg Intravenous Given 5/3/21 1537)       Assessments & Plan (with Medical Decision Making)\  51 year old female with fevers, abdominal pain, N/VD that started 3 days ago and yesterday  started to have cough and congestion. Unable to keep fluids down.  On exam patient appears distressed and ill-appearing. Nontoxic. Afebrile here. Normotensive. No tachycardia. Lung sounds CTA. No hypoxia. Epigastric abdominal tenderness, otherwise abdomen is soft and nondistended. Hgb mildly elevated otherwise normal WBC. Calcium low at 8.2 but remainder of electrolytes are normal. Kidney function is normal. Normal Lipase and normal LFTs.  UA with small blood, 100 protein, 4 RBCs, 27 hyaline cast, but negative nitrite or leuk est. Abdominal/pelvis CT reveals minor groundglass opacities at the lung bases which could be representative of a COVID-19 infection.  The remainder of the abdominal/pelvis CT is unremarkable.  Portable chest x-ray is negative for infiltrate or consolidation.  Patient was given IV normal saline 1 L bolus, IV Zofran, and IV Dilaudid.  She has had no further nausea or vomiting here in the emergency department.  I suspect she has a COVID-19 infection, and her testing for COVID-19 result is pending at this time.  I discussed the lab and imaging findings with patient.  We discussed symptomatic treatment.  She was provided a small number of Norco and Zofran for her pain and nausea.  Patient given quarantine instruction.  She was also provided a referral for the get well loop.  Plan:  Your symptoms and imaging findings are suspicious for Covid-19 infection.  Rest.  Quarantine at home for 10 days from the onset of symptoms. See handout.  Stay well hydrated.  Zofran 4 mg every 8 hours as needed for nausea or vomiting.  Tylenol 650 mg every 4-6 hours as needed for body aches, headache, back pain.  Norco 1 tablet every 6 hours as needed for moderate/severe pain. Do not drive or drink alcohol with this medication.     (you can take 1 tylenol tablet with Norco tablet. Maximum of 3000 mg of Tylenol in 24 hours)  GetWell Loop referral  placed (you should receive an email regarding this). See handout.  Return  to the emergency department for chest pain, increased shortness of breath or difficulty breathing, unable to keep fluids down, or any other worsening symptoms.  .     I have reviewed the nursing notes.    I have reviewed the findings, diagnosis, plan and need for follow up with the patient.      Discharge Medication List as of 5/3/2021  4:37 PM      START taking these medications    Details   HYDROcodone-acetaminophen (NORCO) 5-325 MG tablet Take 1 tablet by mouth every 6 hours as needed for severe pain, Disp-10 tablet, R-0, E-Prescribe      ondansetron (ZOFRAN ODT) 4 MG ODT tab Take 1 tablet (4 mg) by mouth every 8 hours as needed for nausea, Disp-10 tablet, R-0, E-Prescribe             Final diagnoses:   Nausea vomiting and diarrhea   Cough   Shortness of breath   Suspected COVID-19 virus infection       5/3/2021   [facility] EMERGENCY DEPT     Jacob, Lisa Cannon, LOLIS STRAUSS  05/03/21 9919

## 2021-05-03 NOTE — DISCHARGE INSTRUCTIONS
Your symptoms and imaging findings are suspicious for Covid-19 infection.  Rest.  Quarantine at home for 10 days from the onset of symptoms. See handout.  Stay well hydrated.  Zofran 4 mg every 8 hours as needed for nausea or vomiting.  Tylenol 650 mg every 4-6 hours as needed for body aches, headache, back pain.  Norco 1 tablet every 6 hours as needed for moderate/severe pain. Do not drive or drink alcohol with this medication.     (you can take 1 tylenol tablet with Norco tablet. Maximum of 3000 mg of Tylenol in 24 hours)  GetWell Loop referral  placed (you should receive an email regarding this). See handout.  Return to the emergency department for chest pain, increased shortness of breath or difficulty breathing, unable to keep fluids down, or any other worsening symptoms.

## 2021-05-03 NOTE — LETTER
May 3, 2021      To Whom It May Concern:      Liliana Kat was seen in our Emergency Department today, 05/03/21.   No work 5/3/2021 - 5/10/2021    Sincerely,        LOLIS Morrison CNP

## 2021-05-03 NOTE — ED NOTES
"Pt reports feeling a little better but also reports \"this really sucks\", as in covid and the symptoms of it  "

## 2021-05-04 ENCOUNTER — TELEPHONE (OUTPATIENT)
Dept: EMERGENCY MEDICINE | Facility: CLINIC | Age: 51
End: 2021-05-04

## 2021-05-04 ENCOUNTER — PATIENT OUTREACH (OUTPATIENT)
Dept: CARE COORDINATION | Facility: CLINIC | Age: 51
End: 2021-05-04

## 2021-05-04 DIAGNOSIS — U07.1 2019 NOVEL CORONAVIRUS DISEASE (COVID-19): Primary | ICD-10-CM

## 2021-05-04 NOTE — PROGRESS NOTES
Clinic Care Coordination Contact  [facility]/Voicemail       Clinical Data: Care Coordinator Outreach  Outreach attempted x 1.  Left message on patient's voicemail with call back information and requested return call.  Plan: Care Coordinator will try to reach patient again in 1-2 business days.

## 2021-05-04 NOTE — ED TRIAGE NOTES
RN called patient in attempt to notify of positive COVID result. No answer, VM left to call ED. Lois Raza RN

## 2021-05-04 NOTE — TELEPHONE ENCOUNTER
Coronavirus (COVID-19) Notification    Reason for call  Notify of POSITIVE  COVID-19 lab result, assess symptoms,  review [facility] recommendations    Lab Result   Lab test for 2019-nCoV rRt-PCR or SARS-COV-2 PCR  Oropharyngeal AND/OR nasopharyngeal swabs were POSITIVE for 2019-nCoV RNA [OR] SARS-COV-2 RNA (COVID-19) RNA     We have been unable to reach Patient by phone at this time to notify of their Positive COVID-19 result.  Left voicemail message requesting a call back to [phone] [facility] for results.        POSITIVE COVID-19 Letter sent.    Pastora Bryan LPN

## 2021-05-05 NOTE — PROGRESS NOTES
Clinic Care Coordination Contact  [facility]/Voicemail       Clinical Data: Care Coordinator Outreach  Outreach attempted x 2.  Left message on patient's voicemail with call back information and requested return call.  Plan:  Care Coordinator will do no further outreaches at this time.

## 2021-05-12 ENCOUNTER — MYC MEDICAL ADVICE (OUTPATIENT)
Dept: FAMILY MEDICINE | Facility: CLINIC | Age: 51
End: 2021-05-12

## 2021-05-30 ENCOUNTER — MYC MEDICAL ADVICE (OUTPATIENT)
Dept: FAMILY MEDICINE | Facility: CLINIC | Age: 51
End: 2021-05-30

## 2021-05-31 ENCOUNTER — MYC REFILL (OUTPATIENT)
Dept: FAMILY MEDICINE | Facility: CLINIC | Age: 51
End: 2021-05-31

## 2021-05-31 DIAGNOSIS — F43.23 ADJUSTMENT DISORDER WITH MIXED ANXIETY AND DEPRESSED MOOD: ICD-10-CM

## 2021-06-01 RX ORDER — ALPRAZOLAM 0.5 MG
TABLET ORAL
Qty: 90 TABLET | Refills: 0 | Status: SHIPPED | OUTPATIENT
Start: 2021-06-01 | End: 2021-06-30

## 2021-06-01 NOTE — TELEPHONE ENCOUNTER
Requested Prescriptions   Pending Prescriptions Disp Refills     ALPRAZolam (XANAX) 0.5 MG tablet 90 tablet 0     Sig: Take one tab by mouth 3 times a day as needed     Last Written Prescription Date:  04/30/2021  Last Fill Quantity: 90,   # refills: 0  Last Office Visit: 03/11/2021  Future Office visit:       Routing refill request to provider for review/approval because:  Drug not on the [facility] or [facility] refill protocol or controlled substance    Routing to covering provider to advise.   Susi Arenas MA

## 2021-06-09 ENCOUNTER — IMMUNIZATION (OUTPATIENT)
Dept: FAMILY MEDICINE | Facility: CLINIC | Age: 51
End: 2021-06-09
Payer: COMMERCIAL

## 2021-06-09 PROCEDURE — 0011A PR COVID VAC MODERNA 100 MCG/0.5 ML IM: CPT

## 2021-06-09 PROCEDURE — 91301 PR COVID VAC MODERNA 100 MCG/0.5 ML IM: CPT

## 2021-06-28 DIAGNOSIS — F43.23 ADJUSTMENT DISORDER WITH MIXED ANXIETY AND DEPRESSED MOOD: ICD-10-CM

## 2021-06-28 NOTE — TELEPHONE ENCOUNTER
Requested Prescriptions   Pending Prescriptions Disp Refills     ALPRAZolam (XANAX) 0.5 MG tablet [Pharmacy Med Name: ALPRAZOLAM 0.5MG TABS] 90 tablet 0     Sig: TAKE ONE TABLET BY MOUTH THREE TIMES A DAY AS NEEDED     Last Written Prescription Date:  06/01/2021  Last Fill Quantity: 90,   # refills: 0  Last Office Visit: 03/11/2021  Future Office visit:       Routing refill request to provider for review/approval because:  Drug not on the [facility], [facility] or [facility] refill protocol or controlled substance

## 2021-06-30 RX ORDER — ALPRAZOLAM 0.5 MG
TABLET ORAL
Qty: 90 TABLET | Refills: 0 | Status: SHIPPED | OUTPATIENT
Start: 2021-06-30 | End: 2021-07-28

## 2021-07-27 DIAGNOSIS — F43.23 ADJUSTMENT DISORDER WITH MIXED ANXIETY AND DEPRESSED MOOD: ICD-10-CM

## 2021-07-27 NOTE — TELEPHONE ENCOUNTER
Routing to covering provider to review. PCP out of clinic.  Routing refill request to provider for review/approval because:  Drug not on the [facility] refill protocol     Last Written Prescription Date:  6/30/2021  Last Fill Quantity: 90,  # refills: 0   Last office visit: 3/11/2021 with prescribing provider:  Bjorn     Requested Prescriptions   Pending Prescriptions Disp Refills     ALPRAZolam (XANAX) 0.5 MG tablet [Pharmacy Med Name: ALPRAZOLAM 0.5MG TABS] 90 tablet 0     Sig: TAKE ONE TABLET BY MOUTH THREE TIMES A DAY AS NEEDED       There is no refill protocol information for this order        Shira Vargas RN on 7/27/2021 at 11:41 AM

## 2021-07-28 RX ORDER — ALPRAZOLAM 0.5 MG
TABLET ORAL
Qty: 90 TABLET | Refills: 0 | Status: SHIPPED | OUTPATIENT
Start: 2021-07-28 | End: 2021-08-25

## 2021-07-28 NOTE — TELEPHONE ENCOUNTER
Due for visit with PCP to follow up on anxiety. Please call to set up appointment.  Ignacia Esquivel, CNP

## 2021-08-05 ASSESSMENT — ENCOUNTER SYMPTOMS
NAUSEA: 0
FEVER: 0
MYALGIAS: 1
SORE THROAT: 0
DYSURIA: 0
NERVOUS/ANXIOUS: 1
HEMATURIA: 0
WEAKNESS: 1
EYE PAIN: 0
ABDOMINAL PAIN: 0
DIARRHEA: 0
CONSTIPATION: 0
SHORTNESS OF BREATH: 0
COUGH: 0
PALPITATIONS: 1
FREQUENCY: 1
JOINT SWELLING: 1
HEMATOCHEZIA: 0
ARTHRALGIAS: 1
CHILLS: 0
PARESTHESIAS: 1
HEADACHES: 1
BREAST MASS: 0
HEARTBURN: 0

## 2021-08-05 ASSESSMENT — PATIENT HEALTH QUESTIONNAIRE - PHQ9
10. IF YOU CHECKED OFF ANY PROBLEMS, HOW DIFFICULT HAVE THESE PROBLEMS MADE IT FOR YOU TO DO YOUR WORK, TAKE CARE OF THINGS AT HOME, OR GET ALONG WITH OTHER PEOPLE: VERY DIFFICULT
SUM OF ALL RESPONSES TO PHQ QUESTIONS 1-9: 18
SUM OF ALL RESPONSES TO PHQ QUESTIONS 1-9: 18

## 2021-08-06 ASSESSMENT — PATIENT HEALTH QUESTIONNAIRE - PHQ9: SUM OF ALL RESPONSES TO PHQ QUESTIONS 1-9: 18

## 2021-08-12 ENCOUNTER — HOSPITAL ENCOUNTER (OUTPATIENT)
Dept: GENERAL RADIOLOGY | Facility: CLINIC | Age: 51
End: 2021-08-12
Attending: FAMILY MEDICINE
Payer: COMMERCIAL

## 2021-08-12 ENCOUNTER — OFFICE VISIT (OUTPATIENT)
Dept: FAMILY MEDICINE | Facility: CLINIC | Age: 51
End: 2021-08-12
Payer: COMMERCIAL

## 2021-08-12 VITALS
HEIGHT: 65 IN | BODY MASS INDEX: 27.22 KG/M2 | OXYGEN SATURATION: 97 % | SYSTOLIC BLOOD PRESSURE: 116 MMHG | DIASTOLIC BLOOD PRESSURE: 70 MMHG | TEMPERATURE: 97.5 F | RESPIRATION RATE: 16 BRPM | HEART RATE: 95 BPM | WEIGHT: 163.4 LBS

## 2021-08-12 DIAGNOSIS — M25.551 HIP PAIN, RIGHT: ICD-10-CM

## 2021-08-12 DIAGNOSIS — I83.90 SUPERFICIAL VARICOSITIES: ICD-10-CM

## 2021-08-12 DIAGNOSIS — R30.0 DYSURIA: ICD-10-CM

## 2021-08-12 DIAGNOSIS — Z13.6 CARDIOVASCULAR SCREENING; LDL GOAL LESS THAN 160: ICD-10-CM

## 2021-08-12 DIAGNOSIS — M79.604 PAIN OF RIGHT LOWER EXTREMITY: ICD-10-CM

## 2021-08-12 DIAGNOSIS — Z23 HIGH PRIORITY FOR 2019-NCOV VACCINE: ICD-10-CM

## 2021-08-12 DIAGNOSIS — F32.1 MODERATE MAJOR DEPRESSION (H): ICD-10-CM

## 2021-08-12 DIAGNOSIS — Z00.00 ROUTINE GENERAL MEDICAL EXAMINATION AT A HEALTH CARE FACILITY: Primary | ICD-10-CM

## 2021-08-12 LAB
ALBUMIN SERPL-MCNC: 3.7 G/DL (ref 3.4–5)
ALBUMIN UR-MCNC: NEGATIVE MG/DL
ALP SERPL-CCNC: 89 U/L (ref 40–150)
ALT SERPL W P-5'-P-CCNC: 32 U/L (ref 0–50)
ANION GAP SERPL CALCULATED.3IONS-SCNC: 1 MMOL/L (ref 3–14)
APPEARANCE UR: CLEAR
AST SERPL W P-5'-P-CCNC: 20 U/L (ref 0–45)
BASOPHILS # BLD AUTO: 0 10E3/UL (ref 0–0.2)
BASOPHILS NFR BLD AUTO: 1 %
BILIRUB SERPL-MCNC: 0.3 MG/DL (ref 0.2–1.3)
BILIRUB UR QL STRIP: NEGATIVE
BUN SERPL-MCNC: 11 MG/DL (ref 7–30)
CALCIUM SERPL-MCNC: 8.9 MG/DL (ref 8.5–10.1)
CHLORIDE BLD-SCNC: 111 MMOL/L (ref 94–109)
CHOLEST SERPL-MCNC: 259 MG/DL
CO2 SERPL-SCNC: 29 MMOL/L (ref 20–32)
COLOR UR AUTO: YELLOW
CREAT SERPL-MCNC: 0.8 MG/DL (ref 0.52–1.04)
CRP SERPL-MCNC: 3 MG/L (ref 0–8)
EOSINOPHIL # BLD AUTO: 0.1 10E3/UL (ref 0–0.7)
EOSINOPHIL NFR BLD AUTO: 1 %
ERYTHROCYTE [DISTWIDTH] IN BLOOD BY AUTOMATED COUNT: 12.5 % (ref 10–15)
FASTING STATUS PATIENT QL REPORTED: YES
GFR SERPL CREATININE-BSD FRML MDRD: 87 ML/MIN/1.73M2
GLUCOSE BLD-MCNC: 94 MG/DL (ref 70–99)
GLUCOSE UR STRIP-MCNC: NEGATIVE MG/DL
HCT VFR BLD AUTO: 43.5 % (ref 35–47)
HDLC SERPL-MCNC: 50 MG/DL
HGB BLD-MCNC: 14.6 G/DL (ref 11.7–15.7)
HGB UR QL STRIP: ABNORMAL
IMM GRANULOCYTES # BLD: 0 10E3/UL
IMM GRANULOCYTES NFR BLD: 0 %
KETONES UR STRIP-MCNC: NEGATIVE MG/DL
LDLC SERPL CALC-MCNC: 156 MG/DL
LEUKOCYTE ESTERASE UR QL STRIP: NEGATIVE
LYMPHOCYTES # BLD AUTO: 1.4 10E3/UL (ref 0.8–5.3)
LYMPHOCYTES NFR BLD AUTO: 21 %
MCH RBC QN AUTO: 33.5 PG (ref 26.5–33)
MCHC RBC AUTO-ENTMCNC: 33.6 G/DL (ref 31.5–36.5)
MCV RBC AUTO: 100 FL (ref 78–100)
MONOCYTES # BLD AUTO: 0.5 10E3/UL (ref 0–1.3)
MONOCYTES NFR BLD AUTO: 7 %
NEUTROPHILS # BLD AUTO: 4.6 10E3/UL (ref 1.6–8.3)
NEUTROPHILS NFR BLD AUTO: 70 %
NITRATE UR QL: NEGATIVE
NONHDLC SERPL-MCNC: 209 MG/DL
NRBC # BLD AUTO: 0 10E3/UL
NRBC BLD AUTO-RTO: 0 /100
PH UR STRIP: 6 [PH] (ref 5–7)
PLATELET # BLD AUTO: 282 10E3/UL (ref 150–450)
POTASSIUM BLD-SCNC: 3.9 MMOL/L (ref 3.4–5.3)
PROT SERPL-MCNC: 7.4 G/DL (ref 6.8–8.8)
RBC # BLD AUTO: 4.36 10E6/UL (ref 3.8–5.2)
RBC URINE: 8 /HPF
SODIUM SERPL-SCNC: 141 MMOL/L (ref 133–144)
SP GR UR STRIP: 1.01 (ref 1–1.03)
SQUAMOUS EPITHELIAL: 1 /HPF
TRIGL SERPL-MCNC: 265 MG/DL
UROBILINOGEN UR STRIP-MCNC: NORMAL MG/DL
WBC # BLD AUTO: 6.6 10E3/UL (ref 4–11)
WBC URINE: 0 /HPF

## 2021-08-12 PROCEDURE — 81001 URINALYSIS AUTO W/SCOPE: CPT | Performed by: FAMILY MEDICINE

## 2021-08-12 PROCEDURE — 36415 COLL VENOUS BLD VENIPUNCTURE: CPT | Performed by: FAMILY MEDICINE

## 2021-08-12 PROCEDURE — 0012A COVID-19,PF,MODERNA: CPT | Performed by: FAMILY MEDICINE

## 2021-08-12 PROCEDURE — 99396 PREV VISIT EST AGE 40-64: CPT | Performed by: FAMILY MEDICINE

## 2021-08-12 PROCEDURE — 73502 X-RAY EXAM HIP UNI 2-3 VIEWS: CPT

## 2021-08-12 PROCEDURE — 91301 COVID-19,PF,MODERNA: CPT | Performed by: FAMILY MEDICINE

## 2021-08-12 PROCEDURE — 80061 LIPID PANEL: CPT | Performed by: FAMILY MEDICINE

## 2021-08-12 PROCEDURE — 86140 C-REACTIVE PROTEIN: CPT | Performed by: FAMILY MEDICINE

## 2021-08-12 PROCEDURE — 85025 COMPLETE CBC W/AUTO DIFF WBC: CPT | Performed by: FAMILY MEDICINE

## 2021-08-12 PROCEDURE — 87624 HPV HI-RISK TYP POOLED RSLT: CPT | Performed by: FAMILY MEDICINE

## 2021-08-12 PROCEDURE — 73562 X-RAY EXAM OF KNEE 3: CPT | Mod: RT

## 2021-08-12 PROCEDURE — 80053 COMPREHEN METABOLIC PANEL: CPT | Performed by: FAMILY MEDICINE

## 2021-08-12 PROCEDURE — 99213 OFFICE O/P EST LOW 20 MIN: CPT | Mod: 25 | Performed by: FAMILY MEDICINE

## 2021-08-12 PROCEDURE — G0145 SCR C/V CYTO,THINLAYER,RESCR: HCPCS | Performed by: FAMILY MEDICINE

## 2021-08-12 ASSESSMENT — ENCOUNTER SYMPTOMS
ARTHRALGIAS: 1
ABDOMINAL PAIN: 0
HEMATURIA: 0
BREAST MASS: 0
NAUSEA: 0
WEAKNESS: 1
SHORTNESS OF BREATH: 0
NERVOUS/ANXIOUS: 1
PARESTHESIAS: 1
FREQUENCY: 1
DIARRHEA: 0
FEVER: 0
SORE THROAT: 0
HEMATOCHEZIA: 0
CHILLS: 0
MYALGIAS: 1
CONSTIPATION: 0
JOINT SWELLING: 1
COUGH: 0
EYE PAIN: 0
DYSURIA: 0
HEADACHES: 1
PALPITATIONS: 1
HEARTBURN: 0

## 2021-08-12 ASSESSMENT — MIFFLIN-ST. JEOR: SCORE: 1372.67
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
## 2021-08-13 ENCOUNTER — MYC MEDICAL ADVICE (OUTPATIENT)
Dept: FAMILY MEDICINE | Facility: CLINIC | Age: 51
End: 2021-08-13

## 2021-08-13 NOTE — TELEPHONE ENCOUNTER
Spoke with patient and informed of note below. She will have her  come to clinic to  his name is Elvin.     Letter printed signed and brought to  for      Per Dr. So approved letter to return to work on 8/16/2021  Susi Arenas MA

## 2021-08-16 ENCOUNTER — TELEPHONE (OUTPATIENT)
Dept: FAMILY MEDICINE | Facility: CLINIC | Age: 51
End: 2021-08-16

## 2021-08-16 ENCOUNTER — MYC MEDICAL ADVICE (OUTPATIENT)
Dept: FAMILY MEDICINE | Facility: CLINIC | Age: 51
End: 2021-08-16

## 2021-08-16 DIAGNOSIS — M79.604 PAIN OF RIGHT LOWER EXTREMITY: Primary | ICD-10-CM

## 2021-08-16 LAB
BKR LAB AP GYN ADEQUACY: NORMAL
BKR LAB AP GYN INTERPRETATION: NORMAL
BKR LAB AP HPV REFLEX: NORMAL
BKR LAB AP PREVIOUS ABNORMAL: NORMAL
PATH REPORT.COMMENTS IMP SPEC: NORMAL
PATH REPORT.RELEVANT HX SPEC: NORMAL

## 2021-08-17 ENCOUNTER — MYC MEDICAL ADVICE (OUTPATIENT)
Dept: FAMILY MEDICINE | Facility: CLINIC | Age: 51
End: 2021-08-17

## 2021-08-18 LAB
HUMAN PAPILLOMA VIRUS 16 DNA: NEGATIVE
HUMAN PAPILLOMA VIRUS 18 DNA: NEGATIVE
HUMAN PAPILLOMA VIRUS FINAL DIAGNOSIS: NORMAL
HUMAN PAPILLOMA VIRUS OTHER HR: NEGATIVE

## 2021-08-19 ENCOUNTER — MYC MEDICAL ADVICE (OUTPATIENT)
Dept: FAMILY MEDICINE | Facility: CLINIC | Age: 51
End: 2021-08-19

## 2021-08-19 DIAGNOSIS — E78.5 HYPERLIPIDEMIA LDL GOAL <130: Primary | ICD-10-CM

## 2021-08-19 RX ORDER — ATORVASTATIN CALCIUM 20 MG/1
20 TABLET, FILM COATED ORAL DAILY
Qty: 90 TABLET | Refills: 1 | Status: SHIPPED | OUTPATIENT
Start: 2021-08-19 | End: 2023-01-04

## 2021-08-19 NOTE — TELEPHONE ENCOUNTER
Cholesterol was elevated at 259 I would recommend getting on Lipitor 20 mg a day and we will recheck lipid panel and AST and ALT which are liver tests in 2 months.  This would not be the cause of your chest discomfort

## 2021-08-25 ENCOUNTER — MYC MEDICAL ADVICE (OUTPATIENT)
Dept: FAMILY MEDICINE | Facility: CLINIC | Age: 51
End: 2021-08-25

## 2021-08-25 ENCOUNTER — MYC REFILL (OUTPATIENT)
Dept: FAMILY MEDICINE | Facility: CLINIC | Age: 51
End: 2021-08-25

## 2021-08-25 DIAGNOSIS — Z79.890 NEED FOR POSTMENOPAUSAL HORMONE REPLACEMENT: ICD-10-CM

## 2021-08-25 DIAGNOSIS — F32.1 MODERATE MAJOR DEPRESSION (H): ICD-10-CM

## 2021-08-25 DIAGNOSIS — F43.23 ADJUSTMENT DISORDER WITH MIXED ANXIETY AND DEPRESSED MOOD: ICD-10-CM

## 2021-08-25 RX ORDER — ALPRAZOLAM 0.5 MG
TABLET ORAL
Qty: 90 TABLET | Refills: 0 | OUTPATIENT
Start: 2021-08-25

## 2021-08-25 RX ORDER — ALPRAZOLAM 0.5 MG
TABLET ORAL
Qty: 90 TABLET | Refills: 0 | Status: SHIPPED | OUTPATIENT
Start: 2021-08-25 | End: 2021-09-24

## 2021-08-25 RX ORDER — CITALOPRAM HYDROBROMIDE 20 MG/1
TABLET ORAL
Qty: 90 TABLET | Refills: 1 | Status: SHIPPED | OUTPATIENT
Start: 2021-08-25 | End: 2023-03-13

## 2021-08-25 NOTE — TELEPHONE ENCOUNTER
Pending Prescriptions:                       Disp   Refills    ALPRAZolam (XANAX) 0.5 MG tablet [Pharmacy*90 tab*0        Sig: TAKE ONE TABLET BY MOUTH THREE TIMES A DAY AS NEEDED        Routing refill request to provider for review/approval because:  Drug not on the [facility] refill protocol     MEHRAN AnayaN, RN, PHN  [place]  August 25, 2021

## 2021-08-25 NOTE — TELEPHONE ENCOUNTER
Pending Prescriptions:                       Disp   Refills    citalopram (CELEXA) 20 MG tablet [Pharmacy*90 tab*1        Sig: TAKE ONE TABLET BY MOUTH ONCE DAILY (ALONG WITH THE           CITALOPRAM 10 MG DOSE FOR TOTAL DAILY DOSE OF 30           MG)    Routing refill request to provider for review/approval because:  Labs out of range:    PHQ 5/27/2020 3/11/2021 8/5/2021   PHQ-9 Total Score 22 16 18   Q9: Thoughts of better off dead/self-harm past 2 weeks Not at all Not at all Not at all

## 2021-08-26 RX ORDER — MEDROXYPROGESTERONE ACETATE 5 MG
TABLET ORAL
Qty: 90 TABLET | Refills: 1 | Status: SHIPPED | OUTPATIENT
Start: 2021-08-26 | End: 2024-04-04

## 2021-08-26 RX ORDER — ESTRADIOL 1 MG/1
TABLET ORAL
Qty: 90 TABLET | Refills: 2 | Status: SHIPPED | OUTPATIENT
Start: 2021-08-26 | End: 2024-04-04

## 2021-08-26 NOTE — TELEPHONE ENCOUNTER
Pending Prescriptions:                       Disp   Refills    medroxyPROGESTERone (PROVERA) 5 MG tablet *90 tab*1        Sig: TAKE ONE TABLET BY MOUTH ONCE DAILY    estradiol (ESTRACE) 1 MG tablet [Pharmacy *90 tab*2        Sig: TAKE ONE TABLET BY MOUTH ONCE DAILY    Routing refill request to provider for review/approval because:  Drug not on the [facility] refill protocol   A break in medication

## 2021-08-27 RX ORDER — CITALOPRAM HYDROBROMIDE 10 MG/1
TABLET ORAL
Qty: 90 TABLET | Refills: 1 | Status: SHIPPED | OUTPATIENT
Start: 2021-08-27 | End: 2023-01-06

## 2021-08-27 RX ORDER — CITALOPRAM HYDROBROMIDE 20 MG/1
TABLET ORAL
Qty: 90 TABLET | Refills: 1 | Status: SHIPPED | OUTPATIENT
Start: 2021-08-27 | End: 2023-03-13

## 2021-08-27 NOTE — TELEPHONE ENCOUNTER
Pending Prescriptions:                       Disp   Refills    citalopram (CELEXA) 10 MG tablet           90 tab*1        Sig: TAKE ONE TABLET BY MOUTH ONCE DAILY (ALONG WITH THE           20 MG DOSE FOR TOTAL DOSE OF 30 MG DAILY)    citalopram (CELEXA) 20 MG tablet           90 tab*1        Sig: TAKE ONE TABLET BY MOUTH ONCE DAILY (ALONG WITH THE           10 MG DOSE FOR TOTAL DAILY DOSE OF 30 MG)  Routing refill request to provider for review/approval because:  Failed protocols PHQ-( > 5

## 2021-09-18 ENCOUNTER — HEALTH MAINTENANCE LETTER (OUTPATIENT)
Age: 51
End: 2021-09-18

## 2021-09-24 DIAGNOSIS — F43.23 ADJUSTMENT DISORDER WITH MIXED ANXIETY AND DEPRESSED MOOD: ICD-10-CM

## 2021-09-24 RX ORDER — ALPRAZOLAM 0.5 MG
TABLET ORAL
Qty: 90 TABLET | Refills: 0 | Status: SHIPPED | OUTPATIENT
Start: 2021-09-24 | End: 2021-10-26

## 2021-09-24 NOTE — TELEPHONE ENCOUNTER
Xanax      Last Written Prescription Date:  8/25/2021  Last Fill Quantity: 90,   # refills: 0  Last Office Visit: 8/12/2021  Future Office visit:       Routing refill request to provider for review/approval because:  Drug not on the [facility], P or [facility] refill protocol or controlled substance

## 2021-10-19 PROBLEM — F32.9 MAJOR DEPRESSION: Status: ACTIVE | Noted: 2018-09-12

## 2021-10-22 DIAGNOSIS — F43.23 ADJUSTMENT DISORDER WITH MIXED ANXIETY AND DEPRESSED MOOD: ICD-10-CM

## 2021-10-25 NOTE — TELEPHONE ENCOUNTER
Pending Prescriptions:                       Disp   Refills    ALPRAZolam (XANAX) 0.5 MG tablet [Pharmacy*90 tab*0        Sig: TAKE ONE TABLET BY MOUTH THREE TIMES A DAY AS NEEDED    Routing refill request to provider for review/approval because:  Drug not on the [facility] refill protocol

## 2021-10-26 RX ORDER — ALPRAZOLAM 0.5 MG
TABLET ORAL
Qty: 90 TABLET | Refills: 0 | Status: SHIPPED | OUTPATIENT
Start: 2021-10-26 | End: 2021-11-23

## 2021-11-22 DIAGNOSIS — F43.23 ADJUSTMENT DISORDER WITH MIXED ANXIETY AND DEPRESSED MOOD: ICD-10-CM

## 2021-11-23 RX ORDER — ALPRAZOLAM 0.5 MG
TABLET ORAL
Qty: 90 TABLET | Refills: 0 | Status: SHIPPED | OUTPATIENT
Start: 2021-11-23 | End: 2021-12-21

## 2021-12-20 DIAGNOSIS — F43.23 ADJUSTMENT DISORDER WITH MIXED ANXIETY AND DEPRESSED MOOD: ICD-10-CM

## 2021-12-21 RX ORDER — ALPRAZOLAM 0.5 MG
TABLET ORAL
Qty: 90 TABLET | Refills: 0 | Status: SHIPPED | OUTPATIENT
Start: 2021-12-21 | End: 2022-01-19

## 2021-12-21 NOTE — TELEPHONE ENCOUNTER
Xanax      Last Written Prescription Date:  11/23/2021  Last Fill Quantity: 90,   # refills: 0  Last Office Visit: 8/12/2021  Future Office visit:       Routing refill request to provider for review/approval because:  Drug not on the [facility], P or [facility] refill protocol or controlled substance

## 2022-01-18 DIAGNOSIS — F43.23 ADJUSTMENT DISORDER WITH MIXED ANXIETY AND DEPRESSED MOOD: ICD-10-CM

## 2022-01-19 RX ORDER — ALPRAZOLAM 0.5 MG
TABLET ORAL
Qty: 90 TABLET | Refills: 0 | Status: SHIPPED | OUTPATIENT
Start: 2022-01-19 | End: 2022-02-16

## 2022-02-14 ENCOUNTER — IMMUNIZATION (OUTPATIENT)
Dept: FAMILY MEDICINE | Facility: CLINIC | Age: 52
End: 2022-02-14
Payer: COMMERCIAL

## 2022-02-14 PROCEDURE — 0064A COVID-19,PF,MODERNA (18+ YRS BOOSTER .25ML): CPT

## 2022-02-14 PROCEDURE — 91306 COVID-19,PF,MODERNA (18+ YRS BOOSTER .25ML): CPT

## 2022-02-15 DIAGNOSIS — F43.23 ADJUSTMENT DISORDER WITH MIXED ANXIETY AND DEPRESSED MOOD: ICD-10-CM

## 2022-02-15 NOTE — TELEPHONE ENCOUNTER
Xanax      Last Written Prescription Date:  1/19/2022  Last Fill Quantity: 90,   # refills: 0  Last Office Visit: 8/12/2021  Future Office visit:       Routing refill request to provider for review/approval because:  Drug not on the [facility], P or [facility] refill protocol or controlled substance

## 2022-02-16 RX ORDER — ALPRAZOLAM 0.5 MG
TABLET ORAL
Qty: 90 TABLET | Refills: 0 | Status: SHIPPED | OUTPATIENT
Start: 2022-02-16 | End: 2022-03-14

## 2022-03-05 ENCOUNTER — HEALTH MAINTENANCE LETTER (OUTPATIENT)
Age: 52
End: 2022-03-05

## 2022-03-13 DIAGNOSIS — F43.23 ADJUSTMENT DISORDER WITH MIXED ANXIETY AND DEPRESSED MOOD: ICD-10-CM

## 2022-03-14 RX ORDER — ALPRAZOLAM 0.5 MG
TABLET ORAL
Qty: 90 TABLET | Refills: 0 | Status: SHIPPED | OUTPATIENT
Start: 2022-03-14 | End: 2022-04-14

## 2022-03-14 NOTE — TELEPHONE ENCOUNTER
Pending Prescriptions:                       Disp   Refills    ALPRAZolam (XANAX) 0.5 MG tablet [Pharmacy*90 tab*0        Sig: TAKE ONE TABLET BY MOUTH THREE TIMES A DAY AS NEEDED    Routing refill request to provider for review/approval because:  Drug not on the [facility] refill protocol     Ariadna Joya RN

## 2022-03-25 ASSESSMENT — ANXIETY QUESTIONNAIRES
3. WORRYING TOO MUCH ABOUT DIFFERENT THINGS: NEARLY EVERY DAY
2. NOT BEING ABLE TO STOP OR CONTROL WORRYING: NEARLY EVERY DAY
GAD7 TOTAL SCORE: 13
6. BECOMING EASILY ANNOYED OR IRRITABLE: SEVERAL DAYS
7. FEELING AFRAID AS IF SOMETHING AWFUL MIGHT HAPPEN: NEARLY EVERY DAY
5. BEING SO RESTLESS THAT IT IS HARD TO SIT STILL: SEVERAL DAYS
1. FEELING NERVOUS, ANXIOUS, OR ON EDGE: SEVERAL DAYS
4. TROUBLE RELAXING: SEVERAL DAYS

## 2022-03-25 ASSESSMENT — PATIENT HEALTH QUESTIONNAIRE - PHQ9: SUM OF ALL RESPONSES TO PHQ QUESTIONS 1-9: 11

## 2022-03-28 ASSESSMENT — PATIENT HEALTH QUESTIONNAIRE - PHQ9: SUM OF ALL RESPONSES TO PHQ QUESTIONS 1-9: 11

## 2022-03-28 ASSESSMENT — ANXIETY QUESTIONNAIRES: GAD7 TOTAL SCORE: 13

## 2022-04-04 ASSESSMENT — ANXIETY QUESTIONNAIRES
7. FEELING AFRAID AS IF SOMETHING AWFUL MIGHT HAPPEN: NEARLY EVERY DAY
1. FEELING NERVOUS, ANXIOUS, OR ON EDGE: SEVERAL DAYS
6. BECOMING EASILY ANNOYED OR IRRITABLE: SEVERAL DAYS
GAD7 TOTAL SCORE: 13
3. WORRYING TOO MUCH ABOUT DIFFERENT THINGS: NEARLY EVERY DAY
4. TROUBLE RELAXING: SEVERAL DAYS
2. NOT BEING ABLE TO STOP OR CONTROL WORRYING: NEARLY EVERY DAY
5. BEING SO RESTLESS THAT IT IS HARD TO SIT STILL: SEVERAL DAYS

## 2022-04-04 ASSESSMENT — PATIENT HEALTH QUESTIONNAIRE - PHQ9: SUM OF ALL RESPONSES TO PHQ QUESTIONS 1-9: 11

## 2022-04-08 ASSESSMENT — ANXIETY QUESTIONNAIRES
7. FEELING AFRAID AS IF SOMETHING AWFUL MIGHT HAPPEN: NEARLY EVERY DAY
3. WORRYING TOO MUCH ABOUT DIFFERENT THINGS: NEARLY EVERY DAY
GAD7 TOTAL SCORE: 13
4. TROUBLE RELAXING: SEVERAL DAYS
7. FEELING AFRAID AS IF SOMETHING AWFUL MIGHT HAPPEN: NEARLY EVERY DAY
5. BEING SO RESTLESS THAT IT IS HARD TO SIT STILL: SEVERAL DAYS
1. FEELING NERVOUS, ANXIOUS, OR ON EDGE: SEVERAL DAYS
GAD7 TOTAL SCORE: 13
6. BECOMING EASILY ANNOYED OR IRRITABLE: SEVERAL DAYS
2. NOT BEING ABLE TO STOP OR CONTROL WORRYING: NEARLY EVERY DAY
GAD7 TOTAL SCORE: 13

## 2022-04-08 ASSESSMENT — PATIENT HEALTH QUESTIONNAIRE - PHQ9
SUM OF ALL RESPONSES TO PHQ QUESTIONS 1-9: 11
SUM OF ALL RESPONSES TO PHQ QUESTIONS 1-9: 11
10. IF YOU CHECKED OFF ANY PROBLEMS, HOW DIFFICULT HAVE THESE PROBLEMS MADE IT FOR YOU TO DO YOUR WORK, TAKE CARE OF THINGS AT HOME, OR GET ALONG WITH OTHER PEOPLE: VERY DIFFICULT

## 2022-04-11 ENCOUNTER — OFFICE VISIT (OUTPATIENT)
Dept: FAMILY MEDICINE | Facility: CLINIC | Age: 52
End: 2022-04-11
Payer: COMMERCIAL

## 2022-04-11 VITALS
BODY MASS INDEX: 27.16 KG/M2 | OXYGEN SATURATION: 100 % | RESPIRATION RATE: 10 BRPM | DIASTOLIC BLOOD PRESSURE: 70 MMHG | WEIGHT: 165 LBS | HEART RATE: 110 BPM | SYSTOLIC BLOOD PRESSURE: 104 MMHG | TEMPERATURE: 98.2 F

## 2022-04-11 DIAGNOSIS — F32.1 MODERATE MAJOR DEPRESSION (H): ICD-10-CM

## 2022-04-11 DIAGNOSIS — I83.893 VARICOSE VEINS OF BOTH LEGS WITH EDEMA: Primary | ICD-10-CM

## 2022-04-11 DIAGNOSIS — F43.23 ADJUSTMENT DISORDER WITH MIXED ANXIETY AND DEPRESSED MOOD: ICD-10-CM

## 2022-04-11 PROCEDURE — 99214 OFFICE O/P EST MOD 30 MIN: CPT | Performed by: FAMILY MEDICINE

## 2022-04-11 ASSESSMENT — PATIENT HEALTH QUESTIONNAIRE - PHQ9
10. IF YOU CHECKED OFF ANY PROBLEMS, HOW DIFFICULT HAVE THESE PROBLEMS MADE IT FOR YOU TO DO YOUR WORK, TAKE CARE OF THINGS AT HOME, OR GET ALONG WITH OTHER PEOPLE: VERY DIFFICULT
SUM OF ALL RESPONSES TO PHQ QUESTIONS 1-9: 11

## 2022-04-11 ASSESSMENT — ANXIETY QUESTIONNAIRES: GAD7 TOTAL SCORE: 13

## 2022-04-11 NOTE — PROGRESS NOTES
"  Assessment & Plan     Varicose veins of both legs with edema  Superficial spider varicosities in both legs but area of swelling just medial and above the left knee joint that is actually tender.  We will proceed with Doppler venous ultrasound of both lower extremities.  Possibly follow-up with vascular surgery.  - US Lower Extremity Venous Duplex Bilateral; Future    Moderate major depression (H)  She is absolutely overwhelmed at this point.  Her  is total disability from lung issues and she is to take care of him she had to quit her job.  Also is now taking care of her 1-year-old granddaughter 5 days a week she is on Celexa and takes some Xanax as needed.  Wants to get into some counseling at this time.  - Adult Mental Health  Referral; Future    Adjustment disorder with mixed anxiety and depressed mood  See discussion above.  - Adult Mental Health  Referral; Future             BMI:   Estimated body mass index is 27.16 kg/m  as calculated from the following:    Height as of 8/12/21: 1.66 m (5' 5.35\").    Weight as of this encounter: 74.8 kg (165 lb).   Weight management plan: Discussed healthy diet and exercise guidelines        No follow-ups on file.    Brayan So MD  [facility] RL Cannon is a 51 year old who presents for the following health issues: Multiple issues again.  She is very stressed out as discussed above.  Her  is critically ill and on a ventilator last fall from [facility].  Resulted in cardiac problems as well.  He is disabled and she has to take care of him.  He is having more troubles with her legs as noted below.  Area concerned about her varicose veins.    History of Present Illness       Mental Health Follow-up:  Patient presents to follow-up on Depression & Anxiety.Patient's depression since last visit has been:  Bad  The patient is having other symptoms associated with depression.  Patient's anxiety since last visit has " been:  Bad  The patient is having other symptoms associated with anxiety.  Any significant life events: financial concerns and health concerns  Patient is feeling anxious or having panic attacks.  Patient has no concerns about alcohol or drug use.       Today's PHQ-9         PHQ-9 Total Score: 11  PHQ-9 Q9 Thoughts of better off dead/self-harm past 2 weeks :   (P) Not at all    How difficult have these problems made it for you to do your work, take care of things at home, or get along with other people: Very difficult    Today's TAWANA-7 Score: 13    Hyperlipidemia:  She presents for follow up of hyperlipidemia.  She is taking medication to lower cholesterol. She is not having myalgia or other side effects to statin medications.    Reason for visit:  Depression pain in left knee with large veins any shots due  Symptom onset:  More than a month  Symptoms include:  Swollen knee pain when touched veins getting bad  Symptom intensity:  Moderate  Symptom progression:  Worsening  Had these symptoms before:  No  What makes it worse:  Prolonged standing lifting anything heavy  What makes it better:  Wrapping it staying off my leg    She eats 2-3 servings of fruits and vegetables daily.She consumes 1 sweetened beverage(s) daily.She exercises with enough effort to increase her heart rate 30 to 60 minutes per day.  She exercises with enough effort to increase her heart rate 4 days per week.   She is taking medications regularly.             Review of Systems   Constitutional, HEENT, cardiovascular, pulmonary, gi and gu systems are negative, except as otherwise noted.      Objective    LMP 03/06/2006   There is no height or weight on file to calculate BMI.  Physical Exam   GENERAL: healthy, alert and no distress  EYES: Eyes grossly normal to inspection, PERRL and conjunctivae and sclerae normal  MS: Spider varicosities noted in both legs.  Slightly discolored.  Swollen area just superior to the medial joint line of the left knee.   Is tender.  Range of motion of the knees is okay but some tenderness.  SKIN: no suspicious lesions or rashes  NEURO: Normal strength and tone, mentation intact and speech normal  PSYCH: tearful, anxious and appearance well groomed

## 2022-04-12 ENCOUNTER — HOSPITAL ENCOUNTER (OUTPATIENT)
Dept: MAMMOGRAPHY | Facility: CLINIC | Age: 52
Discharge: HOME | End: 2022-04-12
Attending: FAMILY MEDICINE
Payer: COMMERCIAL

## 2022-04-12 ENCOUNTER — HOSPITAL ENCOUNTER (OUTPATIENT)
Dept: ULTRASOUND IMAGING | Facility: CLINIC | Age: 52
Discharge: HOME | End: 2022-04-12
Attending: FAMILY MEDICINE
Payer: COMMERCIAL

## 2022-04-12 DIAGNOSIS — Z12.31 VISIT FOR SCREENING MAMMOGRAM: ICD-10-CM

## 2022-04-12 DIAGNOSIS — I83.893 VARICOSE VEINS OF BOTH LEGS WITH EDEMA: ICD-10-CM

## 2022-04-12 PROCEDURE — 93970 EXTREMITY STUDY: CPT

## 2022-04-12 PROCEDURE — 77067 SCR MAMMO BI INCL CAD: CPT

## 2022-04-13 DIAGNOSIS — F43.23 ADJUSTMENT DISORDER WITH MIXED ANXIETY AND DEPRESSED MOOD: ICD-10-CM

## 2022-04-14 ENCOUNTER — TELEPHONE (OUTPATIENT)
Dept: FAMILY MEDICINE | Facility: CLINIC | Age: 52
End: 2022-04-14
Payer: COMMERCIAL

## 2022-04-14 DIAGNOSIS — I83.893 VARICOSE VEINS OF BOTH LEGS WITH EDEMA: Primary | ICD-10-CM

## 2022-04-14 RX ORDER — ALPRAZOLAM 0.5 MG
TABLET ORAL
Qty: 90 TABLET | Refills: 0 | Status: SHIPPED | OUTPATIENT
Start: 2022-04-14 | End: 2022-05-12

## 2022-04-14 NOTE — TELEPHONE ENCOUNTER
----- Message from Brayan So MD sent at 4/14/2022 12:11 PM CDT -----  Please call her and let her know that the Doppler ultrasound of her leg showed no blood clots.  Not sure what is causing the swelling but taking ibuprofen up to 800 mg 3 times a day and warm packing the area may be beneficial.  She wants to see someone for the varicose veins would have to have her see Dr. Seaman

## 2022-04-14 NOTE — TELEPHONE ENCOUNTER
Spoke with patient and informed of results below. Patient understood. She would like referral placed to see Dr. Seaman.     Susi Arenas MA

## 2022-04-22 ENCOUNTER — TELEPHONE (OUTPATIENT)
Dept: VASCULAR SURGERY | Facility: CLINIC | Age: 52
End: 2022-04-22
Payer: COMMERCIAL

## 2022-04-26 NOTE — NURSING NOTE
"Chief Complaint   Patient presents with     Consult     bartholin cyst       Initial /70 (BP Location: Left arm, Patient Position: Chair, Cuff Size: Adult Regular)  Pulse 78  Temp 98.5  F (36.9  C) (Tympanic)  Resp 24  Ht 5' 5\" (1.651 m)  Wt 145 lb (65.8 kg)  LMP 03/06/2006  SpO2 99%  Breastfeeding? No  BMI 24.13 kg/m2 Estimated body mass index is 24.13 kg/(m^2) as calculated from the following:    Height as of this encounter: 5' 5\" (1.651 m).    Weight as of this encounter: 145 lb (65.8 kg)..   BP completed using cuff size: regular  Medication Rec Completed    Tarah Ribeiro CMA    " Future Appointments   Date Time Provider Swapna Rodriguez   8/17/2022 11:00 AM STEPHY Clemons - CNP [facility] [place] [facility]   LOV 4/26/2022

## 2022-05-11 DIAGNOSIS — F43.23 ADJUSTMENT DISORDER WITH MIXED ANXIETY AND DEPRESSED MOOD: ICD-10-CM

## 2022-05-12 RX ORDER — ALPRAZOLAM 0.5 MG
TABLET ORAL
Qty: 90 TABLET | Refills: 0 | Status: SHIPPED | OUTPATIENT
Start: 2022-05-12 | End: 2022-06-09

## 2022-05-12 NOTE — TELEPHONE ENCOUNTER
Xanax      Last Written Prescription Date:  4/14/2022  Last Fill Quantity: 90,   # refills: 0  Last Office Visit: 4/111/2022  Future Office visit:       Routing refill request to provider for review/approval because:  Drug not on the [facility], P or [facility] refill protocol or controlled substance

## 2022-06-09 DIAGNOSIS — F43.23 ADJUSTMENT DISORDER WITH MIXED ANXIETY AND DEPRESSED MOOD: ICD-10-CM

## 2022-06-09 RX ORDER — ALPRAZOLAM 0.5 MG
TABLET ORAL
Qty: 90 TABLET | Refills: 0 | Status: SHIPPED | OUTPATIENT
Start: 2022-06-09 | End: 2022-07-06

## 2022-06-09 NOTE — TELEPHONE ENCOUNTER
Xanax      Last Written Prescription Date:  5/12/2022  Last Fill Quantity: 90,   # refills: 0  Last Office Visit: 4/11/2022  Future Office visit:       Routing refill request to provider for review/approval because:  Drug not on the [facility], P or [facility] refill protocol or controlled substance

## 2022-07-06 DIAGNOSIS — F43.23 ADJUSTMENT DISORDER WITH MIXED ANXIETY AND DEPRESSED MOOD: ICD-10-CM

## 2022-07-06 RX ORDER — ALPRAZOLAM 0.5 MG
TABLET ORAL
Qty: 90 TABLET | Refills: 0 | Status: SHIPPED | OUTPATIENT
Start: 2022-07-06 | End: 2022-08-04

## 2022-07-06 NOTE — TELEPHONE ENCOUNTER
Xanax      Last Written Prescription Date:  6/9/2022  Last Fill Quantity: 90,   # refills: 0  Last Office Visit: 4/11/2022  Future Office visit:       Routing refill request to provider for review/approval because:  Drug not on the [facility], [facility] or [facility] refill protocol or controlled substance    Rickey Obregon RN

## 2022-08-03 DIAGNOSIS — F43.23 ADJUSTMENT DISORDER WITH MIXED ANXIETY AND DEPRESSED MOOD: ICD-10-CM

## 2022-08-04 RX ORDER — ALPRAZOLAM 0.5 MG
TABLET ORAL
Qty: 90 TABLET | Refills: 0 | Status: SHIPPED | OUTPATIENT
Start: 2022-08-04 | End: 2022-09-02

## 2022-08-31 DIAGNOSIS — F43.23 ADJUSTMENT DISORDER WITH MIXED ANXIETY AND DEPRESSED MOOD: ICD-10-CM

## 2022-09-02 RX ORDER — ALPRAZOLAM 0.5 MG
TABLET ORAL
Qty: 90 TABLET | Refills: 0 | Status: SHIPPED | OUTPATIENT
Start: 2022-09-02 | End: 2022-09-28

## 2022-10-26 DIAGNOSIS — F43.23 ADJUSTMENT DISORDER WITH MIXED ANXIETY AND DEPRESSED MOOD: ICD-10-CM

## 2022-10-26 RX ORDER — ALPRAZOLAM 0.5 MG
TABLET ORAL
Qty: 90 TABLET | Refills: 0 | Status: SHIPPED | OUTPATIENT
Start: 2022-10-26 | End: 2022-11-29

## 2022-10-26 NOTE — TELEPHONE ENCOUNTER
Requested Prescriptions   Pending Prescriptions Disp Refills     ALPRAZolam (XANAX) 0.5 MG tablet [Pharmacy Med Name: ALPRAZOLAM 0.5MG TABS] 90 tablet 0     Sig: TAKE ONE TABLET BY MOUTH THREE TIMES A DAY AS NEEDED     Routing refill request to provider for review/approval because:  Drug not on the [facility], [facility] or [facility] refill protocol or controlled substance

## 2022-11-20 ENCOUNTER — HEALTH MAINTENANCE LETTER (OUTPATIENT)
Age: 52
End: 2022-11-20

## 2022-11-25 DIAGNOSIS — F43.23 ADJUSTMENT DISORDER WITH MIXED ANXIETY AND DEPRESSED MOOD: ICD-10-CM

## 2022-11-29 ENCOUNTER — VIRTUAL VISIT (OUTPATIENT)
Dept: FAMILY MEDICINE | Facility: CLINIC | Age: 52
End: 2022-11-29
Payer: COMMERCIAL

## 2022-11-29 ENCOUNTER — NURSE TRIAGE (OUTPATIENT)
Dept: NURSING | Facility: CLINIC | Age: 52
End: 2022-11-29

## 2022-11-29 ENCOUNTER — MYC MEDICAL ADVICE (OUTPATIENT)
Dept: FAMILY MEDICINE | Facility: CLINIC | Age: 52
End: 2022-11-29

## 2022-11-29 DIAGNOSIS — J01.00 ACUTE MAXILLARY SINUSITIS, RECURRENCE NOT SPECIFIED: Primary | ICD-10-CM

## 2022-11-29 PROCEDURE — 99213 OFFICE O/P EST LOW 20 MIN: CPT | Mod: 95 | Performed by: FAMILY MEDICINE

## 2022-11-29 RX ORDER — FLUTICASONE PROPIONATE 50 MCG
2 SPRAY, SUSPENSION (ML) NASAL DAILY
Qty: 18.2 ML | Refills: 0 | Status: SHIPPED | OUTPATIENT
Start: 2022-11-29 | End: 2023-01-02

## 2022-11-29 RX ORDER — ALPRAZOLAM 0.5 MG
TABLET ORAL
Qty: 90 TABLET | Refills: 0 | Status: SHIPPED | OUTPATIENT
Start: 2022-11-29 | End: 2022-12-23

## 2022-11-29 RX ORDER — LEVOFLOXACIN 500 MG/1
500 TABLET, FILM COATED ORAL DAILY
Qty: 7 TABLET | Refills: 0 | Status: SHIPPED | OUTPATIENT
Start: 2022-11-29 | End: 2023-03-13

## 2022-11-29 ASSESSMENT — PATIENT HEALTH QUESTIONNAIRE - PHQ9
SUM OF ALL RESPONSES TO PHQ QUESTIONS 1-9: 9
SUM OF ALL RESPONSES TO PHQ QUESTIONS 1-9: 9
10. IF YOU CHECKED OFF ANY PROBLEMS, HOW DIFFICULT HAVE THESE PROBLEMS MADE IT FOR YOU TO DO YOUR WORK, TAKE CARE OF THINGS AT HOME, OR GET ALONG WITH OTHER PEOPLE: VERY DIFFICULT

## 2022-11-29 NOTE — PROGRESS NOTES
Kassandra is a 52 year old who is being evaluated via a billable video visit.      How would you like to obtain your AVS? MyChart  If the video visit is dropped, the invitation should be resent by: Text to cell phone: [phone]  Will anyone else be joining your video visit? No        Assessment & Plan     Acute maxillary sinusitis, recurrence not specified  Patient with influenza and secondary sinus infection will treat with Levaquin which she is tolerated ciprofloxacin in the past.  Ordered side effects possible allergic reaction.  Also Flonase.  Follow-up in 48 hours if not improving sooner if worse  - levofloxacin (LEVAQUIN) 500 MG tablet; Take 1 tablet (500 mg) by mouth daily  - fluticasone (FLONASE) 50 MCG/ACT nasal spray; Spray 2 sprays into both nostrils daily                 No follow-ups on file.    Raymond Van MD  [facility]    Subjective   Kassandra is a 52 year old, presenting for the following health issues: Patient notes that her  had influenza last week he was put on Tamiflu she started having symptoms of head congestion runny nose body aches and cough.  She notes most of her symptoms improved but she still has cough and head congestion.  Now today she is got right facial pain and swelling.  No CVA discharge.  No other complaints no fevers chills or sweats  Sinus Problem      History of Present Illness       Reason for visit:  Face is swollen coughing all the time  had influenza A-B found out last Wednesday i was feeling very cold and severe body aches  Symptom onset:  1-2 weeks ago  Symptoms include:  Cough face swollen  Symptom intensity:  Moderate  Symptom progression:  Staying the same  Had these symptoms before:  No  What makes it worse:  Laying down flat  What makes it better:  I have done everything cough medicine cold medicine vicks cough drops hot shower if i could get rid of the cough and pain in my right side of my face I would probably feel  better but nothing is working i think i need a antibiotic now    She eats 0-1 servings of fruits and vegetables daily.She consumes 1 sweetened beverage(s) daily.She exercises with enough effort to increase her heart rate 30 to 60 minutes per day.  She exercises with enough effort to increase her heart rate 5 days per week.   She is taking medications regularly.    Today's PHQ-9         PHQ-9 Total Score: 9    PHQ-9 Q9 Thoughts of better off dead/self-harm past 2 weeks :   Not at all    How difficult have these problems made it for you to do your work, take care of things at home, or get along with other people: Very difficult             Review of Systems   Constitutional, HEENT, cardiovascular, pulmonary, gi and gu systems are negative, except as otherwise noted.      Objective           Vitals:  No vitals were obtained today due to virtual visit.    Physical Exam   GENERAL: Healthy, alert and no distress  EYES: Eyes grossly normal to inspection.  No discharge or erythema, or obvious scleral/conjunctival abnormalities.  RESP: No audible wheeze, cough, or visible cyanosis.  No visible retractions or increased work of breathing.    SKIN: Visible skin clear. No significant rash, abnormal pigmentation or lesions.  NEURO: Cranial nerves grossly intact.  Mentation and speech appropriate for age.  PSYCH: Mentation appears normal, affect normal/bright, judgement and insight intact, normal speech and appearance well-groomed.    She has right maxillary facial swelling no obvious redness            Video-Visit Details    Video Start Time: 405    Type of service:  Video Visit    Video End Time: 410    Originating Location (pt. Location): Home    Distant Location (provider location):  On-site    Platform used for Video Visit: [employer]

## 2022-11-29 NOTE — TELEPHONE ENCOUNTER
S-(situation): sinus congestion    B-(background):   Pt has sinus congestion > 1 week.     Last week, her  was brought in to ED, tested positive for Influenza A/B and COVID.  was on Tamiflu, pt was not as her symptoms were already 4 days since onset.    Pt states she has a history of sinus surgery and recurrent sinus infections.    A-(assessment):   Ongoing sinus congestion, hard to blow her nose  Woke up with right cheek swelling    No fever  No SOB  No headache    R-(recommendations):   Pt requests virtual visit today, FNA thinks this is appropriate. Pt was transferred her to scheduling.    Maki Jaimes RN/[facility] Nurse Advisor        Additional Information    Negative: Sounds like a life-threatening emergency to the triager    Negative: Difficulty breathing, and not from stuffy nose (e.g., not relieved by cleaning out the nose)    Negative: SEVERE headache and has fever    Negative: Patient sounds very sick or weak to the triager    Negative: SEVERE sinus pain    Negative: Severe headache    Redness or swelling on the cheek, forehead, or around the eye    Protocols used: SINUS PAIN AND CONGESTION-A-OH

## 2022-12-07 ENCOUNTER — MYC MEDICAL ADVICE (OUTPATIENT)
Dept: FAMILY MEDICINE | Facility: CLINIC | Age: 52
End: 2022-12-07

## 2022-12-08 ENCOUNTER — E-VISIT (OUTPATIENT)
Dept: FAMILY MEDICINE | Facility: CLINIC | Age: 52
End: 2022-12-08
Payer: COMMERCIAL

## 2022-12-08 DIAGNOSIS — Z53.9 ERRONEOUS ENCOUNTER--DISREGARD: Primary | ICD-10-CM

## 2022-12-15 ENCOUNTER — MYC MEDICAL ADVICE (OUTPATIENT)
Dept: INTERNAL MEDICINE | Facility: CLINIC | Age: 52
End: 2022-12-15

## 2022-12-23 DIAGNOSIS — F43.23 ADJUSTMENT DISORDER WITH MIXED ANXIETY AND DEPRESSED MOOD: ICD-10-CM

## 2022-12-23 RX ORDER — ALPRAZOLAM 0.5 MG
TABLET ORAL
Qty: 90 TABLET | Refills: 0 | Status: SHIPPED | OUTPATIENT
Start: 2022-12-23 | End: 2023-01-27

## 2022-12-28 ENCOUNTER — MYC MEDICAL ADVICE (OUTPATIENT)
Dept: FAMILY MEDICINE | Facility: CLINIC | Age: 52
End: 2022-12-28

## 2022-12-29 ASSESSMENT — ENCOUNTER SYMPTOMS
FREQUENCY: 1
HEADACHES: 1
BREAST MASS: 0
NERVOUS/ANXIOUS: 1
DIZZINESS: 1

## 2023-01-02 ENCOUNTER — MYC REFILL (OUTPATIENT)
Dept: FAMILY MEDICINE | Facility: CLINIC | Age: 53
End: 2023-01-02

## 2023-01-02 ENCOUNTER — MYC MEDICAL ADVICE (OUTPATIENT)
Dept: FAMILY MEDICINE | Facility: CLINIC | Age: 53
End: 2023-01-02

## 2023-01-02 ENCOUNTER — OFFICE VISIT (OUTPATIENT)
Dept: FAMILY MEDICINE | Facility: CLINIC | Age: 53
End: 2023-01-02
Payer: COMMERCIAL

## 2023-01-02 VITALS
SYSTOLIC BLOOD PRESSURE: 128 MMHG | TEMPERATURE: 97.4 F | HEIGHT: 65 IN | RESPIRATION RATE: 16 BRPM | OXYGEN SATURATION: 99 % | WEIGHT: 156 LBS | DIASTOLIC BLOOD PRESSURE: 68 MMHG | BODY MASS INDEX: 25.99 KG/M2 | HEART RATE: 106 BPM

## 2023-01-02 DIAGNOSIS — J01.01 ACUTE RECURRENT MAXILLARY SINUSITIS: ICD-10-CM

## 2023-01-02 DIAGNOSIS — Z00.01 ENCOUNTER FOR GENERAL ADULT MEDICAL EXAMINATION WITH ABNORMAL FINDINGS: Primary | ICD-10-CM

## 2023-01-02 DIAGNOSIS — F43.23 ADJUSTMENT DISORDER WITH MIXED ANXIETY AND DEPRESSED MOOD: ICD-10-CM

## 2023-01-02 DIAGNOSIS — Z72.0 TOBACCO ABUSE: ICD-10-CM

## 2023-01-02 DIAGNOSIS — E78.5 HYPERLIPIDEMIA LDL GOAL <130: ICD-10-CM

## 2023-01-02 DIAGNOSIS — Z13.6 CARDIOVASCULAR SCREENING; LDL GOAL LESS THAN 160: ICD-10-CM

## 2023-01-02 DIAGNOSIS — J01.00 ACUTE MAXILLARY SINUSITIS, RECURRENCE NOT SPECIFIED: ICD-10-CM

## 2023-01-02 DIAGNOSIS — H81.13 BENIGN PAROXYSMAL POSITIONAL VERTIGO, BILATERAL: ICD-10-CM

## 2023-01-02 DIAGNOSIS — R51.9 RIGHT-SIDED HEADACHE: ICD-10-CM

## 2023-01-02 DIAGNOSIS — F43.10 PTSD (POST-TRAUMATIC STRESS DISORDER): ICD-10-CM

## 2023-01-02 DIAGNOSIS — F32.1 MODERATE MAJOR DEPRESSION (H): ICD-10-CM

## 2023-01-02 DIAGNOSIS — R42 DIZZINESS: ICD-10-CM

## 2023-01-02 LAB
ALBUMIN SERPL-MCNC: 3.7 G/DL (ref 3.4–5)
ALP SERPL-CCNC: 110 U/L (ref 40–150)
ALT SERPL W P-5'-P-CCNC: 29 U/L (ref 0–50)
ANION GAP SERPL CALCULATED.3IONS-SCNC: 3 MMOL/L (ref 3–14)
AST SERPL W P-5'-P-CCNC: 27 U/L (ref 0–45)
BILIRUB SERPL-MCNC: 0.3 MG/DL (ref 0.2–1.3)
BUN SERPL-MCNC: 11 MG/DL (ref 7–30)
CALCIUM SERPL-MCNC: 8.9 MG/DL (ref 8.5–10.1)
CHLORIDE BLD-SCNC: 110 MMOL/L (ref 94–109)
CHOLEST SERPL-MCNC: 246 MG/DL
CO2 SERPL-SCNC: 28 MMOL/L (ref 20–32)
CREAT SERPL-MCNC: 0.74 MG/DL (ref 0.52–1.04)
FASTING STATUS PATIENT QL REPORTED: YES
GFR SERPL CREATININE-BSD FRML MDRD: >90 ML/MIN/1.73M2
GLUCOSE BLD-MCNC: 105 MG/DL (ref 70–99)
HDLC SERPL-MCNC: 58 MG/DL
LDLC SERPL CALC-MCNC: 136 MG/DL
NONHDLC SERPL-MCNC: 188 MG/DL
POTASSIUM BLD-SCNC: 3.9 MMOL/L (ref 3.4–5.3)
PROT SERPL-MCNC: 8 G/DL (ref 6.8–8.8)
SODIUM SERPL-SCNC: 141 MMOL/L (ref 133–144)
TRIGL SERPL-MCNC: 258 MG/DL

## 2023-01-02 PROCEDURE — 80053 COMPREHEN METABOLIC PANEL: CPT | Performed by: FAMILY MEDICINE

## 2023-01-02 PROCEDURE — 99396 PREV VISIT EST AGE 40-64: CPT | Performed by: FAMILY MEDICINE

## 2023-01-02 PROCEDURE — 36415 COLL VENOUS BLD VENIPUNCTURE: CPT | Performed by: FAMILY MEDICINE

## 2023-01-02 PROCEDURE — 80061 LIPID PANEL: CPT | Performed by: FAMILY MEDICINE

## 2023-01-02 PROCEDURE — 99213 OFFICE O/P EST LOW 20 MIN: CPT | Mod: 25 | Performed by: FAMILY MEDICINE

## 2023-01-02 RX ORDER — GUAIFENESIN, PSEUDOEPHEDRINE HYDROCHLORIDE 600; 60 MG/1; MG/1
1 TABLET, EXTENDED RELEASE ORAL EVERY 12 HOURS
Qty: 20 TABLET | Refills: 0 | Status: SHIPPED | OUTPATIENT
Start: 2023-01-02 | End: 2024-04-04

## 2023-01-02 RX ORDER — LEVOFLOXACIN 500 MG/1
500 TABLET, FILM COATED ORAL DAILY
Qty: 10 TABLET | Refills: 0 | Status: SHIPPED | OUTPATIENT
Start: 2023-01-02 | End: 2023-03-13

## 2023-01-02 ASSESSMENT — ENCOUNTER SYMPTOMS
DIZZINESS: 1
BREAST MASS: 0
HEADACHES: 1
NERVOUS/ANXIOUS: 1
FREQUENCY: 1

## 2023-01-02 ASSESSMENT — PAIN SCALES - GENERAL: PAINLEVEL: EXTREME PAIN (8)

## 2023-01-02 NOTE — PROGRESS NOTES
SUBJECTIVE:   CC: Kassandra is an 52 year old who presents for preventive health visit.       Patient has been advised of split billing requirements and indicates understanding: Yes  Healthy Habits:     Getting at least 3 servings of Calcium per day:  NO    Bi-annual eye exam:  Yes    Dental care twice a year:  Yes    Sleep apnea or symptoms of sleep apnea:  Sleep apnea    Diet:  Regular (no restrictions)    Frequency of exercise:  4-5 days/week    Duration of exercise:  Greater than 60 minutes    Barriers to taking medications:  Problems remembering to take them    Medication side effects:  None    PHQ-2 Total Score: 2    Additional concerns today:  No      Complaining of terrible sinus pressure.  Was treated a little over a month ago on a virtual visit and did get better.  She has had recurrent problems history of sinus issues.     Today's PHQ-2 Score:   PHQ-2 (  [employer]) 2022   Q1: Little interest or pleasure in doing things 1   Q2: Feeling down, depressed or hopeless 1   PHQ-2 Score 2   PHQ-2 Total Score (12-17 Years)- Positive if 3 or more points; Administer PHQ-A if positive -   Q1: Little interest or pleasure in doing things Several days   Q2: Feeling down, depressed or hopeless Several days   PHQ-2 Score 2           Social History     Tobacco Use     Smoking status: Former     Packs/day: 0.00     Years: 10.00     Pack years: 0.00     Types: Cigarettes     Quit date: 2020     Years since quittin.7     Smokeless tobacco: Never     Tobacco comments:     3 cigs a day    Substance Use Topics     Alcohol use: No     Alcohol/week: 0.0 standard drinks     Comment: recovering  -          Alcohol Use 2022   Prescreen: >3 drinks/day or >7 drinks/week? No   Prescreen: >3 drinks/day or >7 drinks/week? -       Reviewed orders with patient.  Reviewed health maintenance and updated orders accordingly - Yes  Labs reviewed in EPIC    Breast Cancer Screening:    FHS-7:   Breast CA Risk Assessment  (FHS-7) 4/12/2022 11/9/2022 11/9/2022 12/8/2022 12/8/2022 12/26/2022 12/29/2022   Did any of your first-degree relatives have breast or ovarian cancer? No Unknown Unknown Unknown Unknown Yes Yes   Did any of your relatives have bilateral breast cancer? No Unknown Unknown Unknown Unknown Unknown Unknown   Did any man in your family have breast cancer? No No No No No Unknown Unknown   Did any woman in your family have breast and ovarian cancer? No Yes Yes Yes Yes Unknown Unknown   Did any woman in your family have breast cancer before age 50 y? No No No No No Unknown Unknown   Do you have 2 or more relatives with breast and/or ovarian cancer? No No No No No Unknown Unknown   Do you have 2 or more relatives with breast and/or bowel cancer? Yes No No No No Unknown Unknown       Mammogram Screening: Recommended annual mammography  Pertinent mammograms are reviewed under the imaging tab.    History of abnormal Pap smear: NO - age 30-65 PAP every 5 years with negative HPV co-testing recommended  PAP / HPV Latest Ref Rng & Units 8/12/2021 6/27/2018 5/27/2015   PAP   Negative for Intraepithelial Lesion or Malignancy (NILM) - -   PAP (Historical) - - NIL NIL   HPV16 Negative Negative Negative Negative   HPV18 Negative Negative Negative Negative   HRHPV Negative Negative Negative Negative     Reviewed and updated as needed this visit by clinical staff    Allergies  Meds              Reviewed and updated as needed this visit by Provider                 Past Medical History:   Diagnosis Date     Allergic rhinitis, cause unspecified     Allergic rhinitis - weeds and pollan     Anxiety      Bladder polyps      Depressive disorder      Obstructive chronic bronchitis with exacerbation (H)      Other and unspecified ovarian cyst     Ovarian cyst - rupured cyst + laser x3     Other and unspecified ovarian cyst 9/14/2005    Left hemorrhagic ovarian cyst.     Tobacco abuse      Urinary tract infection, site not specified       Past  "Surgical History:   Procedure Laterality Date     ESOPHAGOSCOPY, GASTROSCOPY, DUODENOSCOPY (EGD), COMBINED  11/28/2012    Procedure: COMBINED ESOPHAGOSCOPY, GASTROSCOPY, DUODENOSCOPY (EGD), BIOPSY SINGLE OR MULTIPLE;  ESOPHAGOSCOPY, GASTROSCOPY, DUODENOSCOPY (EGD) with biopsy;  Surgeon: Herson Cabrera MD;  Location: [facility]     EXCIS VAGINAL CYST/TUMOR       HC REMOVAL OF OVARY/TUBE(S)  3/6/2006    Laparoscopic bilateral salpingo-oophorectomy.     HC TYMPANOPLASTY W/O MASTOID, INIT/REV W/O OSS CHAIN RECONST  04/02     LAPAROSCOPIC CHOLECYSTECTOMY N/A 4/6/2017    Procedure: LAPAROSCOPIC CHOLECYSTECTOMY;  Surgeon: Shivam Luevano MD;  Location: [facility] OR     ZZC LIGATE FALLOPIAN TUBE,POSTPARTUM      Tubal Ligation       Review of Systems   Cardiovascular: Positive for chest pain.   Breasts:  Negative for tenderness, breast mass and discharge.   Genitourinary: Positive for frequency and urgency. Negative for pelvic pain, vaginal bleeding and vaginal discharge.   Neurological: Positive for dizziness and headaches.   Psychiatric/Behavioral: Positive for mood changes. The patient is nervous/anxious.           OBJECTIVE:   /68   Pulse 106   Temp 97.4  F (36.3  C) (Temporal)   Resp 16   Ht 1.651 m (5' 5\")   Wt 70.8 kg (156 lb)   LMP 03/06/2006   SpO2 99%   BMI 25.96 kg/m    Physical Exam  GENERAL: healthy, alert and no distress  EYES: Eyes grossly normal to inspection, PERRL and conjunctivae and sclerae normal  HENT: normal cephalic/atraumatic, nasal mucosa edematous , oropharynx clear, oral mucous membranes moist and sinuses: maxillary tenderness on bilateral  NECK: no adenopathy, no asymmetry, masses, or scars and thyroid normal to palpation  RESP: lungs clear to auscultation - no rales, rhonchi or wheezes  CV: regular rate and rhythm, normal S1 S2, no S3 or S4, no murmur, click or rub, no peripheral edema and peripheral pulses strong  ABDOMEN: soft, nontender, no hepatosplenomegaly, no masses and bowel sounds " normal  MS: no gross musculoskeletal defects noted, no edema  SKIN: no suspicious lesions or rashes  NEURO: Normal strength and tone, mentation intact and speech normal  PSYCH: mentation appears normal, affect normal/bright    Diagnostic Test Results:  Labs reviewed in Epic  Results for orders placed or performed in visit on 01/02/23   Lipid panel reflex to direct LDL Non-fasting     Status: Abnormal   Result Value Ref Range    Cholesterol 246 (H) <200 mg/dL    Triglycerides 258 (H) <150 mg/dL    Direct Measure HDL 58 >=50 mg/dL    LDL Cholesterol Calculated 136 (H) <=100 mg/dL    Non HDL Cholesterol 188 (H) <130 mg/dL    Patient Fasting > 8hrs? Yes     Narrative    Cholesterol  Desirable:  <200 mg/dL    Triglycerides  Normal:  Less than 150 mg/dL  Borderline High:  150-199 mg/dL  High:  200-499 mg/dL  Very High:  Greater than or equal to 500 mg/dL    Direct Measure HDL  Female:  Greater than or equal to 50 mg/dL   Male:  Greater than or equal to 40 mg/dL    LDL Cholesterol  Desirable:  <100mg/dL  Above Desirable:  100-129 mg/dL   Borderline High:  130-159 mg/dL   High:  160-189 mg/dL   Very High:  >= 190 mg/dL    Non HDL Cholesterol  Desirable:  130 mg/dL  Above Desirable:  130-159 mg/dL  Borderline High:  160-189 mg/dL  High:  190-219 mg/dL  Very High:  Greater than or equal to 220 mg/dL   Comprehensive metabolic panel (BMP + Alb, Alk Phos, ALT, AST, Total. Bili, TP)     Status: Abnormal   Result Value Ref Range    Sodium 141 133 - 144 mmol/L    Potassium 3.9 3.4 - 5.3 mmol/L    Chloride 110 (H) 94 - 109 mmol/L    Carbon Dioxide (CO2) 28 20 - 32 mmol/L    Anion Gap 3 3 - 14 mmol/L    Urea Nitrogen 11 7 - 30 mg/dL    Creatinine 0.74 0.52 - 1.04 mg/dL    Calcium 8.9 8.5 - 10.1 mg/dL    Glucose 105 (H) 70 - 99 mg/dL    Alkaline Phosphatase 110 40 - 150 U/L    AST 27 0 - 45 U/L    ALT 29 0 - 50 U/L    Protein Total 8.0 6.8 - 8.8 g/dL    Albumin 3.7 3.4 - 5.0 g/dL    Bilirubin Total 0.3 0.2 - 1.3 mg/dL    GFR Estimate  ">90 >60 mL/min/1.73m2       ASSESSMENT/PLAN:   (Z00.01) Encounter for general adult medical examination with abnormal findings  (primary encounter diagnosis)  Comment: See discussions below.  Plan:     (J01.01) Acute recurrent maxillary sinusitis  Comment: We will put her back on Levaquin and this worked well for her in the past.  She is got a lot of pressure so we will try some Mucinex D.  Also wants a referral to ENT and I think this is appropriate.  Has had surgery with a tympanoplasty in the past.  Plan: levofloxacin (LEVAQUIN) 500 MG tablet,         pseudoePHEDrine-guaiFENesin (MUCINEX D)          MG 12 hr tablet, Adult ENT  Referral            (Z13.6) CARDIOVASCULAR SCREENING; LDL GOAL LESS THAN 160  Comment: Her lipids came back quite high.  And says she has been on Lipitor 20 mg before but the last refill we have listed is in the fall 2021.  We will notify her and either she needs to get back on this or if she is indeed on it increase the dosage.  Plan: Lipid panel reflex to direct LDL Non-fasting,         Comprehensive metabolic panel (BMP + Alb, Alk         Phos, ALT, AST, Total. Bili, TP)              Patient has been advised of split billing requirements and indicates understanding: Yes      COUNSELING:  Reviewed preventive health counseling, as reflected in patient instructions       Regular exercise       Healthy diet/nutrition       Vision screening      BMI:   Estimated body mass index is 25.96 kg/m  as calculated from the following:    Height as of this encounter: 1.651 m (5' 5\").    Weight as of this encounter: 70.8 kg (156 lb).         She reports that she quit smoking about 2 years ago. Her smoking use included cigarettes. She has never used smokeless tobacco.      Brayan So MD  [facility]  "

## 2023-01-03 RX ORDER — MECLIZINE HYDROCHLORIDE 25 MG/1
25 TABLET ORAL 3 TIMES DAILY PRN
Qty: 30 TABLET | Refills: 0 | Status: SHIPPED | OUTPATIENT
Start: 2023-01-03 | End: 2024-01-21

## 2023-01-04 RX ORDER — FLUTICASONE PROPIONATE 50 MCG
2 SPRAY, SUSPENSION (ML) NASAL DAILY
Qty: 18.2 ML | Refills: 0 | Status: SHIPPED | OUTPATIENT
Start: 2023-01-04 | End: 2023-03-12

## 2023-01-04 RX ORDER — ATORVASTATIN CALCIUM 20 MG/1
20 TABLET, FILM COATED ORAL DAILY
Qty: 90 TABLET | Refills: 3 | Status: SHIPPED | OUTPATIENT
Start: 2023-01-04 | End: 2024-04-03

## 2023-01-04 NOTE — TELEPHONE ENCOUNTER
Prescription approved per [facility] Refill Protocol.    Last Written Prescription Date: 11/29/22  Last Fill Quantity: 18.2ml,  # refills: 0   Last office visit: 1/2/23   Virtual visit 12/29/22 sinusitis Dr. Van

## 2023-01-05 ENCOUNTER — MYC REFILL (OUTPATIENT)
Dept: INTERNAL MEDICINE | Facility: CLINIC | Age: 53
End: 2023-01-05

## 2023-01-05 DIAGNOSIS — F43.23 ADJUSTMENT DISORDER WITH MIXED ANXIETY AND DEPRESSED MOOD: ICD-10-CM

## 2023-01-05 DIAGNOSIS — F32.1 MODERATE MAJOR DEPRESSION (H): ICD-10-CM

## 2023-01-05 RX ORDER — ALPRAZOLAM 0.5 MG
0.5 TABLET ORAL 3 TIMES DAILY PRN
Qty: 90 TABLET | Refills: 0 | Status: CANCELLED | OUTPATIENT
Start: 2023-01-05

## 2023-01-05 RX ORDER — ALBUTEROL SULFATE 90 UG/1
2 AEROSOL, METERED RESPIRATORY (INHALATION) EVERY 6 HOURS PRN
Qty: 18 G | Refills: 0 | Status: SHIPPED | OUTPATIENT
Start: 2023-01-05 | End: 2024-04-04

## 2023-01-06 ENCOUNTER — TELEPHONE (OUTPATIENT)
Dept: FAMILY MEDICINE | Facility: CLINIC | Age: 53
End: 2023-01-06

## 2023-01-06 ENCOUNTER — MYC MEDICAL ADVICE (OUTPATIENT)
Dept: FAMILY MEDICINE | Facility: CLINIC | Age: 53
End: 2023-01-06

## 2023-01-06 RX ORDER — CITALOPRAM HYDROBROMIDE 10 MG/1
TABLET ORAL
Qty: 90 TABLET | Refills: 1 | Status: SHIPPED | OUTPATIENT
Start: 2023-01-06 | End: 2024-01-02

## 2023-01-06 NOTE — TELEPHONE ENCOUNTER
Requested Prescriptions   Pending Prescriptions Disp Refills     ALPRAZolam (XANAX) 0.5 MG tablet 90 tablet 0     Sig: Take 1 tablet (0.5 mg) by mouth 3 times daily as needed          Routing refill request to provider for review/approval because:  Drug not on the [facility], [facility], [facility] or [facility] refill protocol or controlled substance      Too early to fill was given 30 day supply on 12/23/2022.

## 2023-01-06 NOTE — TELEPHONE ENCOUNTER
"Requested Prescriptions   Pending Prescriptions Disp Refills    citalopram (CELEXA) 10 MG tablet [Pharmacy Med Name: CITALOPRAM HYDROBROMIDE 10MG TABS] 90 tablet 1     Sig: TAKE ONE TABLET BY MOUTH ONCE DAILY (ALONG WITH THE CITALOPRAM 20 MG DOSE FOR TOTAL DOSE OF 30 MG DAILY)       [facility] Protocol Failed - 1/5/2023 12:22 PM        Failed - PHQ-9 score less than 5 in past 6 months     Please review last PHQ-9 score.           Passed - Medication is active on med list        Passed - Patient is age 18 or older        Passed - No active pregnancy on record        Passed - No positive pregnancy test in last 12 months        Passed - Recent (6 mo) or future (30 days) visit within the authorizing provider's specialty     Patient had office visit in the last 6 months or has a visit in the next 30 days with authorizing provider or within the authorizing provider's specialty.  See \"Patient Info\" tab in inbasket, or \"Choose Columns\" in Meds & Orders section of the refill encounter.                  Tarah Becker RN  "

## 2023-01-06 NOTE — TELEPHONE ENCOUNTER
Patient returned call regarding lab results. Updated her on medication adjustments and made appointment with her to establish care with Arin Scherer due to PCP retiring    ALESSIO Strickland, RN

## 2023-01-27 ENCOUNTER — MYC REFILL (OUTPATIENT)
Dept: INTERNAL MEDICINE | Facility: CLINIC | Age: 53
End: 2023-01-27
Payer: COMMERCIAL

## 2023-01-27 DIAGNOSIS — F43.23 ADJUSTMENT DISORDER WITH MIXED ANXIETY AND DEPRESSED MOOD: ICD-10-CM

## 2023-01-27 RX ORDER — ALPRAZOLAM 0.5 MG
0.5 TABLET ORAL 3 TIMES DAILY PRN
Qty: 90 TABLET | Refills: 0 | Status: SHIPPED | OUTPATIENT
Start: 2023-01-27 | End: 2023-02-24

## 2023-01-27 NOTE — TELEPHONE ENCOUNTER
Pending Prescriptions:                       Disp   Refills    ALPRAZolam (XANAX) 0.5 MG tablet           90 tab*0        Sig: Take 1 tablet (0.5 mg) by mouth 3 times daily as           needed      Routing refill request to provider for review/approval because:  Drug not on the [facility] refill protocol     Rose Bateman RN on 1/27/2023 at 4:15 PM

## 2023-02-24 ENCOUNTER — MYC REFILL (OUTPATIENT)
Dept: INTERNAL MEDICINE | Facility: CLINIC | Age: 53
End: 2023-02-24
Payer: COMMERCIAL

## 2023-02-24 DIAGNOSIS — F43.23 ADJUSTMENT DISORDER WITH MIXED ANXIETY AND DEPRESSED MOOD: ICD-10-CM

## 2023-02-24 RX ORDER — ALPRAZOLAM 0.5 MG
0.5 TABLET ORAL 3 TIMES DAILY PRN
Qty: 60 TABLET | Refills: 0 | Status: SHIPPED | OUTPATIENT
Start: 2023-02-24 | End: 2023-03-20

## 2023-02-24 NOTE — TELEPHONE ENCOUNTER
Requested Prescriptions   Pending Prescriptions Disp Refills    ALPRAZolam (XANAX) 0.5 MG tablet 90 tablet 0     Sig: Take 1 tablet (0.5 mg) by mouth 3 times daily as needed       There is no refill protocol information for this order           Tarah Becker RN

## 2023-03-12 ENCOUNTER — MYC REFILL (OUTPATIENT)
Dept: FAMILY MEDICINE | Facility: CLINIC | Age: 53
End: 2023-03-12
Payer: COMMERCIAL

## 2023-03-12 DIAGNOSIS — J01.00 ACUTE MAXILLARY SINUSITIS, RECURRENCE NOT SPECIFIED: ICD-10-CM

## 2023-03-13 ENCOUNTER — OFFICE VISIT (OUTPATIENT)
Dept: FAMILY MEDICINE | Facility: CLINIC | Age: 53
End: 2023-03-13
Payer: COMMERCIAL

## 2023-03-13 VITALS
DIASTOLIC BLOOD PRESSURE: 90 MMHG | RESPIRATION RATE: 16 BRPM | WEIGHT: 159 LBS | HEART RATE: 88 BPM | OXYGEN SATURATION: 96 % | SYSTOLIC BLOOD PRESSURE: 118 MMHG | BODY MASS INDEX: 26.49 KG/M2 | TEMPERATURE: 97.7 F | HEIGHT: 65 IN

## 2023-03-13 DIAGNOSIS — F32.1 MODERATE MAJOR DEPRESSION (H): ICD-10-CM

## 2023-03-13 DIAGNOSIS — F41.1 GAD (GENERALIZED ANXIETY DISORDER): ICD-10-CM

## 2023-03-13 DIAGNOSIS — R35.0 URINARY FREQUENCY: ICD-10-CM

## 2023-03-13 DIAGNOSIS — Z12.11 SCREEN FOR COLON CANCER: ICD-10-CM

## 2023-03-13 DIAGNOSIS — Z12.31 ENCOUNTER FOR SCREENING MAMMOGRAM FOR BREAST CANCER: ICD-10-CM

## 2023-03-13 DIAGNOSIS — F43.10 PTSD (POST-TRAUMATIC STRESS DISORDER): ICD-10-CM

## 2023-03-13 DIAGNOSIS — Z00.00 ENCOUNTER FOR MEDICAL EXAMINATION TO ESTABLISH CARE: Primary | ICD-10-CM

## 2023-03-13 DIAGNOSIS — J30.2 SEASONAL ALLERGIC RHINITIS, UNSPECIFIED TRIGGER: ICD-10-CM

## 2023-03-13 DIAGNOSIS — Z12.31 VISIT FOR SCREENING MAMMOGRAM: ICD-10-CM

## 2023-03-13 DIAGNOSIS — D41.4 BLADDER POLYPS: ICD-10-CM

## 2023-03-13 PROBLEM — T78.2XXA: Status: RESOLVED | Noted: 2018-08-30 | Resolved: 2023-03-13

## 2023-03-13 PROBLEM — T50.905A: Status: RESOLVED | Noted: 2018-08-30 | Resolved: 2023-03-13

## 2023-03-13 PROBLEM — K85.10 ACUTE BILIARY PANCREATITIS, UNSPECIFIED COMPLICATION STATUS: Status: RESOLVED | Noted: 2017-04-05 | Resolved: 2023-03-13

## 2023-03-13 PROBLEM — M25.511 ACUTE PAIN OF RIGHT SHOULDER: Status: RESOLVED | Noted: 2017-02-01 | Resolved: 2023-03-13

## 2023-03-13 PROBLEM — E87.20 ACIDOSIS: Status: RESOLVED | Noted: 2018-08-31 | Resolved: 2023-03-13

## 2023-03-13 PROBLEM — S43.431D LABRAL TEAR OF SHOULDER, RIGHT, SUBSEQUENT ENCOUNTER: Status: RESOLVED | Noted: 2017-05-03 | Resolved: 2023-03-13

## 2023-03-13 PROBLEM — F43.23 ADJUSTMENT DISORDER WITH MIXED ANXIETY AND DEPRESSED MOOD: Status: RESOLVED | Noted: 2017-02-01 | Resolved: 2023-03-13

## 2023-03-13 PROBLEM — T78.2XXA ANAPHYLACTIC REACTION: Status: RESOLVED | Noted: 2018-08-30 | Resolved: 2023-03-13

## 2023-03-13 PROCEDURE — 96127 BRIEF EMOTIONAL/BEHAV ASSMT: CPT | Performed by: NURSE PRACTITIONER

## 2023-03-13 PROCEDURE — 99214 OFFICE O/P EST MOD 30 MIN: CPT | Performed by: NURSE PRACTITIONER

## 2023-03-13 ASSESSMENT — PAIN SCALES - GENERAL: PAINLEVEL: NO PAIN (0)

## 2023-03-13 ASSESSMENT — PATIENT HEALTH QUESTIONNAIRE - PHQ9
SUM OF ALL RESPONSES TO PHQ QUESTIONS 1-9: 17
SUM OF ALL RESPONSES TO PHQ QUESTIONS 1-9: 17
10. IF YOU CHECKED OFF ANY PROBLEMS, HOW DIFFICULT HAVE THESE PROBLEMS MADE IT FOR YOU TO DO YOUR WORK, TAKE CARE OF THINGS AT HOME, OR GET ALONG WITH OTHER PEOPLE: SOMEWHAT DIFFICULT

## 2023-03-13 NOTE — PROGRESS NOTES
Assessment & Plan     Encounter for medical examination to establish care  Extensive chart review  - SC BEHAV ASSMT W/SCORE & DOCD/STAND INSTRUMENT    Moderate major depression (H)  PHQ-9 is 17 today.  Has been on nortriptylene, trazodone, effexor, sertraline without good results.  Will check basic labs.  Referral for counseling  She has had multiple traumatic life events but never done counseling.  Change to lexapro.  Follow up in clinic if no improvement, worsening symptoms, or concerns.     TAWANA (generalized anxiety disorder)  As above    PTSD (post-traumatic stress disorder)  As above    Seasonal allergic rhinitis, unspecified trigger  Supportive cares discussed      Screen for colon cancer  Referral sent  - Colonoscopy Screening  Referral    Visit for screening mammogram  screen    Encounter for screening mammogram for breast cancer  screen  - MA Screen Bilateral w/Ronaldo      38 minutes spent on the date of the encounter doing chart review, review of test results, interpretation of tests, patient visit and documentation        Return in about 4 weeks (around 4/10/2023) for recheck with PCP.    Jermaine Scherer NP  [facility] RL Cannon is a 52 year old, presenting for the following health issues:  Establish Care      History of Present Illness       Reason for visit:  Discuss health and concerns and to establish new doctor with jermaine scherer    She eats 2-3 servings of fruits and vegetables daily.She consumes 1 sweetened beverage(s) daily.She exercises with enough effort to increase her heart rate 30 to 60 minutes per day.  She exercises with enough effort to increase her heart rate 5 days per week.   She is taking medications regularly.    Today's PHQ-9         PHQ-9 Total Score: 17    PHQ-9 Q9 Thoughts of better off dead/self-harm past 2 weeks :   Not at all    How difficult have these problems made it for you to do your work, take care of things at home, or get  "along with other people: Somewhat difficult     Son 5 years ago unintentional homicide.  Started seeing primary then.  Had a \"break' at that time.  Has been on medication for 5 years.  Was on celexa- and xanax.  Wanted to start taper of xanax.  Is scared to try.     is on ventilator and oxygen- from COPD.  Put on - had 3% chance of living- made it.  Had RSV- 1.5years ago.  She quit job and she is her husbands he is on bipap and oxygen and has 30% function.  She stays at home and cares for him.  niece .  Gila yadav mother in law passed  Away.      Xanax- takes two pills at bedtime to sleep.  At night before goes to bed.  Is exhausted.  Even when takes is up.      Bladder- more frequency.  Some urgency.  Two vaginal deliveries.  Son 6, dght7.        Review of Systems   Constitutional, HEENT, cardiovascular, pulmonary, GI, , musculoskeletal, neuro, skin, endocrine and psych systems are negative, except as otherwise noted.      Objective    BP (!) 118/90 (Cuff Size: Adult Regular)   Pulse 88   Temp 97.7  F (36.5  C) (Temporal)   Resp 16   Ht 1.651 m (5' 5\")   Wt 72.1 kg (159 lb)   LMP 2006   SpO2 96%   BMI 26.46 kg/m    Body mass index is 26.46 kg/m .  Physical Exam   GENERAL: healthy, alert and no distress  NECK: no adenopathy, no asymmetry, masses, or scars and thyroid normal to palpation  RESP: lungs clear to auscultation - no rales, rhonchi or wheezes  CV: regular rate and rhythm, normal S1 S2, no S3 or S4, no murmur, click or rub, no peripheral edema and peripheral pulses strong  ABDOMEN: soft, nontender, no hepatosplenomegaly, no masses and bowel sounds normal  MS: no gross musculoskeletal defects noted, no edema    Results for orders placed or performed in visit on 23 (from the past 24 hour(s))   Extra Tube *Canceled*    Narrative    The following orders were created for panel order Extra Tube.  Procedure                               Abnormality         Status                 "     ---------                               -----------         ------                     Extra Serum Separator Tu...[[id number]]                                                   Please view results for these tests on the individual orders.

## 2023-03-14 RX ORDER — FLUTICASONE PROPIONATE 50 MCG
2 SPRAY, SUSPENSION (ML) NASAL DAILY
Qty: 18.2 ML | Refills: 0 | Status: SHIPPED | OUTPATIENT
Start: 2023-03-14 | End: 2024-04-04

## 2023-03-14 RX ORDER — ESCITALOPRAM OXALATE 20 MG/1
TABLET ORAL
Qty: 64 TABLET | Refills: 0 | Status: SHIPPED | OUTPATIENT
Start: 2023-03-14 | End: 2023-05-13

## 2023-03-14 NOTE — TELEPHONE ENCOUNTER
Flonase  Prescription approved per [facility] Refill Protocol.  Sending to scheduling for establish care office visit due

## 2023-03-15 ENCOUNTER — MYC MEDICAL ADVICE (OUTPATIENT)
Dept: FAMILY MEDICINE | Facility: CLINIC | Age: 53
End: 2023-03-15
Payer: COMMERCIAL

## 2023-03-15 ENCOUNTER — TELEPHONE (OUTPATIENT)
Dept: UROLOGY | Facility: CLINIC | Age: 53
End: 2023-03-15
Payer: COMMERCIAL

## 2023-03-15 NOTE — TELEPHONE ENCOUNTER
[facility] Call Center    Phone Message    May a detailed message be left on voicemail: yes     Reason for Call: Patient being referred for Bladder polyps by referring provider - Dx not in guidelines for scheduling. Sending encounter message for review and follow-up with patient for scheduling. Thank you!    Action Taken: Message routed to:  [facility] ([facility] Urology    Travel Screening: Not Applicable

## 2023-03-15 NOTE — TELEPHONE ENCOUNTER
Spoke with pt, she will have to call us back after speaking with her dr to see if there is anyone closer to [place], [place] where she lives... she wcb when wanting to schedule

## 2023-03-20 ENCOUNTER — MYC REFILL (OUTPATIENT)
Dept: INTERNAL MEDICINE | Facility: CLINIC | Age: 53
End: 2023-03-20
Payer: COMMERCIAL

## 2023-03-20 DIAGNOSIS — F43.23 ADJUSTMENT DISORDER WITH MIXED ANXIETY AND DEPRESSED MOOD: ICD-10-CM

## 2023-03-21 RX ORDER — ALPRAZOLAM 0.5 MG
0.5 TABLET ORAL 3 TIMES DAILY PRN
Qty: 60 TABLET | Refills: 0 | Status: SHIPPED | OUTPATIENT
Start: 2023-03-24 | End: 2023-04-20

## 2023-03-21 NOTE — TELEPHONE ENCOUNTER
Requested Prescriptions   Pending Prescriptions Disp Refills     ALPRAZolam (XANAX) 0.5 MG tablet 60 tablet 0     Sig: Take 1 tablet (0.5 mg) by mouth 3 times daily as needed        Next 5 appointments (look out 90 days)    Mar 23, 2023 11:00 AM  Lab visit with [facility]  [facility] ([facility] ) [address] [place] [id number] [phone]   Apr 10, 2023  3:10 PM  MA Visit with [facility] MA/LPN  [facility] ([facility] ) [address] [place] [id number] [phone]           Routing refill request to provider for review/approval because:  Drug not on the [facility], [facility] or [facility] refill protocol or controlled substance

## 2023-03-23 ENCOUNTER — TELEPHONE (OUTPATIENT)
Dept: GASTROENTEROLOGY | Facility: CLINIC | Age: 53
End: 2023-03-23
Payer: COMMERCIAL

## 2023-03-23 ENCOUNTER — LAB (OUTPATIENT)
Dept: LAB | Facility: CLINIC | Age: 53
End: 2023-03-23
Payer: COMMERCIAL

## 2023-03-23 ENCOUNTER — MYC MEDICAL ADVICE (OUTPATIENT)
Dept: FAMILY MEDICINE | Facility: CLINIC | Age: 53
End: 2023-03-23

## 2023-03-23 ENCOUNTER — HOSPITAL ENCOUNTER (OUTPATIENT)
Facility: CLINIC | Age: 53
End: 2023-03-23
Attending: SPECIALIST | Admitting: SPECIALIST
Payer: COMMERCIAL

## 2023-03-23 DIAGNOSIS — F41.1 GAD (GENERALIZED ANXIETY DISORDER): ICD-10-CM

## 2023-03-23 DIAGNOSIS — R35.0 URINARY FREQUENCY: ICD-10-CM

## 2023-03-23 LAB
ALBUMIN UR-MCNC: NEGATIVE MG/DL
ANION GAP SERPL CALCULATED.3IONS-SCNC: 10 MMOL/L (ref 7–15)
APPEARANCE UR: CLEAR
BASOPHILS # BLD AUTO: 0.1 10E3/UL (ref 0–0.2)
BASOPHILS NFR BLD AUTO: 1 %
BILIRUB UR QL STRIP: NEGATIVE
BUN SERPL-MCNC: 12.3 MG/DL (ref 6–20)
CALCIUM SERPL-MCNC: 9.3 MG/DL (ref 8.6–10)
CHLORIDE SERPL-SCNC: 105 MMOL/L (ref 98–107)
COLOR UR AUTO: ABNORMAL
CREAT SERPL-MCNC: 0.71 MG/DL (ref 0.51–0.95)
DEPRECATED CALCIDIOL+CALCIFEROL SERPL-MC: 53 UG/L (ref 20–75)
DEPRECATED HCO3 PLAS-SCNC: 25 MMOL/L (ref 22–29)
EOSINOPHIL # BLD AUTO: 0.1 10E3/UL (ref 0–0.7)
EOSINOPHIL NFR BLD AUTO: 1 %
ERYTHROCYTE [DISTWIDTH] IN BLOOD BY AUTOMATED COUNT: 12.4 % (ref 10–15)
FERRITIN SERPL-MCNC: 98 NG/ML (ref 6–175)
GFR SERPL CREATININE-BSD FRML MDRD: >90 ML/MIN/1.73M2
GLUCOSE SERPL-MCNC: 97 MG/DL (ref 70–99)
GLUCOSE UR STRIP-MCNC: NEGATIVE MG/DL
HCT VFR BLD AUTO: 44.3 % (ref 35–47)
HGB BLD-MCNC: 14.8 G/DL (ref 11.7–15.7)
HGB UR QL STRIP: ABNORMAL
IMM GRANULOCYTES # BLD: 0 10E3/UL
IMM GRANULOCYTES NFR BLD: 0 %
KETONES UR STRIP-MCNC: NEGATIVE MG/DL
LEUKOCYTE ESTERASE UR QL STRIP: NEGATIVE
LYMPHOCYTES # BLD AUTO: 2 10E3/UL (ref 0.8–5.3)
LYMPHOCYTES NFR BLD AUTO: 25 %
MCH RBC QN AUTO: 32.3 PG (ref 26.5–33)
MCHC RBC AUTO-ENTMCNC: 33.4 G/DL (ref 31.5–36.5)
MCV RBC AUTO: 97 FL (ref 78–100)
MONOCYTES # BLD AUTO: 0.4 10E3/UL (ref 0–1.3)
MONOCYTES NFR BLD AUTO: 5 %
MUCOUS THREADS #/AREA URNS LPF: PRESENT /LPF
NEUTROPHILS # BLD AUTO: 5.6 10E3/UL (ref 1.6–8.3)
NEUTROPHILS NFR BLD AUTO: 68 %
NITRATE UR QL: NEGATIVE
NRBC # BLD AUTO: 0 10E3/UL
NRBC BLD AUTO-RTO: 0 /100
PH UR STRIP: 6 [PH] (ref 5–7)
PLATELET # BLD AUTO: 309 10E3/UL (ref 150–450)
POTASSIUM SERPL-SCNC: 4.3 MMOL/L (ref 3.4–5.3)
RBC # BLD AUTO: 4.58 10E6/UL (ref 3.8–5.2)
RBC URINE: 2 /HPF
SODIUM SERPL-SCNC: 140 MMOL/L (ref 136–145)
SP GR UR STRIP: 1.01 (ref 1–1.03)
SQUAMOUS EPITHELIAL: 1 /HPF
TSH SERPL DL<=0.005 MIU/L-ACNC: 1.55 UIU/ML (ref 0.3–4.2)
UROBILINOGEN UR STRIP-MCNC: NORMAL MG/DL
WBC # BLD AUTO: 8.2 10E3/UL (ref 4–11)
WBC URINE: <1 /HPF

## 2023-03-23 PROCEDURE — 80048 BASIC METABOLIC PNL TOTAL CA: CPT

## 2023-03-23 PROCEDURE — 82728 ASSAY OF FERRITIN: CPT

## 2023-03-23 PROCEDURE — 85025 COMPLETE CBC W/AUTO DIFF WBC: CPT

## 2023-03-23 PROCEDURE — 81001 URINALYSIS AUTO W/SCOPE: CPT

## 2023-03-23 PROCEDURE — 84443 ASSAY THYROID STIM HORMONE: CPT

## 2023-03-23 PROCEDURE — 82306 VITAMIN D 25 HYDROXY: CPT

## 2023-03-23 PROCEDURE — 36415 COLL VENOUS BLD VENIPUNCTURE: CPT

## 2023-03-23 NOTE — TELEPHONE ENCOUNTER
Screening Questions  BLUE  KIND OF PREP RED  LOCATION [review exclusion criteria] GREEN  SEDATION TYPE        Y Are you active on mychart?       JESSICA STEIN Ordering/Referring Provider?        [employer] What type of coverage do you have?      N Have you had a positive covid test in the last 14 days?     26.3 1. BMI  [BMI 40+ - review exclusion criteria]    Y  2. Are you able to give consent for your medical care? [IF NO,RN REVIEW]          N  3. Are you taking any prescription pain medications on a routine schedule   (ex narcotics: oxycodone, roxicodone, oxycontin,  and percocet)? [RN Review]          3a. EXTENDED PREP What kind of prescription?     N 4. Do you have any chemical dependencies such as alcohol, street drugs, or methadone?        **If yes 3- 5 , please schedule with MAC sedation.**          IF YES TO ANY 6 - 10 - HOSPITAL SETTING ONLY.     N 6.   Do you need assistance transferring?     N 7.   Have you had a heart or lung transplant?    N 8.   Are you currently on dialysis?   N 9.   Do you use daily home oxygen?   N 10. Do you take nitroglycerin?   10a.  If yes, how often?     11. [FEMALES]  N Are you currently pregnant?    11a.  If yes, how many weeks? [ Greater than 12 weeks, OR NEEDED]    N 12. Do you have Pulmonary Hypertension? *NEED PAC APPT AT [facility] w/ [facility]*     N 13. [review exclusion criteria]  Do you have any implantable devices in your body (pacemaker, defib, LVAD)?    N 14. In the past 6 months, have you had any heart related issues including cardiomyopathy or heart attack?     14a.  If yes, did it require cardiac stenting if so when?     N 15. Have you had a stroke or Transient ischemic attack (TIA - aka  mini stroke ) within 6 months?      N 16. Do you have mod to severe Obstructive Sleep Apnea?  [Hospital only]    N 17. Do you have SEVERE AND UNCONTROLLED asthma? *NEED PAC APPT AT [facility] w/[facility]*     18. Are you currently taking any blood thinners?     18a. No. Continue to 19.   18b. Yes/no  "Blood Thinner: No [CONTINUE TO #19]    N 19. Do you take the medication Phentermine?    19a. If yes, \"Hold for 7 days before procedure.  Please consult your prescribing provider if you have questions about holding this medication.\"     N  20. Do you have chronic kidney disease?      N  21. Do you have a diagnosis of diabetes?     N  22. On a regular basis do you go 3-5 days between bowel movements?      23. Preferred LOCAL Pharmacy for Pre Prescription    [ LIST ONLY ONE PHARMACY]          [address]        - CLOSING REMINDERS -    Informed patient they will need an adult    Cannot take any type of public or medical transportation alone    Conscious Sedation- Needs  for 6 hours after the procedure       MAC/General-Needs  for 24 hours after procedure    Pre-Procedure Covid test to be completed [[facility] PCR Testing Required]    Confirmed Nurse will call to complete assessment       - SCHEDULING DETAILS -  N Hospital Setting Required? If yes, what is the exclusion?:    MARILU  Surgeon    04/17/2023  Date of Procedure  Lower Endoscopy [Colonoscopy]  Type of Procedure Scheduled  [facility]   STANDARD [facility]-If you answer yes to questions #8, #20, #21Which Colonoscopy Prep was Sent?     MAC PER LOCATION Sedation Type     N PAC / Pre-op Required               "

## 2023-03-31 NOTE — TELEPHONE ENCOUNTER
Pending Prescriptions:                       Disp   Refills    ALPRAZolam (XANAX) 0.5 MG tablet [Pharmacy*90 tab*0        Sig: TAKE ONE TABLET BY MOUTH THREE TIMES A DAY AS NEEDED           FOR ANXIETY    Last Written Prescription Date:  1/27/21  Last Fill Quantity: 90,  # refills: 0   Last office visit: 7/30/2020 with prescribing provider:  Judah   Future Office Visit:      Routing refill request to provider for review/approval because:  Drug not on the [facility] refill protocol     Rose Bateman RN on 2/26/2021 at 3:41 PM     [[id number]]

## 2023-04-13 RX ORDER — ONDANSETRON 2 MG/ML
4 INJECTION INTRAMUSCULAR; INTRAVENOUS
Status: CANCELLED | OUTPATIENT
Start: 2023-04-13

## 2023-04-13 RX ORDER — LIDOCAINE 40 MG/G
CREAM TOPICAL
Status: CANCELLED | OUTPATIENT
Start: 2023-04-13

## 2023-04-14 ENCOUNTER — TELEPHONE (OUTPATIENT)
Dept: GASTROENTEROLOGY | Facility: CLINIC | Age: 53
End: 2023-04-14
Payer: COMMERCIAL

## 2023-04-14 NOTE — TELEPHONE ENCOUNTER
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 [place] [address]    Surgeon: Jameson        Rescheduled: Yes    Procedure: Lower Endoscopy [Colonoscopy]     Date: 5/30    Location:[facility]; [address] [place] [address]    Surgeon: bharat    Sedation Level Scheduled  mac,  Reason for Sedation Level site    Prep/Instructions updated and sent:

## 2023-04-17 NOTE — PROGRESS NOTES
ENT Consultation    Liliana Kat who is a 53 year old female seen in consultation at the request of Brayan So.      History of Present Illness - Lilaina Kat is a 53 year old female presents with a chief complaint of right facial swelling pressure in the cheek area givenly has sustained some swelling around the right eye also had couple of episodes of epistaxis recently noticed on the right side.  Smell is also somewhat diminished.  She was on antibiotics previously without significant relief.  She tried Flonase but had some issues using it gets more headaches.  Sense of taste appears to be intact.    Patient also is under a lot of stress lately and does have history of migraines.  Does not have any aura however or photophobia.    BP Readings from Last 1 Encounters:   04/24/23 130/86       BP noted to be well controlled today in office.     Liliana IS NOT a smoker/uses chewing tobacco.      Past Medical History -   Past Medical History:   Diagnosis Date     Allergic rhinitis, cause unspecified     Allergic rhinitis - weeds and pollan     Anxiety      Bladder polyps      Depressive disorder      Obstructive chronic bronchitis with exacerbation (H)      Other and unspecified ovarian cyst     Ovarian cyst - rupured cyst + laser x3     Other and unspecified ovarian cyst 9/14/2005    Left hemorrhagic ovarian cyst.     Tobacco abuse      Urinary tract infection, site not specified        Current Medications -   Current Outpatient Medications:      albuterol (PROAIR HFA/PROVENTIL HFA/VENTOLIN HFA) 108 (90 Base) MCG/ACT inhaler, Inhale 2 puffs into the lungs every 6 hours as needed for shortness of breath or wheezing, Disp: 18 g, Rfl: 0     ALPRAZolam (XANAX) 0.5 MG tablet, Take 1 tablet (0.5 mg) by mouth 2 times daily as needed for anxiety, Disp: 60 tablet, Rfl: 0     aspirin 81 MG tablet, Take 1 tablet (81 mg) by mouth daily, Disp: 30 tablet, Rfl: OTC     atorvastatin (LIPITOR) 20 MG tablet, Take 1  tablet (20 mg) by mouth daily, Disp: 90 tablet, Rfl: 3     bisacodyl (DULCOLAX) 5 MG EC tablet, Take 2 tablets at 3 pm the day before your procedure. If your procedure is before 11 am, take 2 additional tablets at 11 pm. If your procedure is after 11 am, take 2 additional tablets at 6 am. For additional instructions refer to your colonoscopy prep instructions., Disp: 4 tablet, Rfl: 0     escitalopram (LEXAPRO) 20 MG tablet, Take 0.5 tablets (10 mg) by mouth daily for 7 days, THEN 1 tablet (20 mg) daily for 60 days., Disp: 64 tablet, Rfl: 0     estradiol (ESTRACE) 1 MG tablet, TAKE ONE TABLET BY MOUTH ONCE DAILY, Disp: 90 tablet, Rfl: 2     fluticasone (FLONASE) 50 MCG/ACT nasal spray, Spray 2 sprays into both nostrils daily, Disp: 18.2 mL, Rfl: 0     loratadine (CLARITIN) 10 MG tablet, TAKE ONE TABLET BY MOUTH EVERY DAY, Disp: 30 tablet, Rfl: 5     meclizine (ANTIVERT) 25 MG tablet, Take 1 tablet (25 mg) by mouth 3 times daily as needed for dizziness, Disp: 30 tablet, Rfl: 0     medroxyPROGESTERone (PROVERA) 5 MG tablet, TAKE ONE TABLET BY MOUTH ONCE DAILY, Disp: 90 tablet, Rfl: 1     polyethylene glycol (GOLYTELY) 236 g suspension, The night before the exam at 6 pm drink an 8-ounce glass every 15 minutes until the jug is half empty. If you arrive before 11 AM: Drink the other half of the Golytely jug at 11 PM night before procedure. If you arrive after 11 AM: Drink the other half of the Golytely jug at 6 AM day of procedure. For additional instructions refer to your colonoscopy prep instructions., Disp: 4000 mL, Rfl: 0     pseudoePHEDrine-guaiFENesin (MUCINEX D)  MG 12 hr tablet, Take 1 tablet by mouth every 12 hours, Disp: 20 tablet, Rfl: 0     citalopram (CELEXA) 10 MG tablet, TAKE ONE TABLET BY MOUTH ONCE DAILY (ALONG WITH THE CITALOPRAM 20 MG DOSE FOR TOTAL DOSE OF 30 MG DAILY), Disp: 90 tablet, Rfl: 1     MELATONIN, 10 mg daily  (Patient not taking: Reported on 1/2/2023), Disp: , Rfl:     Current  Facility-Administered Medications:      triamcinolone (KENALOG-40) injection 40 mg, 40 mg, , , Shanell Mascorro MD, 40 mg at 21 1703    Allergies -   Allergies   Allergen Reactions     Amoxicillin Anaphylaxis     Augmentin Swelling and Difficulty breathing     Avelox Nausea and Vomiting     Effexor Xr [Venlafaxine Hydrochloride]      Shaky and heart racing     Sulfa Drugs        Social History -   Social History     Socioeconomic History     Marital status:    Occupational History     Employer: [employer]     Comment: assembly   Tobacco Use     Smoking status: Former     Packs/day: 0.00     Years: 10.00     Pack years: 0.00     Types: Cigarettes     Quit date: 2020     Years since quitting: 3.0     Smokeless tobacco: Never     Tobacco comments:     3 cigs a day    Substance and Sexual Activity     Alcohol use: No     Alcohol/week: 0.0 standard drinks of alcohol     Comment: recovering  - 2005     Drug use: No     Sexual activity: Not Currently     Partners: Male     Birth control/protection: Surgical     Comment: tubal    Social History Narrative    Lives with spouse and children. No domestic violence issues.       Family History -   Family History   Problem Relation Age of Onset     Alcohol/Drug Father          at age 53 of alcohol     Arthritis Father      Cancer Father         lung     Alcohol/Drug Paternal Grandfather      Cancer Paternal Grandfather         lung     Alcohol/Drug Paternal Grandmother      Cancer Paternal Grandmother         lung     Alcohol/Drug Paternal Aunt      Allergies Daughter         animals and grass     Allergies Son         dust mite     Arthritis Mother      Hypertension Mother      Lipids Mother      Depression Mother      Heart Disease Mother      Blood Disease Mother         DVTs     Bleeding Disorder Mother      Liver Disease Mother      Depression Brother        Review of Systems - As per HPI and PMHx, otherwise review of system review of the  "head and neck negative. Otherwise 10+ review of system is negative    Physical Exam  /86   Temp 98.3  F (36.8  C) (Temporal)   Ht 1.651 m (5' 5\")   Wt 73.2 kg (161 lb 4.8 oz)   LMP 03/06/2006   BMI 26.84 kg/m    BMI: Body mass index is 26.84 kg/m .    General - The patient is well nourished and well developed, and appears to have good nutritional status.  Alert and oriented to person and place, answers questions and cooperates with examination appropriately.    SKIN - No suspicious lesions or rashes.  Respiration - No respiratory distress.  Head and Face - Normocephalic and atraumatic, with no gross asymmetry noted of the contour of the facial features.  The facial nerve is intact, with strong symmetric movements.    Voice and Breathing - The patient was breathing comfortably without the use of accessory muscles. The patients voice was clear and strong, and had appropriate pitch and quality.    Ears - Bilateral pinna and EACs with normal appearing overlying skin. Tympanic membrane intact with good mobility on pneumatic otoscopy bilaterally. Bony landmarks of the ossicular chain are normal. The tympanic membranes are normal in appearance. No retraction, perforation, or masses.  No fluid or purulence was seen in the external canal or the middle ear.     Eyes - Extraocular movements intact.  Sclera were not icteric or injected, conjunctiva were pink and moist.    Mouth - Examination of the oral cavity showed pink, healthy oral mucosa. No lesions or ulcerations noted.  The tongue was mobile and midline, and the dentition were in good condition.      Throat - The walls of the oropharynx were smooth, pink, moist, symmetric, and had no lesions or ulcerations.  The tonsillar pillars and soft palate were symmetric.  The uvula was midline on elevation.    Neck - Normal midline excursion of the laryngotracheal complex during swallowing.  Full range of motion on passive movement.  Palpation of the occipital, " submental, submandibular, internal jugular chain, and supraclavicular nodes did not demonstrate any abnormal lymph nodes or masses.  The carotid pulse was palpable bilaterally.  Palpation of the thyroid was soft and smooth, with no nodules or goiter appreciated.  The trachea was mobile and midline.    Nose - External contour is symmetric, no gross deflection or scars.  Nasal mucosa is pink and moist with no abnormal mucus.  The septum was midline and non-obstructive, turbinates of normal size and position.  No polyps, masses, or purulence noted on examination.    Neuro - Nonfocal neuro exam is normal, CN 2 through 12 intact, normal gait and muscle tone.      Performed in clinic today:  To further evaluate the nasal cavity, I performed rigid nasal endoscopy.  I first sprayed the nasal cavity bilaterally with a mix of lidocaine and neosynephrine.  I then began on the left side using a 2.7mm, 30 degree rigid nasal endoscope.  The septum was straight and the nasal airway was open.  No abnormal secretions, purulence, or polyps were noted. The left middle turbinate and middle meatus were clearly visualized and normal in appearance.  Looking up, the olfactory cleft was unobstructed.  Going further back, the sphenoethmoid recess was normal in appearance, with healthy appearing mucosa on the face of the sphenoid.  The nasopharynx was unremarkable, and the eustachian tube opening on this side was unobstructed.    I then turned my attention to the right side.  Once again, the septum was straight, and the airway was open.  No abnormal secretions, purulence, polyps were noted.  The right middle turbinate and middle meatus were clearly visualized and normal in appearance.  Looking up, the olfactory cleft was unobstructed.  Going further back the right sphenoethmoid recess was normal in appearance, and eustachian tube opening was unobstructed.   Purple - [id number] Mytamed      A/P - Liliana Kat is a 53 year old female  patient with symptoms of right-sided sinus issues even though do not see any specific findings of polyps or purulence.  Right now patient will use nasal saline quite aggressively on the right side at least twice daily as well as we will get a CT scan to further evaluate.  Certainly there is potential contributor of her migraines to her symptom spectrum as well.  We will reevaluate in a few weeks.      Cordell Pacheco MD

## 2023-04-18 DIAGNOSIS — F43.23 ADJUSTMENT DISORDER WITH MIXED ANXIETY AND DEPRESSED MOOD: ICD-10-CM

## 2023-04-19 ENCOUNTER — MYC REFILL (OUTPATIENT)
Dept: INTERNAL MEDICINE | Facility: CLINIC | Age: 53
End: 2023-04-19
Payer: COMMERCIAL

## 2023-04-19 DIAGNOSIS — F43.23 ADJUSTMENT DISORDER WITH MIXED ANXIETY AND DEPRESSED MOOD: ICD-10-CM

## 2023-04-19 RX ORDER — ALPRAZOLAM 0.5 MG
0.5 TABLET ORAL 3 TIMES DAILY PRN
Qty: 60 TABLET | Refills: 0 | OUTPATIENT
Start: 2023-04-19

## 2023-04-19 NOTE — TELEPHONE ENCOUNTER
Pending Prescriptions:                       Disp   Refills    ALPRAZolam (XANAX) 0.5 MG tablet [Pharmacy*60 tab*0        Sig: Take 1 tablet (0.5 mg) by mouth 3 times daily as           needed for anxiety      Routing refill request to provider for review/approval because:  Drug not on the [facility] refill protocol     Rose Bateman RN on 4/19/2023 at 2:08 PM

## 2023-04-20 ENCOUNTER — MYC MEDICAL ADVICE (OUTPATIENT)
Dept: FAMILY MEDICINE | Facility: CLINIC | Age: 53
End: 2023-04-20
Payer: COMMERCIAL

## 2023-04-20 ENCOUNTER — MYC REFILL (OUTPATIENT)
Dept: INTERNAL MEDICINE | Facility: CLINIC | Age: 53
End: 2023-04-20
Payer: COMMERCIAL

## 2023-04-20 DIAGNOSIS — F43.23 ADJUSTMENT DISORDER WITH MIXED ANXIETY AND DEPRESSED MOOD: ICD-10-CM

## 2023-04-20 RX ORDER — ALPRAZOLAM 0.5 MG
0.5 TABLET ORAL 2 TIMES DAILY PRN
Qty: 60 TABLET | Refills: 0 | Status: SHIPPED | OUTPATIENT
Start: 2023-04-20 | End: 2023-05-13

## 2023-04-20 NOTE — TELEPHONE ENCOUNTER
Requested Prescriptions   Pending Prescriptions Disp Refills     ALPRAZolam (XANAX) 0.5 MG tablet 60 tablet 0     Sig: Take 1 tablet (0.5 mg) by mouth 3 times daily as needed for anxiety         Routing refill request to provider for review/approval because:  Drug not on the [facility], [facility] or [facility] refill protocol or controlled substance

## 2023-04-24 ENCOUNTER — OFFICE VISIT (OUTPATIENT)
Dept: OTOLARYNGOLOGY | Facility: CLINIC | Age: 53
End: 2023-04-24
Attending: FAMILY MEDICINE
Payer: COMMERCIAL

## 2023-04-24 ENCOUNTER — E-VISIT (OUTPATIENT)
Dept: FAMILY MEDICINE | Facility: CLINIC | Age: 53
End: 2023-04-24

## 2023-04-24 ENCOUNTER — MYC MEDICAL ADVICE (OUTPATIENT)
Dept: FAMILY MEDICINE | Facility: CLINIC | Age: 53
End: 2023-04-24

## 2023-04-24 VITALS
TEMPERATURE: 98.3 F | DIASTOLIC BLOOD PRESSURE: 86 MMHG | HEIGHT: 65 IN | WEIGHT: 161.3 LBS | BODY MASS INDEX: 26.87 KG/M2 | SYSTOLIC BLOOD PRESSURE: 130 MMHG

## 2023-04-24 DIAGNOSIS — J01.01 ACUTE RECURRENT MAXILLARY SINUSITIS: ICD-10-CM

## 2023-04-24 DIAGNOSIS — N39.0 ACUTE UTI (URINARY TRACT INFECTION): Primary | ICD-10-CM

## 2023-04-24 PROCEDURE — 31231 NASAL ENDOSCOPY DX: CPT | Performed by: OTOLARYNGOLOGY

## 2023-04-24 PROCEDURE — 99203 OFFICE O/P NEW LOW 30 MIN: CPT | Mod: 25 | Performed by: OTOLARYNGOLOGY

## 2023-04-24 PROCEDURE — 99421 OL DIG E/M SVC 5-10 MIN: CPT | Performed by: NURSE PRACTITIONER

## 2023-04-24 RX ORDER — NITROFURANTOIN 25; 75 MG/1; MG/1
100 CAPSULE ORAL 2 TIMES DAILY
Qty: 10 CAPSULE | Refills: 0 | Status: SHIPPED | OUTPATIENT
Start: 2023-04-24 | End: 2023-04-29

## 2023-04-24 NOTE — PATIENT INSTRUCTIONS
Dear Liliana Kat    After reviewing your responses, I've been able to diagnose you with a urinary tract infection, which is a common infection of the bladder with bacteria.  This is not a sexually transmitted infection, though urinating immediately after intercourse can help prevent infections.  Drinking lots of fluids is also helpful to clear your current infection and prevent the next one.      I have sent a prescription for antibiotics to your pharmacy to treat this infection.    It is important that you take all of your prescribed medication even if your symptoms are improving after a few doses.  Taking all of your medicine helps prevent the symptoms from returning.     If your symptoms worsen, you develop pain in your back or stomach, develop fevers, or are not improving in 5 days, please contact your primary care provider for an appointment or visit any of our convenient Walk-in or Urgent Care Centers to be seen, which can be found on our website here.    Thanks again for choosing us as your health care partner,    Arin Scherer NP    Urinary Tract Infections in Women  Urinary tract infections (UTIs) are most often caused by bacteria. These bacteria enter the urinary tract. The bacteria may come from inside the body. Or they may travel from the skin outside the rectum or vagina into the urethra. Female anatomy makes it easy for bacteria from the bowel to enter a woman s urinary tract, which is the most common source of UTI. This means women develop UTIs more often than men. Pain in or around the urinary tract is a common UTI symptom. But the only way to know for sure if you have a UTI for the healthcare provider to test your urine. The two tests that may be done are the urinalysis and urine culture.    Types of UTIs    Cystitis. A bladder infection (cystitis) is the most common UTI in women. You may have urgent or frequent need to pee. You may also have pain, burning when you pee, and bloody  urine.    Urethritis. This is an inflamed urethra, which is the tube that carries urine from the bladder to outside the body. You may have lower stomach or back pain. You may also have urgent or frequent need to pee.    Pyelonephritis. This is a kidney infection. If not treated, it can be serious and damage your kidneys. In severe cases, you may need to stay in the hospital. You may have a fever and lower back pain.    Medicines to treat a UTI  Most UTIs are treated with antibiotics. These kill the bacteria. The length of time you need to take them depends on the type of infection. It may be as short as 3 days. If you have repeated UTIs, you may need a low-dose antibiotic for several months. Take antibiotics exactly as directed. Don t stop taking them until all of the medicine is gone, even if you feel better. If you stop taking the antibiotic too soon, the infection may not go away. You may also develop a resistance to the antibiotic. This can make it much harder to treat.  Lifestyle changes to treat and prevent UTIs  The lifestyle changes below will help get rid of your UTI. They may also help prevent future UTIs.    Drink plenty of fluids. This includes water, juice, or other caffeine-free drinks. Fluids help flush bacteria out of your body.    Empty your bladder. Always empty your bladder when you feel the urge to pee. And always pee before going to sleep. Urine that stays in your bladder can lead to infection. Try to pee before and after sex as well.    Practice good personal hygiene. Wipe yourself from front to back after using the toilet. This helps keep bacteria from getting into the urethra.    Wear cotton underwear. Don't wear synthetic or tight-fitting underwear that can trap moisture. Change out of wet bathing suits and workout clothing quickly.    Take showers. Showers are better than baths for preventing UTIs.    Use condoms during sex. These help prevent UTIs caused by sexually transmitted bacteria.  Also don't use spermicides during sex. These can increase the risk for UTIs. Choose other forms of birth control instead. For women who tend to get UTIs after sex, a low-dose of a preventive antibiotic may be used. Be sure to discuss this option with your healthcare provider.    Follow up with your healthcare provider as directed. They may test to make sure the infection has cleared. If needed, more treatment may be started.  [employer] last reviewed this educational content on 9/1/2021 2000-2022 [employer]. All rights reserved. This information is not intended as a substitute for professional medical care. Always follow your healthcare professional's instructions.

## 2023-04-24 NOTE — LETTER
4/24/2023         RE: Liliana Kat  [address] [facility] [id number]        Dear Colleague,    Thank you for referring your patient, Liliana Kat, to the [facility]. Please see a copy of my visit note below.    ENT Consultation    Liliana Kat who is a 53 year old female seen in consultation at the request of Brayan So.      History of Present Illness - Liliana Kat is a 53 year old female presents with a chief complaint of right facial swelling pressure in the cheek area givenly has sustained some swelling around the right eye also had couple of episodes of epistaxis recently noticed on the right side.  Smell is also somewhat diminished.  She was on antibiotics previously without significant relief.  She tried Flonase but had some issues using it gets more headaches.  Sense of taste appears to be intact.    Patient also is under a lot of stress lately and does have history of migraines.  Does not have any aura however or photophobia.    BP Readings from Last 1 Encounters:   04/24/23 130/86       BP noted to be well controlled today in office.     Liliana IS NOT a smoker/uses chewing tobacco.      Past Medical History -   Past Medical History:   Diagnosis Date     Allergic rhinitis, cause unspecified     Allergic rhinitis - weeds and pollan     Anxiety      Bladder polyps      Depressive disorder      Obstructive chronic bronchitis with exacerbation (H)      Other and unspecified ovarian cyst     Ovarian cyst - rupured cyst + laser x3     Other and unspecified ovarian cyst 9/14/2005    Left hemorrhagic ovarian cyst.     Tobacco abuse      Urinary tract infection, site not specified        Current Medications -   Current Outpatient Medications:      albuterol (PROAIR HFA/PROVENTIL HFA/VENTOLIN HFA) 108 (90 Base) MCG/ACT inhaler, Inhale 2 puffs into the lungs every 6 hours as needed for shortness of breath or wheezing, Disp: 18 g, Rfl: 0      ALPRAZolam (XANAX) 0.5 MG tablet, Take 1 tablet (0.5 mg) by mouth 2 times daily as needed for anxiety, Disp: 60 tablet, Rfl: 0     aspirin 81 MG tablet, Take 1 tablet (81 mg) by mouth daily, Disp: 30 tablet, Rfl: OTC     atorvastatin (LIPITOR) 20 MG tablet, Take 1 tablet (20 mg) by mouth daily, Disp: 90 tablet, Rfl: 3     bisacodyl (DULCOLAX) 5 MG EC tablet, Take 2 tablets at 3 pm the day before your procedure. If your procedure is before 11 am, take 2 additional tablets at 11 pm. If your procedure is after 11 am, take 2 additional tablets at 6 am. For additional instructions refer to your colonoscopy prep instructions., Disp: 4 tablet, Rfl: 0     escitalopram (LEXAPRO) 20 MG tablet, Take 0.5 tablets (10 mg) by mouth daily for 7 days, THEN 1 tablet (20 mg) daily for 60 days., Disp: 64 tablet, Rfl: 0     estradiol (ESTRACE) 1 MG tablet, TAKE ONE TABLET BY MOUTH ONCE DAILY, Disp: 90 tablet, Rfl: 2     fluticasone (FLONASE) 50 MCG/ACT nasal spray, Spray 2 sprays into both nostrils daily, Disp: 18.2 mL, Rfl: 0     loratadine (CLARITIN) 10 MG tablet, TAKE ONE TABLET BY MOUTH EVERY DAY, Disp: 30 tablet, Rfl: 5     meclizine (ANTIVERT) 25 MG tablet, Take 1 tablet (25 mg) by mouth 3 times daily as needed for dizziness, Disp: 30 tablet, Rfl: 0     medroxyPROGESTERone (PROVERA) 5 MG tablet, TAKE ONE TABLET BY MOUTH ONCE DAILY, Disp: 90 tablet, Rfl: 1     polyethylene glycol (GOLYTELY) 236 g suspension, The night before the exam at 6 pm drink an 8-ounce glass every 15 minutes until the jug is half empty. If you arrive before 11 AM: Drink the other half of the Golytely jug at 11 PM night before procedure. If you arrive after 11 AM: Drink the other half of the Golytely jug at 6 AM day of procedure. For additional instructions refer to your colonoscopy prep instructions., Disp: 4000 mL, Rfl: 0     pseudoePHEDrine-guaiFENesin (MUCINEX D)  MG 12 hr tablet, Take 1 tablet by mouth every 12 hours, Disp: 20 tablet, Rfl: 0      citalopram (CELEXA) 10 MG tablet, TAKE ONE TABLET BY MOUTH ONCE DAILY (ALONG WITH THE CITALOPRAM 20 MG DOSE FOR TOTAL DOSE OF 30 MG DAILY), Disp: 90 tablet, Rfl: 1     MELATONIN, 10 mg daily  (Patient not taking: Reported on 2023), Disp: , Rfl:     Current Facility-Administered Medications:      triamcinolone (KENALOG-40) injection 40 mg, 40 mg, , , Shanell Mascorro MD, 40 mg at 21 1703    Allergies -   Allergies   Allergen Reactions     Amoxicillin Anaphylaxis     Augmentin Swelling and Difficulty breathing     Avelox Nausea and Vomiting     Effexor Xr [Venlafaxine Hydrochloride]      Shaky and heart racing     Sulfa Drugs        Social History -   Social History     Socioeconomic History     Marital status:    Occupational History     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 No domestic violence issues.       Family History -   Family History   Problem Relation Age of Onset     Alcohol/Drug Father          at age 53 of alcohol     Arthritis Father      Cancer Father         lung     Alcohol/Drug Paternal Grandfather      Cancer Paternal Grandfather         lung     Alcohol/Drug Paternal Grandmother      Cancer Paternal Grandmother         lung     Alcohol/Drug Paternal Aunt      Allergies Daughter         animals and grass     Allergies Son         dust mite     Arthritis Mother      Hypertension Mother      Lipids  "Mother      Depression Mother      Heart Disease Mother      Blood Disease Mother         DVTs     Bleeding Disorder Mother      Liver Disease Mother      Depression Brother        Review of Systems - As per HPI and PMHx, otherwise review of system review of the head and neck negative. Otherwise 10+ review of system is negative    Physical Exam  /86   Temp 98.3  F (36.8  C) (Temporal)   Ht 1.651 m (5' 5\")   Wt 73.2 kg (161 lb 4.8 oz)   LMP 03/06/2006   BMI 26.84 kg/m    BMI: Body mass index is 26.84 kg/m .    General - The patient is well nourished and well developed, and appears to have good nutritional status.  Alert and oriented to person and place, answers questions and cooperates with examination appropriately.    SKIN - No suspicious lesions or rashes.  Respiration - No respiratory distress.  Head and Face - Normocephalic and atraumatic, with no gross asymmetry noted of the contour of the facial features.  The facial nerve is intact, with strong symmetric movements.    Voice and Breathing - The patient was breathing comfortably without the use of accessory muscles. The patients voice was clear and strong, and had appropriate pitch and quality.    Ears - Bilateral pinna and EACs with normal appearing overlying skin. Tympanic membrane intact with good mobility on pneumatic otoscopy bilaterally. Bony landmarks of the ossicular chain are normal. The tympanic membranes are normal in appearance. No retraction, perforation, or masses.  No fluid or purulence was seen in the external canal or the middle ear.     Eyes - Extraocular movements intact.  Sclera were not icteric or injected, conjunctiva were pink and moist.    Mouth - Examination of the oral cavity showed pink, healthy oral mucosa. No lesions or ulcerations noted.  The tongue was mobile and midline, and the dentition were in good condition.      Throat - The walls of the oropharynx were smooth, pink, moist, symmetric, and had no lesions or " ulcerations.  The tonsillar pillars and soft palate were symmetric.  The uvula was midline on elevation.    Neck - Normal midline excursion of the laryngotracheal complex during swallowing.  Full range of motion on passive movement.  Palpation of the occipital, submental, submandibular, internal jugular chain, and supraclavicular nodes did not demonstrate any abnormal lymph nodes or masses.  The carotid pulse was palpable bilaterally.  Palpation of the thyroid was soft and smooth, with no nodules or goiter appreciated.  The trachea was mobile and midline.    Nose - External contour is symmetric, no gross deflection or scars.  Nasal mucosa is pink and moist with no abnormal mucus.  The septum was midline and non-obstructive, turbinates of normal size and position.  No polyps, masses, or purulence noted on examination.    Neuro - Nonfocal neuro exam is normal, CN 2 through 12 intact, normal gait and muscle tone.      Performed in clinic today:  To further evaluate the nasal cavity, I performed rigid nasal endoscopy.  I first sprayed the nasal cavity bilaterally with a mix of lidocaine and neosynephrine.  I then began on the left side using a 2.7mm, 30 degree rigid nasal endoscope.  The septum was straight and the nasal airway was open.  No abnormal secretions, purulence, or polyps were noted. The left middle turbinate and middle meatus were clearly visualized and normal in appearance.  Looking up, the olfactory cleft was unobstructed.  Going further back, the sphenoethmoid recess was normal in appearance, with healthy appearing mucosa on the face of the sphenoid.  The nasopharynx was unremarkable, and the eustachian tube opening on this side was unobstructed.    I then turned my attention to the right side.  Once again, the septum was straight, and the airway was open.  No abnormal secretions, purulence, polyps were noted.  The right middle turbinate and middle meatus were clearly visualized and normal in appearance.   Looking up, the olfactory cleft was unobstructed.  Going further back the right sphenoethmoid recess was normal in appearance, and eustachian tube opening was unobstructed.   Purple - [id number] Rigoberto      A/P - Liliana Kat is a 53 year old female patient with symptoms of right-sided sinus issues even though do not see any specific findings of polyps or purulence.  Right now patient will use nasal saline quite aggressively on the right side at least twice daily as well as we will get a CT scan to further evaluate.  Certainly there is potential contributor of her migraines to her symptom spectrum as well.  We will reevaluate in a few weeks.      Cordell Pacheco MD        Again, thank you for allowing me to participate in the care of your patient.        Sincerely,        Cordell Pacheco MD, MD

## 2023-04-25 ENCOUNTER — HOSPITAL ENCOUNTER (OUTPATIENT)
Dept: MAMMOGRAPHY | Facility: CLINIC | Age: 53
Discharge: HOME | End: 2023-04-25
Attending: NURSE PRACTITIONER | Admitting: NURSE PRACTITIONER
Payer: COMMERCIAL

## 2023-04-25 ENCOUNTER — MYC MEDICAL ADVICE (OUTPATIENT)
Dept: FAMILY MEDICINE | Facility: CLINIC | Age: 53
End: 2023-04-25

## 2023-04-25 ENCOUNTER — LAB (OUTPATIENT)
Dept: LAB | Facility: CLINIC | Age: 53
End: 2023-04-25
Payer: COMMERCIAL

## 2023-04-25 DIAGNOSIS — N39.0 ACUTE UTI (URINARY TRACT INFECTION): ICD-10-CM

## 2023-04-25 DIAGNOSIS — Z12.31 ENCOUNTER FOR SCREENING MAMMOGRAM FOR BREAST CANCER: ICD-10-CM

## 2023-04-25 LAB
ALBUMIN UR-MCNC: NEGATIVE MG/DL
APPEARANCE UR: CLEAR
BILIRUB UR QL STRIP: NEGATIVE
COLOR UR AUTO: COLORLESS
GLUCOSE UR STRIP-MCNC: NEGATIVE MG/DL
HGB UR QL STRIP: ABNORMAL
KETONES UR STRIP-MCNC: NEGATIVE MG/DL
LEUKOCYTE ESTERASE UR QL STRIP: NEGATIVE
NITRATE UR QL: NEGATIVE
PH UR STRIP: 6 [PH] (ref 5–7)
RBC URINE: <1 /HPF
SP GR UR STRIP: 1 (ref 1–1.03)
UROBILINOGEN UR STRIP-MCNC: NORMAL MG/DL
WBC URINE: <1 /HPF

## 2023-04-25 PROCEDURE — 77067 SCR MAMMO BI INCL CAD: CPT

## 2023-04-25 PROCEDURE — 81001 URINALYSIS AUTO W/SCOPE: CPT

## 2023-04-25 RX ORDER — ALPRAZOLAM 0.5 MG
0.5 TABLET ORAL 3 TIMES DAILY PRN
Qty: 60 TABLET | Refills: 0 | OUTPATIENT
Start: 2023-04-25

## 2023-05-03 ENCOUNTER — HOSPITAL ENCOUNTER (OUTPATIENT)
Dept: CT IMAGING | Facility: CLINIC | Age: 53
Discharge: HOME | End: 2023-05-03
Attending: OTOLARYNGOLOGY | Admitting: OTOLARYNGOLOGY
Payer: COMMERCIAL

## 2023-05-03 DIAGNOSIS — J01.01 ACUTE RECURRENT MAXILLARY SINUSITIS: ICD-10-CM

## 2023-05-03 PROCEDURE — 70486 CT MAXILLOFACIAL W/O DYE: CPT

## 2023-05-04 NOTE — PROGRESS NOTES
History of Present Illness - Liliana De Lunat is a 53 year old female presenting in clinic today for a recheck on Patient presents with:  Follow Up  Sinus Problem    ***    Present Symptoms include: {ENT ASSOCIATED SX:232233} and they are   {ENT SEVERITY STANDIN} .  Liliana denies {ENT ASSOCIATED SX:020973}.      Body mass index is 27.02 kg/m .    Weight management plan: Patient was referred to their PCP to discuss a diet and exercise plan.    BP Readings from Last 1 Encounters:   23 118/76     CT SCAN OF THE PARANASAL SINUSES AND FACE  5/3/2023 10:58 AM      HISTORY: Acute recurrent maxillary sinusitis     TECHNIQUE: Noncontrast axial scans of the sinuses with coronal and  sagittal reformations were completed. Radiation dose for this scan was  reduced using automated exposure control, adjustment of the mA and/or  kV according to patient size, or iterative reconstruction technique.       COMPARISON: None.     FINDINGS:   Frontal sinuses: No mucosal thickening. The frontal sinus drainage  pathways are clear.      Ethmoid sinuses: Clear.      Right maxillary sinus: No mucosal thickening. The ostiomeatal unit is  normal with a patent infundibulum.      Left maxillary sinus: No mucosal thickening. The ostiomeatal unit is  normal with a patent infundibulum.      Sphenoid sinus: No mucosal thickening. The sphenoid sinus ostia and  sphenoethmoidal recesses are clear.      Nasal septum: Normal and in the midline.      Turbinates and nasal cavity: No nasal mucosal thickening. The  turbinates are unremarkable.      Laminae papyracea and cribriform plate: Normal.     Other findings: Low mAs images of the brain are unremarkable.  Periapical lucencies involving the right maxillary lateral incisor and  second premolar.     BP noted to be well controlled today in office.     Liliana IS NOT a smoker/uses chewing tobacco.     Past Medical History -   Past Medical History:   Diagnosis Date     Allergic rhinitis,  cause unspecified     Allergic rhinitis - weeds and pollan     Anxiety      Bladder polyps      Depressive disorder      Obstructive chronic bronchitis with exacerbation (H)      Other and unspecified ovarian cyst     Ovarian cyst - rupured cyst + laser x3     Other and unspecified ovarian cyst 9/14/2005    Left hemorrhagic ovarian cyst.     Tobacco abuse      Urinary tract infection, site not specified        Current Medications -   Current Outpatient Medications:      albuterol (PROAIR HFA/PROVENTIL HFA/VENTOLIN HFA) 108 (90 Base) MCG/ACT inhaler, Inhale 2 puffs into the lungs every 6 hours as needed for shortness of breath or wheezing, Disp: 18 g, Rfl: 0     [START ON 5/19/2023] ALPRAZolam (XANAX) 0.5 MG tablet, Take 1 tablet (0.5 mg) by mouth 2 times daily as needed for anxiety, Disp: 60 tablet, Rfl: 0     aspirin 81 MG tablet, Take 1 tablet (81 mg) by mouth daily, Disp: 30 tablet, Rfl: OTC     atorvastatin (LIPITOR) 20 MG tablet, Take 1 tablet (20 mg) by mouth daily, Disp: 90 tablet, Rfl: 3     bisacodyl (DULCOLAX) 5 MG EC tablet, Take 2 tablets at 3 pm the day before your procedure. If your procedure is before 11 am, take 2 additional tablets at 11 pm. If your procedure is after 11 am, take 2 additional tablets at 6 am. For additional instructions refer to your colonoscopy prep instructions., Disp: 4 tablet, Rfl: 0     escitalopram (LEXAPRO) 20 MG tablet, Take 1 tablet (20 mg) by mouth daily for 90 days, Disp: 90 tablet, Rfl: 0     estradiol (ESTRACE) 1 MG tablet, TAKE ONE TABLET BY MOUTH ONCE DAILY, Disp: 90 tablet, Rfl: 2     fluticasone (FLONASE) 50 MCG/ACT nasal spray, Spray 2 sprays into both nostrils daily, Disp: 18.2 mL, Rfl: 0     loratadine (CLARITIN) 10 MG tablet, TAKE ONE TABLET BY MOUTH EVERY DAY, Disp: 30 tablet, Rfl: 5     meclizine (ANTIVERT) 25 MG tablet, Take 1 tablet (25 mg) by mouth 3 times daily as needed for dizziness, Disp: 30 tablet, Rfl: 0     medroxyPROGESTERone (PROVERA) 5 MG tablet,  TAKE ONE TABLET BY MOUTH ONCE DAILY, Disp: 90 tablet, Rfl: 1     MELATONIN, 10 mg daily, Disp: , Rfl:      polyethylene glycol (GOLYTELY) 236 g suspension, The night before the exam at 6 pm drink an 8-ounce glass every 15 minutes until the jug is half empty. If you arrive before 11 AM: Drink the other half of the Golytely jug at 11 PM night before procedure. If you arrive after 11 AM: Drink the other half of the Golytely jug at 6 AM day of procedure. For additional instructions refer to your colonoscopy prep instructions., Disp: 4000 mL, Rfl: 0     pseudoePHEDrine-guaiFENesin (MUCINEX D)  MG 12 hr tablet, Take 1 tablet by mouth every 12 hours, Disp: 20 tablet, Rfl: 0     citalopram (CELEXA) 10 MG tablet, TAKE ONE TABLET BY MOUTH ONCE DAILY (ALONG WITH THE CITALOPRAM 20 MG DOSE FOR TOTAL DOSE OF 30 MG DAILY), Disp: 90 tablet, Rfl: 1    Current Facility-Administered Medications:      triamcinolone (KENALOG-40) injection 40 mg, 40 mg, , , Shanell Mascorro MD, 40 mg at 04/02/21 1703    Allergies -   Allergies   Allergen Reactions     Amoxicillin Anaphylaxis     Amoxicillin-Pot Clavulanate Swelling and Difficulty breathing     Effexor Xr [Venlafaxine Hydrochloride]      Shaky and heart racing     Moxifloxacin Hcl In Nacl Nausea and Vomiting     Sulfa Antibiotics        Social History -   Social History     Socioeconomic History     Marital status:    Occupational History     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 No domestic violence issues.       Family History -   Family History   Problem Relation Age of Onset     Alcohol/Drug Father          at age 53 of alcohol     Arthritis Father      Cancer Father         lung     Alcohol/Drug Paternal Grandfather      Cancer Paternal Grandfather         lung     Alcohol/Drug Paternal Grandmother      Cancer Paternal Grandmother         lung     Alcohol/Drug Paternal Aunt      Allergies Daughter         animals and grass     Allergies Son         dust mite     Arthritis Mother      Hypertension Mother      Lipids Mother      Depression Mother      Heart Disease Mother      Blood Disease Mother         DVTs     Bleeding Disorder Mother      Liver Disease Mother      Depression Brother        Review of Systems - As per HPI and PMHx, otherwise review of system review of the head and neck negative. Otherwise 10+ review of system is negative    Physical Exam  /76 (BP Location: Right arm, Patient Position: Sitting, Cuff Size: Adult Regular)   Temp 98.2  F (36.8  C) (Oral)   Ht 1.651 m (5' 5\")   Wt 73.7 kg (162 lb 6.4 oz)   LMP 2006   BMI 27.02 kg/m    BMI: Body mass index is 27.02 kg/m .    General - The patient is well nourished and well developed, and appears to have good nutritional status.  Alert and oriented to person and place, answers questions and cooperates with examination appropriately.    SKIN - No suspicious lesions or rashes.  Respiration - No respiratory distress.  Head and Face - Normocephalic and atraumatic, with no gross asymmetry noted of the contour of the facial features.  The facial nerve is intact, with strong symmetric movements.    Voice and Breathing - The patient was breathing comfortably without the use of accessory muscles. The patients voice was clear and strong, and had appropriate pitch and quality.    Ears - Bilateral pinna and EACs with normal appearing " "overlying skin. Tympanic membrane intact with good mobility on pneumatic otoscopy bilaterally. Bony landmarks of the ossicular chain are normal. The tympanic membranes are normal in appearance. No retraction, perforation, or masses.  No fluid or purulence was seen in the external canal or the middle ear.     Eyes - Extraocular movements intact.  Sclera were not icteric or injected, conjunctiva were pink and moist.    Mouth - Examination of the oral cavity showed pink, healthy oral mucosa. No lesions or ulcerations noted.  The tongue was mobile and midline, and the dentition were in good condition.      Throat - The walls of the oropharynx were smooth, pink, moist, symmetric, and had no lesions or ulcerations.  The tonsillar pillars and soft palate were symmetric. Tonsils are {ENT TONSILS:489525}. The uvula was midline on elevation.    Neck - Normal midline excursion of the laryngotracheal complex during swallowing.  Full range of motion on passive movement.  Palpation of the occipital, submental, submandibular, internal jugular chain, and supraclavicular nodes did not demonstrate any abnormal lymph nodes or masses.  The carotid pulse was palpable bilaterally.  Palpation of the thyroid was soft and smooth, with no nodules or goiter appreciated.  The trachea was mobile and midline.    Nose - External contour is symmetric, no gross deflection or scars.  Nasal mucosa is pink and moist with no abnormal mucus.  The septum was midline and non-obstructive, turbinates of normal size and position.  No polyps, masses, or purulence noted on examination.    Neuro - Nonfocal neuro exam is normal, CN 2 through 12 intact, normal gait and muscle tone.      Performed in clinic today:  {Preformedtoday1:463232}      A/P - Liliana ROD Standaert is a 53 year old female Patient presents with:  Follow Up  Sinus Problem    ***    Liliana should follow up {FOLLOW UP:680892}.      At [facility] next appointment they {WILL:765343::\"will\"} need a " hearing test.      Cordell Pacheco MD

## 2023-05-13 ENCOUNTER — MYC REFILL (OUTPATIENT)
Dept: INTERNAL MEDICINE | Facility: CLINIC | Age: 53
End: 2023-05-13
Payer: COMMERCIAL

## 2023-05-13 ENCOUNTER — MYC REFILL (OUTPATIENT)
Dept: FAMILY MEDICINE | Facility: CLINIC | Age: 53
End: 2023-05-13
Payer: COMMERCIAL

## 2023-05-13 DIAGNOSIS — F43.23 ADJUSTMENT DISORDER WITH MIXED ANXIETY AND DEPRESSED MOOD: ICD-10-CM

## 2023-05-13 DIAGNOSIS — F41.1 GAD (GENERALIZED ANXIETY DISORDER): ICD-10-CM

## 2023-05-13 DIAGNOSIS — F32.1 MODERATE MAJOR DEPRESSION (H): ICD-10-CM

## 2023-05-15 RX ORDER — ALPRAZOLAM 0.5 MG
0.5 TABLET ORAL 2 TIMES DAILY PRN
Qty: 60 TABLET | Refills: 0 | Status: SHIPPED | OUTPATIENT
Start: 2023-05-19 | End: 2023-06-15

## 2023-05-15 NOTE — TELEPHONE ENCOUNTER
Requested Prescriptions   Pending Prescriptions Disp Refills     ALPRAZolam (XANAX) 0.5 MG tablet 60 tablet 0     Sig: Take 1 tablet (0.5 mg) by mouth 2 times daily as needed for anxiety       There is no refill protocol information for this order        Future Office visit:    Next 5 appointments (look out 90 days)    May 18, 2023  9:15 AM  (Arrive by 9:00 AM)  Return Visit with Cordell Pacheco MD  [facility] ([facility] ) [address] [place] [id number] [phone]           Routing refill request to provider for review/approval because:  Drug not on the [facility], P or [facility] refill protocol or controlled substance

## 2023-05-16 RX ORDER — ESCITALOPRAM OXALATE 20 MG/1
20 TABLET ORAL DAILY
Qty: 90 TABLET | Refills: 0 | Status: SHIPPED | OUTPATIENT
Start: 2023-05-16 | End: 2023-07-21

## 2023-05-16 NOTE — TELEPHONE ENCOUNTER
Routing refill request to provider for review/approval because:    Requested Prescriptions   Pending Prescriptions Disp Refills    escitalopram (LEXAPRO) 20 MG tablet 64 tablet 0     Sig: Take 0.5 tablets (10 mg) by mouth daily for 7 days, THEN 1 tablet (20 mg) daily for 60 days.       SSRIs Protocol Failed - 5/13/2023  8:30 AM        Failed - PHQ-9 score less than 5 in past 6 months     Please review last PHQ-9 score.         Signed in. Side rails up x 2.

## 2023-05-18 ENCOUNTER — OFFICE VISIT (OUTPATIENT)
Dept: OTOLARYNGOLOGY | Facility: CLINIC | Age: 53
End: 2023-05-18
Payer: COMMERCIAL

## 2023-05-18 VITALS
BODY MASS INDEX: 27.06 KG/M2 | SYSTOLIC BLOOD PRESSURE: 118 MMHG | WEIGHT: 162.4 LBS | HEIGHT: 65 IN | TEMPERATURE: 98.2 F | DIASTOLIC BLOOD PRESSURE: 76 MMHG

## 2023-05-18 DIAGNOSIS — G43.001 MIGRAINE WITHOUT AURA AND WITH STATUS MIGRAINOSUS, NOT INTRACTABLE: Primary | ICD-10-CM

## 2023-05-18 PROCEDURE — 99213 OFFICE O/P EST LOW 20 MIN: CPT | Performed by: OTOLARYNGOLOGY

## 2023-05-18 RX ORDER — SUMATRIPTAN 50 MG/1
50 TABLET, FILM COATED ORAL
Qty: 20 TABLET | Refills: 1 | Status: SHIPPED | OUTPATIENT
Start: 2023-05-18 | End: 2023-06-07

## 2023-05-18 ASSESSMENT — PAIN SCALES - GENERAL: PAINLEVEL: NO PAIN (0)

## 2023-05-18 NOTE — Clinical Note
2023         RE: Liliana Kat  [address] [address]        Dear Colleague,    Thank you for referring your patient, Liliana Kat, to the [facility]. Please see a copy of my visit note below.    History of Present Illness - Liliana Kat is a 53 year old female presenting in clinic today for a recheck on Patient presents with:  Follow Up  Sinus Problem    ***    Present Symptoms include: {ENT ASSOCIATED SX:101459} and they are   {ENT SEVERITY STANDIN} .  Liliana denies {ENT ASSOCIATED SX:294547}.      Body mass index is 27.02 kg/m .    Weight management plan: Patient was referred to their PCP to discuss a diet and exercise plan.    BP Readings from Last 1 Encounters:   23 118/76     CT SCAN OF THE PARANASAL SINUSES AND FACE  5/3/2023 10:58 AM      HISTORY: Acute recurrent maxillary sinusitis     TECHNIQUE: Noncontrast axial scans of the sinuses with coronal and  sagittal reformations were completed. Radiation dose for this scan was  reduced using automated exposure control, adjustment of the mA and/or  kV according to patient size, or iterative reconstruction technique.       COMPARISON: None.     FINDINGS:   Frontal sinuses: No mucosal thickening. The frontal sinus drainage  pathways are clear.      Ethmoid sinuses: Clear.      Right maxillary sinus: No mucosal thickening. The ostiomeatal unit is  normal with a patent infundibulum.      Left maxillary sinus: No mucosal thickening. The ostiomeatal unit is  normal with a patent infundibulum.      Sphenoid sinus: No mucosal thickening. The sphenoid sinus ostia and  sphenoethmoidal recesses are clear.      Nasal septum: Normal and in the midline.      Turbinates and nasal cavity: No nasal mucosal thickening. The  turbinates are unremarkable.      Laminae papyracea and cribriform plate: Normal.     Other findings: Low mAs images of the brain are unremarkable.  Periapical lucencies  involving the right maxillary lateral incisor and  second premolar.     BP noted to be well controlled today in office.     Liliana IS NOT a smoker/uses chewing tobacco.     Past Medical History -   Past Medical History:   Diagnosis Date     Allergic rhinitis, cause unspecified     Allergic rhinitis - weeds and pollan     Anxiety      Bladder polyps      Depressive disorder      Obstructive chronic bronchitis with exacerbation (H)      Other and unspecified ovarian cyst     Ovarian cyst - rupured cyst + laser x3     Other and unspecified ovarian cyst 9/14/2005    Left hemorrhagic ovarian cyst.     Tobacco abuse      Urinary tract infection, site not specified        Current Medications -   Current Outpatient Medications:      albuterol (PROAIR HFA/PROVENTIL HFA/VENTOLIN HFA) 108 (90 Base) MCG/ACT inhaler, Inhale 2 puffs into the lungs every 6 hours as needed for shortness of breath or wheezing, Disp: 18 g, Rfl: 0     [START ON 5/19/2023] ALPRAZolam (XANAX) 0.5 MG tablet, Take 1 tablet (0.5 mg) by mouth 2 times daily as needed for anxiety, Disp: 60 tablet, Rfl: 0     aspirin 81 MG tablet, Take 1 tablet (81 mg) by mouth daily, Disp: 30 tablet, Rfl: OTC     atorvastatin (LIPITOR) 20 MG tablet, Take 1 tablet (20 mg) by mouth daily, Disp: 90 tablet, Rfl: 3     bisacodyl (DULCOLAX) 5 MG EC tablet, Take 2 tablets at 3 pm the day before your procedure. If your procedure is before 11 am, take 2 additional tablets at 11 pm. If your procedure is after 11 am, take 2 additional tablets at 6 am. For additional instructions refer to your colonoscopy prep instructions., Disp: 4 tablet, Rfl: 0     escitalopram (LEXAPRO) 20 MG tablet, Take 1 tablet (20 mg) by mouth daily for 90 days, Disp: 90 tablet, Rfl: 0     estradiol (ESTRACE) 1 MG tablet, TAKE ONE TABLET BY MOUTH ONCE DAILY, Disp: 90 tablet, Rfl: 2     fluticasone (FLONASE) 50 MCG/ACT nasal spray, Spray 2 sprays into both nostrils daily, Disp: 18.2 mL, Rfl: 0     loratadine  (CLARITIN) 10 MG tablet, TAKE ONE TABLET BY MOUTH EVERY DAY, Disp: 30 tablet, Rfl: 5     meclizine (ANTIVERT) 25 MG tablet, Take 1 tablet (25 mg) by mouth 3 times daily as needed for dizziness, Disp: 30 tablet, Rfl: 0     medroxyPROGESTERone (PROVERA) 5 MG tablet, TAKE ONE TABLET BY MOUTH ONCE DAILY, Disp: 90 tablet, Rfl: 1     MELATONIN, 10 mg daily, Disp: , Rfl:      polyethylene glycol (GOLYTELY) 236 g suspension, The night before the exam at 6 pm drink an 8-ounce glass every 15 minutes until the jug is half empty. If you arrive before 11 AM: Drink the other half of the Golytely jug at 11 PM night before procedure. If you arrive after 11 AM: Drink the other half of the Golytely jug at 6 AM day of procedure. For additional instructions refer to your colonoscopy prep instructions., Disp: 4000 mL, Rfl: 0     pseudoePHEDrine-guaiFENesin (MUCINEX D)  MG 12 hr tablet, Take 1 tablet by mouth every 12 hours, Disp: 20 tablet, Rfl: 0     citalopram (CELEXA) 10 MG tablet, TAKE ONE TABLET BY MOUTH ONCE DAILY (ALONG WITH THE CITALOPRAM 20 MG DOSE FOR TOTAL DOSE OF 30 MG DAILY), Disp: 90 tablet, Rfl: 1    Current Facility-Administered Medications:      triamcinolone (KENALOG-40) injection 40 mg, 40 mg, , , Shanell Mascorro MD, 40 mg at 04/02/21 1703    Allergies -   Allergies   Allergen Reactions     Amoxicillin Anaphylaxis     Amoxicillin-Pot Clavulanate Swelling and Difficulty breathing     Effexor Xr [Venlafaxine Hydrochloride]      Shaky and heart racing     Moxifloxacin Hcl In Nacl Nausea and Vomiting     Sulfa Antibiotics        Social History -   Social History     Socioeconomic History     Marital status:    Occupational History     Employer: [employer]     Comment: assembly   Tobacco Use     Smoking status: Former     Packs/day: 0.00     Years: 10.00     Pack years: 0.00     Types: Cigarettes     Quit date: 4/13/2020     Years since quitting: 3.0     Smokeless tobacco: Never     Tobacco comments:  "    3 cigs a day    Substance and Sexual Activity     Alcohol use: No     Alcohol/week: 0.0 standard drinks of alcohol     Comment: recovering  - 2005     Drug use: No     Sexual activity: Not Currently     Partners: Male     Birth control/protection: Surgical     Comment: tubal 1997   Social History Narrative    Lives with spouse and children. No domestic violence issues.       Family History -   Family History   Problem Relation Age of Onset     Alcohol/Drug Father          at age 53 of alcohol     Arthritis Father      Cancer Father         lung     Alcohol/Drug Paternal Grandfather      Cancer Paternal Grandfather         lung     Alcohol/Drug Paternal Grandmother      Cancer Paternal Grandmother         lung     Alcohol/Drug Paternal Aunt      Allergies Daughter         animals and grass     Allergies Son         dust mite     Arthritis Mother      Hypertension Mother      Lipids Mother      Depression Mother      Heart Disease Mother      Blood Disease Mother         DVTs     Bleeding Disorder Mother      Liver Disease Mother      Depression Brother        Review of Systems - As per HPI and PMHx, otherwise review of system review of the head and neck negative. Otherwise 10+ review of system is negative    Physical Exam  /76 (BP Location: Right arm, Patient Position: Sitting, Cuff Size: Adult Regular)   Temp 98.2  F (36.8  C) (Oral)   Ht 1.651 m (5' 5\")   Wt 73.7 kg (162 lb 6.4 oz)   LMP 2006   BMI 27.02 kg/m    BMI: Body mass index is 27.02 kg/m .    General - The patient is well nourished and well developed, and appears to have good nutritional status.  Alert and oriented to person and place, answers questions and cooperates with examination appropriately.    SKIN - No suspicious lesions or rashes.  Respiration - No respiratory distress.  Head and Face - Normocephalic and atraumatic, with no gross asymmetry noted of the contour of the facial features.  The facial nerve is intact, with " strong symmetric movements.    Voice and Breathing - The patient was breathing comfortably without the use of accessory muscles. The patients voice was clear and strong, and had appropriate pitch and quality.    Ears - Bilateral pinna and EACs with normal appearing overlying skin. Tympanic membrane intact with good mobility on pneumatic otoscopy bilaterally. Bony landmarks of the ossicular chain are normal. The tympanic membranes are normal in appearance. No retraction, perforation, or masses.  No fluid or purulence was seen in the external canal or the middle ear.     Eyes - Extraocular movements intact.  Sclera were not icteric or injected, conjunctiva were pink and moist.    Mouth - Examination of the oral cavity showed pink, healthy oral mucosa. No lesions or ulcerations noted.  The tongue was mobile and midline, and the dentition were in good condition.      Throat - The walls of the oropharynx were smooth, pink, moist, symmetric, and had no lesions or ulcerations.  The tonsillar pillars and soft palate were symmetric. Tonsils are {ENT TONSILS:603711}. The uvula was midline on elevation.    Neck - Normal midline excursion of the laryngotracheal complex during swallowing.  Full range of motion on passive movement.  Palpation of the occipital, submental, submandibular, internal jugular chain, and supraclavicular nodes did not demonstrate any abnormal lymph nodes or masses.  The carotid pulse was palpable bilaterally.  Palpation of the thyroid was soft and smooth, with no nodules or goiter appreciated.  The trachea was mobile and midline.    Nose - External contour is symmetric, no gross deflection or scars.  Nasal mucosa is pink and moist with no abnormal mucus.  The septum was midline and non-obstructive, turbinates of normal size and position.  No polyps, masses, or purulence noted on examination.    Neuro - Nonfocal neuro exam is normal, CN 2 through 12 intact, normal gait and muscle tone.      Performed in  "clinic today:  {Preformedtoday1:109671}      A/P - Liliana Kat is a 53 year old female Patient presents with:  Follow Up  Sinus Problem    ***    Liliana should follow up {FOLLOW UP:653976}.      At Liliana next appointment they {WILL:180801::\"will\"} need a hearing test.      Cordell Pacheco MD            Again, thank you for allowing me to participate in the care of your patient.        Sincerely,        Cordell Pacheco MD, MD  "

## 2023-05-19 ENCOUNTER — MYC MEDICAL ADVICE (OUTPATIENT)
Dept: OTOLARYNGOLOGY | Facility: CLINIC | Age: 53
End: 2023-05-19
Payer: COMMERCIAL

## 2023-05-22 DIAGNOSIS — K04.7 DENTAL INFECTION: Primary | ICD-10-CM

## 2023-05-22 RX ORDER — CLINDAMYCIN HCL 300 MG
300 CAPSULE ORAL 3 TIMES DAILY
Qty: 21 CAPSULE | Refills: 0 | Status: SHIPPED | OUTPATIENT
Start: 2023-05-22 | End: 2023-05-29

## 2023-05-29 RX ORDER — LIDOCAINE 40 MG/G
CREAM TOPICAL
Status: CANCELLED | OUTPATIENT
Start: 2023-05-29

## 2023-06-15 ENCOUNTER — MYC REFILL (OUTPATIENT)
Dept: INTERNAL MEDICINE | Facility: CLINIC | Age: 53
End: 2023-06-15
Payer: COMMERCIAL

## 2023-06-15 DIAGNOSIS — F43.23 ADJUSTMENT DISORDER WITH MIXED ANXIETY AND DEPRESSED MOOD: ICD-10-CM

## 2023-06-15 RX ORDER — ALPRAZOLAM 0.5 MG
0.5 TABLET ORAL 2 TIMES DAILY PRN
Qty: 60 TABLET | Refills: 0 | Status: SHIPPED | OUTPATIENT
Start: 2023-06-16 | End: 2023-07-14

## 2023-07-14 ENCOUNTER — MYC REFILL (OUTPATIENT)
Dept: INTERNAL MEDICINE | Facility: CLINIC | Age: 53
End: 2023-07-14
Payer: COMMERCIAL

## 2023-07-14 DIAGNOSIS — F43.23 ADJUSTMENT DISORDER WITH MIXED ANXIETY AND DEPRESSED MOOD: ICD-10-CM

## 2023-07-14 RX ORDER — ALPRAZOLAM 0.5 MG
0.5 TABLET ORAL 2 TIMES DAILY PRN
Qty: 60 TABLET | Refills: 0 | Status: SHIPPED | OUTPATIENT
Start: 2023-07-14 | End: 2023-08-11

## 2023-07-14 NOTE — TELEPHONE ENCOUNTER
Requested Prescriptions   Pending Prescriptions Disp Refills     ALPRAZolam (XANAX) 0.5 MG tablet 60 tablet 0     Sig: Take 1 tablet (0.5 mg) by mouth 2 times daily as needed for anxiety       Next 5 appointments (look out 90 days)    Sep 21, 2023 10:00 AM  (Arrive by 9:45 AM)  Return Visit with Cordell Pacheco MD  [facility] ([facility] ) [address] [place] [id number] [phone]           Routing refill request to provider for review/approval because:  Drug not on the [facility], [facility] or [facility] refill protocol or controlled substance

## 2023-07-21 ENCOUNTER — MYC REFILL (OUTPATIENT)
Dept: FAMILY MEDICINE | Facility: CLINIC | Age: 53
End: 2023-07-21
Payer: COMMERCIAL

## 2023-07-21 DIAGNOSIS — F41.1 GAD (GENERALIZED ANXIETY DISORDER): ICD-10-CM

## 2023-07-21 DIAGNOSIS — F32.1 MODERATE MAJOR DEPRESSION (H): ICD-10-CM

## 2023-07-21 RX ORDER — ESCITALOPRAM OXALATE 20 MG/1
20 TABLET ORAL DAILY
Qty: 90 TABLET | Refills: 0 | Status: SHIPPED | OUTPATIENT
Start: 2023-07-21 | End: 2024-01-02

## 2023-07-21 NOTE — TELEPHONE ENCOUNTER
Pending Prescriptions:                       Disp   Refills    escitalopram (LEXAPRO) 20 MG tablet        90 tab*0        Sig: Take 1 tablet (20 mg) by mouth daily      Routing refill request to provider for review/approval because:  Phq9 is out of range    Rose Bateman RN on 7/21/2023 at 12:20 PM

## 2023-08-11 ENCOUNTER — MYC REFILL (OUTPATIENT)
Dept: INTERNAL MEDICINE | Facility: CLINIC | Age: 53
End: 2023-08-11
Payer: COMMERCIAL

## 2023-08-11 DIAGNOSIS — F43.23 ADJUSTMENT DISORDER WITH MIXED ANXIETY AND DEPRESSED MOOD: ICD-10-CM

## 2023-08-11 RX ORDER — ALPRAZOLAM 0.5 MG
0.5 TABLET ORAL 2 TIMES DAILY PRN
Qty: 60 TABLET | Refills: 0 | Status: SHIPPED | OUTPATIENT
Start: 2023-08-11 | End: 2023-09-07

## 2023-09-07 ENCOUNTER — MYC REFILL (OUTPATIENT)
Dept: INTERNAL MEDICINE | Facility: CLINIC | Age: 53
End: 2023-09-07
Payer: COMMERCIAL

## 2023-09-07 DIAGNOSIS — F43.23 ADJUSTMENT DISORDER WITH MIXED ANXIETY AND DEPRESSED MOOD: ICD-10-CM

## 2023-09-08 RX ORDER — ALPRAZOLAM 0.5 MG
0.5 TABLET ORAL 2 TIMES DAILY PRN
Qty: 60 TABLET | Refills: 0 | Status: SHIPPED | OUTPATIENT
Start: 2023-09-11 | End: 2023-10-06

## 2023-09-08 NOTE — TELEPHONE ENCOUNTER
Requested Prescriptions   Pending Prescriptions Disp Refills    ALPRAZolam (XANAX) 0.5 MG tablet 60 tablet 0     Sig: Take 1 tablet (0.5 mg) by mouth 2 times daily as needed for anxiety     Next 5 appointments (look out 90 days)      Nov 06, 2023  1:00 PM  MA Visit with [facility] MA/LPN  [facility] ([facility] ) [address] [place] [id number] [phone]             Routing refill request to provider for review/approval because:  Drug not on the [facility], [facility] or [facility] refill protocol or controlled substance

## 2023-10-06 ENCOUNTER — MYC REFILL (OUTPATIENT)
Dept: INTERNAL MEDICINE | Facility: CLINIC | Age: 53
End: 2023-10-06
Payer: COMMERCIAL

## 2023-10-06 DIAGNOSIS — F43.23 ADJUSTMENT DISORDER WITH MIXED ANXIETY AND DEPRESSED MOOD: ICD-10-CM

## 2023-10-06 RX ORDER — ALPRAZOLAM 0.5 MG
0.5 TABLET ORAL 2 TIMES DAILY PRN
Qty: 60 TABLET | Refills: 0 | Status: SHIPPED | OUTPATIENT
Start: 2023-10-06 | End: 2023-11-02

## 2023-10-09 ENCOUNTER — IMMUNIZATION (OUTPATIENT)
Dept: FAMILY MEDICINE | Facility: CLINIC | Age: 53
End: 2023-10-09
Payer: COMMERCIAL

## 2023-10-09 PROCEDURE — 90471 IMMUNIZATION ADMIN: CPT

## 2023-10-09 PROCEDURE — 90682 RIV4 VACC RECOMBINANT DNA IM: CPT

## 2023-10-11 NOTE — LETTER
Liliana Kat  [address] [place] [id number]    September [birth year], 2019      Dear Liliana Kat    APPOINTMENT REMINDER:   Our records indicates that it is time for you to be seen for a recheck.     Your current medication request will not be approved for refills you will need to be seen before  refills can be approved.  Taking care of your health is important to us, and ongoing visits with your provider are vital to your care.    We look forward to seeing you in the near future.  You may call our office at [phone] to schedule a visit.     Please disregard this notice if you have already made an appointment.      Sincerely,    [facility]       [address] [address]  Phone: [phone]  Fax: [phone]   TONY Bragg Amber JIMENEZ PA, Anisa Rg ACP Leana Miller [facility]roldo [facility] PA, Anisa Rg URIEL [place]

## 2023-11-02 ENCOUNTER — MYC REFILL (OUTPATIENT)
Dept: INTERNAL MEDICINE | Facility: CLINIC | Age: 53
End: 2023-11-02
Payer: COMMERCIAL

## 2023-11-02 DIAGNOSIS — F43.23 ADJUSTMENT DISORDER WITH MIXED ANXIETY AND DEPRESSED MOOD: ICD-10-CM

## 2023-11-02 RX ORDER — ALPRAZOLAM 0.5 MG
0.5 TABLET ORAL 2 TIMES DAILY PRN
Qty: 60 TABLET | Refills: 0 | Status: SHIPPED | OUTPATIENT
Start: 2023-11-02 | End: 2023-12-01

## 2023-11-06 ENCOUNTER — ALLIED HEALTH/NURSE VISIT (OUTPATIENT)
Dept: FAMILY MEDICINE | Facility: CLINIC | Age: 53
End: 2023-11-06
Payer: COMMERCIAL

## 2023-11-06 ENCOUNTER — MEDICAL CORRESPONDENCE (OUTPATIENT)
Dept: HEALTH INFORMATION MANAGEMENT | Facility: CLINIC | Age: 53
End: 2023-11-06

## 2023-11-06 ENCOUNTER — TRANSFERRED RECORDS (OUTPATIENT)
Dept: HEALTH INFORMATION MANAGEMENT | Facility: CLINIC | Age: 53
End: 2023-11-06

## 2023-11-06 VITALS — SYSTOLIC BLOOD PRESSURE: 122 MMHG | DIASTOLIC BLOOD PRESSURE: 80 MMHG

## 2023-11-06 DIAGNOSIS — Z01.30 BP CHECK: Primary | ICD-10-CM

## 2023-11-06 PROCEDURE — 99207 PR NO CHARGE NURSE ONLY: CPT

## 2023-11-06 NOTE — PROGRESS NOTES
Liliana Kat is a 53 year old patient who comes in today for a Blood Pressure check.  Initial BP:  /80   LMP 03/06/2006      Data Unavailable  Disposition: follow-up as previously indicated by provider..  Jenna Yadav MA 11/6/2023

## 2023-11-08 ENCOUNTER — TRANSCRIBE ORDERS (OUTPATIENT)
Dept: OTHER | Age: 53
End: 2023-11-08

## 2023-11-08 DIAGNOSIS — S89.92XA KNEE INJURY, LEFT, INITIAL ENCOUNTER: Primary | ICD-10-CM

## 2023-11-13 ENCOUNTER — OFFICE VISIT (OUTPATIENT)
Dept: ORTHOPEDICS | Facility: CLINIC | Age: 53
End: 2023-11-13
Payer: COMMERCIAL

## 2023-11-13 ENCOUNTER — ANCILLARY PROCEDURE (OUTPATIENT)
Dept: GENERAL RADIOLOGY | Facility: CLINIC | Age: 53
End: 2023-11-13
Attending: PHYSICIAN ASSISTANT
Payer: COMMERCIAL

## 2023-11-13 VITALS
BODY MASS INDEX: 26.99 KG/M2 | SYSTOLIC BLOOD PRESSURE: 100 MMHG | TEMPERATURE: 98.4 F | HEIGHT: 65 IN | WEIGHT: 162 LBS | DIASTOLIC BLOOD PRESSURE: 80 MMHG

## 2023-11-13 DIAGNOSIS — S89.92XA LEFT KNEE INJURY: ICD-10-CM

## 2023-11-13 DIAGNOSIS — S83.8X2A ACUTE MEDIAL MENISCAL INJURY OF KNEE, LEFT, INITIAL ENCOUNTER: ICD-10-CM

## 2023-11-13 DIAGNOSIS — S83.412A SPRAIN OF MEDIAL COLLATERAL LIGAMENT OF LEFT KNEE, INITIAL ENCOUNTER: Primary | ICD-10-CM

## 2023-11-13 PROCEDURE — 73564 X-RAY EXAM KNEE 4 OR MORE: CPT | Mod: TC | Performed by: RADIOLOGY

## 2023-11-13 PROCEDURE — 99204 OFFICE O/P NEW MOD 45 MIN: CPT | Performed by: PHYSICIAN ASSISTANT

## 2023-11-13 ASSESSMENT — PAIN SCALES - GENERAL: PAINLEVEL: EXTREME PAIN (9)

## 2023-11-13 NOTE — PROGRESS NOTES
ORTHOPEDIC CONSULT      Chief Complaint: Liliana Kat is a 53 year old female who works for [employer] and has a granddaughter that is 2-1/2 years old..      Chief Complaint   Patient presents with    Knee Pain     Left knee injury    Consult        History of Present Illness:   Mechanism of Injury: Patient was chasing after her 2 and half-year-old granddaughter when they were triggered treating and tripped on a speed bump and fell and had a twisting style valgus injury.  Location: Left knee  Duration of Pain: Since date of injury which was 10/31/2023  Rating of Pain: Moderate to severe per the patient  Pain Quality: Achy but can be sharp  Pain is better with: Rest  Pain is worse with: Weightbearing and bending knee  Treatment so far consists of: Ice and elevation and catching.  Patient also has used a knee brace which has not helped that much.  She feels ice and elevation has.  She has not had any injections or any therapy or any advanced imaging.  She did go to urgent care in [place] where they did an x-ray and did not have any further treatment..   Associated Features: Denies numbness or tingling shooting burning electric pain.  Prior history of related problems: No previous surgery or fracture or injury  Pain is Limiting: Ambulation and weightbearing on the left side although she can weight-bear slightly but usually limps.  Here to: Orthopedic consultation  The Pain Has: Been about the same  Additional History: Patient feels that the knee is unstable and has difficulty weightbearing but she can limp.  She has had some catching and locking she believes possibly.  She did have some swelling after the injury.  She has noticed some calf spasms and also some posterior pain and some pain down her shin as well as anterior medial pain.      Patient's past medical, surgical, social and family histories reviewed.     Past Medical History:   Diagnosis Date    Allergic rhinitis, cause unspecified     Allergic  rhinitis - weeds and pollan    Anxiety     Bladder polyps     Depressive disorder     Obstructive chronic bronchitis with exacerbation (H)     Other and unspecified ovarian cyst     Ovarian cyst - rupured cyst + laser x3    Other and unspecified ovarian cyst 9/14/2005    Left hemorrhagic ovarian cyst.    Tobacco abuse     Urinary tract infection, site not specified         Past Surgical History:   Procedure Laterality Date    ESOPHAGOSCOPY, GASTROSCOPY, DUODENOSCOPY (EGD), COMBINED  11/28/2012    Procedure: COMBINED ESOPHAGOSCOPY, GASTROSCOPY, DUODENOSCOPY (EGD), BIOPSY SINGLE OR MULTIPLE;  ESOPHAGOSCOPY, GASTROSCOPY, DUODENOSCOPY (EGD) with biopsy;  Surgeon: Herson Cabrera MD;  Location: [facility]    EXCIS VAGINAL CYST/TUMOR      HC REMOVAL OF OVARY/TUBE(S)  3/6/2006    Laparoscopic bilateral salpingo-oophorectomy.    HC TYMPANOPLASTY W/O MASTOID, INIT/REV W/O OSS CHAIN RECONST  04/02    LAPAROSCOPIC CHOLECYSTECTOMY N/A 4/6/2017    Procedure: LAPAROSCOPIC CHOLECYSTECTOMY;  Surgeon: Shivam Luevano MD;  Location: [facility]    ZZC LIGATE FALLOPIAN TUBE,POSTPARTUM      Tubal Ligation       Medications:  albuterol (PROAIR HFA/PROVENTIL HFA/VENTOLIN HFA) 108 (90 Base) MCG/ACT inhaler, Inhale 2 puffs into the lungs every 6 hours as needed for shortness of breath or wheezing  ALPRAZolam (XANAX) 0.5 MG tablet, Take 1 tablet (0.5 mg) by mouth 2 times daily as needed for anxiety  aspirin 81 MG tablet, Take 1 tablet (81 mg) by mouth daily  atorvastatin (LIPITOR) 20 MG tablet, Take 1 tablet (20 mg) by mouth daily  bisacodyl (DULCOLAX) 5 MG EC tablet, Take 2 tablets at 3 pm the day before your procedure. If your procedure is before 11 am, take 2 additional tablets at 11 pm. If your procedure is after 11 am, take 2 additional tablets at 6 am. For additional instructions refer to your colonoscopy prep instructions.  escitalopram (LEXAPRO) 20 MG tablet, Take 1 tablet (20 mg) by mouth daily  estradiol (ESTRACE) 1 MG tablet, TAKE ONE  TABLET BY MOUTH ONCE DAILY  fluticasone (FLONASE) 50 MCG/ACT nasal spray, Spray 2 sprays into both nostrils daily  loratadine (CLARITIN) 10 MG tablet, TAKE ONE TABLET BY MOUTH EVERY DAY  meclizine (ANTIVERT) 25 MG tablet, Take 1 tablet (25 mg) by mouth 3 times daily as needed for dizziness  medroxyPROGESTERone (PROVERA) 5 MG tablet, TAKE ONE TABLET BY MOUTH ONCE DAILY  MELATONIN, 10 mg daily  pseudoePHEDrine-guaiFENesin (MUCINEX D)  MG 12 hr tablet, Take 1 tablet by mouth every 12 hours  citalopram (CELEXA) 10 MG tablet, TAKE ONE TABLET BY MOUTH ONCE DAILY (ALONG WITH THE CITALOPRAM 20 MG DOSE FOR TOTAL DOSE OF 30 MG DAILY)  polyethylene glycol (GOLYTELY) 236 g suspension, The night before the exam at 6 pm drink an 8-ounce glass every 15 minutes until the jug is half empty. If you arrive before 11 AM: Drink the other half of the LK FREEMANytely jug at 11 PM night before procedure. If you arrive after 11 AM: Drink the other half of the Golytely jug at 6 AM day of procedure. For additional instructions refer to your colonoscopy prep instructions. 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   Tobacco Use    Smoking status: Former     Packs/day: 0.00     Years: 10.00     Additional pack years: 0.00     Total pack years: 0.00     Types: Cigarettes     Quit date: 4/13/2020     Years since quitting: 3.5    Smokeless tobacco: Never    Tobacco comments:     3 cigs a day    Substance and Sexual Activity    Alcohol use: No     Alcohol/week: 0.0 standard drinks of alcohol     Comment: recovering  - 2005    Drug use: No    Sexual  "activity: Not Currently     Partners: Male     Birth control/protection: Surgical     Comment: tubal 1997       Family History   Problem Relation Age of Onset    Alcohol/Drug Father          at age 53 of alcohol    Arthritis Father     Cancer Father         lung    Alcohol/Drug Paternal Grandfather     Cancer Paternal Grandfather         lung    Alcohol/Drug Paternal Grandmother     Cancer Paternal Grandmother         lung    Alcohol/Drug Paternal Aunt     Allergies Daughter         animals and grass    Allergies Son         dust mite    Arthritis Mother     Hypertension Mother     Lipids Mother     Depression Mother     Heart Disease Mother     Blood Disease Mother         DVTs    Bleeding Disorder Mother     Liver Disease Mother     Depression Brother        REVIEW OF SYSTEMS  10 point review systems performed otherwise negative as noted as per history of present illness.    Physical Exam:  Vitals: /80 (BP Location: Right arm, Cuff Size: Adult Regular)   Temp 98.4  F (36.9  C) (Temporal)   Ht 1.651 m (5' 5\")   Wt 73.5 kg (162 lb)   LMP 2006   BMI 26.96 kg/m    BMI= Body mass index is 26.96 kg/m .    Constitutional: healthy, alert and no acute distress   Psychiatric: mentation appears normal and affect normal/bright  NEURO: no focal deficits, CMS intact left lower extremity   RESP: Normal with easy respirations and no use of accessory muscles to breathe, no audible wheezing or retractions  CV: Calf soft and nontender to palpation, leg warm   SKIN: No erythema, rashes, excoriation, or breakdown. No evidence of infection.   MUSCULOSKELETAL:  INSPECTION of left knee: No gross deformities, erythema, edema, ecchymosis, atrophy or fasciculations.   PALPATION: Medial joint line tenderness noted but also medial tenderness in general.  Patient also noting tenderness for the posterior knee and slightly with the lateral knee.  No anterior portion of the knee. No specific joint line tenderness on the " lateral side. No prepatella bursa tenderness or pes bursa tenderness. No increased warmth.  No effusion.   ROM: Passive: Extension 5 degrees short of full and 5 degrees of hyperextension on the contralateral side, flexion to 80 degrees compared to 125  on the contralateral side. All range of motion without catching, locking but there is pain at maximal extension and flexion mostly.   STRENGTH: 4 out of 5 quad and hamstring without pain.   SPECIAL TEST: Patient has a negative Lachman's negative drawer sign, I did compare this to the contralateral side which is also stable.  Patient's knee is stable to varus and valgus stress at 30  of flexion.  However the patient had pain with valgus stress.  Patient has a positive Mikhail's.   GAIT: Left antalgic gait and the patient seems to not put full weight on it as she hobbles over to the exam table.  Lymph: no palpable lymph nodes    Diagnostic Modalities:  X-rays done today showing no fracture no dislocation no tumor and good joint spacing in medial and lateral as well as patellofemoral compartment.  Patella sitting central in the trochlear groove.  Good alignment.    Independent visualization of the images was performed.    Impression: 1.  13 days status post left MCL sprain  2.  13-day status post left knee possible medial meniscus injury    Plan:  All of the above pertinent physical exam and imaging modalities findings was reviewed with Liliana.    FOCUSED PLAN:  Patient who has a very tender knee today and difficult to get a good exam on however patient has medial joint line tenderness and a positive Mikhail's test.  Her history sounds like possible ACL tear with immediate effusion and instability she states.  I do believe she has MCL sprain as there was a valgus injury and valgus stress does cause pain although it is stable.  We placed her in a hinged knee brace to have her wear this for a full 6 weeks to treat the MCL sprain.  Patient having difficulty  weightbearing still so with the possible meniscus tear and ACL I feel its best to get an MRI to evaluate the integrity of the soft tissues.  We can follow-up with the patient virtually with the MRI results.  Patient is to continue to ice and elevate above heart level and utilize crutches as needed.  Follow-up virtually with MRI results.    Re-x-ray on return: No      This note was dictated with [employer].    Bruce Valentine PA-C

## 2023-11-13 NOTE — PATIENT INSTRUCTIONS
Please call [phone] to schedule your MRI .     *Once your imaging exam has been scheduled, our prior authorization team will connect with your insurance to ensure coverage of the exam. They will only reach out to you if a prior authorization is denied.  Please schedule your imaging exam at least 3 days out so our team has time to complete this process.  Failure to do so could result in insurance denial and you becoming responsible for the cost of the exam.     After your imaging appointment is scheduled, call [phone] to schedule your video visit with WICHO Mathis to discuss the results. **NOTE: after a joint injection you will only you will only need to follow-up if you have problems. It can take up to 2 weeks for you to start feeling the benefit of the injection. .

## 2023-11-13 NOTE — LETTER
11/13/2023         RE: Liliana Kat  [address] [facility] [address]        Dear Colleague,    Thank you for referring your patient, Liliana Kat, to the [facility]. Please see a copy of my visit note below.    ORTHOPEDIC CONSULT      Chief Complaint: Liliana Kat is a 53 year old female who works for [employer] and has a granddaughter that is 2-1/2 years old..      Chief Complaint   Patient presents with     Knee Pain     Left knee injury     Consult        History of Present Illness:   Mechanism of Injury: Patient was chasing after her 2 and half-year-old granddaughter when they were triggered treating and tripped on a speed bump and fell and had a twisting style valgus injury.  Location: Left knee  Duration of Pain: Since date of injury which was 10/31/2023  Rating of Pain: Moderate to severe per the patient  Pain Quality: Achy but can be sharp  Pain is better with: Rest  Pain is worse with: Weightbearing and bending knee  Treatment so far consists of: Ice and elevation and catching.  Patient also has used a knee brace which has not helped that much.  She feels ice and elevation has.  She has not had any injections or any therapy or any advanced imaging.  She did go to urgent care in [place] where they did an x-ray and did not have any further treatment..   Associated Features: Denies numbness or tingling shooting burning electric pain.  Prior history of related problems: No previous surgery or fracture or injury  Pain is Limiting: Ambulation and weightbearing on the left side although she can weight-bear slightly but usually limps.  Here to: Orthopedic consultation  The Pain Has: Been about the same  Additional History: Patient feels that the knee is unstable and has difficulty weightbearing but she can limp.  She has had some catching and locking she believes possibly.  She did have some swelling after the injury.  She has noticed some calf spasms  and also some posterior pain and some pain down her shin as well as anterior medial pain.      Patient's past medical, surgical, social and family histories reviewed.     Past Medical History:   Diagnosis Date     Allergic rhinitis, cause unspecified     Allergic rhinitis - weeds and pollan     Anxiety      Bladder polyps      Depressive disorder      Obstructive chronic bronchitis with exacerbation (H)      Other and unspecified ovarian cyst     Ovarian cyst - rupured cyst + laser x3     Other and unspecified ovarian cyst 9/14/2005    Left hemorrhagic ovarian cyst.     Tobacco abuse      Urinary tract infection, site not specified         Past Surgical History:   Procedure Laterality Date     ESOPHAGOSCOPY, GASTROSCOPY, DUODENOSCOPY (EGD), COMBINED  11/28/2012    Procedure: COMBINED ESOPHAGOSCOPY, GASTROSCOPY, DUODENOSCOPY (EGD), BIOPSY SINGLE OR MULTIPLE;  ESOPHAGOSCOPY, GASTROSCOPY, DUODENOSCOPY (EGD) with biopsy;  Surgeon: Herson Cabrera MD;  Location: [facility]     EXCIS VAGINAL CYST/TUMOR       HC REMOVAL OF OVARY/TUBE(S)  3/6/2006    Laparoscopic bilateral salpingo-oophorectomy.     HC TYMPANOPLASTY W/O MASTOID, INIT/REV W/O OSS CHAIN RECONST  04/02     LAPAROSCOPIC CHOLECYSTECTOMY N/A 4/6/2017    Procedure: LAPAROSCOPIC CHOLECYSTECTOMY;  Surgeon: Shivam Luevano MD;  Location: [facility]     ZZC LIGATE FALLOPIAN TUBE,POSTPARTUM      Tubal Ligation       Medications:  albuterol (PROAIR HFA/PROVENTIL HFA/VENTOLIN HFA) 108 (90 Base) MCG/ACT inhaler, Inhale 2 puffs into the lungs every 6 hours as needed for shortness of breath or wheezing  ALPRAZolam (XANAX) 0.5 MG tablet, Take 1 tablet (0.5 mg) by mouth 2 times daily as needed for anxiety  aspirin 81 MG tablet, Take 1 tablet (81 mg) by mouth daily  atorvastatin (LIPITOR) 20 MG tablet, Take 1 tablet (20 mg) by mouth daily  bisacodyl (DULCOLAX) 5 MG EC tablet, Take 2 tablets at 3 pm the day before your procedure. If your procedure is before 11 am, take 2 additional  tablets at 11 pm. If your procedure is after 11 am, take 2 additional tablets at 6 am. For additional instructions refer to your colonoscopy prep instructions.  escitalopram (LEXAPRO) 20 MG tablet, Take 1 tablet (20 mg) by mouth daily  estradiol (ESTRACE) 1 MG tablet, TAKE ONE TABLET BY MOUTH ONCE DAILY  fluticasone (FLONASE) 50 MCG/ACT nasal spray, Spray 2 sprays into both nostrils daily  loratadine (CLARITIN) 10 MG tablet, TAKE ONE TABLET BY MOUTH EVERY DAY  meclizine (ANTIVERT) 25 MG tablet, Take 1 tablet (25 mg) by mouth 3 times daily as needed for dizziness  medroxyPROGESTERone (PROVERA) 5 MG tablet, TAKE ONE TABLET BY MOUTH ONCE DAILY  MELATONIN, 10 mg daily  pseudoePHEDrine-guaiFENesin (MUCINEX D)  MG 12 hr tablet, Take 1 tablet by mouth every 12 hours  citalopram (CELEXA) 10 MG tablet, TAKE ONE TABLET BY MOUTH ONCE DAILY (ALONG WITH THE CITALOPRAM 20 MG DOSE FOR TOTAL DOSE OF 30 MG DAILY)  polyethylene glycol (GOLYTELY) 236 g suspension, The night before the exam at 6 pm drink an 8-ounce glass every 15 minutes until the jug is half empty. If you arrive before 11 AM: Drink the other half of the Golytely jug at 11 PM night before procedure. If you arrive after 11 AM: Drink the other half of the Golytely jug at 6 AM day of procedure. For additional instructions refer to your colonoscopy prep instructions. (Patient not taking: Reported on 11/13/2023)    triamcinolone (KENALOG-40) injection 40 mg        Allergies   Allergen Reactions     Amoxicillin Anaphylaxis     Amoxicillin-Pot Clavulanate Swelling and Difficulty breathing     Effexor Xr [Venlafaxine Hydrochloride]      Shaky and heart racing     Moxifloxacin Hcl In Nacl Nausea and Vomiting     Sulfa Antibiotics        Social History     Occupational History     Employer: [employer]     Comment: assembly   Tobacco Use     Smoking status: Former     Packs/day: 0.00     Years: 10.00     Additional pack years: 0.00     Total pack years: 0.00     Types:  "Cigarettes     Quit date: 2020     Years since quitting: 3.5     Smokeless tobacco: Never     Tobacco comments:     3 cigs a day    Substance and Sexual Activity     Alcohol use: No     Alcohol/week: 0.0 standard drinks of alcohol     Comment: recovering  -      Drug use: No     Sexual activity: Not Currently     Partners: Male     Birth control/protection: Surgical     Comment: tubal        Family History   Problem Relation Age of Onset     Alcohol/Drug Father          at age 53 of alcohol     Arthritis Father      Cancer Father         lung     Alcohol/Drug Paternal Grandfather      Cancer Paternal Grandfather         lung     Alcohol/Drug Paternal Grandmother      Cancer Paternal Grandmother         lung     Alcohol/Drug Paternal Aunt      Allergies Daughter         animals and grass     Allergies Son         dust mite     Arthritis Mother      Hypertension Mother      Lipids Mother      Depression Mother      Heart Disease Mother      Blood Disease Mother         DVTs     Bleeding Disorder Mother      Liver Disease Mother      Depression Brother        REVIEW OF SYSTEMS  10 point review systems performed otherwise negative as noted as per history of present illness.    Physical Exam:  Vitals: /80 (BP Location: Right arm, Cuff Size: Adult Regular)   Temp 98.4  F (36.9  C) (Temporal)   Ht 1.651 m (5' 5\")   Wt 73.5 kg (162 lb)   LMP 2006   BMI 26.96 kg/m    BMI= Body mass index is 26.96 kg/m .    Constitutional: healthy, alert and no acute distress   Psychiatric: mentation appears normal and affect normal/bright  NEURO: no focal deficits, CMS intact left lower extremity   RESP: Normal with easy respirations and no use of accessory muscles to breathe, no audible wheezing or retractions  CV: Calf soft and nontender to palpation, leg warm   SKIN: No erythema, rashes, excoriation, or breakdown. No evidence of infection.   MUSCULOSKELETAL:  INSPECTION of left knee: No gross deformities, " erythema, edema, ecchymosis, atrophy or fasciculations.   PALPATION: Medial joint line tenderness noted but also medial tenderness in general.  Patient also noting tenderness for the posterior knee and slightly with the lateral knee.  No anterior portion of the knee. No specific joint line tenderness on the lateral side. No prepatella bursa tenderness or pes bursa tenderness. No increased warmth.  No effusion.   ROM: Passive: Extension 5 degrees short of full and 5 degrees of hyperextension on the contralateral side, flexion to 80 degrees compared to 125  on the contralateral side. All range of motion without catching, locking but there is pain at maximal extension and flexion mostly.   STRENGTH: 4 out of 5 quad and hamstring without pain.   SPECIAL TEST: Patient has a negative Lachman's negative drawer sign, I did compare this to the contralateral side which is also stable.  Patient's knee is stable to varus and valgus stress at 30  of flexion.  However the patient had pain with valgus stress.  Patient has a positive Mikhail's.   GAIT: Left antalgic gait and the patient seems to not put full weight on it as she hobbles over to the exam table.  Lymph: no palpable lymph nodes    Diagnostic Modalities:  X-rays done today showing no fracture no dislocation no tumor and good joint spacing in medial and lateral as well as patellofemoral compartment.  Patella sitting central in the trochlear groove.  Good alignment.    Independent visualization of the images was performed.    Impression: 1.  13 days status post left MCL sprain  2.  13-day status post left knee possible medial meniscus injury    Plan:  All of the above pertinent physical exam and imaging modalities findings was reviewed with Liliana.    FOCUSED PLAN:  Patient who has a very tender knee today and difficult to get a good exam on however patient has medial joint line tenderness and a positive Mikhail's test.  Her history sounds like possible ACL tear with  immediate effusion and instability she states.  I do believe she has MCL sprain as there was a valgus injury and valgus stress does cause pain although it is stable.  We placed her in a hinged knee brace to have her wear this for a full 6 weeks to treat the MCL sprain.  Patient having difficulty weightbearing still so with the possible meniscus tear and ACL I feel its best to get an MRI to evaluate the integrity of the soft tissues.  We can follow-up with the patient virtually with the MRI results.  Patient is to continue to ice and elevate above heart level and utilize crutches as needed.  Follow-up virtually with MRI results.    Re-x-ray on return: No      This note was dictated with [employer].    Bruce Valentine PA-C        Again, thank you for allowing me to participate in the care of your patient.        Sincerely,        Bruce Valentine PA-C

## 2023-11-21 ENCOUNTER — HOSPITAL ENCOUNTER (OUTPATIENT)
Dept: MRI IMAGING | Facility: CLINIC | Age: 53
Discharge: HOME | End: 2023-11-21
Attending: PHYSICIAN ASSISTANT | Admitting: PHYSICIAN ASSISTANT
Payer: COMMERCIAL

## 2023-11-21 DIAGNOSIS — S83.8X2A ACUTE MEDIAL MENISCAL INJURY OF KNEE, LEFT, INITIAL ENCOUNTER: ICD-10-CM

## 2023-11-21 DIAGNOSIS — S83.412A SPRAIN OF MEDIAL COLLATERAL LIGAMENT OF LEFT KNEE, INITIAL ENCOUNTER: ICD-10-CM

## 2023-11-21 PROCEDURE — 73721 MRI JNT OF LWR EXTRE W/O DYE: CPT | Mod: LT

## 2023-11-21 PROCEDURE — 73721 MRI JNT OF LWR EXTRE W/O DYE: CPT | Mod: 26 | Performed by: RADIOLOGY

## 2023-11-22 ENCOUNTER — TELEPHONE (OUTPATIENT)
Dept: ORTHOPEDICS | Facility: CLINIC | Age: 53
End: 2023-11-22
Payer: COMMERCIAL

## 2023-11-22 NOTE — TELEPHONE ENCOUNTER
Patient notified of provider's message as written. She confirmed that she is non-weightbearing and using her crutches. She has no further questions at this time and will keep her video visit as scheduled for Monday.     Tania Lozano RN   [facility]

## 2023-11-22 NOTE — TELEPHONE ENCOUNTER
Dear Team, can you please let this patient know to be nonweightbearing and use her crutches until I talk to her virtually on Monday about her MRI.  I believe she is already doing this but I just want to make sure.  I will give her all the information and discussed treatment possibilities at that time but nonweightbearing would probably be best with that leg looking at the MRI.  Thank you.

## 2023-11-27 ENCOUNTER — VIRTUAL VISIT (OUTPATIENT)
Dept: ORTHOPEDICS | Facility: CLINIC | Age: 53
End: 2023-11-27
Payer: COMMERCIAL

## 2023-11-27 DIAGNOSIS — S83.412D SPRAIN OF MEDIAL COLLATERAL LIGAMENT OF LEFT KNEE, SUBSEQUENT ENCOUNTER: ICD-10-CM

## 2023-11-27 DIAGNOSIS — S82.122D CLOSED FRACTURE OF LATERAL PORTION OF LEFT TIBIAL PLATEAU WITH ROUTINE HEALING, SUBSEQUENT ENCOUNTER: ICD-10-CM

## 2023-11-27 DIAGNOSIS — S83.512D COMPLETE TEAR, KNEE, ANTERIOR CRUCIATE LIGAMENT, LEFT, SUBSEQUENT ENCOUNTER: Primary | ICD-10-CM

## 2023-11-27 PROCEDURE — 99213 OFFICE O/P EST LOW 20 MIN: CPT | Mod: VID | Performed by: PHYSICIAN ASSISTANT

## 2023-11-27 NOTE — PROGRESS NOTES
"Liliana Kat is a 53 year old female who is being evaluated via a billable video visit.      The patient has been notified of following:     \"This video visit will be conducted via a call between you and your physician/provider. We have found that certain health care needs can be provided without the need for a physical exam.  This service lets us provide the care you need with a short video conversation.  If a prescription is necessary we can send it directly to your pharmacy.  If lab work is needed we can place an order for that and you can then stop by our lab to have the test done at a later time.    If during the course of the call the physician/provider feels a video visit is not appropriate, you will not be charged for this service.\"     Patient has given verbal consent for Video visit?  Yes    I have reviewed and updated the patient's Past Medical History, Social History, Family History and Medication List.    ALLERGIES  Amoxicillin, Amoxicillin-pot clavulanate, Effexor xr [venlafaxine hydrochloride], Moxifloxacin hcl in nacl, and Sulfa antibiotics    Subjective:  Liliana Kat complains of    Chief Complaint   Patient presents with    RECHECK     MRI results of left knee       HPI  Patient was last seen by myself on 11/13/2023 and we ordered an MRI to assess internal derangement, meniscus injury versus ACL.  Patient was injured on 10/31/2023 when she was chasing after her 2 and half-year-old granddaughter triggered treating and tripped on a speed bump and twisted and had a valgus injury.  We suspected MCL injury and placed the patient in hinged knee brace to wear at all times for 6 weeks.  An MRI was done on 12/21/2023 and when I saw the results I did have the nurses call the patient to let her know to be nonweightbearing.  Patient has stated that her knee has been unstable and has been swelling quite a bit.    Objective:    Vitals:  No vitals were obtained today due to virtual visit.    (All " of the exam as per the patient's report other than inspection or otherwise stated)    INSPECTION of left knee: No gross deformities, erythema, edema, ecchymosis, atrophy or fasciculations.  Per the patient  PALPATION: Tenderness especially to the medial side per the patient  ROM: Patient states 10 degrees short of full extension and flexion only to about 30 degrees without catching locking but there is a lot of pain with range of motion.  STRENGTH: Able to fire quad and hamstring.  SPECIAL TEST: None.  Diagnostic Modalities:  Recent Results (from the past 744 hour(s))   XR Knee Left G/E 4 Views    Narrative    LEFT KNEE FOUR OR MORE VIEWS   11/13/2023 8:28 AM     HISTORY:  Left knee injury.    COMPARISON: Right knee radiographs from 8/12/2021.      Impression    IMPRESSION:  Right: Normal and unchanged.    Left: Normal bones, joint spaces and alignment. No fracture, effusion  or calcified intra-articular body.    ARMOND BRUNER MD         SYSTEM ID:  [id number]   MR Knee Left w/o Contrast    Narrative    EXAMINATION: MRI of the left knee without contrast    DATE:  11/21/2020    HISTORY: Sprain of the tibial collateral ligament    TECHNIQUE: Multiplanar, multisequence MR imaging of the left knee was  obtained using standard sequences in 3 orthogonal planes without the  use of intravenous or intra-articular gadolinium contrast.    Comparison: Comparison radiographs dated 11/13/2023 were reviewed,  which demonstrated no acute bone abnormality.    FINDINGS:    In the medial compartment, the medial meniscus is intact. There is no  high-grade or full-thickness cartilage loss or subchondral edema.    In the lateral compartment, the lateral meniscus is intact. There is  no high-grade or full-thickness cartilage loss or subchondral edema.    In the patellofemoral compartment, there is a high-grade or  full-thickness cartilage loss or subchondral edema.    The posterior cruciate ligament is intact.     Bone marrow edema  along the posterior aspect of the lateral tibial  plateau as well as along the posterior aspect of the lateral femoral  condyle. Nondisplaced fracture along the posterior aspect of the  lateral tibial plateau. High-grade to full-thickness tearing of the  proximal anterior cruciate ligament.    High-grade to near full-thickness tearing of the proximal tibial  collateral ligament with surrounding soft tissue edema. No significant  retraction. Edema in the meniscofemoral ligament..     The anterior iliotibial band, fibular collateral ligament, biceps  femoris tendon, and popliteus tendons are intact.    There is a small joint effusion. Small popliteal cyst. No joint  bodies.    The extensor mechanism is intact and normal in appearance.       Impression    IMPRESSION:  1. Small left knee joint effusion. Small popliteal cyst.    2. Bony contusions along the posterior aspect of the lateral tibial  plateau and the posterior aspect of the lateral femoral condyle. There  is a nondisplaced fracture along the posterior lateral tibial plateau.  Associated high-grade to near full-thickness tearing of the proximal  portion of the anterior cruciate ligament.    3. High-grade to full-thickness tearing of the proximal tibial  collateral ligament with surrounding soft tissue edema, consistent  with a high-grade sprain.    4. The posterior cruciate ligament, lateral supporting structures, and  bilateral menisci are intact. No high-grade cartilage loss although  there is mild impaction along the posterior aspect of the lateral  femoral condyle adjacent to the bone marrow edema.    AIME KENNEDY MD         SYSTEM ID:  [id number]     I agree with the above reading.    Independent visualization of the images was performed.    Assessment:  1.  Left knee ACL rupture  2.  Left knee posterior lateral tibial plateau nondisplaced fracture.  3.  Left knee lateral tibial plateau and posterior lateral femoral condyle bone contusion.  4.  Left knee  proximal tibial collateral ligament tear  5.  Left knee MCL strain    PLAN:  1.  Patient is to remain nonweightbearing left lower extremity.  2.  Discussed this case with Dr. Parker who recommended bracing and therapy x 6 weeks then possible ACL reconstruction  3.  Formal physical therapy ordered for range of motion and strengthening time 6 weeks.  4. CC, RN will reach out tomorrow and get the patient set up for a telescoping ACL brace fitting.  Patient is to wear this at all times except for hygiene x 6 weeks.  5.  Patient is to reach out and make an appointment with Dr. Parker in about 4 weeks and at that visit they can discuss if the patient needs to proceed with ACL reconstruction.  6.  Patient reeducated to continually ice 15 minutes on 15 minutes off and elevate above heart level is much as possible and I think this will decrease her pain.  Also nonweightbearing will help.    Video-Visit Details    Type of service:  Video Visit    Video Total Time: 27 minutes with the patient was not on for the first 15 minutes.    Originating Location (pt. Location): Home    Distant Location (provider location):  [facility]     Mode of Communication:  Video Conference via [employer]       This note was dictated with [employer].    Bruce Valentine PA-C

## 2023-11-27 NOTE — PROGRESS NOTES
How would you like to obtain your AVS? [employer]  If the video visit is dropped, the invitation should be resent by: Text to cell phone: [phone]  Will anyone else be joining your video visit? Sarah Jackson/TERENCE

## 2023-11-27 NOTE — LETTER
"    11/27/2023         RE: Liliana Kat  [address] [facility] [address]        Dear Colleague,    Thank you for referring your patient, Liliana Kat, to the [facility]. Please see a copy of my visit note below.    Liliana Kat is a 53 year old female who is being evaluated via a billable video visit.      The patient has been notified of following:     \"This video visit will be conducted via a call between you and your physician/provider. We have found that certain health care needs can be provided without the need for a physical exam.  This service lets us provide the care you need with a short video conversation.  If a prescription is necessary we can send it directly to your pharmacy.  If lab work is needed we can place an order for that and you can then stop by our lab to have the test done at a later time.    If during the course of the call the physician/provider feels a video visit is not appropriate, you will not be charged for this service.\"     Patient has given verbal consent for Video visit?  Yes    I have reviewed and updated the patient's Past Medical History, Social History, Family History and Medication List.    ALLERGIES  Amoxicillin, Amoxicillin-pot clavulanate, Effexor xr [venlafaxine hydrochloride], Moxifloxacin hcl in nacl, and Sulfa antibiotics    Subjective:  Liliana Kat complains of    Chief Complaint   Patient presents with     RECHECK     MRI results of left knee       HPI  Patient was last seen by myself on 11/13/2023 and we ordered an MRI to assess internal derangement, meniscus injury versus ACL.  Patient was injured on 10/31/2023 when she was chasing after her 2 and half-year-old granddaughter triggered treating and tripped on a speed bump and twisted and had a valgus injury.  We suspected MCL injury and placed the patient in hinged knee brace to wear at all times for 6 weeks.  An MRI was done on 12/21/2023 and when I saw " the results I did have the nurses call the patient to let her know to be nonweightbearing.  Patient has stated that her knee has been unstable and has been swelling quite a bit.    Objective:    Vitals:  No vitals were obtained today due to virtual visit.    (All of the exam as per the patient's report other than inspection or otherwise stated)    INSPECTION of left knee: No gross deformities, erythema, edema, ecchymosis, atrophy or fasciculations.  Per the patient  PALPATION: Tenderness especially to the medial side per the patient  ROM: Patient states 10 degrees short of full extension and flexion only to about 30 degrees without catching locking but there is a lot of pain with range of motion.  STRENGTH: Able to fire quad and hamstring.  SPECIAL TEST: None.  Diagnostic Modalities:  Recent Results (from the past 744 hour(s))   XR Knee Left G/E 4 Views    Narrative    LEFT KNEE FOUR OR MORE VIEWS   11/13/2023 8:28 AM     HISTORY:  Left knee injury.    COMPARISON: Right knee radiographs from 8/12/2021.      Impression    IMPRESSION:  Right: Normal and unchanged.    Left: Normal bones, joint spaces and alignment. No fracture, effusion  or calcified intra-articular body.    ARMOND BRUNER MD         SYSTEM ID:  [id number]   MR Knee Left w/o Contrast    Narrative    EXAMINATION: MRI of the left knee without contrast    DATE:  11/21/2020    HISTORY: Sprain of the tibial collateral ligament    TECHNIQUE: Multiplanar, multisequence MR imaging of the left knee was  obtained using standard sequences in 3 orthogonal planes without the  use of intravenous or intra-articular gadolinium contrast.    Comparison: Comparison radiographs dated 11/13/2023 were reviewed,  which demonstrated no acute bone abnormality.    FINDINGS:    In the medial compartment, the medial meniscus is intact. There is no  high-grade or full-thickness cartilage loss or subchondral edema.    In the lateral compartment, the lateral meniscus is intact.  There is  no high-grade or full-thickness cartilage loss or subchondral edema.    In the patellofemoral compartment, there is a high-grade or  full-thickness cartilage loss or subchondral edema.    The posterior cruciate ligament is intact.     Bone marrow edema along the posterior aspect of the lateral tibial  plateau as well as along the posterior aspect of the lateral femoral  condyle. Nondisplaced fracture along the posterior aspect of the  lateral tibial plateau. High-grade to full-thickness tearing of the  proximal anterior cruciate ligament.    High-grade to near full-thickness tearing of the proximal tibial  collateral ligament with surrounding soft tissue edema. No significant  retraction. Edema in the meniscofemoral ligament..     The anterior iliotibial band, fibular collateral ligament, biceps  femoris tendon, and popliteus tendons are intact.    There is a small joint effusion. Small popliteal cyst. No joint  bodies.    The extensor mechanism is intact and normal in appearance.       Impression    IMPRESSION:  1. Small left knee joint effusion. Small popliteal cyst.    2. Bony contusions along the posterior aspect of the lateral tibial  plateau and the posterior aspect of the lateral femoral condyle. There  is a nondisplaced fracture along the posterior lateral tibial plateau.  Associated high-grade to near full-thickness tearing of the proximal  portion of the anterior cruciate ligament.    3. High-grade to full-thickness tearing of the proximal tibial  collateral ligament with surrounding soft tissue edema, consistent  with a high-grade sprain.    4. The posterior cruciate ligament, lateral supporting structures, and  bilateral menisci are intact. No high-grade cartilage loss although  there is mild impaction along the posterior aspect of the lateral  femoral condyle adjacent to the bone marrow edema.    AIME KENNEDY MD         SYSTEM ID:  [id number]     I agree with the above reading.    Independent  visualization of the images was performed.    Assessment:  1.  Left knee ACL rupture  2.  Left knee posterior lateral tibial plateau nondisplaced fracture.  3.  Left knee lateral tibial plateau and posterior lateral femoral condyle bone contusion.  4.  Left knee proximal tibial collateral ligament tear  5.  Left knee MCL strain    PLAN:  1.  Patient is to remain nonweightbearing left lower extremity.  2.  Discussed this case with Dr. Parker who recommended bracing and therapy x 6 weeks then possible ACL reconstruction  3.  Formal physical therapy ordered for range of motion and strengthening time 6 weeks.  4. CC, RN will reach out tomorrow and get the patient set up for a telescoping ACL brace fitting.  Patient is to wear this at all times except for hygiene x 6 weeks.  5.  Patient is to reach out and make an appointment with Dr. Parker in about 4 weeks and at that visit they can discuss if the patient needs to proceed with ACL reconstruction.  6.  Patient reeducated to continually ice 15 minutes on 15 minutes off and elevate above heart level is much as possible and I think this will decrease her pain.  Also nonweightbearing will help.    Video-Visit Details    Type of service:  Video Visit    Video Total Time: 27 minutes with the patient was not on for the first 15 minutes.    Originating Location (pt. Location): Home    Distant Location (provider location):  [facility]     Mode of Communication:  Video Conference via [employer]       This note was dictated with Nov[employer].    Bruce Valentine PA-C       How would you like to obtain your AVS? MyChart  If the video visit is dropped, the invitation should be resent by: Text to cell phone: [phone]  Will anyone else be joining your video visit? Sarah Jackson/TERENCE       Again, thank you for allowing me to participate in the care of your patient.        Sincerely,        Bruce Valentine PA-C

## 2023-11-29 ENCOUNTER — ALLIED HEALTH/NURSE VISIT (OUTPATIENT)
Dept: FAMILY MEDICINE | Facility: CLINIC | Age: 53
End: 2023-11-29
Payer: COMMERCIAL

## 2023-11-29 DIAGNOSIS — S83.512D COMPLETE TEAR, KNEE, ANTERIOR CRUCIATE LIGAMENT, LEFT, SUBSEQUENT ENCOUNTER: Primary | ICD-10-CM

## 2023-11-29 PROCEDURE — 99207 PR NO BILLABLE SERVICE THIS VISIT: CPT

## 2023-11-29 NOTE — PROGRESS NOTES
patient was informed to come to [facility] to get a telescoping knee brace. We do not have these in clinic so I have put the referral in for orthotics.    patient notified via person that someone should be calling her and I also gave her leydi with orthotics card.    Manuel/TERENCE

## 2023-12-01 ENCOUNTER — MYC REFILL (OUTPATIENT)
Dept: INTERNAL MEDICINE | Facility: CLINIC | Age: 53
End: 2023-12-01
Payer: COMMERCIAL

## 2023-12-01 DIAGNOSIS — F43.23 ADJUSTMENT DISORDER WITH MIXED ANXIETY AND DEPRESSED MOOD: ICD-10-CM

## 2023-12-01 RX ORDER — ALPRAZOLAM 0.5 MG
0.5 TABLET ORAL 2 TIMES DAILY PRN
Qty: 60 TABLET | Refills: 0 | Status: SHIPPED | OUTPATIENT
Start: 2023-12-01 | End: 2024-01-02

## 2023-12-06 ENCOUNTER — TELEPHONE (OUTPATIENT)
Dept: ORTHOPEDICS | Facility: CLINIC | Age: 53
End: 2023-12-06
Payer: COMMERCIAL

## 2023-12-06 NOTE — TELEPHONE ENCOUNTER
Kassandra called back and she did not request Dr. Sofia. I got her scheduled with Dr. Parker in [place] on 12/27/2023.    Manuel/TERENCE

## 2023-12-06 NOTE — TELEPHONE ENCOUNTER
Left message for patient to call back.      Per Oscar's note :    It does not show that she has an appointment but I am sure she probably does like he said.  I also looked and she has a follow-up appointment with Dr. Sofia and it was supposed to be Dr. Parker however maybe she is just getting a second opinion with Dr. Sofia which is totally fine with me.  They will be able to see what the plan is that I laid out in front of her.  She can decide what she wants to do.       She was to follow up with Dr. Parker. I see she is scheduled with Dr. Sofia.  Did the patient not want to see Dr. Parker? I just want to make sure she is scheduled with the correct provider.  It should still be the week of the 25th- Dr. Parker is in [place] [id number].    Manuel/TERENCE

## 2023-12-31 ENCOUNTER — MYC REFILL (OUTPATIENT)
Dept: INTERNAL MEDICINE | Facility: CLINIC | Age: 53
End: 2023-12-31
Payer: COMMERCIAL

## 2023-12-31 DIAGNOSIS — F43.23 ADJUSTMENT DISORDER WITH MIXED ANXIETY AND DEPRESSED MOOD: ICD-10-CM

## 2024-01-02 ENCOUNTER — MYC REFILL (OUTPATIENT)
Dept: FAMILY MEDICINE | Facility: CLINIC | Age: 54
End: 2024-01-02
Payer: COMMERCIAL

## 2024-01-02 DIAGNOSIS — F43.23 ADJUSTMENT DISORDER WITH MIXED ANXIETY AND DEPRESSED MOOD: ICD-10-CM

## 2024-01-02 RX ORDER — ALPRAZOLAM 0.5 MG
0.5 TABLET ORAL 2 TIMES DAILY PRN
Qty: 60 TABLET | Refills: 0 | Status: SHIPPED | OUTPATIENT
Start: 2024-01-02 | End: 2024-04-04

## 2024-01-02 RX ORDER — ALPRAZOLAM 0.5 MG
0.5 TABLET ORAL 2 TIMES DAILY PRN
Qty: 60 TABLET | Refills: 0 | Status: SHIPPED | OUTPATIENT
Start: 2024-01-02 | End: 2024-01-31

## 2024-01-02 RX ORDER — ALPRAZOLAM 0.5 MG
0.5 TABLET ORAL 2 TIMES DAILY PRN
Qty: 60 TABLET | Refills: 0 | OUTPATIENT
Start: 2024-01-02

## 2024-01-08 NOTE — ED AVS SNAPSHOT
[facility] Emergency Department    [address] [place] [address]    Phone:  [phone]    Fax:  [phone]                                       Liliana Kat   MRN: [id number]    Department:  [facility] Emergency Department   Date of Visit:  9/8/2017           Patient Information     Date Of Birth          1970        Your diagnoses for this visit were:     BPPV (benign paroxysmal positional vertigo), left        You were seen by Fanny Santo APRN CNP.      Follow-up Information     Follow up with Paula Villa APRN CNP In 1 week.    Specialty:  NURSE PRACTITIONER - OBSTETRICS & GYNECOLOGY    Contact information:    [address] [place] [address] [phone]          Follow up with [facility] Emergency Department.    Specialty:  EMERGENCY MEDICINE    Why:  If symptoms worsen    Contact information:    [address] [place] [address] [phone]    Additional information:    From [address]: Exit at [address] on south side of [place]. Turn right on [address]. Turn left at stoplight on [address]. [facility] will be in view two blocks ahead        Discharge Instructions         Benign Paroxysmal Positional Vertigo     Your health care provider may move your head in certain ways to treat your BPPV.     Benign paroxysmal positional vertigo (BPPV) is a problem with the inner ear. The inner ear contains the vestibular system. This system is what helps you keep your balance. BPPV causes a feeling of spinning. It is a common problem of the vestibular system.  Understanding the vestibular system  The vestibular system of the ear is made up of very tiny parts. They include the utricle, saccule, and semicircular canals. The utricle is a tiny organ that contains calcium crystals. In some people, the crystals can move into the semicircular canals. When this happens, the system no longer works as it should.  This causes BPPV. Benign means it is not life-threatening. Paroxysmal means it happens suddenly. Positional means that it happens when you move your head. Vertigo is a feeling of spinning.  What causes BPPV?  Causes include injury to your head or neck. Other problems with the vestibular system may cause BPPV. In many people, the cause of BPPV is not known.  Symptoms of BPPV  You many have repeated feelings of spinning (vertigo). The vertigo usually lasts less than 1 minute. Some movements, suchas rolling over in bed, can bring on vertigo.  Diagnosing BPPV  Your primary health care provider may diagnose and treat your BPPV. Or you may see an ear, nose, and throat doctor (otolaryngologist). In some cases, you may see a nervous system doctor (neurologist).  The health care provider will ask about your symptoms and your medical history. He or she will examine you. You may have hearing and balance tests. As part of the exam, your health care provider may have you move your head and body in certain ways. If you have BPPV, the movements can bring on vertigo. Your provider will also look for abnormal movements of your eyes. You may have other tests to check your vestibular or nervous systems.  Treatment for BPPV  Your health care provider may try to move the calcium crystals. This is done by having you move your head and neck in certain ways. This treatment is safe and often works well. You may also be told to do these movements at home. You may still have vertigo for a few weeks. Your health care provider will recheck your symptoms, usually in about a month. Special physical therapy may also be part of treatment. In rare cases surgery may be needed for BPPV that does not go away.     When to call the health care provider  Call your health care provider right away if you have any of these:    Symptoms that do not go away with treatment    Symptoms that get worse    New symptoms   Date Last Reviewed: 3/19/2015    [id number]  [employer]. [address] [place] [address], [place] [address]. All rights reserved. This information is not intended as a substitute for professional medical care. Always follow your healthcare professional's instructions.      You can look up the Epley and half somersault maneuvers on-line for vertigo treatment, these can be done at home, specifically for the left side.  These sometimes are helpful for people.     Future Appointments        Provider Department Dept Phone Center    9/15/2017 3:15 PM LOLIS Osorio [facility] [phone] [facility]      24 Hour Appointment Hotline       To make an appointment at any [facility], call [phone] ([phone]). If you don't have a family doctor or clinic, we will help you find one. [facility] are conveniently located to serve the needs of you and your family.             Review of your medicines      START taking        Dose / Directions Last dose taken    loratadine 10 MG tablet   Commonly known as:  CLARITIN   Dose:  10 mg   Quantity:  30 tablet        Take 1 tablet (10 mg) by mouth daily   Refills:  0        meclizine 25 MG tablet   Commonly known as:  ANTIVERT   Dose:  25 mg   Quantity:  30 tablet        Take 1 tablet (25 mg) by mouth every 6 hours as needed for dizziness   Refills:  0          Our records show that you are taking the medicines listed below. If these are incorrect, please call your family doctor or clinic.        Dose / Directions Last dose taken    * albuterol (2.5 MG/3ML) 0.083% neb solution   Quantity:  1 BOX        ONE NEBULIZATION 4 TIMES DAILY AS NEEDED   Refills:  0        * albuterol 108 (90 BASE) MCG/ACT Inhaler   Commonly known as:  PROAIR HFA/PROVENTIL HFA/VENTOLIN HFA   Dose:  2 puff   Quantity:  1 Inhaler        Inhale 2 puffs into the lungs every 6 hours as needed for shortness of breath / dyspnea or wheezing   Refills:  0        ALPRAZolam 0.5 MG tablet   Commonly known as:   XANAX   Dose:  0.5 mg   Quantity:  90 tablet        Take 1 tablet (0.5 mg) by mouth 3 times daily as needed for anxiety   Refills:  0        aspirin 81 MG tablet   Dose:  81 mg        Take 1 tablet by mouth daily.   Refills:  OTC        estradiol 1 MG tablet   Commonly known as:  ESTRACE   Quantity:  30 tablet        TAKE ONE TABLET BY MOUTH ONCE DAILY   Refills:  1        FLUoxetine 40 MG capsule   Commonly known as:  PROzac   Dose:  40 mg   Quantity:  30 capsule        Take 1 capsule (40 mg) by mouth daily   Refills:  1        fluticasone 50 MCG/ACT spray   Commonly known as:  FLONASE   Dose:  2 spray   Quantity:  1 Package        Spray 2 sprays into both nostrils daily   Refills:  12        medroxyPROGESTERone 5 MG tablet   Commonly known as:  PROVERA   Quantity:  90 tablet        TAKE ONE TABLET BY MOUTH ONCE DAILY   Refills:  0        MELATONIN   Dose:  10 mg        10 mg daily   Refills:  0        ondansetron 4 MG ODT tab   Commonly known as:  ZOFRAN ODT   Dose:  4 mg   Quantity:  12 tablet        Take 1 tablet (4 mg) by mouth every 8 hours as needed for nausea   Refills:  0        TYLENOL PO   Dose:  650 mg        Take 650 mg by mouth every 4 hours as needed for mild pain or fever   Refills:  0        * Notice:  This list has 2 medication(s) that are the same as other medications prescribed for you. Read the directions carefully, and ask your doctor or other care provider to review them with you.            Prescriptions were sent or printed at these locations (2 Prescriptions)                   [facility] [place] [address] [facility]    [address]  [facility] [address]    Telephone:  [phone]   Fax:  [phone]   Hours:                  E-Prescribed (2 of 2)         meclizine (ANTIVERT) 25 MG tablet               loratadine (CLARITIN) 10 MG tablet                Procedures and tests performed during your visit     CBC with platelets differential    CRP inflammation    CT Head  "w/o Contrast    Comprehensive metabolic panel    Erythrocyte sedimentation rate auto    Peripheral IV catheter    UA with Microscopic      Orders Needing Specimen Collection     None      Pending Results     No orders found from 2017 to 2017.            Pending Culture Results     No orders found from 2017 to 2017.            Pending Results Instructions     If you had any lab results that were not finalized at the time of your Discharge, you can call the ED Lab Result RN at [phone]. You will be contacted by this team for any positive Lab results or changes in treatment. The nurses are available 7 days a week from 10A to 6:30P.  You can leave a message 24 hours per day and they will return your call.        Thank you for choosing [place]       Thank you for choosing [place] for your care. Our goal is always to provide you with excellent care. Hearing back from our patients is one way we can continue to improve our services. Please take a few minutes to complete the written survey that you may receive in the mail after you visit with us. Thank you!        [employer] Information     [email or web address] lets you send messages to your doctor, view your test results, renew your prescriptions, schedule appointments and more. To sign up, go to [email or web address] . Click on \"Log in\" on the left side of the screen, which will take you to the Welcome page. Then click on \"Sign up Now\" on the right side of the page.     You will be asked to enter the access code listed below, as well as some personal information. Please follow the directions to create your username and password.     Your access code is: [email or web address]  Expires: 2017  1:45 PM     Your access code will  in 90 days. If you need help or a new code, please call your [place] clinic or [phone].        Care EveryWhere ID     This is your Care EveryWhere ID. This could be used by other organizations to access your [place] medical " records  [id number]        Equal Access to Services     NAREN WILSON : Paulie Johnson, kushal waldron, anirudh stanford. So [facility] [phone].    ATENCIÓN: Si habla español, tiene a garcia disposición servicios gratuitos de asistencia lingüística. Llame al [phone].    We comply with applicable federal civil rights laws and [place] laws. We do not discriminate on the basis of race, color, national origin, age, disability sex, sexual orientation or gender identity.            After Visit Summary       This is your record. Keep this with you and show to your community pharmacist(s) and doctor(s) at your next visit.                   Suresh

## 2024-01-21 ENCOUNTER — MYC REFILL (OUTPATIENT)
Dept: FAMILY MEDICINE | Facility: CLINIC | Age: 54
End: 2024-01-21
Payer: COMMERCIAL

## 2024-01-21 DIAGNOSIS — F43.10 PTSD (POST-TRAUMATIC STRESS DISORDER): ICD-10-CM

## 2024-01-21 DIAGNOSIS — R51.9 RIGHT-SIDED HEADACHE: ICD-10-CM

## 2024-01-21 DIAGNOSIS — K04.7 DENTAL INFECTION: ICD-10-CM

## 2024-01-21 DIAGNOSIS — R42 DIZZINESS: ICD-10-CM

## 2024-01-21 DIAGNOSIS — F32.1 MODERATE MAJOR DEPRESSION (H): ICD-10-CM

## 2024-01-21 DIAGNOSIS — H81.13 BENIGN PAROXYSMAL POSITIONAL VERTIGO, BILATERAL: ICD-10-CM

## 2024-01-21 DIAGNOSIS — F43.23 ADJUSTMENT DISORDER WITH MIXED ANXIETY AND DEPRESSED MOOD: ICD-10-CM

## 2024-01-22 ENCOUNTER — E-VISIT (OUTPATIENT)
Dept: FAMILY MEDICINE | Facility: CLINIC | Age: 54
End: 2024-01-22

## 2024-01-22 DIAGNOSIS — J01.90 ACUTE SINUSITIS WITH SYMPTOMS > 10 DAYS: Primary | ICD-10-CM

## 2024-01-22 PROCEDURE — 99421 OL DIG E/M SVC 5-10 MIN: CPT | Performed by: NURSE PRACTITIONER

## 2024-01-22 RX ORDER — DOXYCYCLINE 100 MG/1
100 CAPSULE ORAL 2 TIMES DAILY
Qty: 20 CAPSULE | Refills: 0 | Status: SHIPPED | OUTPATIENT
Start: 2024-01-22 | End: 2024-02-01

## 2024-01-22 RX ORDER — MECLIZINE HYDROCHLORIDE 25 MG/1
25 TABLET ORAL 3 TIMES DAILY PRN
Qty: 30 TABLET | Refills: 0 | Status: SHIPPED | OUTPATIENT
Start: 2024-01-22

## 2024-01-22 RX ORDER — CLINDAMYCIN HCL 300 MG
300 CAPSULE ORAL 3 TIMES DAILY
Qty: 21 CAPSULE | Refills: 0 | OUTPATIENT
Start: 2024-01-22 | End: 2024-01-29

## 2024-01-22 NOTE — TELEPHONE ENCOUNTER
Tara has not prescribed or treated sinus infections for this patient. He previously prescribed clindamycin for infection in the back of there mouth (was waiting to get in with dentist) 5/19/23.    Leilani Forman RN on 1/22/2024 at 8:28 AM

## 2024-01-22 NOTE — TELEPHONE ENCOUNTER
Reviewed with Dr. Pacheco who denied the prescription as patient should be seen by PCP for this concern. Looks like primary care RN advised her to do an evisit. ENT agrees.    Leilani Forman RN on 1/22/2024 at 3:00 PM

## 2024-01-31 ENCOUNTER — MYC REFILL (OUTPATIENT)
Dept: INTERNAL MEDICINE | Facility: CLINIC | Age: 54
End: 2024-01-31
Payer: COMMERCIAL

## 2024-01-31 DIAGNOSIS — F43.23 ADJUSTMENT DISORDER WITH MIXED ANXIETY AND DEPRESSED MOOD: ICD-10-CM

## 2024-01-31 RX ORDER — ALPRAZOLAM 0.5 MG
0.5 TABLET ORAL 2 TIMES DAILY PRN
Qty: 60 TABLET | Refills: 0 | Status: SHIPPED | OUTPATIENT
Start: 2024-02-01 | End: 2024-02-27

## 2024-01-31 NOTE — TELEPHONE ENCOUNTER
Requested Prescriptions   Pending Prescriptions Disp Refills    ALPRAZolam (XANAX) 0.5 MG tablet 60 tablet 0     Sig: Take 1 tablet (0.5 mg) by mouth 2 times daily as needed for anxiety       Next 5 appointments (look out 90 days)      Apr 04, 2024  8:30 AM  (Arrive by 8:10 AM)  Adult Preventative Visit with Arin Scherer NP  [facility] [address] [place] [id number] [phone]             Routing refill request to provider for review/approval because:  Drug not on the [facility], [facility] or [facility] refill protocol or controlled substance

## 2024-02-03 NOTE — MR AVS SNAPSHOT
After Visit Summary   6/27/2018    Liliana Kat    MRN: [id number]           Patient Information     Date Of Birth          1970        Visit Information        Provider Department      6/27/2018 11:10 AM Edis Washington MD [facility]        Today's Diagnoses     Encounter for screening mammogram for breast cancer    -  1    Encounter for well adult exam with abnormal findings        Diarrhea, unspecified type        Other fatigue        Loss of weight        Encounter for screening for malignant neoplasm of colon        Screening for malignant neoplasm of cervix        Personal history of long-term (current) use of postmenopausal hormone replacement therapy           Follow-ups after your visit        Additional Services     GASTROENTEROLOGY ADULT REF PROCEDURE ONLY [facility] [phone]; [place] Provider       Last Lab Result: Creatinine (mg/dL)       Date                     Value                 09/08/2017               0.87             ----------  Body mass index is 22.63 kg/(m^2).     Needed:  No  Language:  English    Patient will be contacted to schedule procedure.     Please be aware that coverage of these services is subject to the terms and limitations of your health insurance plan.  Call member services at your health plan with any benefit or coverage questions.  Any procedures must be performed at a [place] facility OR coordinated by your clinic's referral office.    Please bring the following with you to your appointment:    (1) Any X-Rays, CTs or MRIs which have been performed.  Contact the facility where they were done to arrange for  prior to your scheduled appointment.    (2) List of current medications   (3) This referral request   (4) Any documents/labs given to you for this referral                  Your next 10 appointments already scheduled     Jul 06, 2018  9:45 AM CDT   MA SCREENING DIGITAL BILATERAL with  Patient discharged to home with infant. Infant in carseat. Patient in wheelchair. Discharge supplies given. Prescriptions e sent to patient's pharmacy by OB. Discharge instructions reviewed patient via  # [id number] and copies given. Per patient and significant other, no questions. Bands verified. See infant note and/or footprint sheet on chart.     "PHMA1   [facility] ([facility])    [address] [place] [id number] [phone]           Do not use any powder, lotion or deodorant under your arms or on your breast. If you do, we will ask you to remove it before your exam.  Wear comfortable, two-piece clothing.  If you have any allergies, tell your care team.  Bring any previous mammograms from other facilities or have them mailed to the breast center. Three-dimensional (3D) mammograms are available at [place] locations in formerly [facility], [facility], [facility], and [place]. [facility] locations include [place] and [facility] in [place]. Benefits of 3D mammograms include: - Improved rate of cancer detection - Decreases your chance of having to go back for more tests, which means fewer: - \"False-positive\" results (This means that there is an abnormal area but it isn't cancer.) - Invasive testing procedures, such as a biopsy or surgery - Can provide clearer images of the breast if you have dense breast tissue. 3D mammography is an optional exam that anyone can have with a 2D mammogram. It doesn't replace or take the place of a 2D mammogram. 2D mammograms remain an effective screening test for all women.  Not all insurance companies cover the cost of a 3D mammogram. Check with your insurance.            Jul 06, 2018 10:20 AM CDT   Office Visit with Edis Washington MD   [facility] ([address] [id number] [phone]           Bring a current list of meds and any records pertaining to this visit. For Physicals, please bring immunization records and any forms needing to be filled out. Please arrive 10 minutes early to complete paperwork.            Jul 09, 2018 10:30 AM CDT   New Visit with ELVA Felton   [facility] ([address] " "[facility] [address]   [phone]              Future tests that were ordered for you today     Open Future Orders        Priority Expected Expires Ordered    Enteric Bacteria and Virus Panel by EDWARD Stool Routine  2019    *MA Screening Digital Bilateral Routine  2019            Who to contact     If you have questions or need follow up information about today's clinic visit or your schedule please contact [facility] directly at [phone].  Normal or non-critical lab and imaging results will be communicated to you by MyChart, letter or phone within 4 business days after the clinic has received the results. If you do not hear from us within 7 days, please contact the clinic through Tongtechhart or phone. If you have a critical or abnormal lab result, we will notify you by phone as soon as possible.  Submit refill requests through MommyCoach or call your pharmacy and they will forward the refill request to us. Please allow 3 business days for your refill to be completed.          Additional Information About Your Visit        MommyCoach Information     MommyCoach lets you send messages to your doctor, view your test results, renew your prescriptions, schedule appointments and more. To sign up, go to [email or web address] . Click on \"Log in\" on the left side of the screen, which will take you to the Welcome page. Then click on \"Sign up Now\" on the right side of the page.     You will be asked to enter the access code listed below, as well as some personal information. Please follow the directions to create your username and password.     Your access code is: [email or web address]  Expires: 2018 11:02 AM     Your access code will  in 90 days. If you need help or a new code, please call your [place] clinic or [phone].        Care EveryWhere ID     This is your Care EveryWhere ID. This could be used by other organizations to access your [place] medical " "records  [id number]        Your Vitals Were     Pulse Temperature Respirations Height Last Period Pulse Oximetry    70 97.6  F (36.4  C) (Temporal) 16 5' 5\" (1.651 m) 03/06/2006 98%    Breastfeeding? BMI (Body Mass Index)                No 22.63 kg/m2           Blood Pressure from Last 3 Encounters:   06/27/18 118/64   09/28/17 96/72   09/08/17 112/79    Weight from Last 3 Encounters:   06/27/18 136 lb (61.7 kg)   09/28/17 144 lb 9.6 oz (65.6 kg)   09/08/17 143 lb (64.9 kg)              We Performed the Following     Amylase     Basic metabolic panel     CBC with platelets     CHLAMYDIA TRACHOMATIS PCR     CRP, inflammation     ENDOMETRIAL BIOPSY W/O CERVICAL DILATION     GASTROENTEROLOGY ADULT REF PROCEDURE ONLY [facility] [phone]; [place] Provider     HPV High Risk Types DNA Cervical     Lipase     NEISSERIA GONORRHOEA PCR     OFFICE/OUTPT VISIT,EST,LEVL IV     Pap imaged thin layer screen with HPV - recommended age 30 - 65 years (select HPV order below)     Surgical pathology exam     TSH with free T4 reflex        Primary Care Provider Office Phone # Fax #    Paula Villa, APRN [phone] [address] [place] [address]        Equal Access to Services     NAREN WILSON : Hadii ermias ku hadasho Soomaali, waaxda luqadaha, qaybta kaalmada adeegyada, waxay windyin kristopher leslie. So [facility] [phone].    ATENCIÓN: Si habla español, tiene a garcia disposición servicios gratuitos de asistencia lingüística. Llame al [phone].    We comply with applicable federal civil rights laws and [place] laws. We do not discriminate on the basis of race, color, national origin, age, disability, sex, sexual orientation, or gender identity.            Thank you!     Thank you for choosing [facility]  for your care. Our goal is always to provide you with excellent care. Hearing back from our patients is one way we can continue to improve our services. " Please take a few minutes to complete the written survey that you may receive in the mail after your visit with us. Thank you!             Your Updated Medication List - Protect others around you: Learn how to safely use, store and throw away your medicines at www.disposemymeds.org.          This list is accurate as of 6/27/18 12:17 PM.  Always use your most recent med list.                   Brand Name Dispense Instructions for use Diagnosis    * albuterol 108 (90 Base) MCG/ACT Inhaler    PROAIR HFA/PROVENTIL HFA/VENTOLIN HFA    1 Inhaler    Inhale 2 puffs into the lungs every 6 hours as needed for shortness of breath / dyspnea or wheezing    Tobacco abuse       * albuterol (2.5 MG/3ML) 0.083% neb solution     1 Box    ONE NEBULIZATION 4 TIMES DAILY AS NEEDED    Acute sinusitis, unspecified       ALPRAZolam 0.5 MG tablet    XANAX    90 tablet    Take 1 tablet (0.5 mg) by mouth 3 times daily as needed for anxiety    Adjustment disorder with mixed anxiety and depressed mood       aspirin 81 MG tablet     30 tablet    Take 1 tablet (81 mg) by mouth daily    Superficial varicosities, unspecified laterality       citalopram 20 MG tablet    celeXA    30 tablet    TAKE ONE TABLET BY MOUTH EVERY DAY    Adjustment disorder with mixed anxiety and depressed mood       estradiol 1 MG tablet    ESTRACE    30 tablet    TAKE ONE TABLET BY MOUTH EVERY DAY    Need for postmenopausal hormone replacement       fluticasone 50 MCG/ACT spray    FLONASE    1 Bottle    Spray 2 sprays into both nostrils daily    Acute sinusitis, unspecified       loratadine 10 MG tablet    CLARITIN    30 tablet    TAKE ONE TABLET BY MOUTH EVERY DAY    Acute sinusitis       meclizine 25 MG tablet    ANTIVERT    30 tablet    Take 1 tablet (25 mg) by mouth every 6 hours as needed for dizziness        MELATONIN      10 mg daily        TYLENOL PO      Take 650 mg by mouth every 4 hours as needed for mild pain or fever        * Notice:  This list has 2  medication(s) that are the same as other medications prescribed for you. Read the directions carefully, and ask your doctor or other care provider to review them with you.

## 2024-02-27 ENCOUNTER — MYC REFILL (OUTPATIENT)
Dept: INTERNAL MEDICINE | Facility: CLINIC | Age: 54
End: 2024-02-27
Payer: COMMERCIAL

## 2024-02-27 ENCOUNTER — MYC REFILL (OUTPATIENT)
Dept: FAMILY MEDICINE | Facility: CLINIC | Age: 54
End: 2024-02-27
Payer: COMMERCIAL

## 2024-02-27 DIAGNOSIS — F32.1 MODERATE MAJOR DEPRESSION (H): ICD-10-CM

## 2024-02-27 DIAGNOSIS — F43.23 ADJUSTMENT DISORDER WITH MIXED ANXIETY AND DEPRESSED MOOD: ICD-10-CM

## 2024-02-27 DIAGNOSIS — F41.1 GAD (GENERALIZED ANXIETY DISORDER): ICD-10-CM

## 2024-02-27 RX ORDER — ESCITALOPRAM OXALATE 20 MG/1
20 TABLET ORAL DAILY
Qty: 30 TABLET | Refills: 0 | Status: SHIPPED | OUTPATIENT
Start: 2024-02-27 | End: 2024-04-04

## 2024-02-27 RX ORDER — ALPRAZOLAM 0.5 MG
0.5 TABLET ORAL 2 TIMES DAILY PRN
Qty: 60 TABLET | Refills: 0 | Status: SHIPPED | OUTPATIENT
Start: 2024-02-29 | End: 2024-03-26

## 2024-03-26 ENCOUNTER — PATIENT OUTREACH (OUTPATIENT)
Dept: CARE COORDINATION | Facility: CLINIC | Age: 54
End: 2024-03-26
Payer: COMMERCIAL

## 2024-03-26 ENCOUNTER — MYC REFILL (OUTPATIENT)
Dept: INTERNAL MEDICINE | Facility: CLINIC | Age: 54
End: 2024-03-26
Payer: COMMERCIAL

## 2024-03-26 DIAGNOSIS — F43.23 ADJUSTMENT DISORDER WITH MIXED ANXIETY AND DEPRESSED MOOD: ICD-10-CM

## 2024-03-26 RX ORDER — ALPRAZOLAM 0.5 MG
0.5 TABLET ORAL 2 TIMES DAILY PRN
Qty: 60 TABLET | Refills: 0 | Status: SHIPPED | OUTPATIENT
Start: 2024-03-26 | End: 2024-04-25

## 2024-03-28 SDOH — HEALTH STABILITY: PHYSICAL HEALTH: ON AVERAGE, HOW MANY MINUTES DO YOU ENGAGE IN EXERCISE AT THIS LEVEL?: 20 MIN

## 2024-03-28 SDOH — HEALTH STABILITY: PHYSICAL HEALTH: ON AVERAGE, HOW MANY DAYS PER WEEK DO YOU ENGAGE IN MODERATE TO STRENUOUS EXERCISE (LIKE A BRISK WALK)?: 1 DAY

## 2024-03-28 ASSESSMENT — SOCIAL DETERMINANTS OF HEALTH (SDOH): HOW OFTEN DO YOU GET TOGETHER WITH FRIENDS OR RELATIVES?: NEVER

## 2024-03-28 NOTE — COMMUNITY RESOURCES LIST (ENGLISH)
March 28, 2024           YOUR PERSONALIZED LIST OF SERVICES & PROGRAMS           NAVIGATION    Eligibility Screening      [facility] - Veterans benefits application assistance  [address] [place] [address] (Distance: 18.1 miles)  Phone: [phone]) 833-3285  Website: https://www.co.[place]sey.[place].us/161/Veterans-Services  Language: English  Fee: Free  Accessibility: Ada accessible, Deaf or hard of hearing, Blind accommodation      [employer]  Phone: (495) 419-1656  Website: https://www.mnsure.org/about-us/assister-program/navigators/index.jsp  Language: English  Hours: Mon 8:00 AM - 4:00 PM Tue 8:00 AM - 4:00 PM Wed 8:00 AM - 4:00 PM Thu 8:00 AM - 4:00 PM        ASSISTANCE    Nutrition Benefits      [employer] [place] - SNAP (formerly food stamps) Screening and Application help  Phone: ([phone] 316-6352  Website: https://www.CDB Infotek.org/programs/[place]-food-helpline/  Language: English  Hours: Mon 10:00 AM - 5:00 PM Tue 10:00 AM - 5:00 PM Wed 10:00 AM - 5:00 PM Thu 10:00 AM - 5:00 PM Fri 10:00 AM - 5:00 PM  Fee: Free  Accessibility: Ada accessible, Blind accommodation, Deaf or hard of hearing, Translation services    Pantry      [employer] - Food pantry  [address] [place] [address] (Distance: 15.0 miles)  Phone: (154) [phone]  Website: https://www.RobotsLAB/[facility] Hospital[facility]/  Language: English  Fee: Free      [employer]  [address] [place] [address] (Distance: 15.2 miles)  Phone: (718) 750-7922  Website: http://STATS Group  Language: English  Fee: Free      [employer]  Phone: (572) 668-1781  Website: https://www.Recommind.[employer]/empowerment-food-bank  Language: English  Hours: Mon 9:00 AM - 5:00 PM Tue 9:00 AM - 5:00 PM Wed 9:00 AM - 5:00 PM Thu 9:00 AM - 5:00 PM Fri 9:00 AM - 5:00 PM  Fee: Free            Medical Transportation, (NEMT)      [facility] [employer] - Transportation to medical appointments -  [facility] [employer]  [address] [place] [address] (Distance: 18.0 miles)  Website: http://www.Semantra  Language: English  Fee: Free  Accessibility: Blind accommodation      Social Service [facility] - Neighbor to Neighbor Program  Phone: (465) 772-7020  Email: [email or web address]  Website: https://www.[facility]/services/older-adults/-services/neighbor-to-neighbor  Language: English  Hours: Mon 8:00 AM - 5:00 PM Tue 8:00 AM - 5:00 PM Wed 8:00 AM - 5:00 PM Thu 8:00 AM - 5:00 PM Fri 8:00 AM - 5:00 PM  Fee: Insurance, Self pay  Accessibility: Deaf or hard of hearing, Blind accommodation, Translation services    Expense Assistance      - Dislocated Worker/Adult [employer] Employment Program  Phone: (327) 897-6322  Email: shanda@MobiWork  Website: https://MobiWork/services/employment-services/dislocated-worker-program/  Language: English, Senegalese  Hours: Mon 8:00 AM - 4:30 PM Tue 8:00 AM - 4:30 PM Wed 8:00 AM - 4:30 PM Thu 8:00 AM - 4:30 PM Fri 8:00 AM - 4:30 PM  Fee: Free  Accessibility: Ada accessible    Coordination      [facility] In [employer] - Ride coordination  [address] [place] [address] (Distance: 18.0 miles)  Website: http://wwwAir Robotics  Language: English  Fee: Free  Accessibility: Blind accommodation      [employer] - Ride coordination  [address] [place] [address] (Distance: 16.7 miles)  Website: http://www.4 the stars.Intellisense  Language: English  Fee: Self pay, Insurance      [employer] - FREE AIR TRANSPORTATION FOR NON-EMERGENCY MEDICAL OR HUMANITARIAN FLIGHTS  Phone: (202) 664-8578  Email: missions@[employer].Medivance  Website: http://www.Ign[employer] Solutions  Language: English               IMPORTANT NUMBERS & WEBSITES        Emergency Services  911  .   [facility]  211 http://211unitedway.org  .   Poison Control  (786) 460-4185 http://mnpoison.org http://[facility].org  .     Suicide and Crisis Lifeline  597  http://988lifeline.org  .   Childhelp [place] Child Abuse Hotline  115.975.2452 http://Childhelphotline.org   .   National Sexual Assault Hotline  (841) 222-9956 (HOPE) http://Rainn.org   .     National Runaway Safeline  (470) 254-3579 (RUNAWAY) http://Expandly.Rover Apps  .   Pregnancy & Postpartum Support  Call/text 466-600-5034  [place]: http://ppsupportmn.org  [place]: http://psichapters.com/wi  .   Substance Abuse National Helpline ([facility])  400-524-HELP (5974) http://Findtreatment.gov   .                DISCLAIMER: [employer] does not endorse any service providers mentioned in this resource list. [employer] does not guarantee that the services mentioned in this resource list will be available to you or will improve your health or wellness.    [facility]

## 2024-04-03 ENCOUNTER — MYC REFILL (OUTPATIENT)
Dept: FAMILY MEDICINE | Facility: CLINIC | Age: 54
End: 2024-04-03
Payer: COMMERCIAL

## 2024-04-03 ENCOUNTER — HOSPITAL ENCOUNTER (OUTPATIENT)
Dept: MAMMOGRAPHY | Facility: CLINIC | Age: 54
Discharge: HOME | End: 2024-04-03
Attending: NURSE PRACTITIONER | Admitting: NURSE PRACTITIONER
Payer: COMMERCIAL

## 2024-04-03 DIAGNOSIS — Z12.31 VISIT FOR SCREENING MAMMOGRAM: ICD-10-CM

## 2024-04-03 DIAGNOSIS — J01.00 ACUTE MAXILLARY SINUSITIS, RECURRENCE NOT SPECIFIED: ICD-10-CM

## 2024-04-03 DIAGNOSIS — E78.5 HYPERLIPIDEMIA LDL GOAL <130: ICD-10-CM

## 2024-04-03 PROCEDURE — 77063 BREAST TOMOSYNTHESIS BI: CPT

## 2024-04-03 RX ORDER — FLUTICASONE PROPIONATE 50 MCG
2 SPRAY, SUSPENSION (ML) NASAL DAILY
Qty: 18.2 ML | Refills: 0 | Status: CANCELLED | OUTPATIENT
Start: 2024-04-03

## 2024-04-03 ASSESSMENT — PATIENT HEALTH QUESTIONNAIRE - PHQ9
10. IF YOU CHECKED OFF ANY PROBLEMS, HOW DIFFICULT HAVE THESE PROBLEMS MADE IT FOR YOU TO DO YOUR WORK, TAKE CARE OF THINGS AT HOME, OR GET ALONG WITH OTHER PEOPLE: VERY DIFFICULT
SUM OF ALL RESPONSES TO PHQ QUESTIONS 1-9: 14
SUM OF ALL RESPONSES TO PHQ QUESTIONS 1-9: 14

## 2024-04-03 NOTE — TELEPHONE ENCOUNTER
Called patient to follow up on this request as it looks like this was prescribed for acute sinusitis.     Patient states she has started to use this for her allergies as well. Advised patient we would need to go through triage about her symptoms and that I don't know if the doctor will prescribe it for something else. She states she has an appointment tomorrow with her PCP and will discuss it with her then. She had no further questions or concerns.     Melissa Narvaez, MEHRANN, RN

## 2024-04-04 ENCOUNTER — MYC MEDICAL ADVICE (OUTPATIENT)
Dept: ORTHOPEDICS | Facility: CLINIC | Age: 54
End: 2024-04-04

## 2024-04-04 ENCOUNTER — OFFICE VISIT (OUTPATIENT)
Dept: FAMILY MEDICINE | Facility: CLINIC | Age: 54
End: 2024-04-04
Payer: COMMERCIAL

## 2024-04-04 VITALS
BODY MASS INDEX: 27.42 KG/M2 | WEIGHT: 164.6 LBS | DIASTOLIC BLOOD PRESSURE: 80 MMHG | HEIGHT: 65 IN | HEART RATE: 90 BPM | SYSTOLIC BLOOD PRESSURE: 120 MMHG | OXYGEN SATURATION: 97 % | RESPIRATION RATE: 20 BRPM | TEMPERATURE: 98 F

## 2024-04-04 DIAGNOSIS — N95.0 POST-MENOPAUSAL BLEEDING: ICD-10-CM

## 2024-04-04 DIAGNOSIS — Z63.6 CAREGIVER STRESS: ICD-10-CM

## 2024-04-04 DIAGNOSIS — Z00.00 ROUTINE GENERAL MEDICAL EXAMINATION AT A HEALTH CARE FACILITY: Primary | ICD-10-CM

## 2024-04-04 DIAGNOSIS — J30.2 SEASONAL ALLERGIC RHINITIS, UNSPECIFIED TRIGGER: ICD-10-CM

## 2024-04-04 DIAGNOSIS — Z12.11 SCREEN FOR COLON CANCER: ICD-10-CM

## 2024-04-04 DIAGNOSIS — F32.1 MODERATE MAJOR DEPRESSION (H): ICD-10-CM

## 2024-04-04 DIAGNOSIS — E78.5 HYPERLIPIDEMIA LDL GOAL <130: ICD-10-CM

## 2024-04-04 DIAGNOSIS — M25.562 CHRONIC PAIN OF LEFT KNEE: ICD-10-CM

## 2024-04-04 DIAGNOSIS — F43.10 PTSD (POST-TRAUMATIC STRESS DISORDER): ICD-10-CM

## 2024-04-04 DIAGNOSIS — E87.6 HYPOKALEMIA: ICD-10-CM

## 2024-04-04 DIAGNOSIS — G89.29 CHRONIC PAIN OF LEFT KNEE: ICD-10-CM

## 2024-04-04 DIAGNOSIS — F41.1 GAD (GENERALIZED ANXIETY DISORDER): ICD-10-CM

## 2024-04-04 LAB
ANION GAP SERPL CALCULATED.3IONS-SCNC: 10 MMOL/L (ref 7–15)
BUN SERPL-MCNC: 9.4 MG/DL (ref 6–20)
CALCIUM SERPL-MCNC: 9.6 MG/DL (ref 8.6–10)
CHLORIDE SERPL-SCNC: 105 MMOL/L (ref 98–107)
CHOLEST SERPL-MCNC: 191 MG/DL
CREAT SERPL-MCNC: 0.82 MG/DL (ref 0.51–0.95)
DEPRECATED HCO3 PLAS-SCNC: 26 MMOL/L (ref 22–29)
EGFRCR SERPLBLD CKD-EPI 2021: 86 ML/MIN/1.73M2
ESTRADIOL SERPL-MCNC: 10 PG/ML
FASTING STATUS PATIENT QL REPORTED: YES
FSH SERPL IRP2-ACNC: 66.2 MIU/ML
GLUCOSE SERPL-MCNC: 100 MG/DL (ref 70–99)
HDLC SERPL-MCNC: 52 MG/DL
LDLC SERPL CALC-MCNC: 96 MG/DL
NONHDLC SERPL-MCNC: 139 MG/DL
POTASSIUM SERPL-SCNC: 4.2 MMOL/L (ref 3.4–5.3)
SODIUM SERPL-SCNC: 141 MMOL/L (ref 135–145)
TRIGL SERPL-MCNC: 213 MG/DL
TSH SERPL DL<=0.005 MIU/L-ACNC: 1.21 UIU/ML (ref 0.3–4.2)

## 2024-04-04 PROCEDURE — 99214 OFFICE O/P EST MOD 30 MIN: CPT | Mod: 25 | Performed by: NURSE PRACTITIONER

## 2024-04-04 PROCEDURE — 80061 LIPID PANEL: CPT | Performed by: NURSE PRACTITIONER

## 2024-04-04 PROCEDURE — 87624 HPV HI-RISK TYP POOLED RSLT: CPT | Performed by: NURSE PRACTITIONER

## 2024-04-04 PROCEDURE — 96127 BRIEF EMOTIONAL/BEHAV ASSMT: CPT | Performed by: NURSE PRACTITIONER

## 2024-04-04 PROCEDURE — 82274 ASSAY TEST FOR BLOOD FECAL: CPT | Performed by: NURSE PRACTITIONER

## 2024-04-04 PROCEDURE — 83001 ASSAY OF GONADOTROPIN (FSH): CPT | Performed by: NURSE PRACTITIONER

## 2024-04-04 PROCEDURE — 99396 PREV VISIT EST AGE 40-64: CPT | Performed by: NURSE PRACTITIONER

## 2024-04-04 PROCEDURE — 80048 BASIC METABOLIC PNL TOTAL CA: CPT | Performed by: NURSE PRACTITIONER

## 2024-04-04 PROCEDURE — 82670 ASSAY OF TOTAL ESTRADIOL: CPT | Performed by: NURSE PRACTITIONER

## 2024-04-04 PROCEDURE — 36415 COLL VENOUS BLD VENIPUNCTURE: CPT | Performed by: NURSE PRACTITIONER

## 2024-04-04 PROCEDURE — 84443 ASSAY THYROID STIM HORMONE: CPT | Performed by: NURSE PRACTITIONER

## 2024-04-04 PROCEDURE — G0145 SCR C/V CYTO,THINLAYER,RESCR: HCPCS | Performed by: NURSE PRACTITIONER

## 2024-04-04 RX ORDER — ESCITALOPRAM OXALATE 20 MG/1
20 TABLET ORAL DAILY
Qty: 90 TABLET | Refills: 1 | Status: SHIPPED | OUTPATIENT
Start: 2024-04-04

## 2024-04-04 RX ORDER — ATORVASTATIN CALCIUM 20 MG/1
20 TABLET, FILM COATED ORAL DAILY
Qty: 90 TABLET | Refills: 3 | Status: SHIPPED | OUTPATIENT
Start: 2024-04-04

## 2024-04-04 RX ORDER — BUSPIRONE HYDROCHLORIDE 5 MG/1
TABLET ORAL
Qty: 134 TABLET | Refills: 0 | Status: SHIPPED | OUTPATIENT
Start: 2024-04-04 | End: 2024-05-07

## 2024-04-04 RX ORDER — FLUTICASONE PROPIONATE 50 MCG
1 SPRAY, SUSPENSION (ML) NASAL DAILY
Qty: 16 G | Refills: 11 | Status: SHIPPED | OUTPATIENT
Start: 2024-04-04

## 2024-04-04 SDOH — SOCIAL STABILITY - SOCIAL INSECURITY: DEPENDENT RELATIVE NEEDING CARE AT HOME: Z63.6

## 2024-04-04 ASSESSMENT — KOOS JR
HOW SEVERE IS YOUR KNEE STIFFNESS AFTER FIRST WAKING IN MORNING: MODERATE
GOING UP OR DOWN STAIRS: MILD
STRAIGHTENING KNEE FULLY: MILD
TWISING OR PIVOTING ON KNEE: MILD
KOOS JR SCORING: 65.99
BENDING TO THE FLOOR TO PICK UP OBJECT: MILD
RISING FROM SITTING: MILD
STANDING UPRIGHT: MILD

## 2024-04-04 ASSESSMENT — PAIN SCALES - GENERAL: PAINLEVEL: SEVERE PAIN (7)

## 2024-04-04 NOTE — PATIENT INSTRUCTIONS
Preventive Care Advice   This is general advice given by our system to help you stay healthy. However, your care team may have specific advice just for you. Please talk to your care team about your preventive care needs.  Nutrition  Eat 5 or more servings of fruits and vegetables each day.  Try wheat bread, brown rice and whole grain pasta (instead of white bread, rice, and pasta).  Get enough calcium and vitamin D. Check the label on foods and aim for 100% of the RDA (recommended daily allowance).  Lifestyle  Exercise at least 150 minutes each week   (30 minutes a day, 5 days a week).  Do muscle strengthening activities 2 days a week. These help control your weight and prevent disease.  No smoking.  Wear sunscreen to prevent skin cancer.  Have a dental exam and cleaning every 6 months.  Yearly exams  See your health care team every year to talk about:  Any changes in your health.  Any medicines your care team has prescribed.  Preventive care, family planning, and ways to prevent chronic diseases.  Shots (vaccines)   HPV shots (up to age 26), if you've never had them before.  Hepatitis B shots (up to age 59), if you've never had them before.  COVID-19 shot: Get this shot when it's due.  Flu shot: Get a flu shot every year.  Tetanus shot: Get a tetanus shot every 10 years.  Pneumococcal, hepatitis A, and RSV shots: Ask your care team if you need these based on your risk.  Shingles shot (for age 50 and up).  General health tests  Diabetes screening:  Starting at age 35, Get screened for diabetes at least every 3 years.  If you are younger than age 35, ask your care team if you should be screened for diabetes.  Cholesterol test: At age 39, start having a cholesterol test every 5 years, or more often if advised.  Bone density scan (DEXA): At age 50, ask your care team if you should have this scan for osteoporosis (brittle bones).  Hepatitis C: Get tested at least once in your life.  STIs (sexually transmitted  infections)  Before age 24: Ask your care team if you should be screened for STIs.  After age 24: Get screened for STIs if you're at risk. You are at risk for STIs (including HIV) if:  You are sexually active with more than one person.  You don't use condoms every time.  You or a partner was diagnosed with a sexually transmitted infection.  If you are at risk for HIV, ask about PrEP medicine to prevent HIV.  Get tested for HIV at least once in your life, whether you are at risk for HIV or not.  Cancer screening tests  Cervical cancer screening: If you have a cervix, begin getting regular cervical cancer screening tests at age 21. Most people who have regular screenings with normal results can stop after age 65. Talk about this with your provider.  Breast cancer scan (mammogram): If you've ever had breasts, begin having regular mammograms starting at age 40. This is a scan to check for breast cancer.  Colon cancer screening: It is important to start screening for colon cancer at age 45.  Have a colonoscopy test every 10 years (or more often if you're at risk) Or, ask your provider about stool tests like a FIT test every year or Cologuard test every 3 years.  To learn more about your testing options, visit: https://www.Donuts/833396.pdf.  For help making a decision, visit: https://bit.ly/am08753.  Prostate cancer screening test: If you have a prostate and are age 55 to 69, ask your provider if you would benefit from a yearly prostate cancer screening test.  Lung cancer screening: If you are a current or former smoker age 50 to 80, ask your care team if ongoing lung cancer screenings are right for you.  For informational purposes only. Not to replace the advice of your health care provider. Copyright   2023 [employer]. All rights reserved. Clinically reviewed by the [facility]. testhub 986819 - REV 01/24.    Preventing Falls: Care Instructions  Injuries and health  problems such as trouble walking or poor eyesight can increase your risk of falling. So can some medicines. But there are things you can do to help prevent falls. You can exercise to get stronger. You can also arrange your home to make it safer.    Talk to your doctor about the medicines you take. Ask if any of them increase the risk of falls and whether they can be changed or stopped.   Try to exercise regularly. It can help improve your strength and balance. This can help lower your risk of falling.     Practice fall safety and prevention.    Wear low-heeled shoes that fit well and give your feet good support. Talk to your doctor if you have foot problems that make this hard.  Carry a cellphone or wear a medical alert device that you can use to call for help.  Use stepladders instead of chairs to reach high objects. Don't climb if you're at risk for falls. Ask for help, if needed.  Wear the correct eyeglasses, if you need them.    Make your home safer.    Remove rugs, cords, clutter, and furniture from walkways.  Keep your house well lit. Use night-lights in hallways and bathrooms.  Install and use sturdy handrails on stairways.  Wear nonskid footwear, even inside. Don't walk barefoot or in socks without shoes.    Be safe outside.    Use handrails, curb cuts, and ramps whenever possible.  Keep your hands free by using a shoulder bag or backpack.  Try to walk in well-lit areas. Watch out for uneven ground, changes in pavement, and debris.  Be careful in the winter. Walk on the grass or gravel when sidewalks are slippery. Use de-icer on steps and walkways. Add non-slip devices to shoes.    Put grab bars and nonskid mats in your shower or tub and near the toilet. Try to use a shower chair or bath bench when bathing.   Get into a tub or shower by putting in your weaker leg first. Get out with your strong side first. Have a phone or medical alert device in the bathroom with you.   Where can you learn more?  Go to  "https://www.healthLogRhythm.net/patiented  Enter G117 in the search box to learn more about \"Preventing Falls: Care Instructions.\"  Current as of: July 17, 2023               Content Version: 14.0    4979-6434 [employer].   Care instructions adapted under license by your healthcare professional. If you have questions about a medical condition or this instruction, always ask your healthcare professional. [employer] disclaims any warranty or liability for your use of this information.      Relationships for Good Health  Relationships are important for our health and happiness. Social isolation, loneliness and lack of support are bad for your health. Studies show that loneliness can harm health and limit your life span as much as high blood pressure and smoking.   Take some time to reflect on your relationships. Then answer these questions:  Are there people in your life that cause you stress or drain your energy? What can you do to set limits?  ________________________________________________________________________________________________________________________________________________________________________________________________________________________________________________________________________________________________________________________________________________  Who do you enjoy spending time with? Who can you go to for support?  ________________________________________________________________________________________________________________________________________________________________________________________________________________________________________________________________________________________________________________________________________________  What can you do to improve your relationships with " others?  __________________________________________________________________________________________________________________________________________________________________________________________________________________  ______________________________________________________________________________________________________________________________  What do you like most about your relationships with others?  ________________________________________________________________________________________________________________________________________________________________________________________________________________________________________________________________________________________________________________________________________________  My goal: ______________________________________________________________________  I will ______________________________________________________________________________________________________________________________________________________________________________________________    For informational purposes only. Not to replace the advice of your health care provider. Copyright   2018 [facility]. All rights reserved. Clinically reviewed by [facility]  Team. SMARTworks 886309 - Rev 04/21.    Learning About Stress  What is stress?     Stress is your body's response to a hard situation. Your body can have a physical, emotional, or mental response. Stress is a fact of life for most people, and it affects everyone differently. What causes stress for you may not be stressful for someone else.  A lot of things can cause stress. You may feel stress when you go on a job interview, take a test, or run a race. This kind of short-term stress is normal and even useful. It can help you if you need to work hard or react quickly. For example, stress can help you finish an important job on time.  Long-term stress is caused by ongoing stressful situations or events. Examples  of long-term stress include long-term health problems, ongoing problems at work, or conflicts in your family. Long-term stress can harm your health.  How does stress affect your health?  When you are stressed, your body responds as though you are in danger. It makes hormones that speed up your heart, make you breathe faster, and give you a burst of energy. This is called the fight-or-flight stress response. If the stress is over quickly, your body goes back to normal and no harm is done.  But if stress happens too often or lasts too long, it can have bad effects. Long-term stress can make you more likely to get sick, and it can make symptoms of some diseases worse. If you tense up when you are stressed, you may develop neck, shoulder, or low back pain. Stress is linked to high blood pressure and heart disease.  Stress also harms your emotional health. It can make you mckay, tense, or depressed. Your relationships may suffer, and you may not do well at work or school.  What can you do to manage stress?  You can try these things to help manage stress:   Do something active. Exercise or activity can help reduce stress. Walking is a great way to get started. Even everyday activities such as housecleaning or yard work can help.  Try yoga or andre chi. These techniques combine exercise and meditation. You may need some training at first to learn them.  Do something you enjoy. For example, listen to music or go to a movie. Practice your hobby or do volunteer work.  Meditate. This can help you relax, because you are not worrying about what happened before or what may happen in the future.  Do guided imagery. Imagine yourself in any setting that helps you feel calm. You can use online videos, books, or a teacher to guide you.  Do breathing exercises. For example:  From a standing position, bend forward from the waist with your knees slightly bent. Let your arms dangle close to the floor.  Breathe in slowly and deeply as you  "return to a standing position. Roll up slowly and lift your head last.  Hold your breath for just a few seconds in the standing position.  Breathe out slowly and bend forward from the waist.  Let your feelings out. Talk, laugh, cry, and express anger when you need to. Talking with supportive friends or family, a counselor, or a mykel leader about your feelings is a healthy way to relieve stress. Avoid discussing your feelings with people who make you feel worse.  Write. It may help to write about things that are bothering you. This helps you find out how much stress you feel and what is causing it. When you know this, you can find better ways to cope.  What can you do to prevent stress?  You might try some of these things to help prevent stress:  Manage your time. This helps you find time to do the things you want and need to do.  Get enough sleep. Your body recovers from the stresses of the day while you are sleeping.  Get support. Your family, friends, and community can make a difference in how you experience stress.  Limit your news feed. Avoid or limit time on social media or news that may make you feel stressed.  Do something active. Exercise or activity can help reduce stress. Walking is a great way to get started.  Where can you learn more?  Go to https://www.[employer]ning [employer].net/patiented  Enter N032 in the search box to learn more about \"Learning About Stress.\"  Current as of: October 24, 2023               Content Version: 14.0    7469-8316 [employer].   Care instructions adapted under license by your healthcare professional. If you have questions about a medical condition or this instruction, always ask your healthcare professional. [employer] disclaims any warranty or liability for your use of this information.      Learning About Depression Screening  What is depression screening?  Depression screening is a way to see if you have depression symptoms. It may be done by a doctor or " "counselor. It's often part of a routine checkup. That's because your mental health is just as important as your physical health.  Depression is a mental health condition that affects how you feel, think, and act. You may:  Have less energy.  Lose interest in your daily activities.  Feel sad and grouchy for a long time.  Depression is very common. It affects people of all ages.  Many things can lead to depression. Some people become depressed after they have a stroke or find out they have a major illness like cancer or heart disease. The death of a loved one or a breakup may lead to depression. It can run in families. Most experts believe that a combination of inherited genes and stressful life events can cause it.  What happens during screening?  You may be asked to fill out a form about your depression symptoms. You and the doctor will discuss your answers. The doctor may ask you more questions to learn more about how you think, act, and feel.  What happens after screening?  If you have symptoms of depression, your doctor will talk to you about your options.  Doctors usually treat depression with medicines or counseling. Often, combining the two works best. Many people don't get help because they think that they'll get over the depression on their own. But people with depression may not get better unless they get treatment.  The cause of depression is not well understood. There may be many factors involved. But if you have depression, it's not your fault.  A serious symptom of depression is thinking about death or suicide. If you or someone you care about talks about this or about feeling hopeless, get help right away.  It's important to know that depression can be treated. Medicine, counseling, and self-care may help.  Where can you learn more?  Go to https://www.healthwise.net/patiented  Enter T185 in the search box to learn more about \"Learning About Depression Screening.\"  Current as of: June 24, " 2023               Content Version: 14.0    6563-6735 [employer].   Care instructions adapted under license by your healthcare professional. If you have questions about a medical condition or this instruction, always ask your healthcare professional. [employer] disclaims any warranty or liability for your use of this information.

## 2024-04-04 NOTE — TELEPHONE ENCOUNTER
Left voicemail for patient to call back to schedule a follow up appointment for her knee with Oscar Valentine PA-C.    Sanam Parker MA
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
## 2024-04-05 ENCOUNTER — PATIENT OUTREACH (OUTPATIENT)
Dept: CARE COORDINATION | Facility: CLINIC | Age: 54
End: 2024-04-05
Payer: COMMERCIAL

## 2024-04-05 NOTE — LETTER
[facility] CARE COORDINATION  [address]   [place] [address]    April 8, 2024    Liliana Kat  [address] [address]      Dear Liliana,    I am a clinic community health worker who works with Arin Scherer NP with the [facility]. I have been trying to reach you recently to introduce Clinic Care Coordination. Below is a description of clinic care coordination and how we can further assist you.       The clinic care coordination team is made up of a registered nurse, , financial resource worker and community health worker who understand the health care system. The goal of clinic care coordination is to help you manage your health and improve access to the health care system. Our team works alongside your provider to assist you in determining your health and social needs. We can help you obtain health care and community resources, providing you with necessary information and education. We can work with you through any barriers and develop a care plan that helps coordinate and strengthen the communication between you and your care team.  Our services are voluntary and are offered without charge to you personally.    Please feel free to contact me with any questions or concerns regarding care coordination and what we can offer.      We are focused on providing you with the highest-quality healthcare experience possible.    Sincerely,     THADDEUS Noriega  [phone]  [facility]  [facility]

## 2024-04-05 NOTE — PROGRESS NOTES
Clinic Care Coordination Contact  [facility]/Voicemail    Clinical Data: Care Coordinator Outreach    Outreach Documentation Number of Outreach Attempt   4/5/2024  11:13 AM 1       Left message on patient's voicemail with call back information and requested return call.    Plan: Care Coordinator will try to reach patient again in 1-2 business days.    THADDEUS Noriega  [phone]  [facility]

## 2024-04-08 ENCOUNTER — HOSPITAL ENCOUNTER (OUTPATIENT)
Dept: ULTRASOUND IMAGING | Facility: CLINIC | Age: 54
Discharge: HOME | End: 2024-04-08
Attending: NURSE PRACTITIONER | Admitting: NURSE PRACTITIONER
Payer: COMMERCIAL

## 2024-04-08 DIAGNOSIS — N95.0 POST-MENOPAUSAL BLEEDING: ICD-10-CM

## 2024-04-08 LAB
BKR LAB AP GYN ADEQUACY: NORMAL
BKR LAB AP GYN INTERPRETATION: NORMAL
BKR LAB AP HPV REFLEX: NORMAL
BKR LAB AP LMP: NORMAL
BKR LAB AP PREVIOUS ABNORMAL: NORMAL
PATH REPORT.COMMENTS IMP SPEC: NORMAL
PATH REPORT.COMMENTS IMP SPEC: NORMAL
PATH REPORT.RELEVANT HX SPEC: NORMAL

## 2024-04-08 PROCEDURE — 76830 TRANSVAGINAL US NON-OB: CPT

## 2024-04-09 ENCOUNTER — ANCILLARY PROCEDURE (OUTPATIENT)
Dept: GENERAL RADIOLOGY | Facility: CLINIC | Age: 54
End: 2024-04-09
Attending: PHYSICIAN ASSISTANT
Payer: COMMERCIAL

## 2024-04-09 ENCOUNTER — OFFICE VISIT (OUTPATIENT)
Dept: ORTHOPEDICS | Facility: CLINIC | Age: 54
End: 2024-04-09
Payer: COMMERCIAL

## 2024-04-09 VITALS
DIASTOLIC BLOOD PRESSURE: 78 MMHG | TEMPERATURE: 98.1 F | SYSTOLIC BLOOD PRESSURE: 108 MMHG | HEIGHT: 65 IN | BODY MASS INDEX: 27.49 KG/M2 | WEIGHT: 165 LBS

## 2024-04-09 DIAGNOSIS — S82.122D CLOSED FRACTURE OF LATERAL PORTION OF LEFT TIBIAL PLATEAU WITH ROUTINE HEALING, SUBSEQUENT ENCOUNTER: ICD-10-CM

## 2024-04-09 DIAGNOSIS — S83.512D COMPLETE TEAR, KNEE, ANTERIOR CRUCIATE LIGAMENT, LEFT, SUBSEQUENT ENCOUNTER: Primary | ICD-10-CM

## 2024-04-09 DIAGNOSIS — S83.512D COMPLETE TEAR, KNEE, ANTERIOR CRUCIATE LIGAMENT, LEFT, SUBSEQUENT ENCOUNTER: ICD-10-CM

## 2024-04-09 LAB
HEMOCCULT STL QL IA: NEGATIVE
HUMAN PAPILLOMA VIRUS 16 DNA: NEGATIVE
HUMAN PAPILLOMA VIRUS 18 DNA: NEGATIVE
HUMAN PAPILLOMA VIRUS FINAL DIAGNOSIS: NORMAL
HUMAN PAPILLOMA VIRUS OTHER HR: NEGATIVE

## 2024-04-09 PROCEDURE — 73562 X-RAY EXAM OF KNEE 3: CPT | Mod: TC | Performed by: RADIOLOGY

## 2024-04-09 PROCEDURE — 99214 OFFICE O/P EST MOD 30 MIN: CPT | Performed by: PHYSICIAN ASSISTANT

## 2024-04-09 ASSESSMENT — PAIN SCALES - GENERAL: PAINLEVEL: MODERATE PAIN (4)
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
## 2024-04-09 NOTE — LETTER
4/9/2024         RE: Liliana Kat  [address] [facility] [id number]        Dear Colleague,    Thank you for referring your patient, Liliana Kat, to the [facility]. Please see a copy of my visit note below.    Office Visit-Follow up    Chief Complaint: Liliana Kat is a 53 year old female who is being seen for   Chief Complaint   Patient presents with     RECHECK      Left MCL sprain           History of Present Illness:   Mechanism of Injury:  Patient was chasing after her 2 and half-year-old granddaughter when they were triggered treating and tripped on a speed bump and fell and had a twisting style valgus injury.   Location: Left knee  Duration of Pain: Since date of injury the pain has gotten much better since then though  Rating of Pain: Mild  Pain Quality: Achy  Pain is better with: Rest  Pain is worse with: Activity  Treatment so far consists of: Patient was last seen by myself virtually on 11/27/2023 and that was a follow-up to an MRI of the left knee which showed multiple injuries including ACL tear and posterior lateral tibial plateau nondisplaced fracture and bone contusion and MCL strain and proximal tibial collateral ligament tear.  I discussed the plan with Dr. Parker and he wanted the patient to do formal physical therapy for 6 weeks prior to doing ACL reconstruction.  The plan was for the patient to make an appointment with Dr. Parker in 4 weeks to be assessed for surgery and for the patient to be nonweightbearing left lower extremity during that time.  Patient also was getting a telescoping brace for her ACL and was to wear that for 6 weeks only off for hygiene.  Patient was also to do physical therapy with range of motion and strengthening during that time.  I noticed today that the patient never made that appointment Dr. Parker and I do not believe did physical therapy and I did see a note that she may be did not have insurance and possibly  "that some reason she did not do these things.  She is now 5 months and 10 days out from the left knee injuries.  Patient did confirm that she lost her insurance shortly after I saw her.  Patient states that she did get her telescoping brace and wore that for 2 and half months and also remain nonweightbearing for 2 months.  She has not done formal physical therapy but works with her son  and regain strength that way.  She never did follow-up with Dr. Parker.  Associated Features: Denies numbness or tingling shooting burning electric pain.  Pain is Limiting: Nothing  Here to: Orthopedic follow-up  Additional History: Patient would like to have ACL reconstructed sooner than later if possible.    REVIEW OF SYSTEMS  Review of systems negative other than positive findings in HPI.    Physical Exam:  Vitals: /78 (BP Location: Right arm, Cuff Size: Adult Regular)   Temp 98.1  F (36.7  C) (Temporal)   Ht 1.651 m (5' 5\")   Wt 74.8 kg (165 lb)   LMP 03/06/2006   BMI 27.46 kg/m    BMI= Body mass index is 27.46 kg/m .  Constitutional: healthy, alert and no acute distress   Psychiatric: mentation appears normal and affect normal/bright  NEURO: no focal deficits, CMS intact left lower extremity   RESP: Normal with easy respirations and no use of accessory muscles to breathe, no audible wheezing or retractions  CV: Calf soft and nontender to palpation, leg warm   SKIN: No erythema, rashes, excoriation, or breakdown. No evidence of infection.   MUSCULOSKELETAL:  INSPECTION of left knee: No gross deformities, erythema, edema, ecchymosis, atrophy or fasciculations.   PALPATION: No tenderness medial, lateral, anterior and posterior portion of the knee. No specific joint line tenderness.  No prepatella bursa tenderness or pes bursa tenderness. No increased warmth.  No effusion.   ROM: Passive: Extension full, flexion to 135 . All range of motion without catching, locking or pain.     STRENGTH: 5 out of 5 quad and " hamstring without pain.   SPECIAL TEST: Patient has a postive Lachman's positive anterior drawer sign, negative posterior drawer sign. Patient's knee is stable to varus and valgus stress at 30  of flexion. Patient has a negative Mikhail's.   GAIT: non-antalgic  Lymph: no palpable lymph nodes      Diagnostic Modalities:  X-rays done today showing no fracture noted as it appears to be healed at this point with the posterior lateral tibial plateau.  I did compare these x-rays to the previous x-rays that were done on 11/13/2023.  There is good joint spacing medial and lateral compartment and slight joint space narrowing in the patellofemoral compartment.  Good alignment.    I compared these to the x-rays that were done 11/13/2023 on the left knee.    I also reviewed the MRI that was done 11/21/2023 and agree with the impression below    IMPRESSION:  1. Small left knee joint effusion. Small popliteal cyst.     2. Bony contusions along the posterior aspect of the lateral tibial  plateau and the posterior aspect of the lateral femoral condyle. There  is a nondisplaced fracture along the posterior lateral tibial plateau.  Associated high-grade to near full-thickness tearing of the proximal  portion of the anterior cruciate ligament.     3. High-grade to full-thickness tearing of the proximal tibial  collateral ligament with surrounding soft tissue edema, consistent  with a high-grade sprain.     4. The posterior cruciate ligament, lateral supporting structures, and  bilateral menisci are intact. No high-grade cartilage loss although  there is mild impaction along the posterior aspect of the lateral  femoral condyle adjacent to the bone marrow edema.    Independent visualization of the images was performed.      Impression: 1.  5 months and 10 days status post left knee ACL rupture  2.  5 months and 10 days status post left knee posterior lateral tibial plateau nondisplaced fracture, resolved.  3.  5 months and 10 days  status post left knee lateral tibial plateau and posterior lateral femoral condyle bone contusion, resolved.  4.  5 months and 10 days status post left knee proximal tibial collateral ligament tear, resolved  5.  5 months and 10 days status post left knee MCL strain, resolved    Plan:  All of the above pertinent physical exam and imaging modalities findings was reviewed with Liliana.    FOCUSED PLAN:   Unfortunately the patient lost her insurance right after I saw her virtually 11/27/2023.  Patient has been working with her son who is an  and gaining strength in the knee.  She did get her telescoping knee brace and wore that for 2 and half months and now wears it just at night and also has a hinged knee brace during the day.  Patient remain nonweightbearing for 2 months which was the treatment for her tibial plateau fracture that was nondisplaced and bone contusion.  Patient can continue ambulating without restriction.  I recommend the telescoping knee brace for protecting the ACL.  Dr. Parker is not able to perform this patient's surgery because of an injury currently so we will refer the patient to Dr. Bustamante for evaluation of possible ACL reconstruction as there is laxity on exam today.  Patient would like her surgery here in [place].  Patient can follow-up with myself on an as-needed basis.    Re-x-ray on return: No      This note was dictated with [employer].    Bruce Valentine PA-C                Again, thank you for allowing me to participate in the care of your patient.        Sincerely,        Bruce Valentine PA-C

## 2024-04-17 ENCOUNTER — ANCILLARY PROCEDURE (OUTPATIENT)
Dept: GENERAL RADIOLOGY | Facility: CLINIC | Age: 54
End: 2024-04-17
Attending: ORTHOPAEDIC SURGERY
Payer: COMMERCIAL

## 2024-04-17 ENCOUNTER — OFFICE VISIT (OUTPATIENT)
Dept: ORTHOPEDICS | Facility: CLINIC | Age: 54
End: 2024-04-17
Payer: COMMERCIAL

## 2024-04-17 VITALS
TEMPERATURE: 98.4 F | BODY MASS INDEX: 27.74 KG/M2 | WEIGHT: 166.5 LBS | SYSTOLIC BLOOD PRESSURE: 117 MMHG | DIASTOLIC BLOOD PRESSURE: 83 MMHG | HEIGHT: 65 IN

## 2024-04-17 DIAGNOSIS — M25.552 LEFT HIP PAIN: Primary | ICD-10-CM

## 2024-04-17 DIAGNOSIS — S82.122D CLOSED FRACTURE OF LATERAL PORTION OF LEFT TIBIAL PLATEAU WITH ROUTINE HEALING, SUBSEQUENT ENCOUNTER: ICD-10-CM

## 2024-04-17 DIAGNOSIS — S83.512D COMPLETE TEAR, KNEE, ANTERIOR CRUCIATE LIGAMENT, LEFT, SUBSEQUENT ENCOUNTER: ICD-10-CM

## 2024-04-17 DIAGNOSIS — M25.552 LEFT HIP PAIN: ICD-10-CM

## 2024-04-17 PROCEDURE — 99213 OFFICE O/P EST LOW 20 MIN: CPT | Performed by: ORTHOPAEDIC SURGERY

## 2024-04-17 PROCEDURE — 73502 X-RAY EXAM HIP UNI 2-3 VIEWS: CPT | Mod: TC | Performed by: RADIOLOGY

## 2024-04-17 NOTE — LETTER
4/17/2024         RE: Liliana Kat  [address] [facility] [address]        Dear Colleague,    Thank you for referring your patient, Liliana Kat, to the [facility]. Please see a copy of my visit note below.    ORTHOPEDIC CONSULT      Chief Complaint: Liliana Kat is a 54 year old female who is being seen for   Chief Complaint   Patient presents with     Left Knee - Pain       History of Present Illness:   Presents with left knee pain.  She reports had a normal fall October 2023.  Subsequent seen and evaluated.  An MRI.  She was treated conservative.  Treated with a hinged knee brace.  Compression ice.  Also been taking Tylenol and ibuprofen.  At that time they recommended physical therapy.  She is unable to complete that due to loss of insurance.  Recently got her insurance back.  She is been manage knee brace.  She reports feeling stable does complains of anterior knee pain.  She also reports of left hip/groin pain.  This began approximate month after her fall and injury.  No previous surgeries or injections to the areas.      Patient's past medical, surgical, social and family histories reviewed.     Past Medical History:   Diagnosis Date     Allergic rhinitis, cause unspecified     Allergic rhinitis - weeds and pollan     Anxiety      Bladder polyps      Depressive disorder      Obstructive chronic bronchitis with exacerbation (H)      Other and unspecified ovarian cyst     Ovarian cyst - rupured cyst + laser x3     Other and unspecified ovarian cyst 09/14/2005    Left hemorrhagic ovarian cyst.     Tobacco abuse      Urinary tract infection, site not specified        Past Surgical History:   Procedure Laterality Date     ABDOMEN SURGERY  [facility]    Along time ago galbladder     ESOPHAGOSCOPY, GASTROSCOPY, DUODENOSCOPY (EGD), COMBINED  11/28/2012    Procedure: COMBINED ESOPHAGOSCOPY, GASTROSCOPY, DUODENOSCOPY (EGD), BIOPSY SINGLE OR MULTIPLE;   ESOPHAGOSCOPY, GASTROSCOPY, DUODENOSCOPY (EGD) with biopsy;  Surgeon: Herson Cabrera MD;  Location: [facility]     EXCIS VAGINAL CYST/TUMOR       HC REMOVAL OF OVARY/TUBE(S)  03/06/2006    Laparoscopic bilateral salpingo-oophorectomy.     HC TYMPANOPLASTY W/O MASTOID, INIT/REV W/O OSS CHAIN RECONST  04/01/2002     LAPAROSCOPIC CHOLECYSTECTOMY N/A 04/06/2017    Procedure: LAPAROSCOPIC CHOLECYSTECTOMY;  Surgeon: Shivam Luevano MD;  Location: [facility]     ZZC LIGATE FALLOPIAN TUBE,POSTPARTUM      Tubal Ligation       Medications:  Current Outpatient Medications   Medication Sig Dispense Refill     ALPRAZolam (XANAX) 0.5 MG tablet Take 1 tablet (0.5 mg) by mouth 2 times daily as needed for anxiety 60 tablet 0     aspirin 81 MG tablet Take 1 tablet (81 mg) by mouth daily 30 tablet OTC     atorvastatin (LIPITOR) 20 MG tablet Take 1 tablet (20 mg) by mouth daily 90 tablet 3     busPIRone (BUSPAR) 5 MG tablet Take 1 tablet (5 mg) by mouth 2 times daily for 7 days, THEN 2 tablets (10 mg) 2 times daily for 30 days. 134 tablet 0     escitalopram (LEXAPRO) 20 MG tablet Take 1 tablet (20 mg) by mouth daily 90 tablet 1     fluticasone (FLONASE) 50 MCG/ACT nasal spray Spray 1 spray into both nostrils daily 16 g 11     loratadine (CLARITIN) 10 MG tablet TAKE ONE TABLET BY MOUTH EVERY DAY 30 tablet 5     meclizine (ANTIVERT) 25 MG tablet Take 1 tablet (25 mg) by mouth 3 times daily as needed for dizziness 30 tablet 0     Current Facility-Administered Medications   Medication Dose Route Frequency Provider Last Rate Last Admin     triamcinolone (KENALOG-40) injection 40 mg  40 mg   Shanell Mascorro MD   40 mg at 04/02/21 1703       Allergies   Allergen Reactions     Amoxicillin Anaphylaxis     Amoxicillin-Pot Clavulanate Swelling and Difficulty breathing     Effexor Xr [Venlafaxine Hydrochloride]      Shaky and heart racing     Moxifloxacin Hcl In Nacl Nausea and Vomiting     Sulfa Antibiotics        Social History      Occupational History     Employer: [employer]     Comment: assembly   Tobacco Use     Smoking status: Former     Current packs/day: 0.00     Average packs/day: 0.5 packs/day for 14.2 years (7.1 ttl pk-yrs)     Types: Cigarettes     Start date: 10/14/2005     Quit date: 2020     Years since quittin.2     Smokeless tobacco: Former     Quit date: 2020     Tobacco comments:     Dont smoke anymore   Vaping Use     Vaping status: Never Used   Substance and Sexual Activity     Alcohol use: No     Comment: recovering  - 2005     Drug use: No     Sexual activity: Not Currently     Partners: Male     Birth control/protection: Post-menopausal, Female Surgical, None     Comment: [facility]       Family History   Problem Relation Age of Onset     Alcohol/Drug Father          at age 53 of alcohol     Arthritis Father      Cancer Father         lung     Alcohol/Drug Paternal Grandfather      Cancer Paternal Grandfather         lung     Alcohol/Drug Paternal Grandmother      Cancer Paternal Grandmother         lung     Alcohol/Drug Paternal Aunt      Allergies Daughter         animals and grass     Anxiety Disorder Daughter         Was sexual assulted by family friend     Asthma Daughter         Mold in old apartment     Allergies Son         dust mite     Depression Son         Went to intermediate for homiside due to drugs     Substance Abuse Son         Was on herion and meth for years     Arthritis Mother      Hypertension Mother         Na     Lipids Mother      Depression Mother      Heart Disease Mother      Blood Disease Mother         DVTs     Bleeding Disorder Mother      Liver Disease Mother      Anxiety Disorder Mother         Abuse     Mental Illness Mother         Abuse     Osteoporosis Mother         [place]     Obesity Mother         Depression     Depression Brother      Other Cancer Brother        REVIEW OF SYSTEMS  10 point review systems performed otherwise negative as noted as per history of  "present illness.    Physical Exam:  Vitals: /83   Temp 98.4  F (36.9  C)   Ht 1.651 m (5' 5\")   Wt 75.5 kg (166 lb 8 oz)   LMP 03/06/2006   BMI 27.71 kg/m    BMI= Body mass index is 27.71 kg/m .  Constitutional: healthy, alert and no acute distress   Psychiatric: mentation appears normal and affect normal/bright  NEURO: no focal deficits  RESP: Normal with easy respirations and no use of accessory muscles to breathe, no audible wheezing or retractions  CV: LLE:  no edema         Regular rate and rhythm by palpation  SKIN: No erythema, rashes, excoriation, or breakdown. No evidence of infection.   JOINT/EXTREMITIES:left knee: Does show a scant amount of bursal swelling along the anterior aspect of the knee along the patellar tendon.  There is no palpable defects.  Exquisite tenderness to light touch throughout the knee.  No instability with varus and valgus testing at 0 and 30 degrees.  Active motion 10-90 degrees.  Passive full extension to approximately 100 with a soft endpoint.  Extremely apprehensive with any testing.  Significant guarding throughout her exam.  There is no effusion.  No palpable masses.  Left hip has full range of motion with no evidence of motion block.  However internal and external rotation does cause groin pain.  Negative straight leg     GAIT: not tested     Diagnostic Modalities:  left knee X-ray: No fracture, dislocation and or lesion. Normal alignment.  Joint space maintained no significant arthritis. No appreciable soft tissue abnormality    EXAMINATION: MRI of the left knee without contrast     DATE:  11/21/2020     HISTORY: Sprain of the tibial collateral ligament     TECHNIQUE: Multiplanar, multisequence MR imaging of the left knee was  obtained using standard sequences in 3 orthogonal planes without the  use of intravenous or intra-articular gadolinium contrast.     Comparison: Comparison radiographs dated 11/13/2023 were reviewed,  which demonstrated no acute bone " abnormality.     FINDINGS:     In the medial compartment, the medial meniscus is intact. There is no  high-grade or full-thickness cartilage loss or subchondral edema.     In the lateral compartment, the lateral meniscus is intact. There is  no high-grade or full-thickness cartilage loss or subchondral edema.     In the patellofemoral compartment, there is a high-grade or  full-thickness cartilage loss or subchondral edema.     The posterior cruciate ligament is intact.      Bone marrow edema along the posterior aspect of the lateral tibial  plateau as well as along the posterior aspect of the lateral femoral  condyle. Nondisplaced fracture along the posterior aspect of the  lateral tibial plateau. High-grade to full-thickness tearing of the  proximal anterior cruciate ligament.     High-grade to near full-thickness tearing of the proximal tibial  collateral ligament with surrounding soft tissue edema. No significant  retraction. Edema in the meniscofemoral ligament..      The anterior iliotibial band, fibular collateral ligament, biceps  femoris tendon, and popliteus tendons are intact.     There is a small joint effusion. Small popliteal cyst. No joint  bodies.     The extensor mechanism is intact and normal in appearance.                                                                       IMPRESSION:  1. Small left knee joint effusion. Small popliteal cyst.     2. Bony contusions along the posterior aspect of the lateral tibial  plateau and the posterior aspect of the lateral femoral condyle. There  is a nondisplaced fracture along the posterior lateral tibial plateau.  Associated high-grade to near full-thickness tearing of the proximal  portion of the anterior cruciate ligament.     3. High-grade to full-thickness tearing of the proximal tibial  collateral ligament with surrounding soft tissue edema, consistent  with a high-grade sprain.     4. The posterior cruciate ligament, lateral supporting structures,  and  bilateral menisci are intact. No high-grade cartilage loss although  there is mild impaction along the posterior aspect of the lateral  femoral condyle adjacent to the bone marrow edema    Left hip x-ray: Taken today in the office shows no acute fractures or dislocations.  No significant degenerative changes.  Independent visualization of the images was performed.      Impression: left ACL tear, MCL tear  Left hip flexor strain  Complex regional pain syndrome ?    Plan:  All of the above pertinent physical exam and imaging modalities findings was reviewed with Liliana.    We discussed her knee at length.  She has no feelings of instability.  Exam wise shows some limited motion and hypersensitivity along it.  Most important thing I recommended some physical therapy to help with the desensitization and work on range of motion strengthening and mechanics.  We discussed some potential Neurontin.  She has been on that before and had ill effects from it.  Will hold on that.  Work with physical therapy.  Discontinue the hinged knee brace.  Okay to work on some compression.  Over-the-counter medications.  Plan to see her back in 4-6 weeks        Return to clinic 4-6 , week(s), PRN, or sooner as needed for changes.  Re-x-ray on return: No    Gabino Bustamante D.O.      Again, thank you for allowing me to participate in the care of your patient.        Sincerely,        Nima Bustamante, DO

## 2024-04-17 NOTE — PROGRESS NOTES
ORTHOPEDIC CONSULT      Chief Complaint: Liliana Kat is a 54 year old female who is being seen for   Chief Complaint   Patient presents with    Left Knee - Pain       History of Present Illness:   Presents with left knee pain.  She reports had a normal fall October 2023.  Subsequent seen and evaluated.  An MRI.  She was treated conservative.  Treated with a hinged knee brace.  Compression ice.  Also been taking Tylenol and ibuprofen.  At that time they recommended physical therapy.  She is unable to complete that due to loss of insurance.  Recently got her insurance back.  She is been manage knee brace.  She reports feeling stable does complains of anterior knee pain.  She also reports of left hip/groin pain.  This began approximate month after her fall and injury.  No previous surgeries or injections to the areas.      Patient's past medical, surgical, social and family histories reviewed.     Past Medical History:   Diagnosis Date    Allergic rhinitis, cause unspecified     Allergic rhinitis - weeds and pollan    Anxiety     Bladder polyps     Depressive disorder     Obstructive chronic bronchitis with exacerbation (H)     Other and unspecified ovarian cyst     Ovarian cyst - rupured cyst + laser x3    Other and unspecified ovarian cyst 09/14/2005    Left hemorrhagic ovarian cyst.    Tobacco abuse     Urinary tract infection, site not specified        Past Surgical History:   Procedure Laterality Date    ABDOMEN SURGERY  [facility]    Along time ago galbladder    ESOPHAGOSCOPY, GASTROSCOPY, DUODENOSCOPY (EGD), COMBINED  11/28/2012    Procedure: COMBINED ESOPHAGOSCOPY, GASTROSCOPY, DUODENOSCOPY (EGD), BIOPSY SINGLE OR MULTIPLE;  ESOPHAGOSCOPY, GASTROSCOPY, DUODENOSCOPY (EGD) with biopsy;  Surgeon: Herson Cabrera MD;  Location: [facility]    EXCIS VAGINAL CYST/TUMOR      HC REMOVAL OF OVARY/TUBE(S)  03/06/2006    Laparoscopic bilateral salpingo-oophorectomy.    HC TYMPANOPLASTY W/O MASTOID, INIT/REV W/O  OSS CHAIN RECONST  2002    LAPAROSCOPIC CHOLECYSTECTOMY N/A 2017    Procedure: LAPAROSCOPIC CHOLECYSTECTOMY;  Surgeon: Shivam Luevano MD;  Location:  OR    [facility] LIGATE FALLOPIAN TUBE,POSTPARTUM      Tubal Ligation       Medications:  Current Outpatient Medications   Medication Sig Dispense Refill    ALPRAZolam (XANAX) 0.5 MG tablet Take 1 tablet (0.5 mg) by mouth 2 times daily as needed for anxiety 60 tablet 0    aspirin 81 MG tablet Take 1 tablet (81 mg) by mouth daily 30 tablet OTC    atorvastatin (LIPITOR) 20 MG tablet Take 1 tablet (20 mg) by mouth daily 90 tablet 3    busPIRone (BUSPAR) 5 MG tablet Take 1 tablet (5 mg) by mouth 2 times daily for 7 days, THEN 2 tablets (10 mg) 2 times daily for 30 days. 134 tablet 0    escitalopram (LEXAPRO) 20 MG tablet Take 1 tablet (20 mg) by mouth daily 90 tablet 1    fluticasone (FLONASE) 50 MCG/ACT nasal spray Spray 1 spray into both nostrils daily 16 g 11    loratadine (CLARITIN) 10 MG tablet TAKE ONE TABLET BY MOUTH EVERY DAY 30 tablet 5    meclizine (ANTIVERT) 25 MG tablet Take 1 tablet (25 mg) by mouth 3 times daily as needed for dizziness 30 tablet 0     Current Facility-Administered Medications   Medication Dose Route Frequency Provider Last Rate Last Admin    triamcinolone (KENALOG-40) injection 40 mg  40 mg   Shanell Mascorro MD   40 mg at 21 1703       Allergies   Allergen Reactions    Amoxicillin Anaphylaxis    Amoxicillin-Pot Clavulanate Swelling and Difficulty breathing    Effexor Xr [Venlafaxine Hydrochloride]      Shaky and heart racing    Moxifloxacin Hcl In Nacl Nausea and Vomiting    Sulfa Antibiotics        Social History     Occupational History     Employer: [employer]     Comment: assembly   Tobacco Use    Smoking status: Former     Current packs/day: 0.00     Average packs/day: 0.5 packs/day for 14.2 years (7.1 ttl pk-yrs)     Types: Cigarettes     Start date: 10/14/2005     Quit date: 2020     Years since quittin.2  "   Smokeless tobacco: Former     Quit date: 2020    Tobacco comments:     Dont smoke anymore   Vaping Use    Vaping status: Never Used   Substance and Sexual Activity    Alcohol use: No     Comment: recovering  -     Drug use: No    Sexual activity: Not Currently     Partners: Male     Birth control/protection: Post-menopausal, Female Surgical, None     Comment: [facility]ct[facility]       Family History   Problem Relation Age of Onset    Alcohol/Drug Father          at age 53 of alcohol    Arthritis Father     Cancer Father         lung    Alcohol/Drug Paternal Grandfather     Cancer Paternal Grandfather         lung    Alcohol/Drug Paternal Grandmother     Cancer Paternal Grandmother         lung    Alcohol/Drug Paternal Aunt     Allergies Daughter         animals and grass    Anxiety Disorder Daughter         Was sexual assulted by family friend    Asthma Daughter         Mold in old apartment    Allergies Son         dust mite    Depression Son         Went to [facility] for homiside due to drugs    Substance Abuse Son         Was on herion and meth for years    Arthritis Mother     Hypertension Mother         Na    Lipids Mother     Depression Mother     Heart Disease Mother     Blood Disease Mother         DVTs    Bleeding Disorder Mother     Liver Disease Mother     Anxiety Disorder Mother         Abuse    Mental Illness Mother         Abuse    Osteoporosis Mother         [place]    Obesity Mother         Depression    Depression Brother     Other Cancer Brother        REVIEW OF SYSTEMS  10 point review systems performed otherwise negative as noted as per history of present illness.    Physical Exam:  Vitals: /83   Temp 98.4  F (36.9  C)   Ht 1.651 m (5' 5\")   Wt 75.5 kg (166 lb 8 oz)   LMP 2006   BMI 27.71 kg/m    BMI= Body mass index is 27.71 kg/m .  Constitutional: healthy, alert and no acute distress   Psychiatric: mentation appears normal and affect normal/bright  NEURO: no focal " deficits  RESP: Normal with easy respirations and no use of accessory muscles to breathe, no audible wheezing or retractions  CV: LLE:  no edema         Regular rate and rhythm by palpation  SKIN: No erythema, rashes, excoriation, or breakdown. No evidence of infection.   JOINT/EXTREMITIES:left knee: Does show a scant amount of bursal swelling along the anterior aspect of the knee along the patellar tendon.  There is no palpable defects.  Exquisite tenderness to light touch throughout the knee.  No instability with varus and valgus testing at 0 and 30 degrees.  Active motion 10-90 degrees.  Passive full extension to approximately 100 with a soft endpoint.  Extremely apprehensive with any testing.  Significant guarding throughout her exam.  There is no effusion.  No palpable masses.  Left hip has full range of motion with no evidence of motion block.  However internal and external rotation does cause groin pain.  Negative straight leg     GAIT: not tested     Diagnostic Modalities:  left knee X-ray: No fracture, dislocation and or lesion. Normal alignment.  Joint space maintained no significant arthritis. No appreciable soft tissue abnormality    EXAMINATION: MRI of the left knee without contrast     DATE:  11/21/2020     HISTORY: Sprain of the tibial collateral ligament     TECHNIQUE: Multiplanar, multisequence MR imaging of the left knee was  obtained using standard sequences in 3 orthogonal planes without the  use of intravenous or intra-articular gadolinium contrast.     Comparison: Comparison radiographs dated 11/13/2023 were reviewed,  which demonstrated no acute bone abnormality.     FINDINGS:     In the medial compartment, the medial meniscus is intact. There is no  high-grade or full-thickness cartilage loss or subchondral edema.     In the lateral compartment, the lateral meniscus is intact. There is  no high-grade or full-thickness cartilage loss or subchondral edema.     In the patellofemoral compartment,  there is a high-grade or  full-thickness cartilage loss or subchondral edema.     The posterior cruciate ligament is intact.      Bone marrow edema along the posterior aspect of the lateral tibial  plateau as well as along the posterior aspect of the lateral femoral  condyle. Nondisplaced fracture along the posterior aspect of the  lateral tibial plateau. High-grade to full-thickness tearing of the  proximal anterior cruciate ligament.     High-grade to near full-thickness tearing of the proximal tibial  collateral ligament with surrounding soft tissue edema. No significant  retraction. Edema in the meniscofemoral ligament..      The anterior iliotibial band, fibular collateral ligament, biceps  femoris tendon, and popliteus tendons are intact.     There is a small joint effusion. Small popliteal cyst. No joint  bodies.     The extensor mechanism is intact and normal in appearance.                                                                       IMPRESSION:  1. Small left knee joint effusion. Small popliteal cyst.     2. Bony contusions along the posterior aspect of the lateral tibial  plateau and the posterior aspect of the lateral femoral condyle. There  is a nondisplaced fracture along the posterior lateral tibial plateau.  Associated high-grade to near full-thickness tearing of the proximal  portion of the anterior cruciate ligament.     3. High-grade to full-thickness tearing of the proximal tibial  collateral ligament with surrounding soft tissue edema, consistent  with a high-grade sprain.     4. The posterior cruciate ligament, lateral supporting structures, and  bilateral menisci are intact. No high-grade cartilage loss although  there is mild impaction along the posterior aspect of the lateral  femoral condyle adjacent to the bone marrow edema    Left hip x-ray: Taken today in the office shows no acute fractures or dislocations.  No significant degenerative changes.  Independent visualization of the  images was performed.      Impression: left ACL tear, MCL tear  Left hip flexor strain  Complex regional pain syndrome ?    Plan:  All of the above pertinent physical exam and imaging modalities findings was reviewed with Liliana.    We discussed her knee at length.  She has no feelings of instability.  Exam wise shows some limited motion and hypersensitivity along it.  Most important thing I recommended some physical therapy to help with the desensitization and work on range of motion strengthening and mechanics.  We discussed some potential Neurontin.  She has been on that before and had ill effects from it.  Will hold on that.  Work with physical therapy.  Discontinue the hinged knee brace.  Okay to work on some compression.  Over-the-counter medications.  Plan to see her back in 4-6 weeks        Return to clinic 4-6 , week(s), PRN, or sooner as needed for changes.  Re-x-ray on return: No    Gabino Bustamante D.O.   O-T Advancement Flap Text: The defect edges were debeveled with a #15 scalpel blade.  Given the location of the defect, shape of the defect and the proximity to free margins an O-T advancement flap was deemed most appropriate.  Using a sterile surgical marker, an appropriate advancement flap was drawn incorporating the defect and placing the expected incisions within the relaxed skin tension lines where possible.    The area thus outlined was incised deep to adipose tissue with a #15 scalpel blade.  The skin margins were undermined to an appropriate distance in all directions utilizing iris scissors.

## 2024-04-18 ENCOUNTER — MYC MEDICAL ADVICE (OUTPATIENT)
Dept: FAMILY MEDICINE | Facility: CLINIC | Age: 54
End: 2024-04-18
Payer: COMMERCIAL

## 2024-04-24 ASSESSMENT — ACTIVITIES OF DAILY LIVING (ADL)
HOW_WOULD_YOU_RATE_THE_CURRENT_FUNCTION_OF_YOUR_KNEE_DURING_YOUR_USUAL_DAILY_ACTIVITIES_ON_A_SCALE_FROM_0_TO_100_WITH_100_BEING_YOUR_LEVEL_OF_KNEE_FUNCTION_PRIOR_TO_YOUR_INJURY_AND_0_BEING_THE_INABILITY_TO_PERFORM_ANY_OF_YOUR_USUAL_DAILY_ACTIVITIES?: 80
WEAKNESS: THE SYMPTOM AFFECTS MY ACTIVITY SLIGHTLY
PAIN: THE SYMPTOM AFFECTS MY ACTIVITY MODERATELY
SIT WITH YOUR KNEE BENT: ACTIVITY IS MINIMALLY DIFFICULT
RAW_SCORE: 50
GIVING WAY, BUCKLING OR SHIFTING OF KNEE: THE SYMPTOM AFFECTS MY ACTIVITY SLIGHTLY
RISE FROM A CHAIR: ACTIVITY IS MINIMALLY DIFFICULT
GO DOWN STAIRS: ACTIVITY IS MINIMALLY DIFFICULT
AS_A_RESULT_OF_YOUR_KNEE_INJURY,_HOW_WOULD_YOU_RATE_YOUR_CURRENT_LEVEL_OF_DAILY_ACTIVITY?: NEARLY NORMAL
HOW_WOULD_YOU_RATE_THE_OVERALL_FUNCTION_OF_YOUR_KNEE_DURING_YOUR_USUAL_DAILY_ACTIVITIES?: ABNORMAL
GO DOWN STAIRS: ACTIVITY IS MINIMALLY DIFFICULT
PAIN: THE SYMPTOM AFFECTS MY ACTIVITY MODERATELY
RISE FROM A CHAIR: ACTIVITY IS MINIMALLY DIFFICULT
WALK: ACTIVITY IS NOT DIFFICULT
KNEE_ACTIVITY_OF_DAILY_LIVING_SUM: 50
SWELLING: THE SYMPTOM AFFECTS MY ACTIVITY MODERATELY
LIMPING: THE SYMPTOM AFFECTS MY ACTIVITY SLIGHTLY
GIVING WAY, BUCKLING OR SHIFTING OF KNEE: THE SYMPTOM AFFECTS MY ACTIVITY SLIGHTLY
STIFFNESS: I DO NOT HAVE THE SYMPTOM
STAND: ACTIVITY IS MINIMALLY DIFFICULT
KNEEL ON THE FRONT OF YOUR KNEE: ACTIVITY IS SOMEWHAT DIFFICULT
STAND: ACTIVITY IS MINIMALLY DIFFICULT
SWELLING: THE SYMPTOM AFFECTS MY ACTIVITY MODERATELY
KNEEL ON THE FRONT OF YOUR KNEE: ACTIVITY IS SOMEWHAT DIFFICULT
SQUAT: ACTIVITY IS MINIMALLY DIFFICULT
GO UP STAIRS: ACTIVITY IS MINIMALLY DIFFICULT
SIT WITH YOUR KNEE BENT: ACTIVITY IS MINIMALLY DIFFICULT
HOW_WOULD_YOU_RATE_THE_OVERALL_FUNCTION_OF_YOUR_KNEE_DURING_YOUR_USUAL_DAILY_ACTIVITIES?: ABNORMAL
WALK: ACTIVITY IS NOT DIFFICULT
LIMPING: THE SYMPTOM AFFECTS MY ACTIVITY SLIGHTLY
KNEE_ACTIVITY_OF_DAILY_LIVING_SCORE: 71.43
AS_A_RESULT_OF_YOUR_KNEE_INJURY,_HOW_WOULD_YOU_RATE_YOUR_CURRENT_LEVEL_OF_DAILY_ACTIVITY?: NEARLY NORMAL
WEAKNESS: THE SYMPTOM AFFECTS MY ACTIVITY SLIGHTLY
STIFFNESS: I DO NOT HAVE THE SYMPTOM
GO UP STAIRS: ACTIVITY IS MINIMALLY DIFFICULT
HOW_WOULD_YOU_RATE_THE_CURRENT_FUNCTION_OF_YOUR_KNEE_DURING_YOUR_USUAL_DAILY_ACTIVITIES_ON_A_SCALE_FROM_0_TO_100_WITH_100_BEING_YOUR_LEVEL_OF_KNEE_FUNCTION_PRIOR_TO_YOUR_INJURY_AND_0_BEING_THE_INABILITY_TO_PERFORM_ANY_OF_YOUR_USUAL_DAILY_ACTIVITIES?: 80
PLEASE_INDICATE_YOR_PRIMARY_REASON_FOR_REFERRAL_TO_THERAPY:: KNEE
SQUAT: ACTIVITY IS MINIMALLY DIFFICULT

## 2024-04-25 ENCOUNTER — MYC REFILL (OUTPATIENT)
Dept: INTERNAL MEDICINE | Facility: CLINIC | Age: 54
End: 2024-04-25
Payer: COMMERCIAL

## 2024-04-25 DIAGNOSIS — F43.23 ADJUSTMENT DISORDER WITH MIXED ANXIETY AND DEPRESSED MOOD: ICD-10-CM

## 2024-04-26 RX ORDER — ALPRAZOLAM 0.5 MG
0.5 TABLET ORAL 2 TIMES DAILY PRN
Qty: 60 TABLET | Refills: 0 | Status: SHIPPED | OUTPATIENT
Start: 2024-04-26 | End: 2024-05-24

## 2024-04-30 ASSESSMENT — ANXIETY QUESTIONNAIRES
7. FEELING AFRAID AS IF SOMETHING AWFUL MIGHT HAPPEN: MORE THAN HALF THE DAYS
7. FEELING AFRAID AS IF SOMETHING AWFUL MIGHT HAPPEN: MORE THAN HALF THE DAYS
6. BECOMING EASILY ANNOYED OR IRRITABLE: MORE THAN HALF THE DAYS
1. FEELING NERVOUS, ANXIOUS, OR ON EDGE: SEVERAL DAYS
3. WORRYING TOO MUCH ABOUT DIFFERENT THINGS: SEVERAL DAYS
GAD7 TOTAL SCORE: 10
4. TROUBLE RELAXING: SEVERAL DAYS
5. BEING SO RESTLESS THAT IT IS HARD TO SIT STILL: MORE THAN HALF THE DAYS
8. IF YOU CHECKED OFF ANY PROBLEMS, HOW DIFFICULT HAVE THESE MADE IT FOR YOU TO DO YOUR WORK, TAKE CARE OF THINGS AT HOME, OR GET ALONG WITH OTHER PEOPLE?: VERY DIFFICULT
GAD7 TOTAL SCORE: 10
IF YOU CHECKED OFF ANY PROBLEMS ON THIS QUESTIONNAIRE, HOW DIFFICULT HAVE THESE PROBLEMS MADE IT FOR YOU TO DO YOUR WORK, TAKE CARE OF THINGS AT HOME, OR GET ALONG WITH OTHER PEOPLE: VERY DIFFICULT
2. NOT BEING ABLE TO STOP OR CONTROL WORRYING: SEVERAL DAYS

## 2024-05-01 ENCOUNTER — THERAPY VISIT (OUTPATIENT)
Dept: PHYSICAL THERAPY | Facility: CLINIC | Age: 54
End: 2024-05-01
Attending: ORTHOPAEDIC SURGERY
Payer: COMMERCIAL

## 2024-05-01 DIAGNOSIS — S82.122D CLOSED FRACTURE OF LATERAL PORTION OF LEFT TIBIAL PLATEAU WITH ROUTINE HEALING, SUBSEQUENT ENCOUNTER: ICD-10-CM

## 2024-05-01 DIAGNOSIS — S83.512D COMPLETE TEAR, KNEE, ANTERIOR CRUCIATE LIGAMENT, LEFT, SUBSEQUENT ENCOUNTER: ICD-10-CM

## 2024-05-01 PROCEDURE — 97161 PT EVAL LOW COMPLEX 20 MIN: CPT | Mod: GP

## 2024-05-01 PROCEDURE — 97110 THERAPEUTIC EXERCISES: CPT | Mod: GP

## 2024-05-01 NOTE — PROGRESS NOTES
PHYSICAL THERAPY EVALUATION  Type of Visit: Evaluation    See electronic medical record for Abuse and Falls Screening details.    Subjective       Presenting condition or subjective complaint: Torn acl  Date of onset: 10/31/23    Relevant medical history: Depression; Dizziness; Menopause; Mental Illness; Overweight; Pain at night or rest   Dates & types of surgery: Gallbladder surgery two sinus surgery hysterectomy    Patient presents with left knee pain s/p ACL tear in October 2023. Patient reports pain is in left anterior knee just below the patella and left anterior hip/groin. She wore a hinged brace for several months following her injury and was doing exercises on her own to increase strength. She continues to wear a knee sleeve daily. She is not taking anything for pain. She is able to sleep through the night without the pain waking her up.     Prior diagnostic imaging/testing results: MRI     Prior therapy history for the same diagnosis, illness or injury: No      Prior Level of Function  Transfers: Independent  Ambulation: Independent  ADL: Independent  IADL:     Living Environment  Social support: With a significant other or spouse   Type of home: 1 level   Stairs to enter the home: Yes 3 Is there a railing: Yes   Ramp: No   Stairs inside the home: No       Help at home: None  Equipment owned: Crutches; Grab bars; Raised toilet seat; Bath bench     Employment: Yes [employer] for my   Hobbies/Interests: Playing with granddaughter walking dog    Patient goals for therapy: Strengthen and relieve pain    Pain assessment: See objective evaluation for additional pain details     Objective   KNEE EVALUATION  PAIN: Pain Level at Rest: 2/10  Pain Level with Use: 6/10  Pain Location: knee  Pain Quality: Aching and Sharp  Pain Frequency: intermittent  Pain is Worst: daytime  Pain is Exacerbated By: kneeling on left knee  Pain is Relieved By: cold and compression, exercises  Pain Progression: Improved  INTEGUMENTARY  (edema, incisions): WNL    GAIT:  Weightbearing Status: WBAT  Assistive Device(s): None  Gait Deviations: Antalgic  BALANCE/PROPRIOCEPTION:   WEIGHTBEARING ALIGNMENT: slight knee flexion on left  NON-WEIGHTBEARING ALIGNMENT:   ROM:   (Degrees) Left AROM   Knee Flexion 114   Knee Extension Lacking 5   Pain:   End feel:     STRENGTH: 4/5 L hip flex, abd, add, L knee ext, 5/5 L flexion  FLEXIBILITY: Decreased hip IR L, Decreased hamstrings L, Decreased hip IR R, Decreased hamstrings R  SPECIAL TESTS:    Left   Apley's (Meniscus)    Mikhail's (Meniscus)    Christin's (ITB/TFL)    Patellar Apprehension Test    Patella Tracking    Ligamentous Stability    Anterior Drawer (ACL) Positive,     Posterior Drawer (PCL) Negative,     Prone Dial Test at 30 Deg and 90 Deg (PCL/PLC)    Valgus Stress Testing at 0 Deg and 30 Deg    Varus Stress Testing at 0 Deg and 30 Deg       FUNCTIONAL TESTS:   PALPATION:   + Tenderness At Location Left   Medial Joint Line +   Lateral Joint Line -   Patellar Tendon +   IT Band +   Incisional    Popliteal +   Biceps Femoris +   Semitendinosis +   Semimembranosis +   Glut Medius +   Patellar Medial -   Patellar Lateral -   Patellar Superior -   Patellar Inferior  +     JOINT MOBILITY:    Left   Tib-Fib Proximal WNL   Patellofemoral Medial WNL   Patellofemoral Lateral WNL   Patellofemoral Superior WNL   Patellofemoral Inferior WNL       Assessment & Plan   CLINICAL IMPRESSIONS  Medical Diagnosis: Closed fracture of lateral portion of left tibial plateau with routine healing, subsequent encounter (S82.122D)  Complete tear, knee, anterior cruciate ligament, left, subsequent encounter (S83.512D)    Treatment Diagnosis: Closed fracture of lateral portion of left tibial plateau with routine healing, subsequent encounter (S82.122D)  Complete tear, knee, anterior cruciate ligament, left, subsequent encounter (S83.512D)   Impression/Assessment: Patient is a 54 year old female with left knee pain s/p ACL tear in  October 2023 complaints.  The following significant findings have been identified: Pain, Decreased ROM/flexibility, Decreased strength, Impaired gait, Impaired muscle performance, Decreased activity tolerance, Impaired posture, and Instability. These impairments interfere with their ability to perform self care tasks, work tasks, recreational activities, and household chores as compared to previous level of function.     Clinical Decision Making (Complexity):  Clinical Presentation: Stable/Uncomplicated  Clinical Presentation Rationale: based on medical and personal factors listed in PT evaluation  Clinical Decision Making (Complexity): Low complexity    PLAN OF CARE  Treatment Interventions:  Modalities: Cryotherapy, E-stim  Interventions: Gait Training, Manual Therapy, Neuromuscular Re-education, Therapeutic Activity, Therapeutic Exercise    Long Term Goals     PT Goal 1  Goal Identifier: KOS  Goal Description: Patient will report improved left knee pain ratings and function as shown by increased KOS score of 7 points or greater in order to show significant improvement and greater return to previous function.  Rationale: to maximize safety and independence with performance of ADLs and functional tasks;to maximize safety and independence within the home  Target Date: 06/26/24  PT Goal 2  Goal Identifier: Home programming  Goal Description: Patient will report good compliance with HEP for 4 weeks performing 5/7 days and demonstrate proper technique without cues to improve quad and hip strength, LE flexibility, and allow her to perform ADLs and care for her  safely without limitations in functional mobility.  Rationale: to maximize safety and independence with performance of ADLs and functional tasks  Target Date: 05/29/24  PT Goal 3  Goal Identifier: Left knee AROM  Goal Description: Patient will demonstrate left knee active ROM of 120 degrees of flexion and full extension to allow her perform ADLs and  functional mobility tasks without limitations.  Rationale: to maximize safety and independence with performance of ADLs and functional tasks  Target Date: 06/26/24      Frequency of Treatment: 1x/week  Duration of Treatment: 8 weeks    Recommended Referrals to Other Professionals:   Education Assessment:   Learner/Method: Patient;Listening;Reading;Demonstration;Pictures/Video;No Barriers to Learning  Education Comments: Education provided regarding future progression of physical therapy interventions.    Risks and benefits of evaluation/treatment have been explained.   Patient/Family/caregiver agrees with Plan of Care.     Evaluation Time:     PT Eval, Low Complexity Minutes (29686): 20       Signing Clinician: Ariadna Solis PT

## 2024-05-07 ENCOUNTER — VIRTUAL VISIT (OUTPATIENT)
Dept: FAMILY MEDICINE | Facility: CLINIC | Age: 54
End: 2024-05-07
Payer: COMMERCIAL

## 2024-05-07 DIAGNOSIS — F32.1 MODERATE MAJOR DEPRESSION (H): Primary | ICD-10-CM

## 2024-05-07 DIAGNOSIS — F41.1 GAD (GENERALIZED ANXIETY DISORDER): ICD-10-CM

## 2024-05-07 PROCEDURE — 99442 PR PHYSICIAN TELEPHONE EVALUATION 11-20 MIN: CPT | Mod: 93 | Performed by: NURSE PRACTITIONER

## 2024-05-07 RX ORDER — BUSPIRONE HYDROCHLORIDE 5 MG/1
10 TABLET ORAL 2 TIMES DAILY
Qty: 240 TABLET | Refills: 0 | Status: SHIPPED | OUTPATIENT
Start: 2024-05-07 | End: 2024-07-06

## 2024-05-07 ASSESSMENT — PATIENT HEALTH QUESTIONNAIRE - PHQ9: SUM OF ALL RESPONSES TO PHQ QUESTIONS 1-9: 20

## 2024-05-07 NOTE — PROGRESS NOTES
Kassandra is a 54 year old who is being evaluated via a billable telephone visit.    What phone number would you like to be contacted at? [phone]   How would you like to obtain your AVS? Direct Sitters  Originating Location (pt. Location): Home    Distant Location (provider location):  On-site        Instructions Relayed to Patient by Virtual Roomer:     Patient instructed that provider will initiate telephone visit via phone call      Patient Confirmed they will join visit via: Provider to call patient for telephone visit   Reminded patient to ensure they were logged on to virtual visit by arrival time listed.   Asked if patient has flexibility to initiate visit sooner than arrival time: patient is unable to initiate visit earlier than arrival time     If pediatric virtual visit, ensured pediatric patient along with parent/guardian will be present for video visit.     Patient offered the website [email or web address] and/or phone number to [employer] Help line: [phone]     Assessment & Plan     Moderate major depression (H)  PHQ-9 is 20- denies suicidal ideation- feels is improved with buspar/ meeting with / taking time for herself/ exercising.  Has counseling scheduled.  Is also going to start journaling.  Discussed good self care, sleep hygiene, mindfulness therapies and meditation. Plan follow up in 4 weeks for physical    TAWANA (generalized anxiety disorder)  As above  - busPIRone (BUSPAR) 5 MG tablet; Take 2 tablets (10 mg) by mouth 2 times daily for 60 days    22 minutes spent by me on the date of the encounter doing chart review, review of test results, interpretation of tests, patient visit, and documentation           The longitudinal plan of care for the diagnosis(es)/condition(s) as documented were addressed during this visit. Due to the added complexity in care, I will continue to support Kassandra in the subsequent management and with ongoing continuity of care.     Subjective   Kassandra  is a 54 year old, presenting for the following health issues:  Follow Up, Counseling, and Physical Therapy        5/7/2024     8:56 AM   Additional Questions   Roomed by Angy         5/7/2024     8:56 AM   Patient Reported Additional Medications   Patient reports taking the following new medications none     History of Present Illness       Mental Health Follow-up:  Patient presents to follow-up on Depression & Anxiety.Patient's depression since last visit has been:  Medium  The patient is not having other symptoms associated with depression.  Patient's anxiety since last visit has been:  Medium  The patient is not having other symptoms associated with anxiety.  Any significant life events: financial concerns  Patient is feeling anxious or having panic attacks.  Patient has no concerns about alcohol or drug use.    She eats 0-1 servings of fruits and vegetables daily.She consumes 0 sweetened beverage(s) daily.She exercises with enough effort to increase her heart rate 30 to 60 minutes per day.  She exercises with enough effort to increase her heart rate 3 or less days per week.   She is taking medications regularly.     Patient would like to go over the therapy options she has found with the provider. Patient would also like to follow  up on depression and anxiety with the provider as patient is feeling severely depressed.     Got a hold of [facility] for counseling for [place].  Going to [facility] twice a week for hot tub or swimming/ walk.  Has  coming to home today  has end stage COPD on home oxygen.  Has counseling schedule in [place].  She has been exhausted with caring for him.  Her son relapsed.  Daughter ending her relationship with her sig other and moving into new apartment.  Brother- calling her drunk.      Feels buspar is really helping.  Taking 10mg BID_ wants to stay at that dose.      Review of Systems  Constitutional, HEENT, cardiovascular, pulmonary, GI, , musculoskeletal,  neuro, skin, endocrine and psych systems are negative, except as otherwise noted.      Objective           Vitals:  No vitals were obtained today due to virtual visit.    Physical Exam   General: Alert and no distress //Respiratory: No audible wheeze, cough, or shortness of breath // Psychiatric:  Appropriate affect, tone, and pace of words            Phone call duration: 20 minutes  Signed Electronically by: Arin Scherer NP

## 2024-05-24 ENCOUNTER — MYC REFILL (OUTPATIENT)
Dept: INTERNAL MEDICINE | Facility: CLINIC | Age: 54
End: 2024-05-24
Payer: COMMERCIAL

## 2024-05-24 DIAGNOSIS — F43.23 ADJUSTMENT DISORDER WITH MIXED ANXIETY AND DEPRESSED MOOD: ICD-10-CM

## 2024-05-24 RX ORDER — ALPRAZOLAM 0.5 MG
0.5 TABLET ORAL 2 TIMES DAILY PRN
Qty: 60 TABLET | Refills: 0 | Status: SHIPPED | OUTPATIENT
Start: 2024-05-24 | End: 2024-06-21

## 2024-06-21 ENCOUNTER — MYC REFILL (OUTPATIENT)
Dept: INTERNAL MEDICINE | Facility: CLINIC | Age: 54
End: 2024-06-21
Payer: COMMERCIAL

## 2024-06-21 DIAGNOSIS — F43.23 ADJUSTMENT DISORDER WITH MIXED ANXIETY AND DEPRESSED MOOD: ICD-10-CM

## 2024-06-21 RX ORDER — ALPRAZOLAM 0.5 MG
0.5 TABLET ORAL 2 TIMES DAILY PRN
Qty: 60 TABLET | Refills: 0 | Status: SHIPPED | OUTPATIENT
Start: 2024-06-21 | End: 2024-07-19

## 2024-06-28 NOTE — PROGRESS NOTES
DISCHARGE  Reason for Discharge: Patient has failed to schedule further appointments.    Equipment Issued:     Discharge Plan: Patient to continue home program.    Referring Provider:  Nima Bustamante       05/01/24 0500   Appointment Info   Signing clinician's name / credentials Ariadna Solis, PT, DPT   Total/Authorized Visits [employer]   Visits Used 1   Medical Diagnosis Closed fracture of lateral portion of left tibial plateau with routine healing, subsequent encounter (S82.122D)  Complete tear, knee, anterior cruciate ligament, left, subsequent encounter (S83.512D)   PT Tx Diagnosis Closed fracture of lateral portion of left tibial plateau with routine healing, subsequent encounter (S82.122D)  Complete tear, knee, anterior cruciate ligament, left, subsequent encounter (S83.512D)   Progress Note/Certification   Onset of illness/injury or Date of Surgery 10/31/23   Therapy Frequency 1x/week   Predicted Duration 8 weeks   GOALS   PT Goals 2;3   PT Goal 1   Goal Identifier KOS   Goal Description Patient will report improved left knee pain ratings and function as shown by increased KOS score of 7 points or greater in order to show significant improvement and greater return to previous function.   Rationale to maximize safety and independence with performance of ADLs and functional tasks;to maximize safety and independence within the home   Target Date 06/26/24   PT Goal 2   Goal Identifier Home programming   Goal Description Patient will report good compliance with HEP for 4 weeks performing 5/7 days and demonstrate proper technique without cues to improve quad and hip strength, LE flexibility, and allow her to perform ADLs and care for her  safely without limitations in functional mobility.   Rationale to maximize safety and independence with performance of ADLs and functional tasks   Target Date 05/29/24   PT Goal 3   Goal Identifier Left knee AROM   Goal Description Patient will demonstrate  left knee active ROM of 120 degrees of flexion and full extension to allow her perform ADLs and functional mobility tasks without limitations.   Rationale to maximize safety and independence with performance of ADLs and functional tasks   Target Date 06/26/24   Objective Measures   Objective Measures Objective Measure 1;Objective Measure 2;Objective Measure 3   Objective Measure 1   Objective Measure Pain   Details 2/10 at best, 6/10 at worst   Objective Measure 2   Objective Measure KOS   Details 71.43   Objective Measure 3   Objective Measure Left knee AROM   Details 4-114   Treatment Interventions (PT)   Interventions Therapeutic Procedure/Exercise   Therapeutic Procedure/Exercise   PTRx Ther Proc 1 Hip Flexion Straight Leg Raise   PTRx Ther Proc 1 - Details x 10 with verbal cues to perform slowly and only lift leg as high as she can control lowering   PTRx Ther Proc 2 Hip Abduction Straight Leg Raise   PTRx Ther Proc 2 - Details x 10 with verbal cues to perform slowly and only lift leg as high as she can control lowering   PTRx Ther Proc 3 Squat   PTRx Ther Proc 3 - Details x 10 with verbal cues to shift weight posteriorly and keep knees behind her toes   PTRx Ther Proc 4 Heel Raises   PTRx Ther Proc 4 - Details with BUE support x 10 with verbal cues to focus on eccentric lowering   PTRx Ther Proc 5 Short Arc Knee Extension   PTRx Ther Proc 5 - Details x 10 with 2-3 second hold, verbal cues to perform slowly   PTRx Ther Proc 6 Seated Hamstring Stretch   PTRx Ther Proc 6 - Details on L 2 x 30 seconds, verbal cues to maintain neutral spine   Therapeutic Procedures: strength, endurance, ROM, flexibility minutes (54959) 25   Ther Proc 1 - Details HEP instruction with handout given.   Skilled Intervention Instructed, provided cues as needed, and adapted based on patient response   Patient Response/Progress Tolerated well with no increase in left knee pain. Patient verbalized understanding of HEP.   Eval/Assessments    PT Eval, Low Complexity Minutes (48951) 20   Education   Learner/Method Patient;Listening;Reading;Demonstration;Pictures/Video;No Barriers to Learning   Education Comments Education provided regarding future progression of physical therapy interventions.   Plan   Home program As listed above. See PTRx for details.   Plan for next session Review HEP and progress LLE strengthening as tolerated.   Total Session Time   Timed Code Treatment Minutes 25   Total Treatment Time (sum of timed and untimed services) 45

## 2024-07-19 ENCOUNTER — MYC REFILL (OUTPATIENT)
Dept: INTERNAL MEDICINE | Facility: CLINIC | Age: 54
End: 2024-07-19

## 2024-07-19 DIAGNOSIS — F43.23 ADJUSTMENT DISORDER WITH MIXED ANXIETY AND DEPRESSED MOOD: ICD-10-CM

## 2024-07-19 RX ORDER — ALPRAZOLAM 0.5 MG
0.5 TABLET ORAL 2 TIMES DAILY PRN
Qty: 60 TABLET | Refills: 0 | Status: SHIPPED | OUTPATIENT
Start: 2024-07-19 | End: 2024-08-19

## 2024-08-19 ENCOUNTER — MYC REFILL (OUTPATIENT)
Dept: INTERNAL MEDICINE | Facility: CLINIC | Age: 54
End: 2024-08-19
Payer: COMMERCIAL

## 2024-08-19 DIAGNOSIS — F43.23 ADJUSTMENT DISORDER WITH MIXED ANXIETY AND DEPRESSED MOOD: ICD-10-CM

## 2024-08-19 RX ORDER — ALPRAZOLAM 0.5 MG
0.5 TABLET ORAL 2 TIMES DAILY PRN
Qty: 60 TABLET | Refills: 0 | Status: SHIPPED | OUTPATIENT
Start: 2024-08-19 | End: 2024-09-19

## 2024-08-28 NOTE — TELEPHONE ENCOUNTER
Agustina MERRILL [facility] [address] [facility] (supporting Dinorah Irizarry MD)15 minutes ago (12:19 PM)     RA  May have urinary or bladder infection slight pain in back on one side and constant need to go urinate and feel unconfrotable. Can get an antibiotic prescribed ?          Message left for patient to call and speak to any .

## 2024-09-19 ENCOUNTER — MYC REFILL (OUTPATIENT)
Dept: INTERNAL MEDICINE | Facility: CLINIC | Age: 54
End: 2024-09-19
Payer: COMMERCIAL

## 2024-09-19 DIAGNOSIS — F43.23 ADJUSTMENT DISORDER WITH MIXED ANXIETY AND DEPRESSED MOOD: ICD-10-CM

## 2024-09-19 RX ORDER — ALPRAZOLAM 0.5 MG
0.5 TABLET ORAL 2 TIMES DAILY PRN
Qty: 60 TABLET | Refills: 0 | Status: SHIPPED | OUTPATIENT
Start: 2024-09-19

## 2024-10-18 ENCOUNTER — MYC REFILL (OUTPATIENT)
Dept: FAMILY MEDICINE | Facility: CLINIC | Age: 54
End: 2024-10-18
Payer: COMMERCIAL

## 2024-10-18 DIAGNOSIS — E78.5 HYPERLIPIDEMIA LDL GOAL <130: ICD-10-CM

## 2024-10-18 DIAGNOSIS — F41.1 GAD (GENERALIZED ANXIETY DISORDER): ICD-10-CM

## 2024-10-18 DIAGNOSIS — J30.2 SEASONAL ALLERGIC RHINITIS, UNSPECIFIED TRIGGER: ICD-10-CM

## 2024-10-19 ENCOUNTER — MYC REFILL (OUTPATIENT)
Dept: INTERNAL MEDICINE | Facility: CLINIC | Age: 54
End: 2024-10-19
Payer: COMMERCIAL

## 2024-10-19 DIAGNOSIS — F43.23 ADJUSTMENT DISORDER WITH MIXED ANXIETY AND DEPRESSED MOOD: ICD-10-CM

## 2024-10-21 RX ORDER — ALPRAZOLAM 0.5 MG
0.5 TABLET ORAL 2 TIMES DAILY PRN
Qty: 10 TABLET | Refills: 0 | OUTPATIENT
Start: 2024-10-21

## 2024-10-21 RX ORDER — ATORVASTATIN CALCIUM 20 MG/1
20 TABLET, FILM COATED ORAL DAILY
Qty: 90 TABLET | Refills: 3 | OUTPATIENT
Start: 2024-10-21

## 2024-10-21 RX ORDER — FLUTICASONE PROPIONATE 50 MCG
1 SPRAY, SUSPENSION (ML) NASAL DAILY
Qty: 16 G | Refills: 11 | OUTPATIENT
Start: 2024-10-21

## 2024-10-21 RX ORDER — ALPRAZOLAM 0.5 MG
0.5 TABLET ORAL 2 TIMES DAILY PRN
Qty: 50 TABLET | Refills: 0 | Status: SHIPPED | OUTPATIENT
Start: 2024-10-21

## 2024-10-21 RX ORDER — BUSPIRONE HYDROCHLORIDE 5 MG/1
10 TABLET ORAL 2 TIMES DAILY
Qty: 240 TABLET | Refills: 0 | Status: SHIPPED | OUTPATIENT
Start: 2024-10-21

## 2024-11-18 ENCOUNTER — MYC REFILL (OUTPATIENT)
Dept: INTERNAL MEDICINE | Facility: CLINIC | Age: 54
End: 2024-11-18
Payer: COMMERCIAL

## 2024-11-18 DIAGNOSIS — F43.23 ADJUSTMENT DISORDER WITH MIXED ANXIETY AND DEPRESSED MOOD: ICD-10-CM

## 2024-11-18 RX ORDER — ALPRAZOLAM 0.5 MG
0.5 TABLET ORAL 2 TIMES DAILY PRN
Qty: 60 TABLET | Refills: 0 | Status: SHIPPED | OUTPATIENT
Start: 2024-11-18

## 2024-11-19 NOTE — LETTER
[address] [id number] [phone]        August 13, 2021    Regarding:  Liliana Kat  [address] [place] [id number]              To Whom It May Concern;    Please excuse Liliana from work from 8/12/2021-8/15/2021 due to medical reasons. She may return to work on 8/16/2021. If you have any questions or concerns please call the clinic at [phone].       Sincerely,    Brayan So MD  
denies pain/discomfort (Rating = 0)

## 2025-01-20 ENCOUNTER — MYC REFILL (OUTPATIENT)
Dept: INTERNAL MEDICINE | Facility: CLINIC | Age: 55
End: 2025-01-20

## 2025-01-20 DIAGNOSIS — F43.23 ADJUSTMENT DISORDER WITH MIXED ANXIETY AND DEPRESSED MOOD: ICD-10-CM

## 2025-01-20 RX ORDER — ALPRAZOLAM 0.5 MG
0.5 TABLET ORAL 2 TIMES DAILY PRN
Qty: 60 TABLET | Refills: 0 | Status: SHIPPED | OUTPATIENT
Start: 2025-01-20

## 2025-01-27 ENCOUNTER — E-VISIT (OUTPATIENT)
Dept: FAMILY MEDICINE | Facility: CLINIC | Age: 55
End: 2025-01-27
Payer: COMMERCIAL

## 2025-01-27 DIAGNOSIS — J01.90 ACUTE NON-RECURRENT SINUSITIS, UNSPECIFIED LOCATION: Primary | ICD-10-CM

## 2025-01-27 RX ORDER — PREDNISONE 20 MG/1
40 TABLET ORAL DAILY
Qty: 10 TABLET | Refills: 0 | Status: SHIPPED | OUTPATIENT
Start: 2025-01-27 | End: 2025-02-01

## 2025-01-27 NOTE — PATIENT INSTRUCTIONS
Thank you for choosing us for your care. I have placed an order for a prescription so that you can start treatment. View your full visit summary for details by clicking on the link below. Your pharmacist will able to address any questions you may have about the medication.     If you're not feeling better within 5-7 days, please schedule an appointment.  You can schedule an appointment right here in [facility], or call [phone]  If the visit is for the same symptoms as your eVisit, we'll refund the cost of your eVisit if seen within seven days.

## 2025-02-19 ENCOUNTER — MYC REFILL (OUTPATIENT)
Dept: INTERNAL MEDICINE | Facility: CLINIC | Age: 55
End: 2025-02-19
Payer: COMMERCIAL

## 2025-02-19 DIAGNOSIS — F43.23 ADJUSTMENT DISORDER WITH MIXED ANXIETY AND DEPRESSED MOOD: ICD-10-CM

## 2025-02-20 ENCOUNTER — MYC MEDICAL ADVICE (OUTPATIENT)
Dept: FAMILY MEDICINE | Facility: CLINIC | Age: 55
End: 2025-02-20
Payer: COMMERCIAL

## 2025-02-20 RX ORDER — ALPRAZOLAM 0.5 MG
0.5 TABLET ORAL 2 TIMES DAILY PRN
Qty: 60 TABLET | Refills: 0 | Status: SHIPPED | OUTPATIENT
Start: 2025-02-20

## 2025-02-20 NOTE — TELEPHONE ENCOUNTER
Alprazolam rx sent to [facility] today 02/25/2025.    Sent [employer] message to patient.   Pia Trejo RN on 2/20/2025 at 3:02 PM

## 2025-03-04 ENCOUNTER — PATIENT OUTREACH (OUTPATIENT)
Dept: CARE COORDINATION | Facility: CLINIC | Age: 55
End: 2025-03-04
Payer: COMMERCIAL

## 2025-03-06 ASSESSMENT — ANXIETY QUESTIONNAIRES
7. FEELING AFRAID AS IF SOMETHING AWFUL MIGHT HAPPEN: MORE THAN HALF THE DAYS
6. BECOMING EASILY ANNOYED OR IRRITABLE: NOT AT ALL
5. BEING SO RESTLESS THAT IT IS HARD TO SIT STILL: MORE THAN HALF THE DAYS
GAD7 TOTAL SCORE: 9
GAD7 TOTAL SCORE: 9
8. IF YOU CHECKED OFF ANY PROBLEMS, HOW DIFFICULT HAVE THESE MADE IT FOR YOU TO DO YOUR WORK, TAKE CARE OF THINGS AT HOME, OR GET ALONG WITH OTHER PEOPLE?: SOMEWHAT DIFFICULT
1. FEELING NERVOUS, ANXIOUS, OR ON EDGE: MORE THAN HALF THE DAYS
GAD7 TOTAL SCORE: 9
7. FEELING AFRAID AS IF SOMETHING AWFUL MIGHT HAPPEN: MORE THAN HALF THE DAYS
3. WORRYING TOO MUCH ABOUT DIFFERENT THINGS: SEVERAL DAYS
4. TROUBLE RELAXING: SEVERAL DAYS
2. NOT BEING ABLE TO STOP OR CONTROL WORRYING: SEVERAL DAYS

## 2025-03-07 ASSESSMENT — ANXIETY QUESTIONNAIRES: GAD7 TOTAL SCORE: 9

## 2025-03-11 ENCOUNTER — VIRTUAL VISIT (OUTPATIENT)
Dept: FAMILY MEDICINE | Facility: CLINIC | Age: 55
End: 2025-03-11
Payer: COMMERCIAL

## 2025-03-11 DIAGNOSIS — J42 CHRONIC BRONCHITIS, UNSPECIFIED CHRONIC BRONCHITIS TYPE (H): ICD-10-CM

## 2025-03-11 DIAGNOSIS — Z20.822 EXPOSURE TO 2019 NOVEL CORONAVIRUS: ICD-10-CM

## 2025-03-11 DIAGNOSIS — F32.1 MODERATE MAJOR DEPRESSION (H): Primary | ICD-10-CM

## 2025-03-11 DIAGNOSIS — F41.1 GAD (GENERALIZED ANXIETY DISORDER): ICD-10-CM

## 2025-03-11 DIAGNOSIS — E78.5 HYPERLIPIDEMIA LDL GOAL <130: ICD-10-CM

## 2025-03-11 DIAGNOSIS — Z12.11 SCREEN FOR COLON CANCER: ICD-10-CM

## 2025-03-11 DIAGNOSIS — Z12.31 VISIT FOR SCREENING MAMMOGRAM: ICD-10-CM

## 2025-03-11 PROCEDURE — 98006 SYNCH AUDIO-VIDEO EST MOD 30: CPT | Performed by: NURSE PRACTITIONER

## 2025-03-11 PROCEDURE — 96127 BRIEF EMOTIONAL/BEHAV ASSMT: CPT | Mod: 95 | Performed by: NURSE PRACTITIONER

## 2025-03-11 RX ORDER — ATORVASTATIN CALCIUM 20 MG/1
20 TABLET, FILM COATED ORAL DAILY
Qty: 90 TABLET | Refills: 3 | Status: SHIPPED | OUTPATIENT
Start: 2025-03-11

## 2025-03-11 RX ORDER — ALBUTEROL SULFATE 90 UG/1
2 INHALANT RESPIRATORY (INHALATION) EVERY 6 HOURS PRN
Qty: 18 G | Refills: 3 | Status: SHIPPED | OUTPATIENT
Start: 2025-03-11

## 2025-03-11 RX ORDER — ALPRAZOLAM 0.5 MG
0.5 TABLET ORAL 2 TIMES DAILY PRN
Qty: 60 TABLET | Refills: 0 | Status: SHIPPED | OUTPATIENT
Start: 2025-03-20

## 2025-03-11 RX ORDER — BUSPIRONE HYDROCHLORIDE 5 MG/1
10 TABLET ORAL 2 TIMES DAILY
Qty: 240 TABLET | Refills: 0 | Status: SHIPPED | OUTPATIENT
Start: 2025-03-11

## 2025-03-11 RX ORDER — ESCITALOPRAM OXALATE 20 MG/1
20 TABLET ORAL DAILY
Qty: 90 TABLET | Refills: 1 | Status: SHIPPED | OUTPATIENT
Start: 2025-03-11

## 2025-03-11 ASSESSMENT — PATIENT HEALTH QUESTIONNAIRE - PHQ9
SUM OF ALL RESPONSES TO PHQ QUESTIONS 1-9: 15
10. IF YOU CHECKED OFF ANY PROBLEMS, HOW DIFFICULT HAVE THESE PROBLEMS MADE IT FOR YOU TO DO YOUR WORK, TAKE CARE OF THINGS AT HOME, OR GET ALONG WITH OTHER PEOPLE: SOMEWHAT DIFFICULT
SUM OF ALL RESPONSES TO PHQ QUESTIONS 1-9: 15

## 2025-03-11 NOTE — PROGRESS NOTES
"Kassandra is a 54 year old who is being evaluated via a billable video visit.    How would you like to obtain your AVS? MyChart  If the video visit is dropped, the invitation should be resent by: Text to cell phone: [phone]  Will anyone else be joining your video visit? No      Assessment & Plan     Moderate major depression (H)  PHQ-9 is 15, TAWANA 7 is 9.  Recently cared for her terminally ill father who passed away on Jan 9th.  Her mother has been staying with her and they have been doing counseling [facility].  They are also working on healing their relationship.  Patient has been exercising more, has lost 20 lbs.  Doing more meditation and yoga.  Continue meds.  Has physical in one month  - escitalopram (LEXAPRO) 20 MG tablet; Take 1 tablet (20 mg) by mouth daily.    TAWANA (generalized anxiety disorder)  As above.  Came to me on BID xanax.  Will not change today with recent loss and grief.  Will address in one month at physical  - escitalopram (LEXAPRO) 20 MG tablet; Take 1 tablet (20 mg) by mouth daily.  - busPIRone (BUSPAR) 5 MG tablet; Take 2 tablets (10 mg) by mouth 2 times daily.    Chronic bronchitis, unspecified chronic bronchitis type (H)  Quit smoking 5 years ago.  Currently has URI that her  and mother also had.  Has had some wheezing. H as not had an inhaler in years.  Will send today    Hyperlipidemia LDL goal <130  On statin- fasting labs at physical  - atorvastatin (LIPITOR) 20 MG tablet; Take 1 tablet (20 mg) by mouth daily.    Visit for screening mammogram  screen  - MA Screening Bilateral w/ Ronaldo; Future    Screen for colon cancer  screen  - Fecal colorectal cancer screen FIT - Future (S+30); Future          BMI  Estimated body mass index is 27.71 kg/m  as calculated from the following:    Height as of 4/17/24: 1.651 m (5' 5\").    Weight as of 4/17/24: 75.5 kg (166 lb 8 oz).   Weight management plan: Discussed healthy diet and exercise guidelines          The longitudinal " plan of care for the diagnosis(es)/condition(s) as documented were addressed during this visit. Due to the added complexity in care, I will continue to support Kassandra in the subsequent management and with ongoing continuity of care.     June Cannon is a 54 year old, presenting for the following health issues:  Recheck Medication    History of Present Illness       Mental Health Follow-up: Patient's depression since last visit has been:  Medium        Any significant life events: YES        Reason for visit:  Follow up   She is taking medications regularly.      Has covid.  Her  had also.  Her mother also has.  Has had for 5 days      Review of Systems  Constitutional, HEENT, cardiovascular, pulmonary, GI, , musculoskeletal, neuro, skin, endocrine and psych systems are negative, except as otherwise noted.      Objective           Vitals:  No vitals were obtained today due to virtual visit.    Physical Exam   GENERAL: alert and no distress  EYES: Eyes grossly normal to inspection.  No discharge or erythema, or obvious scleral/conjunctival abnormalities.  RESP: No audible wheeze, cough, or visible cyanosis.    SKIN: Visible skin clear. No significant rash, abnormal pigmentation or lesions.  NEURO: Cranial nerves grossly intact.  Mentation and speech appropriate for age.  PSYCH: Appropriate affect, tone, and pace of words    No results found for this or any previous visit (from the past 24 hours).      Video-Visit Details    Type of service:  Video Visit   Originating Location (pt. Location): Home    Distant Location (provider location):  On-site  Platform used for Video Visit: Cristal  Signed Electronically by: Arin Scherer NP

## 2025-03-20 ENCOUNTER — MYC REFILL (OUTPATIENT)
Dept: FAMILY MEDICINE | Facility: CLINIC | Age: 55
End: 2025-03-20
Payer: COMMERCIAL

## 2025-03-20 ENCOUNTER — MYC MEDICAL ADVICE (OUTPATIENT)
Dept: FAMILY MEDICINE | Facility: CLINIC | Age: 55
End: 2025-03-20
Payer: COMMERCIAL

## 2025-03-20 RX ORDER — ALPRAZOLAM 0.5 MG
0.5 TABLET ORAL 2 TIMES DAILY PRN
Qty: 60 TABLET | Refills: 0 | OUTPATIENT
Start: 2025-03-20

## 2025-03-25 ENCOUNTER — ORDERS ONLY (AUTO-RELEASED) (OUTPATIENT)
Dept: FAMILY MEDICINE | Facility: CLINIC | Age: 55
End: 2025-03-25
Payer: COMMERCIAL

## 2025-03-25 DIAGNOSIS — Z12.11 SCREEN FOR COLON CANCER: ICD-10-CM

## 2025-04-10 ENCOUNTER — HOSPITAL ENCOUNTER (OUTPATIENT)
Dept: MAMMOGRAPHY | Facility: CLINIC | Age: 55
Discharge: HOME | End: 2025-04-10
Attending: NURSE PRACTITIONER
Payer: COMMERCIAL

## 2025-04-10 DIAGNOSIS — Z12.31 VISIT FOR SCREENING MAMMOGRAM: ICD-10-CM

## 2025-04-10 PROCEDURE — 77067 SCR MAMMO BI INCL CAD: CPT

## 2025-04-10 PROCEDURE — 77063 BREAST TOMOSYNTHESIS BI: CPT

## 2025-04-16 SDOH — HEALTH STABILITY: PHYSICAL HEALTH: ON AVERAGE, HOW MANY MINUTES DO YOU ENGAGE IN EXERCISE AT THIS LEVEL?: 150+ MIN

## 2025-04-16 SDOH — HEALTH STABILITY: PHYSICAL HEALTH: ON AVERAGE, HOW MANY DAYS PER WEEK DO YOU ENGAGE IN MODERATE TO STRENUOUS EXERCISE (LIKE A BRISK WALK)?: 5 DAYS

## 2025-04-16 ASSESSMENT — SOCIAL DETERMINANTS OF HEALTH (SDOH): HOW OFTEN DO YOU GET TOGETHER WITH FRIENDS OR RELATIVES?: TWICE A WEEK

## 2025-04-21 ENCOUNTER — OFFICE VISIT (OUTPATIENT)
Dept: FAMILY MEDICINE | Facility: CLINIC | Age: 55
End: 2025-04-21
Attending: NURSE PRACTITIONER
Payer: COMMERCIAL

## 2025-04-21 VITALS
HEIGHT: 64 IN | SYSTOLIC BLOOD PRESSURE: 120 MMHG | TEMPERATURE: 97.8 F | HEART RATE: 80 BPM | RESPIRATION RATE: 16 BRPM | WEIGHT: 152 LBS | OXYGEN SATURATION: 99 % | DIASTOLIC BLOOD PRESSURE: 80 MMHG | BODY MASS INDEX: 25.95 KG/M2

## 2025-04-21 DIAGNOSIS — E78.5 HYPERLIPIDEMIA LDL GOAL <130: ICD-10-CM

## 2025-04-21 DIAGNOSIS — Z00.00 ROUTINE GENERAL MEDICAL EXAMINATION AT A HEALTH CARE FACILITY: Primary | ICD-10-CM

## 2025-04-21 DIAGNOSIS — R59.1 LYMPHADENOPATHY: ICD-10-CM

## 2025-04-21 DIAGNOSIS — F32.1 MODERATE MAJOR DEPRESSION (H): ICD-10-CM

## 2025-04-21 DIAGNOSIS — F41.1 GAD (GENERALIZED ANXIETY DISORDER): ICD-10-CM

## 2025-04-21 DIAGNOSIS — J42 CHRONIC BRONCHITIS, UNSPECIFIED CHRONIC BRONCHITIS TYPE (H): ICD-10-CM

## 2025-04-21 DIAGNOSIS — R11.0 NAUSEA: ICD-10-CM

## 2025-04-21 DIAGNOSIS — R35.0 URINARY FREQUENCY: ICD-10-CM

## 2025-04-21 PROBLEM — S82.122D CLOSED FRACTURE OF LATERAL PORTION OF LEFT TIBIAL PLATEAU WITH ROUTINE HEALING, SUBSEQUENT ENCOUNTER: Status: RESOLVED | Noted: 2024-05-01 | Resolved: 2025-04-21

## 2025-04-21 PROBLEM — S83.512D: Status: RESOLVED | Noted: 2024-05-01 | Resolved: 2025-04-21

## 2025-04-21 PROBLEM — E87.6 HYPOKALEMIA: Status: RESOLVED | Noted: 2018-08-30 | Resolved: 2025-04-21

## 2025-04-21 LAB
ALBUMIN SERPL BCG-MCNC: 4.7 G/DL (ref 3.5–5.2)
ALBUMIN UR-MCNC: NEGATIVE MG/DL
ALP SERPL-CCNC: 102 U/L (ref 40–150)
ALT SERPL W P-5'-P-CCNC: 23 U/L (ref 0–50)
ANION GAP SERPL CALCULATED.3IONS-SCNC: 10 MMOL/L (ref 7–15)
APPEARANCE UR: CLEAR
AST SERPL W P-5'-P-CCNC: 23 U/L (ref 0–45)
BASOPHILS # BLD AUTO: 0 10E3/UL (ref 0–0.2)
BASOPHILS NFR BLD AUTO: 0 %
BILIRUB SERPL-MCNC: 0.5 MG/DL
BILIRUB UR QL STRIP: NEGATIVE
BUN SERPL-MCNC: 10.5 MG/DL (ref 6–20)
CALCIUM SERPL-MCNC: 10.1 MG/DL (ref 8.8–10.4)
CHLORIDE SERPL-SCNC: 106 MMOL/L (ref 98–107)
CHOLEST SERPL-MCNC: 226 MG/DL
COLOR UR AUTO: YELLOW
CREAT SERPL-MCNC: 0.75 MG/DL (ref 0.51–0.95)
EGFRCR SERPLBLD CKD-EPI 2021: >90 ML/MIN/1.73M2
EOSINOPHIL # BLD AUTO: 0.1 10E3/UL (ref 0–0.7)
EOSINOPHIL NFR BLD AUTO: 1 %
ERYTHROCYTE [DISTWIDTH] IN BLOOD BY AUTOMATED COUNT: 12.3 % (ref 10–15)
FASTING STATUS PATIENT QL REPORTED: YES
FASTING STATUS PATIENT QL REPORTED: YES
GLUCOSE SERPL-MCNC: 90 MG/DL (ref 70–99)
GLUCOSE UR STRIP-MCNC: NEGATIVE MG/DL
HCO3 SERPL-SCNC: 25 MMOL/L (ref 22–29)
HCT VFR BLD AUTO: 42.5 % (ref 35–47)
HDLC SERPL-MCNC: 56 MG/DL
HGB BLD-MCNC: 14.6 G/DL (ref 11.7–15.7)
HGB UR QL STRIP: ABNORMAL
IMM GRANULOCYTES # BLD: 0 10E3/UL
IMM GRANULOCYTES NFR BLD: 0 %
KETONES UR STRIP-MCNC: NEGATIVE MG/DL
LDLC SERPL CALC-MCNC: 133 MG/DL
LEUKOCYTE ESTERASE UR QL STRIP: NEGATIVE
LYMPHOCYTES # BLD AUTO: 2.7 10E3/UL (ref 0.8–5.3)
LYMPHOCYTES NFR BLD AUTO: 25 %
MCH RBC QN AUTO: 31.7 PG (ref 26.5–33)
MCHC RBC AUTO-ENTMCNC: 34.4 G/DL (ref 31.5–36.5)
MCV RBC AUTO: 92 FL (ref 78–100)
MONOCYTES # BLD AUTO: 0.6 10E3/UL (ref 0–1.3)
MONOCYTES NFR BLD AUTO: 6 %
MUCOUS THREADS #/AREA URNS LPF: PRESENT /LPF
NEUTROPHILS # BLD AUTO: 7.3 10E3/UL (ref 1.6–8.3)
NEUTROPHILS NFR BLD AUTO: 68 %
NITRATE UR QL: NEGATIVE
NONHDLC SERPL-MCNC: 170 MG/DL
NRBC # BLD AUTO: 0 10E3/UL
NRBC BLD AUTO-RTO: 0 /100
PH UR STRIP: 5.5 [PH] (ref 5–7)
PLATELET # BLD AUTO: 334 10E3/UL (ref 150–450)
POTASSIUM SERPL-SCNC: 3.9 MMOL/L (ref 3.4–5.3)
PROT SERPL-MCNC: 7.6 G/DL (ref 6.4–8.3)
RBC # BLD AUTO: 4.6 10E6/UL (ref 3.8–5.2)
RBC URINE: 8 /HPF
SODIUM SERPL-SCNC: 141 MMOL/L (ref 135–145)
SP GR UR STRIP: 1.02 (ref 1–1.03)
SQUAMOUS EPITHELIAL: <1 /HPF
TRIGL SERPL-MCNC: 184 MG/DL
TSH SERPL DL<=0.005 MIU/L-ACNC: 1.99 UIU/ML (ref 0.3–4.2)
UROBILINOGEN UR STRIP-MCNC: NORMAL MG/DL
WBC # BLD AUTO: 10.7 10E3/UL (ref 4–11)
WBC URINE: <1 /HPF

## 2025-04-21 PROCEDURE — 81001 URINALYSIS AUTO W/SCOPE: CPT | Performed by: NURSE PRACTITIONER

## 2025-04-21 PROCEDURE — G2211 COMPLEX E/M VISIT ADD ON: HCPCS | Performed by: NURSE PRACTITIONER

## 2025-04-21 PROCEDURE — 99214 OFFICE O/P EST MOD 30 MIN: CPT | Mod: 25 | Performed by: NURSE PRACTITIONER

## 2025-04-21 PROCEDURE — 36415 COLL VENOUS BLD VENIPUNCTURE: CPT | Performed by: NURSE PRACTITIONER

## 2025-04-21 PROCEDURE — 3079F DIAST BP 80-89 MM HG: CPT | Performed by: NURSE PRACTITIONER

## 2025-04-21 PROCEDURE — 80061 LIPID PANEL: CPT | Performed by: NURSE PRACTITIONER

## 2025-04-21 PROCEDURE — 85025 COMPLETE CBC W/AUTO DIFF WBC: CPT | Performed by: NURSE PRACTITIONER

## 2025-04-21 PROCEDURE — 1126F AMNT PAIN NOTED NONE PRSNT: CPT | Performed by: NURSE PRACTITIONER

## 2025-04-21 PROCEDURE — 99396 PREV VISIT EST AGE 40-64: CPT | Performed by: NURSE PRACTITIONER

## 2025-04-21 PROCEDURE — 84443 ASSAY THYROID STIM HORMONE: CPT | Performed by: NURSE PRACTITIONER

## 2025-04-21 PROCEDURE — 3074F SYST BP LT 130 MM HG: CPT | Performed by: NURSE PRACTITIONER

## 2025-04-21 PROCEDURE — 80053 COMPREHEN METABOLIC PANEL: CPT | Performed by: NURSE PRACTITIONER

## 2025-04-21 ASSESSMENT — PATIENT HEALTH QUESTIONNAIRE - PHQ9
SUM OF ALL RESPONSES TO PHQ QUESTIONS 1-9: 10
SUM OF ALL RESPONSES TO PHQ QUESTIONS 1-9: 10
10. IF YOU CHECKED OFF ANY PROBLEMS, HOW DIFFICULT HAVE THESE PROBLEMS MADE IT FOR YOU TO DO YOUR WORK, TAKE CARE OF THINGS AT HOME, OR GET ALONG WITH OTHER PEOPLE: SOMEWHAT DIFFICULT

## 2025-04-21 ASSESSMENT — PAIN SCALES - GENERAL: PAINLEVEL_OUTOF10: NO PAIN (0)

## 2025-04-21 NOTE — PATIENT INSTRUCTIONS
Patient Education   Preventive Care Advice   This is general advice given by our system to help you stay healthy. However, your care team may have specific advice just for you. Please talk to your care team about your preventive care needs.  Nutrition  Eat 5 or more servings of fruits and vegetables each day.  Try wheat bread, brown rice and whole grain pasta (instead of white bread, rice, and pasta).  Get enough calcium and vitamin D. Check the label on foods and aim for 100% of the RDA (recommended daily allowance).  Lifestyle  Exercise at least 150 minutes each week  (30 minutes a day, 5 days a week).  Do muscle strengthening activities 2 days a week. These help control your weight and prevent disease.  No smoking.  Wear sunscreen to prevent skin cancer.  Have a dental exam and cleaning every 6 months.  Yearly exams  See your health care team every year to talk about:  Any changes in your health.  Any medicines your care team has prescribed.  Preventive care, family planning, and ways to prevent chronic diseases.  Shots (vaccines)   HPV shots (up to age 26), if you've never had them before.  Hepatitis B shots (up to age 59), if you've never had them before.  COVID-19 shot: Get this shot when it's due.  Flu shot: Get a flu shot every year.  Tetanus shot: Get a tetanus shot every 10 years.  Pneumococcal, hepatitis A, and RSV shots: Ask your care team if you need these based on your risk.  Shingles shot (for age 50 and up)  General health tests  Diabetes screening:  Starting at age 35, Get screened for diabetes at least every 3 years.  If you are younger than age 35, ask your care team if you should be screened for diabetes.  Cholesterol test: At age 39, start having a cholesterol test every 5 years, or more often if advised.  Bone density scan (DEXA): At age 50, ask your care team if you should have this scan for osteoporosis (brittle bones).  Hepatitis C: Get tested at least once in your life.  STIs (sexually  transmitted infections)  Before age 24: Ask your care team if you should be screened for STIs.  After age 24: Get screened for STIs if you're at risk. You are at risk for STIs (including HIV) if:  You are sexually active with more than one person.  You don't use condoms every time.  You or a partner was diagnosed with a sexually transmitted infection.  If you are at risk for HIV, ask about PrEP medicine to prevent HIV.  Get tested for HIV at least once in your life, whether you are at risk for HIV or not.  Cancer screening tests  Cervical cancer screening: If you have a cervix, begin getting regular cervical cancer screening tests starting at age 21.  Breast cancer scan (mammogram): If you've ever had breasts, begin having regular mammograms starting at age 40. This is a scan to check for breast cancer.  Colon cancer screening: It is important to start screening for colon cancer at age 45.  Have a colonoscopy test every 10 years (or more often if you're at risk) Or, ask your provider about stool tests like a FIT test every year or Cologuard test every 3 years.  To learn more about your testing options, visit:   .  For help making a decision, visit:   https://bit.ly/nc58487.  Prostate cancer screening test: If you have a prostate, ask your care team if a prostate cancer screening test (PSA) at age 55 is right for you.  Lung cancer screening: If you are a current or former smoker ages 50 to 80, ask your care team if ongoing lung cancer screenings are right for you.  For informational purposes only. Not to replace the advice of your health care provider. Copyright   2023 [facility]. All rights reserved. Clinically reviewed by the [facility]. [employer] 168419 - REV 01/24.  Learning About Stress  What is stress?     Stress is your body's response to a hard situation. Your body can have a physical, emotional, or mental response. Stress is a fact of life for most people, and it  affects everyone differently. What causes stress for you may not be stressful for someone else.  A lot of things can cause stress. You may feel stress when you go on a job interview, take a test, or run a race. This kind of short-term stress is normal and even useful. It can help you if you need to work hard or react quickly. For example, stress can help you finish an important job on time.  Long-term stress is caused by ongoing stressful situations or events. Examples of long-term stress include long-term health problems, ongoing problems at work, or conflicts in your family. Long-term stress can harm your health.  How does stress affect your health?  When you are stressed, your body responds as though you are in danger. It makes hormones that speed up your heart, make you breathe faster, and give you a burst of energy. This is called the fight-or-flight stress response. If the stress is over quickly, your body goes back to normal and no harm is done.  But if stress happens too often or lasts too long, it can have bad effects. Long-term stress can make you more likely to get sick, and it can make symptoms of some diseases worse. If you tense up when you are stressed, you may develop neck, shoulder, or low back pain. Stress is linked to high blood pressure and heart disease.  Stress also harms your emotional health. It can make you mckay, tense, or depressed. Your relationships may suffer, and you may not do well at work or school.  What can you do to manage stress?  You can try these things to help manage stress:   Do something active. Exercise or activity can help reduce stress. Walking is a great way to get started. Even everyday activities such as housecleaning or yard work can help.  Try yoga or andre chi. These techniques combine exercise and meditation. You may need some training at first to learn them.  Do something you enjoy. For example, listen to music or go to a movie. Practice your hobby or do volunteer  "work.  Meditate. This can help you relax, because you are not worrying about what happened before or what may happen in the future.  Do guided imagery. Imagine yourself in any setting that helps you feel calm. You can use online videos, books, or a teacher to guide you.  Do breathing exercises. For example:  From a standing position, bend forward from the waist with your knees slightly bent. Let your arms dangle close to the floor.  Breathe in slowly and deeply as you return to a standing position. Roll up slowly and lift your head last.  Hold your breath for just a few seconds in the standing position.  Breathe out slowly and bend forward from the waist.  Let your feelings out. Talk, laugh, cry, and express anger when you need to. Talking with supportive friends or family, a counselor, or a mykel leader about your feelings is a healthy way to relieve stress. Avoid discussing your feelings with people who make you feel worse.  Write. It may help to write about things that are bothering you. This helps you find out how much stress you feel and what is causing it. When you know this, you can find better ways to cope.  What can you do to prevent stress?  You might try some of these things to help prevent stress:  Manage your time. This helps you find time to do the things you want and need to do.  Get enough sleep. Your body recovers from the stresses of the day while you are sleeping.  Get support. Your family, friends, and community can make a difference in how you experience stress.  Limit your news feed. Avoid or limit time on social media or news that may make you feel stressed.  Do something active. Exercise or activity can help reduce stress. Walking is a great way to get started.  Where can you learn more?  Go to https://www.[employer].net/patiented  Enter N032 in the search box to learn more about \"Learning About Stress.\"  Current as of: October 24, 2024  Content Version: 14.4 2024-2025 [employer].   Care instructions adapted under license by your healthcare professional. If you have questions about a medical condition or this instruction, always ask your healthcare professional. [employer] disclaims any warranty or liability for your use of this information.    Learning About Depression Screening  What is depression screening?  Depression screening is a way to see if you have depression symptoms. It may be done by a doctor or counselor. It's often part of a routine checkup. That's because your mental health is just as important as your physical health.  Depression is a mental health condition that affects how you feel, think, and act. You may:  Have less energy.  Lose interest in your daily activities.  Feel sad and grouchy for a long time.  Depression is very common. It affects people of all ages.  Many things can lead to depression. Some people become depressed after they have a stroke or find out they have a major illness like cancer or heart disease. The death of a loved one or a breakup may lead to depression. It can run in families. Most experts believe that a combination of inherited genes and stressful life events can cause it.  What happens during screening?  You may be asked to fill out a form about your depression symptoms. You and the doctor will discuss your answers. The doctor may ask you more questions to learn more about how you think, act, and feel.  What happens after screening?  If you have symptoms of depression, your doctor will talk to you about your options.  Doctors usually treat depression with medicines or counseling. Often, combining the two works best. Many people don't get help because they think that they'll get over the depression on their own. But people with depression may not get better unless they get treatment.  The cause of depression is not well understood. There may be many factors involved. But if you have depression, it's not your fault.  A serious  "symptom of depression is thinking about death or suicide. If you or someone you care about talks about this or about feeling hopeless, get help right away.  It's important to know that depression can be treated. Medicine, counseling, and self-care may help.  Where can you learn more?  Go to https://www.[employer]net/patiented  Enter T185 in the search box to learn more about \"Learning About Depression Screening.\"  Current as of: July 31, 2024  Content Version: 14.4    1482-3866 [employer].   Care instructions adapted under license by your healthcare professional. If you have questions about a medical condition or this instruction, always ask your healthcare professional. [employer] disclaims any warranty or liability for your use of this information.    Substance Use Disorder: Care Instructions  Overview     You can improve your life and health by stopping your use of alcohol or drugs. When you don't drink or use drugs, you may feel and sleep better. You may get along better with your family, friends, and coworkers. There are medicines and programs that can help with substance use disorder.  How can you care for yourself at home?  Here are some ways to help you stay sober and prevent relapse.  If you have been given medicine to help keep you sober or reduce your cravings, be sure to take it exactly as prescribed.  Talk to your doctor about programs that can help you stop using drugs or drinking alcohol.  Do not keep alcohol or drugs in your home.  Plan ahead. Think about what you'll say if other people ask you to drink or use drugs. Try not to spend time with people who drink or use drugs.  Use the time and money spent on drinking or drugs to do something that's important to you.  Preventing a relapse  Have a plan to deal with relapse. Learn to recognize changes in your thinking that lead you to drink or use drugs. Get help before you start to drink or use drugs again.  Try to stay away from " situations, friends, or places that may lead you to drink or use drugs.  If you feel the need to drink alcohol or use drugs again, seek help right away. Call a trusted friend or family member. Some people get support from organizations such as Narcotics Anonymous or [employer] [facility] or from treatment facilities.  If you relapse, get help as soon as you can. Some people make a plan with another person that outlines what they want that person to do for them if they relapse. The plan usually includes how to handle the relapse and who to notify in case of relapse.  Don't give up. Remember that a relapse doesn't mean that you have failed. Use the experience to learn the triggers that lead you to drink or use drugs. Then quit again. Recovery is a lifelong process. Many people have several relapses before they are able to quit for good.  Follow-up care is a key part of your treatment and safety. Be sure to make and go to all appointments, and call your doctor if you are having problems. It's also a good idea to know your test results and keep a list of the medicines you take.  When should you call for help?   Call 161  anytime you think you may need emergency care. For example, call if you or someone else:    Has overdosed or has withdrawal signs. Be sure to tell the emergency workers that you are or someone else is using or trying to quit using drugs. Overdose or withdrawal signs may include:  Losing consciousness.  Seizure.  Seeing or hearing things that aren't there (hallucinations).     Is thinking or talking about suicide or harming others.   Where to get help 24 hours a day, 7 days a week   If you or someone you know talks about suicide, self-harm, a mental health crisis, a substance use crisis, or any other kind of emotional distress, get help right away. You can:    Call the Suicide and Crisis Lifeline at 986.     Call 6-717-658-TALK (1-579.790.3191).     Text HOME to 415426 to access the Crisis Text Line.   Consider  "saving these numbers in your phone.  Go to [employer] for more information or to chat online.  Call your doctor now or seek immediate medical care if:    You are having withdrawal symptoms. These may include nausea or vomiting, sweating, shakiness, and anxiety.   Watch closely for changes in your health, and be sure to contact your doctor if:    You have a relapse.     You need more help or support to stop.   Where can you learn more?  Go to https://www.[employer].net/patiented  Enter H573 in the search box to learn more about \"Substance Use Disorder: Care Instructions.\"  Current as of: August 20, 2024  Content Version: 14.4    3790-1197 [employer].   Care instructions adapted under license by your healthcare professional. If you have questions about a medical condition or this instruction, always ask your healthcare professional. [employer] disclaims any warranty or liability for your use of this information.       "

## 2025-04-21 NOTE — PROGRESS NOTES
Preventive Care Visit  [facility]  Arin Scherer, DAMIEN, Nurse Practitioner - Family  Apr 21, 2025      Assessment & Plan     Routine general medical examination at a health care facility  Discussed recommended vaccines    Moderate major depression (H)  TA[place]NA  Seen for this with virtual 3/11/25.  Came to me on the BID xanax.  Will not change at this time due to recent passing of her father.  She is doing counseling, exercise- lost over 10 lbs and self care.      Chronic bronchitis, unspecified chronic bronchitis type (H)  Quit smoking 2020.  Rare albuterol use    Hyperlipidemia LDL goal <130  On statin- check fasting labs  - Lipid panel reflex to direct LDL Non-fasting; Future  - Lipid panel reflex to direct LDL Non-fasting    Urinary frequency  Ua negative  - UA Macroscopic with reflex to Microscopic and Culture - Lab Collect; Future  - UA Macroscopic with reflex to Microscopic and Culture - Lab Collect    Nausea  Check labs.  Has had gall bladder out.  She is not sure if related to anxiety/ exhaustion  - TSH with free T4 reflex; Future  - Comprehensive metabolic panel (BMP + Alb, Alk Phos, ALT, AST, Total. Bili, TP); Future  - TSH with free T4 reflex  - Comprehensive metabolic panel (BMP + Alb, Alk Phos, ALT, AST, Total. Bili, TP)    Lymphadenopathy  Left axilla- no recent shots, tatoos.  Recent Mammogram normal.  Will get CBC and us.  Did have covid prior to feeling them but have been present for two months  - US Axillary Left; Future  - CBC with platelets and differential; Future  - CBC with platelets and differential        Patient has been advised of split billing requirements and indicates understanding: Yes        Counseling  Appropriate preventive services were addressed with this patient via screening, questionnaire, or discussion as appropriate for fall prevention, nutrition, physical activity, Tobacco-use cessation, social engagement, weight loss and cognition.  Checklist  reviewing preventive services available has been given to the patient.  Reviewed patient's diet, addressing concerns and/or questions.   The patient was instructed to see the dentist every 6 months.   She is at risk for psychosocial distress and has been provided with information to reduce risk.   The patient's PHQ-9 score is consistent with moderate depression. She was provided with information regarding depression.         The longitudinal plan of care for the diagnosis(es)/condition(s) as documented were addressed during this visit. Due to the added complexity in care, I will continue to support Kassandra in the subsequent management and with ongoing continuity of care.     Subjective   Kassandra is a 55 year old, presenting for the following:  Physical        4/21/2025    12:48 PM   Additional Questions   Roomed by Wanda ALVARADO         4/21/2025    12:48 PM   Patient Reported Additional Medications   Patient reports taking the following new medications multi vitamin, calcium, vitamin d          HPI  Cloudy urine and frequency    Nausea  Hot flashes  Left axillary nodes- been there two months, tender  No new tatoos, cuts, no shots    Did have covid 2 months ago lymph nodes came after.  She was very sick with this      Advance Care Planning    Discussed advance care planning with patient; informed AVS has link to [facility] [employer].        4/16/2025   General Health   How would you rate your overall physical health? (!) FAIR   Feel stress (tense, anxious, or unable to sleep) To some extent   (!) STRESS CONCERN      4/16/2025   Nutrition   Three or more servings of calcium each day? Yes   Diet: Regular (no restrictions)   How many servings of fruit and vegetables per day? (!) 2-3   How many sweetened beverages each day? 0-1         4/16/2025   Exercise   Days per week of moderate/strenous exercise 5 days   Average minutes spent exercising at this level 150+ min         4/16/2025   Social Factors   Frequency of gathering with  friends or relatives Twice a week   Worry food won't last until get money to buy more No   Food not last or not have enough money for food? No   Do you have housing? (Housing is defined as stable permanent housing and does not include staying ouside in a car, in a tent, in an abandoned building, in an overnight shelter, or couch-surfing.) No   Are you worried about losing your housing? No   Lack of transportation? No   Unable to get utilities (heat,electricity)? No   Want help with housing or utility concern? No   (!) HOUSING CONCERN PRESENT      2025   Fall Risk   Fallen 2 or more times in the past year? No   Trouble with walking or balance? No          2025   Dental   Dentist two times every year? (!) NO       Today's PHQ-9 Score:       2025    10:18 AM   PHQ-9 SCORE   PHQ-9 Total Score MyChart 10 (Moderate depression)   PHQ-9 Total Score 10        Patient-reported         2025   Substance Use   Alcohol more than 3/day or more than 7/wk No   Do you use any other substances recreationally? No    (!) XANAX OR OTHER BENZOS       Multiple values from one day are sorted in reverse-chronological order     Social History     Tobacco Use    Smoking status: Former     Current packs/day: 0.00     Average packs/day: 0.5 packs/day for 14.2 years (7.1 ttl pk-yrs)     Types: Cigarettes     Start date: 10/14/2005     Quit date: 2020     Years since quittin.3    Smokeless tobacco: Former     Quit date: 2020    Tobacco comments:     Dont smoke anymore   Vaping Use    Vaping status: Never Used   Substance Use Topics    Alcohol use: No     Comment: recovering  - 2005    Drug use: No           4/10/2025   LAST FHS-7 RESULTS   1st degree relative breast or ovarian cancer No   Any relative bilateral breast cancer No   Any male have breast cancer No   Any ONE woman have BOTH breast AND ovarian cancer No   Any woman with breast cancer before 50yrs No   2 or more relatives with breast AND/OR ovarian cancer  No   2 or more relatives with breast AND/OR bowel cancer No        Mammogram Screening - Mammogram every 1-2 years updated in Health Maintenance based on mutual decision making        2025   STI Screening   New sexual partner(s) since last STI/HIV test? No     History of abnormal Pap smear: No - age 30- 64 PAP with HPV every 5 years recommended        Latest Ref Rng & Units 2024     9:03 AM 2021     9:14 AM 2018    11:51 AM   PAP / HPV   PAP  Negative for Intraepithelial Lesion or Malignancy (NILM)  Negative for Intraepithelial Lesion or Malignancy (NILM)     PAP (Historical)    NIL    HPV 16 DNA Negative Negative  Negative     HPV 18 DNA Negative Negative  Negative     Other HR HPV Negative Negative  Negative       ASCVD Risk   The 10-year ASCVD risk score (Ismael NASCIMENTO, et al., 2019) is: 1.5%    Values used to calculate the score:      Age: 55 years      Sex: Female      Is Non- : No      Diabetic: No      Tobacco smoker: No      Systolic Blood Pressure: 120 mmHg      Is BP treated: No      HDL Cholesterol: 52 mg/dL      Total Cholesterol: 191 mg/dL    Fracture Risk Assessment Tool  Link to Frax Calculator  Use the information below to complete the Frax calculator  : 1970  Sex: female  Weight (kg): 68.9 kg (actual weight)  Height (cm): 163.3 cm  Previous Fragility Fracture:  Yes  History of parent with fractured hip:  No  Current Smoking:  No  Patient has been on glucocorticoids for more than 3 months (5mg/day or more): No  Rheumatoid Arthritis on Problem List:  No  Secondary Osteoporosis on Problem List:  No  Consumes 3 or more units of alcohol per day: No  Femoral Neck BMD (g/cm2)           Reviewed and updated as needed this visit by Provider                    Past Medical History:   Diagnosis Date    Allergic rhinitis, cause unspecified     Allergic rhinitis - weeds and pollan    Anxiety     Bladder polyps     Closed fracture of lateral portion of left  "tibial plateau with routine healing, subsequent encounter 05/01/2024    Complete tear, knee, anterior cruciate ligament, left, subsequent encounter 05/01/2024    Depressive disorder     Obstructive chronic bronchitis with exacerbation (H)     Other and unspecified ovarian cyst     Ovarian cyst - rupured cyst + laser x3    Other and unspecified ovarian cyst 09/14/2005    Left hemorrhagic ovarian cyst.    Tobacco abuse     Urinary tract infection, site not specified      Past Surgical History:   Procedure Laterality Date    ABDOMEN SURGERY  Hisarectome    Along time ago galbladder    ESOPHAGOSCOPY, GASTROSCOPY, DUODENOSCOPY (EGD), COMBINED  11/28/2012    Procedure: COMBINED ESOPHAGOSCOPY, GASTROSCOPY, DUODENOSCOPY (EGD), BIOPSY SINGLE OR MULTIPLE;  ESOPHAGOSCOPY, GASTROSCOPY, DUODENOSCOPY (EGD) with biopsy;  Surgeon: Herson Cabrera MD;  Location: [facility]    EXCIS VAGINAL CYST/TUMOR      HC REMOVAL OF OVARY/TUBE(S)  03/06/2006    Laparoscopic bilateral salpingo-oophorectomy.    HC TYMPANOPLASTY W/O MASTOID, INIT/REV W/O OSS CHAIN RECONST  04/01/2002    LAPAROSCOPIC CHOLECYSTECTOMY N/A 04/06/2017    Procedure: LAPAROSCOPIC CHOLECYSTECTOMY;  Surgeon: Shivam Luevano MD;  Location: [facility]    ZZC LIGATE FALLOPIAN TUBE,POSTPARTUM      Tubal Ligation         Review of Systems  Constitutional, HEENT, cardiovascular, pulmonary, GI, , musculoskeletal, neuro, skin, endocrine and psych systems are negative, except as otherwise noted.     Objective    Exam  /80   Pulse 80   Temp 97.8  F (36.6  C) (Temporal)   Resp 16   Ht 1.632 m (5' 4.27\")   Wt 68.9 kg (152 lb)   LMP 03/06/2006   SpO2 99%   BMI 25.87 kg/m     Estimated body mass index is 25.87 kg/m  as calculated from the following:    Height as of this encounter: 1.632 m (5' 4.27\").    Weight as of this encounter: 68.9 kg (152 lb).    Physical Exam  GENERAL: alert and no distress  EYES: Eyes grossly normal to inspection, PERRL and conjunctivae and sclerae " normal  HENT: ear canals and TM's normal, nose and mouth without ulcers or lesions  NECK: no adenopathy, no asymmetry, masses, or scars  RESP: lungs clear to auscultation - no rales, rhonchi or wheezes  CV: regular rate and rhythm, normal S1 S2, no S3 or S4, no murmur, click or rub, no peripheral edema  ABDOMEN: soft, nontender, no hepatosplenomegaly, no masses and bowel sounds normal  MS: no gross musculoskeletal defects noted, no edema  SKIN: no suspicious lesions or rashes  NEURO: Normal strength and tone, mentation intact and speech normal  PSYCH: mentation appears normal, affect normal/bright  LYMPH: supraclavicular: no adenopathy  axillary: enlarged lymph nodes in the left axillary area.        Signed Electronically by: Arin Scherer NP    Answers submitted by the patient for this visit:  Patient Health Questionnaire (Submitted on 4/21/2025)  If you checked off any problems, how difficult have these problems made it for you to do your work, take care of things at home, or get along with other people?: Somewhat difficult  PHQ9 TOTAL SCORE: 10

## 2025-04-21 NOTE — NURSING NOTE
Prior to immunization administration, verified patients identity using patient s name and date of birth. Please see Immunization Activity for additional information.     Screening Questionnaire for Adult Immunization    Are you sick today?   No   Do you have allergies to medications, food, a vaccine component or latex?   Yes   Have you ever had a serious reaction after receiving a vaccination?   No   Do you have a long-term health problem with heart, lung, kidney, or metabolic disease (e.g., diabetes), asthma, a blood disorder, no spleen, complement component deficiency, a cochlear implant, or a spinal fluid leak?  Are you on long-term aspirin therapy?   No   Do you have cancer, leukemia, HIV/AIDS, or any other immune system problem?   No   Do you have a parent, brother, or sister with an immune system problem?   No   In the past 3 months, have you taken medications that affect  your immune system, such as prednisone, other steroids, or anticancer drugs; drugs for the treatment of rheumatoid arthritis, Crohn s disease, or psoriasis; or have you had radiation treatments?   No   Have you had a seizure, or a brain or other nervous system problem?   No   During the past year, have you received a transfusion of blood or blood    products, or been given immune (gamma) globulin or antiviral drug?   No   For women: Are you pregnant or is there a chance you could become       pregnant during the next month?   No   Have you received any vaccinations in the past 4 weeks?   No     Immunization questionnaire was positive for at least one answer.  Notified Arin Scherer NP.      Patient instructed to remain in clinic for 15 minutes afterwards, and to report any adverse reactions.     Screening performed by Wanda Fontana LPN on 4/21/2025 at 12:56 PM.

## 2025-05-01 NOTE — TELEPHONE ENCOUNTER
Hard RX copy signed walked to  for p/u by [facility]   pharmacy.Yanely TERRY    
hydrocodcone      Last Written Prescription Date: 12/15/16  Last Fill Quantity: 45,  # refills: 0   Last Office Visit with [facility], [facility] or [facility] prescribing provider: 11/17/16                                                 
and proprioception, and improving postural awareness. Next visit plan to add new exercises and continue current phase     Electronically Signed by Olga Erwin, OMAR  Date: 05/01/2025     Note: Portions of this note have been templated and/or copied from initial evaluation, reassessments and prior notes for documentation efficiency.    Note: If patient does not return for scheduled/recommended follow up visits, this note will serve as a discharge from care along with the most recent update on progress.    Ortho Evaluation

## 2025-05-19 ENCOUNTER — MYC REFILL (OUTPATIENT)
Dept: FAMILY MEDICINE | Facility: CLINIC | Age: 55
End: 2025-05-19
Payer: COMMERCIAL

## 2025-05-19 DIAGNOSIS — F41.1 GAD (GENERALIZED ANXIETY DISORDER): ICD-10-CM

## 2025-05-19 RX ORDER — ALPRAZOLAM 0.5 MG
0.5 TABLET ORAL 2 TIMES DAILY PRN
Qty: 60 TABLET | Refills: 0 | Status: SHIPPED | OUTPATIENT
Start: 2025-05-19

## 2025-05-23 ENCOUNTER — HOSPITAL ENCOUNTER (OUTPATIENT)
Dept: ULTRASOUND IMAGING | Facility: CLINIC | Age: 55
Discharge: HOME | End: 2025-05-23
Attending: NURSE PRACTITIONER
Payer: COMMERCIAL

## 2025-05-23 DIAGNOSIS — R59.1 LYMPHADENOPATHY: ICD-10-CM

## 2025-05-23 PROCEDURE — 76882 US LMTD JT/FCL EVL NVASC XTR: CPT | Mod: LT

## 2025-05-27 ENCOUNTER — RESULTS FOLLOW-UP (OUTPATIENT)
Dept: FAMILY MEDICINE | Facility: CLINIC | Age: 55
End: 2025-05-27

## 2025-08-20 ENCOUNTER — MYC REFILL (OUTPATIENT)
Dept: FAMILY MEDICINE | Facility: CLINIC | Age: 55
End: 2025-08-20
Payer: COMMERCIAL

## 2025-08-20 DIAGNOSIS — F41.1 GAD (GENERALIZED ANXIETY DISORDER): ICD-10-CM

## 2025-08-21 RX ORDER — ALPRAZOLAM 0.5 MG
0.5 TABLET ORAL 2 TIMES DAILY PRN
Qty: 60 TABLET | Refills: 0 | Status: SHIPPED | OUTPATIENT
Start: 2025-08-21

## (undated) DEVICE — SOL NACL 0.9% INJ 1000ML BAG 07983-09

## (undated) DEVICE — ANTIFOG SOLUTION W/FOAM PAD 31142527

## (undated) DEVICE — SU MONOCRYL 4-0 PS-2 18" UND Y496G

## (undated) DEVICE — ESU HOOK TIP 5MM CONMED

## (undated) DEVICE — DRAPE IOBAN INCISE 23X17" 6650EZ

## (undated) DEVICE — GOWN LG DISP 9515

## (undated) DEVICE — SYR 10ML PREFILLED 0.9% NACL INJ NOT STERILE 306500

## (undated) DEVICE — NDL INSUFFLATION 120MM VERRES 172015

## (undated) DEVICE — GLOVE PROTEXIS W/NEU-THERA 6.5  2D73TE65

## (undated) DEVICE — ESU PENCIL W/HOLSTER

## (undated) DEVICE — PACK GENERAL LAPAOSCOPY

## (undated) DEVICE — SOL ADH LIQUID BENZOIN SWAB 0.6ML C1544

## (undated) DEVICE — HEMOSTAT ABSORBABLE POWDER ARISTA 1GM SM0005-USA

## (undated) DEVICE — GLOVE BIOGEL 6.5 LATEX

## (undated) DEVICE — STOCKING SLEEVE COMPRESSION CALF MED

## (undated) DEVICE — ESU GROUND PAD UNIVERSAL W/O CORD

## (undated) DEVICE — ESU SUCTION/IRRIGATION SYSTEM PISTOL GRIP

## (undated) DEVICE — ENDO DISSECTOR BLUNT 05MM 3/PK 173019

## (undated) DEVICE — DRSG STERI STRIP 1/2X4" R1547

## (undated) DEVICE — GLOVE PROTEXIS W/NEU-THERA 6.0  2D73TE60

## (undated) DEVICE — ENDO TROCAR BLADELESS 05X100MM B5LT

## (undated) DEVICE — SU PDS II 0 CT-1 36" Z346H

## (undated) DEVICE — CATH TRAY FOLEY SURESTEP 16FR DRAIN BAG STATOCK A899916

## (undated) DEVICE — ENDO TROCAR BLADELESS 12X100MM B12LT

## (undated) RX ORDER — GLYCOPYRROLATE 0.2 MG/ML
INJECTION INTRAMUSCULAR; INTRAVENOUS
Status: DISPENSED
Start: 2017-04-06

## (undated) RX ORDER — ONDANSETRON 2 MG/ML
INJECTION INTRAMUSCULAR; INTRAVENOUS
Status: DISPENSED
Start: 2017-04-06

## (undated) RX ORDER — DEXAMETHASONE SODIUM PHOSPHATE 10 MG/ML
INJECTION INTRAMUSCULAR; INTRAVENOUS
Status: DISPENSED
Start: 2017-04-06

## (undated) RX ORDER — FENTANYL CITRATE 50 UG/ML
INJECTION, SOLUTION INTRAMUSCULAR; INTRAVENOUS
Status: DISPENSED
Start: 2017-04-06

## (undated) RX ORDER — BUPIVACAINE HYDROCHLORIDE AND EPINEPHRINE 2.5; 5 MG/ML; UG/ML
INJECTION, SOLUTION EPIDURAL; INFILTRATION; INTRACAUDAL; PERINEURAL
Status: DISPENSED
Start: 2017-04-06

## (undated) RX ORDER — PROPOFOL 10 MG/ML
INJECTION, EMULSION INTRAVENOUS
Status: DISPENSED
Start: 2017-04-06

## (undated) RX ORDER — LIDOCAINE HYDROCHLORIDE 20 MG/ML
INJECTION, SOLUTION EPIDURAL; INFILTRATION; INTRACAUDAL; PERINEURAL
Status: DISPENSED
Start: 2017-04-06